# Patient Record
Sex: FEMALE | Race: WHITE | Employment: OTHER | URBAN - NONMETROPOLITAN AREA
[De-identification: names, ages, dates, MRNs, and addresses within clinical notes are randomized per-mention and may not be internally consistent; named-entity substitution may affect disease eponyms.]

---

## 2024-09-20 PROBLEM — I10 ESSENTIAL HYPERTENSION: Status: ACTIVE | Noted: 2020-09-24

## 2024-09-20 PROBLEM — E87.6 HYPOKALEMIA: Status: ACTIVE | Noted: 2020-09-24

## 2024-09-20 PROBLEM — K56.699 STRICTURE OF SIGMOID COLON (HCC): Status: ACTIVE | Noted: 2023-01-05

## 2024-09-20 PROBLEM — N32.1 COLOVESICAL FISTULA: Status: ACTIVE | Noted: 2023-09-15

## 2024-09-20 PROBLEM — Z86.711 HISTORY OF PULMONARY EMBOLUS (PE): Status: ACTIVE | Noted: 2020-09-24

## 2024-09-20 PROBLEM — F41.9 ANXIETY: Status: ACTIVE | Noted: 2020-09-24

## 2024-09-20 PROBLEM — J30.9 ALLERGIC RHINITIS: Status: ACTIVE | Noted: 2020-09-24

## 2024-09-20 PROBLEM — Z86.718 PERSONAL HISTORY OF VENOUS THROMBOSIS AND EMBOLISM: Status: ACTIVE | Noted: 2017-06-20

## 2024-09-20 PROBLEM — E03.9 HYPOTHYROIDISM: Status: ACTIVE | Noted: 2020-09-24

## 2024-09-20 PROBLEM — K21.9 GASTROESOPHAGEAL REFLUX DISEASE: Status: ACTIVE | Noted: 2020-09-24

## 2024-09-20 PROBLEM — D64.9 ANEMIA: Status: ACTIVE | Noted: 2022-11-17

## 2024-09-20 RX ORDER — PHENOL 1.4 %
10 AEROSOL, SPRAY (ML) MUCOUS MEMBRANE NIGHTLY
COMMUNITY

## 2024-09-20 RX ORDER — MONTELUKAST SODIUM 10 MG/1
10 TABLET ORAL DAILY
COMMUNITY
Start: 2024-07-30 | End: 2025-01-26

## 2024-09-20 RX ORDER — BETAMETHASONE DIPROPIONATE 0.5 MG/G
CREAM TOPICAL PRN
COMMUNITY
End: 2024-09-24

## 2024-09-20 RX ORDER — METOPROLOL SUCCINATE 50 MG/1
50 TABLET, EXTENDED RELEASE ORAL DAILY
COMMUNITY
Start: 2024-07-30 | End: 2025-01-26

## 2024-09-20 RX ORDER — BUSPIRONE HYDROCHLORIDE 10 MG/1
10 TABLET ORAL 2 TIMES DAILY
COMMUNITY
Start: 2024-07-30 | End: 2025-01-26

## 2024-09-20 RX ORDER — PANTOPRAZOLE SODIUM 40 MG/1
40 TABLET, DELAYED RELEASE ORAL DAILY
COMMUNITY
Start: 2024-07-30 | End: 2025-01-26

## 2024-09-20 RX ORDER — POTASSIUM CHLORIDE 1500 MG/1
20 TABLET, EXTENDED RELEASE ORAL DAILY
COMMUNITY
Start: 2024-07-30 | End: 2025-01-26

## 2024-09-20 RX ORDER — LOSARTAN POTASSIUM AND HYDROCHLOROTHIAZIDE 25; 100 MG/1; MG/1
1 TABLET ORAL DAILY
COMMUNITY
Start: 2024-07-30 | End: 2025-01-26

## 2024-09-20 RX ORDER — WARFARIN SODIUM 5 MG/1
5 TABLET ORAL
COMMUNITY
Start: 2024-07-25

## 2024-09-20 RX ORDER — LEVOTHYROXINE SODIUM 125 UG/1
125 TABLET ORAL DAILY
COMMUNITY
Start: 2024-04-24 | End: 2024-10-21

## 2024-09-20 RX ORDER — ACETAMINOPHEN 325 MG/1
325 TABLET ORAL EVERY 6 HOURS PRN
COMMUNITY

## 2024-09-24 ENCOUNTER — OFFICE VISIT (OUTPATIENT)
Dept: SURGERY | Age: 74
End: 2024-09-24
Payer: MEDICARE

## 2024-09-24 VITALS
WEIGHT: 183 LBS | RESPIRATION RATE: 18 BRPM | SYSTOLIC BLOOD PRESSURE: 120 MMHG | BODY MASS INDEX: 34.55 KG/M2 | DIASTOLIC BLOOD PRESSURE: 64 MMHG | HEART RATE: 87 BPM | HEIGHT: 61 IN | TEMPERATURE: 97.5 F | OXYGEN SATURATION: 96 %

## 2024-09-24 DIAGNOSIS — N82.4 COLOVAGINAL FISTULA: Primary | ICD-10-CM

## 2024-09-24 PROCEDURE — 3074F SYST BP LT 130 MM HG: CPT | Performed by: SURGERY

## 2024-09-24 PROCEDURE — G8427 DOCREV CUR MEDS BY ELIG CLIN: HCPCS | Performed by: SURGERY

## 2024-09-24 PROCEDURE — G8417 CALC BMI ABV UP PARAM F/U: HCPCS | Performed by: SURGERY

## 2024-09-24 PROCEDURE — 99204 OFFICE O/P NEW MOD 45 MIN: CPT | Performed by: SURGERY

## 2024-09-24 PROCEDURE — 3078F DIAST BP <80 MM HG: CPT | Performed by: SURGERY

## 2024-09-24 PROCEDURE — 1090F PRES/ABSN URINE INCON ASSESS: CPT | Performed by: SURGERY

## 2024-09-24 RX ORDER — FERROUS SULFATE 325(65) MG
325 TABLET ORAL
COMMUNITY

## 2024-09-24 RX ORDER — CETIRIZINE HYDROCHLORIDE 5 MG/1
5 TABLET ORAL DAILY
COMMUNITY

## 2024-09-24 RX ORDER — MONTELUKAST SODIUM 10 MG/1
10 TABLET ORAL NIGHTLY
COMMUNITY

## 2024-10-03 ENCOUNTER — HOSPITAL ENCOUNTER (OUTPATIENT)
Dept: CT IMAGING | Age: 74
Discharge: HOME | End: 2024-10-03
Attending: RADIOLOGY

## 2024-10-03 ENCOUNTER — HOSPITAL ENCOUNTER (OUTPATIENT)
Dept: GENERAL RADIOLOGY | Age: 74
Discharge: HOME | End: 2024-10-03

## 2024-10-03 DIAGNOSIS — Z00.6 EXAMINATION FOR NORMAL COMPARISON FOR CLINICAL RESEARCH: ICD-10-CM

## 2024-10-11 ENCOUNTER — OFFICE VISIT (OUTPATIENT)
Dept: SURGERY | Age: 74
End: 2024-10-11
Payer: MEDICARE

## 2024-10-11 VITALS
DIASTOLIC BLOOD PRESSURE: 68 MMHG | OXYGEN SATURATION: 97 % | SYSTOLIC BLOOD PRESSURE: 134 MMHG | TEMPERATURE: 97.1 F | HEART RATE: 76 BPM | RESPIRATION RATE: 18 BRPM

## 2024-10-11 DIAGNOSIS — N82.4 COLOVAGINAL FISTULA: ICD-10-CM

## 2024-10-11 DIAGNOSIS — Z01.818 PRE-OP TESTING: ICD-10-CM

## 2024-10-11 DIAGNOSIS — K56.699 STRICTURE OF SIGMOID COLON (HCC): Primary | ICD-10-CM

## 2024-10-11 DIAGNOSIS — I10 PRIMARY HYPERTENSION: ICD-10-CM

## 2024-10-11 PROCEDURE — G8399 PT W/DXA RESULTS DOCUMENT: HCPCS | Performed by: SURGERY

## 2024-10-11 PROCEDURE — 3078F DIAST BP <80 MM HG: CPT | Performed by: SURGERY

## 2024-10-11 PROCEDURE — 3075F SYST BP GE 130 - 139MM HG: CPT | Performed by: SURGERY

## 2024-10-11 PROCEDURE — G8427 DOCREV CUR MEDS BY ELIG CLIN: HCPCS | Performed by: SURGERY

## 2024-10-11 PROCEDURE — 3017F COLORECTAL CA SCREEN DOC REV: CPT | Performed by: SURGERY

## 2024-10-11 PROCEDURE — 1036F TOBACCO NON-USER: CPT | Performed by: SURGERY

## 2024-10-11 PROCEDURE — G8484 FLU IMMUNIZE NO ADMIN: HCPCS | Performed by: SURGERY

## 2024-10-11 PROCEDURE — G8417 CALC BMI ABV UP PARAM F/U: HCPCS | Performed by: SURGERY

## 2024-10-11 PROCEDURE — 99215 OFFICE O/P EST HI 40 MIN: CPT | Performed by: SURGERY

## 2024-10-11 PROCEDURE — 1090F PRES/ABSN URINE INCON ASSESS: CPT | Performed by: SURGERY

## 2024-10-11 PROCEDURE — 1123F ACP DISCUSS/DSCN MKR DOCD: CPT | Performed by: SURGERY

## 2024-10-11 NOTE — PROGRESS NOTES
Casimiro Callahan D.O. [facility]  [facility] GENERAL SURGERY  [address]  [place] [address]  [phone]  Established Patient Evaluation in Office    Pt Name: Marry Ayers  Date of Birth 1950   Today's Date: 10/11/2024  Medical Record Number: [id number]  Referring Provider: No ref. provider found  Primary Care Provider: Silvestre Sawyer MD  Chief Complaint   Patient presents with    Results     CT pelvis 10/03/24     ASSESSMENT       Diagnosis Orders   1. Stricture of sigmoid colon (HCC)  EKG 12 Lead    CBC    Comprehensive Metabolic Panel    COLECTOMY PARTIAL / TOTAL      2. Colovaginal fistula        3. Primary hypertension  EKG 12 Lead    CBC    Comprehensive Metabolic Panel      4. Pre-op testing  EKG 12 Lead    CBC    Comprehensive Metabolic Panel          Past Medical History:   Diagnosis Date    Allergic rhinitis     Anemia     Anxiety     Cataract     Essential hypertension     GERD (gastroesophageal reflux disease)     Hypokalemia     Hypothyroidism     Pulmonary embolism (HCC)           PLANS   Assessment & Plan   CT did not definitively show a fistula. No bladder symptoms. Still passing flatus vaginally.  Schedule Marry for sigmoid colectomy  Potential diagnostic and therapeutic modalities were discussed with the patient including surgical and nonsurgical options. The risks, complications and benefits of the options were discussed. Complications including, but not limited to, bleeding, wound infection, anastomotic leak, blood clots, bowel obstruction, other organ injury, injury to surrounding structures, intra-abdominal abscess, and need for colostomy were reviewed. The patient was given an opportunity to ask questions. Once answered, the patient is agreeable to proceed with surgery.  Status: Inpatient  Planned anesthesia: general  She will undergo pre-operative clearance per anesthesia guidelines with risk factors listed under the past medical history diagnosis &

## 2024-11-25 NOTE — PROGRESS NOTES
Called patient to set up PAT visit. States she is getting her hair cut currently and will call back when she gets home.

## 2024-11-25 NOTE — PROGRESS NOTES
PAT VISIT    Date: 11/26/24  Arrival time:  10:30am and location; [facility]   Bring Drivers license and insurance  Bring Medications in original bottles  If possible bring caregiver for appointment  Take am medications with water unless you are holding any for surgery  Appointment may last 2 hours

## 2024-12-02 NOTE — PROGRESS NOTES
Called pt to make sure they are aware to do Bowel Prep Discussed bowel prep instructions for surgery. Verbalizes understanding and denies any questions.  Stressed importance of nothing red. And do the medication at times that was on paper given them.

## 2024-12-03 ENCOUNTER — PREP FOR PROCEDURE (OUTPATIENT)
Dept: SURGERY | Age: 74
End: 2024-12-03

## 2024-12-03 RX ORDER — SODIUM CHLORIDE 0.9 % (FLUSH) 0.9 %
5-40 SYRINGE (ML) INJECTION PRN
Status: CANCELLED | OUTPATIENT
Start: 2024-12-03

## 2024-12-03 RX ORDER — METRONIDAZOLE 500 MG/100ML
500 INJECTION, SOLUTION INTRAVENOUS
Status: CANCELLED | OUTPATIENT
Start: 2024-12-03 | End: 2024-12-03

## 2024-12-03 RX ORDER — SODIUM CHLORIDE 0.9 % (FLUSH) 0.9 %
5-40 SYRINGE (ML) INJECTION EVERY 12 HOURS SCHEDULED
Status: CANCELLED | OUTPATIENT
Start: 2024-12-03

## 2024-12-03 RX ORDER — SODIUM CHLORIDE 9 MG/ML
INJECTION, SOLUTION INTRAVENOUS PRN
Status: CANCELLED | OUTPATIENT
Start: 2024-12-03

## 2024-12-03 NOTE — H&P
ZOIE Dugan  [facility] GENERAL SURGERY  [address]  [place] [address]  [phone]  Established Patient Evaluation in Office    Pt Name: Marry Ayers  Date of Birth 1950   Medical Record Number: [id number]  Referring Provider: No ref. provider found  Primary Care Provider: Silvestre Sawyer MD  Chief Complaint   Patient presents with    Results     CT pelvis 10/03/24     ASSESSMENT       Diagnosis Orders   1. Stricture of sigmoid colon (HCC)  EKG 12 Lead    CBC    Comprehensive Metabolic Panel    COLECTOMY PARTIAL / TOTAL      2. Colovaginal fistula        3. Primary hypertension  EKG 12 Lead    CBC    Comprehensive Metabolic Panel      4. Pre-op testing  EKG 12 Lead    CBC    Comprehensive Metabolic Panel          Past Medical History:   Diagnosis Date    Allergic rhinitis     Anemia     Anxiety     Cataract     Essential hypertension     GERD (gastroesophageal reflux disease)     Hypokalemia     Hypothyroidism     Pulmonary embolism (HCC)           PLANS   Assessment & Plan   CT did not definitively show a fistula. No bladder symptoms. Still passing flatus vaginally.  Schedule Marry for sigmoid colectomy  Potential diagnostic and therapeutic modalities were discussed with the patient including surgical and nonsurgical options. The risks, complications and benefits of the options were discussed. Complications including, but not limited to, bleeding, wound infection, anastomotic leak, blood clots, bowel obstruction, other organ injury, injury to surrounding structures, intra-abdominal abscess, and need for colostomy were reviewed. The patient was given an opportunity to ask questions. Once answered, the patient is agreeable to proceed with surgery.  Status: Inpatient  Planned anesthesia: general  She will undergo pre-operative clearance per anesthesia guidelines with risk factors listed under the past medical history diagnosis & problem list.  Perioperative      busPIRone (BUSPAR) 10 MG tablet Take 1 tablet by mouth 2 times daily      levothyroxine (SYNTHROID) 125 MCG tablet Take 1 tablet by mouth daily      losartan-hydroCHLOROthiazide (HYZAAR) 100-25 MG per tablet Take 1 tablet by mouth daily      Melatonin 10 MG TABS Take 10 mg by mouth nightly      metoprolol succinate (TOPROL XL) 50 MG extended release tablet Take 1 tablet by mouth daily      montelukast (SINGULAIR) 10 MG tablet Take 1 tablet by mouth daily      pantoprazole (PROTONIX) 40 MG tablet Take 1 tablet by mouth daily      potassium chloride (K-TAB) 20 MEQ TBCR extended release tablet Take 1 tablet by mouth daily      warfarin (COUMADIN) 5 MG tablet Take 1 tablet by mouth      acetaminophen (TYLENOL) 325 MG tablet Take 1 tablet by mouth every 6 hours as needed       No current facility-administered medications for this visit.     Allergies  has No Known Allergies.  Family History  family history is not on file.  Social History   reports that she has never smoked. She has never used smokeless tobacco. She reports that she does not currently use alcohol. She reports that she does not use drugs.  Health Screening Exams  Health Maintenance   Topic Date Due    Depression Screen  Never done    Hepatitis C screen  Never done    DTaP/Tdap/Td vaccine (1 - Tdap) Never done    Lipids  Never done    Breast cancer screen  Never done    Colorectal Cancer Screen  Never done    Respiratory Syncytial Virus (RSV) Pregnant or age 60 yrs+ (1 - 1-dose 60+ series) Never done    Pneumococcal 65+ years Vaccine (2 of 2 - PCV) 09/03/2019    Annual Wellness Visit (Medicare)  Never done    COVID-19 Vaccine (7 - 2023-24 season) 01/03/2025    DEXA (modify frequency per FRAX score)  Completed    Flu vaccine  Completed    Shingles vaccine  Completed    Hepatitis A vaccine  Aged Out    Hepatitis B vaccine  Aged Out    Hib vaccine  Aged Out    Polio vaccine  Aged Out    Meningococcal (ACWY) vaccine  Aged Out     Review of  Systems  +clotting disorder. Unless otherwise stated in HPI, ROS was unremarkable.     OBJECTIVE    VITALS:  temporal temperature is 97.1 °F (36.2 °C). Her blood pressure is 134/68 and her pulse is 76. Her respiration is 18 and oxygen saturation is 97%.  Pain Score:   0 - No pain There is no height or weight on file to calculate BMI.  CONSTITUTIONAL: Alert and oriented times 3, no acute distress and cooperative to examination with proper mood and affect.  SKIN: Skin color, texture, turgor normal. No rashes or lesions.  LYMPH: no cervical nodes, no inguinal nodes  HEENT: Head is normocephalic, atraumatic. EOMI, PERRLA.  NECK: Supple, symmetrical, trachea midline, no adenopathy, thyroid symmetric, not enlarged and no tenderness, skin normal.  CHEST/LUNGS: chest symmetric with normal A/P diameter, normal respiratory rate and rhythm, lungs clear to auscultation without wheezes, rales or rhonchi. No accessory muscle use. Scars None   CARDIOVASCULAR: Heart sounds are normal.  Regular rate and rhythm without murmur, gallop or rub. Normal S1 and S2. Carotid and femoral pulses 2+/4 and equal bilaterally.  ABDOMEN: Normal shape. Robotic and low midline scar(s) present. Normal bowel sounds.  No bruits. soft, nontender, nondistended, no masses or organomegaly. no evidence of hernia. Percussion: Normal without hepatosplenomegally.  RECTAL: deferred, not clinically indicated  NEUROLOGIC: There are no focalizing motor or sensory deficits. CN II-XII are grossly intact..   EXTREMITIES: no cyanosis, no clubbing, and no edema.      Thank you for the interesting evaluation. Further recommendations as listed above.       Electronically signed by DO Casimiro Kim DO [facility] GENERAL SURGERY  History and Physical Update    Pt Name: Marry Ayers  MRN: [id number]  YOB: 1950  Date of evaluation: 12/4/2024    I have examined the patient and reviewed the H&P/Consult and there are no changes to the

## 2024-12-04 ENCOUNTER — ANESTHESIA EVENT (OUTPATIENT)
Dept: OPERATING ROOM | Age: 74
End: 2024-12-04
Payer: MEDICARE

## 2024-12-04 ENCOUNTER — ANESTHESIA (OUTPATIENT)
Dept: OPERATING ROOM | Age: 74
End: 2024-12-04
Payer: MEDICARE

## 2024-12-04 ENCOUNTER — HOSPITAL ENCOUNTER (INPATIENT)
Age: 74
LOS: 24 days | Discharge: ANOTHER HOSPITAL | End: 2024-12-28
Attending: SURGERY | Admitting: SURGERY
Payer: MEDICARE

## 2024-12-04 DIAGNOSIS — Z43.2 ILEOSTOMY CARE (HCC): Primary | ICD-10-CM

## 2024-12-04 DIAGNOSIS — K56.699 SIGMOID STRICTURE (HCC): ICD-10-CM

## 2024-12-04 DIAGNOSIS — G89.18 ACUTE POSTOPERATIVE PAIN: ICD-10-CM

## 2024-12-04 PROCEDURE — 7100000001 HC PACU RECOVERY - ADDTL 15 MIN: Performed by: SURGERY

## 2024-12-04 PROCEDURE — 88304 TISSUE EXAM BY PATHOLOGIST: CPT

## 2024-12-04 PROCEDURE — 2580000003 HC RX 258: Performed by: SURGERY

## 2024-12-04 PROCEDURE — 88307 TISSUE EXAM BY PATHOLOGIST: CPT

## 2024-12-04 PROCEDURE — 6360000002 HC RX W HCPCS

## 2024-12-04 PROCEDURE — 6370000000 HC RX 637 (ALT 250 FOR IP): Performed by: SURGERY

## 2024-12-04 PROCEDURE — 0DTN0ZZ RESECTION OF SIGMOID COLON, OPEN APPROACH: ICD-10-PCS | Performed by: SURGERY

## 2024-12-04 PROCEDURE — 2709999900 HC NON-CHARGEABLE SUPPLY: Performed by: SURGERY

## 2024-12-04 PROCEDURE — 7100000000 HC PACU RECOVERY - FIRST 15 MIN: Performed by: SURGERY

## 2024-12-04 PROCEDURE — 3600000014 HC SURGERY LEVEL 4 ADDTL 15MIN: Performed by: SURGERY

## 2024-12-04 PROCEDURE — 6360000002 HC RX W HCPCS: Performed by: SURGERY

## 2024-12-04 PROCEDURE — 6360000002 HC RX W HCPCS: Performed by: NURSE ANESTHETIST, CERTIFIED REGISTERED

## 2024-12-04 PROCEDURE — 0DB80ZZ EXCISION OF SMALL INTESTINE, OPEN APPROACH: ICD-10-PCS | Performed by: SURGERY

## 2024-12-04 PROCEDURE — 2580000003 HC RX 258: Performed by: NURSE ANESTHETIST, CERTIFIED REGISTERED

## 2024-12-04 PROCEDURE — 1200000000 HC SEMI PRIVATE

## 2024-12-04 PROCEDURE — 3700000000 HC ANESTHESIA ATTENDED CARE: Performed by: SURGERY

## 2024-12-04 PROCEDURE — 44140 PARTIAL REMOVAL OF COLON: CPT | Performed by: SURGERY

## 2024-12-04 PROCEDURE — 44120 REMOVAL OF SMALL INTESTINE: CPT | Performed by: SURGERY

## 2024-12-04 PROCEDURE — 2500000003 HC RX 250 WO HCPCS: Performed by: NURSE ANESTHETIST, CERTIFIED REGISTERED

## 2024-12-04 PROCEDURE — 2720000010 HC SURG SUPPLY STERILE: Performed by: SURGERY

## 2024-12-04 PROCEDURE — 6360000002 HC RX W HCPCS: Performed by: ANESTHESIOLOGY

## 2024-12-04 PROCEDURE — 3700000001 HC ADD 15 MINUTES (ANESTHESIA): Performed by: SURGERY

## 2024-12-04 PROCEDURE — 3600000004 HC SURGERY LEVEL 4 BASE: Performed by: SURGERY

## 2024-12-04 RX ORDER — HYDRALAZINE HYDROCHLORIDE 20 MG/ML
INJECTION INTRAMUSCULAR; INTRAVENOUS
Status: COMPLETED
Start: 2024-12-04 | End: 2024-12-04

## 2024-12-04 RX ORDER — ENOXAPARIN SODIUM 100 MG/ML
40 INJECTION SUBCUTANEOUS DAILY
Status: DISCONTINUED | OUTPATIENT
Start: 2024-12-05 | End: 2024-12-12

## 2024-12-04 RX ORDER — ONDANSETRON 2 MG/ML
4 INJECTION INTRAMUSCULAR; INTRAVENOUS EVERY 6 HOURS PRN
Status: DISCONTINUED | OUTPATIENT
Start: 2024-12-04 | End: 2024-12-28 | Stop reason: HOSPADM

## 2024-12-04 RX ORDER — SODIUM CHLORIDE 0.9 % (FLUSH) 0.9 %
5-40 SYRINGE (ML) INJECTION PRN
Status: DISCONTINUED | OUTPATIENT
Start: 2024-12-04 | End: 2024-12-25

## 2024-12-04 RX ORDER — ROCURONIUM BROMIDE 10 MG/ML
INJECTION, SOLUTION INTRAVENOUS
Status: DISCONTINUED | OUTPATIENT
Start: 2024-12-04 | End: 2024-12-04 | Stop reason: SDUPTHER

## 2024-12-04 RX ORDER — SODIUM CHLORIDE 9 MG/ML
INJECTION, SOLUTION INTRAVENOUS CONTINUOUS
Status: DISCONTINUED | OUTPATIENT
Start: 2024-12-04 | End: 2024-12-06

## 2024-12-04 RX ORDER — HYDRALAZINE HYDROCHLORIDE 20 MG/ML
10 INJECTION INTRAMUSCULAR; INTRAVENOUS
Status: DISCONTINUED | OUTPATIENT
Start: 2024-12-04 | End: 2024-12-04 | Stop reason: HOSPADM

## 2024-12-04 RX ORDER — ONDANSETRON 2 MG/ML
INJECTION INTRAMUSCULAR; INTRAVENOUS
Status: DISCONTINUED | OUTPATIENT
Start: 2024-12-04 | End: 2024-12-04 | Stop reason: SDUPTHER

## 2024-12-04 RX ORDER — DEXAMETHASONE SODIUM PHOSPHATE 10 MG/ML
INJECTION, EMULSION INTRAMUSCULAR; INTRAVENOUS
Status: DISCONTINUED | OUTPATIENT
Start: 2024-12-04 | End: 2024-12-04 | Stop reason: SDUPTHER

## 2024-12-04 RX ORDER — SODIUM CHLORIDE 0.9 % (FLUSH) 0.9 %
5-40 SYRINGE (ML) INJECTION EVERY 12 HOURS SCHEDULED
Status: DISCONTINUED | OUTPATIENT
Start: 2024-12-04 | End: 2024-12-04 | Stop reason: HOSPADM

## 2024-12-04 RX ORDER — SODIUM CHLORIDE 0.9 % (FLUSH) 0.9 %
5-40 SYRINGE (ML) INJECTION EVERY 12 HOURS SCHEDULED
Status: DISCONTINUED | OUTPATIENT
Start: 2024-12-04 | End: 2024-12-25

## 2024-12-04 RX ORDER — MONTELUKAST SODIUM 10 MG/1
10 TABLET ORAL NIGHTLY
Status: DISCONTINUED | OUTPATIENT
Start: 2024-12-04 | End: 2024-12-06

## 2024-12-04 RX ORDER — HYDROCHLOROTHIAZIDE 25 MG/1
25 TABLET ORAL DAILY
Status: DISCONTINUED | OUTPATIENT
Start: 2024-12-04 | End: 2024-12-05 | Stop reason: SDUPTHER

## 2024-12-04 RX ORDER — LOSARTAN POTASSIUM 100 MG/1
100 TABLET ORAL DAILY
Status: DISCONTINUED | OUTPATIENT
Start: 2024-12-04 | End: 2024-12-05 | Stop reason: SDUPTHER

## 2024-12-04 RX ORDER — OXYCODONE HYDROCHLORIDE 5 MG/1
5 TABLET ORAL EVERY 4 HOURS PRN
Status: DISCONTINUED | OUTPATIENT
Start: 2024-12-04 | End: 2024-12-28 | Stop reason: HOSPADM

## 2024-12-04 RX ORDER — PROPOFOL 10 MG/ML
INJECTION, EMULSION INTRAVENOUS
Status: DISCONTINUED | OUTPATIENT
Start: 2024-12-04 | End: 2024-12-04 | Stop reason: SDUPTHER

## 2024-12-04 RX ORDER — MIDAZOLAM HYDROCHLORIDE 1 MG/ML
INJECTION, SOLUTION INTRAMUSCULAR; INTRAVENOUS
Status: DISCONTINUED | OUTPATIENT
Start: 2024-12-04 | End: 2024-12-04 | Stop reason: SDUPTHER

## 2024-12-04 RX ORDER — HYDROMORPHONE HYDROCHLORIDE 2 MG/ML
INJECTION, SOLUTION INTRAMUSCULAR; INTRAVENOUS; SUBCUTANEOUS
Status: DISCONTINUED | OUTPATIENT
Start: 2024-12-04 | End: 2024-12-04 | Stop reason: SDUPTHER

## 2024-12-04 RX ORDER — LEVOTHYROXINE SODIUM 125 UG/1
125 TABLET ORAL DAILY
Status: DISCONTINUED | OUTPATIENT
Start: 2024-12-05 | End: 2024-12-28 | Stop reason: HOSPADM

## 2024-12-04 RX ORDER — SODIUM CHLORIDE 0.9 % (FLUSH) 0.9 %
5-40 SYRINGE (ML) INJECTION PRN
Status: DISCONTINUED | OUTPATIENT
Start: 2024-12-04 | End: 2024-12-04 | Stop reason: HOSPADM

## 2024-12-04 RX ORDER — BUSPIRONE HYDROCHLORIDE 10 MG/1
10 TABLET ORAL 2 TIMES DAILY
Status: DISCONTINUED | OUTPATIENT
Start: 2024-12-04 | End: 2024-12-28 | Stop reason: HOSPADM

## 2024-12-04 RX ORDER — SODIUM CHLORIDE 9 MG/ML
INJECTION, SOLUTION INTRAVENOUS PRN
Status: DISCONTINUED | OUTPATIENT
Start: 2024-12-04 | End: 2024-12-28 | Stop reason: HOSPADM

## 2024-12-04 RX ORDER — METOPROLOL SUCCINATE 50 MG/1
50 TABLET, EXTENDED RELEASE ORAL DAILY
Status: DISCONTINUED | OUTPATIENT
Start: 2024-12-05 | End: 2024-12-28 | Stop reason: HOSPADM

## 2024-12-04 RX ORDER — FENTANYL CITRATE 50 UG/ML
INJECTION, SOLUTION INTRAMUSCULAR; INTRAVENOUS
Status: DISCONTINUED | OUTPATIENT
Start: 2024-12-04 | End: 2024-12-04 | Stop reason: SDUPTHER

## 2024-12-04 RX ORDER — PHENYLEPHRINE HCL IN 0.9% NACL 1 MG/10 ML
SYRINGE (ML) INTRAVENOUS
Status: DISCONTINUED | OUTPATIENT
Start: 2024-12-04 | End: 2024-12-04 | Stop reason: SDUPTHER

## 2024-12-04 RX ORDER — SODIUM CHLORIDE 9 MG/ML
INJECTION, SOLUTION INTRAVENOUS PRN
Status: DISCONTINUED | OUTPATIENT
Start: 2024-12-04 | End: 2024-12-04 | Stop reason: HOSPADM

## 2024-12-04 RX ORDER — LIDOCAINE HYDROCHLORIDE 20 MG/ML
INJECTION, SOLUTION INTRAVENOUS
Status: DISCONTINUED | OUTPATIENT
Start: 2024-12-04 | End: 2024-12-04 | Stop reason: SDUPTHER

## 2024-12-04 RX ORDER — LOSARTAN POTASSIUM AND HYDROCHLOROTHIAZIDE 25; 100 MG/1; MG/1
1 TABLET ORAL DAILY
Status: DISCONTINUED | OUTPATIENT
Start: 2024-12-04 | End: 2024-12-04 | Stop reason: CLARIF

## 2024-12-04 RX ORDER — METRONIDAZOLE 500 MG/100ML
500 INJECTION, SOLUTION INTRAVENOUS
Status: COMPLETED | OUTPATIENT
Start: 2024-12-04 | End: 2024-12-04

## 2024-12-04 RX ORDER — DROPERIDOL 2.5 MG/ML
0.62 INJECTION, SOLUTION INTRAMUSCULAR; INTRAVENOUS ONCE
Status: COMPLETED | OUTPATIENT
Start: 2024-12-04 | End: 2024-12-04

## 2024-12-04 RX ORDER — SODIUM CHLORIDE 9 MG/ML
INJECTION, SOLUTION INTRAVENOUS
Status: DISCONTINUED | OUTPATIENT
Start: 2024-12-04 | End: 2024-12-04 | Stop reason: SDUPTHER

## 2024-12-04 RX ADMIN — MONTELUKAST SODIUM 10 MG: 10 TABLET, FILM COATED ORAL at 19:54

## 2024-12-04 RX ADMIN — DEXAMETHASONE SODIUM PHOSPHATE 10 MG: 10 INJECTION, EMULSION INTRAMUSCULAR; INTRAVENOUS at 09:49

## 2024-12-04 RX ADMIN — HYDROMORPHONE HYDROCHLORIDE 0.5 MG: 1 INJECTION, SOLUTION INTRAMUSCULAR; INTRAVENOUS; SUBCUTANEOUS at 18:22

## 2024-12-04 RX ADMIN — PROPOFOL 150 MG: 10 INJECTION, EMULSION INTRAVENOUS at 09:39

## 2024-12-04 RX ADMIN — LIDOCAINE HYDROCHLORIDE 80 MG: 20 INJECTION, SOLUTION INTRAVENOUS at 09:39

## 2024-12-04 RX ADMIN — PIPERACILLIN AND TAZOBACTAM 3375 MG: 3; .375 INJECTION, POWDER, FOR SOLUTION INTRAVENOUS at 22:46

## 2024-12-04 RX ADMIN — HYDRALAZINE HYDROCHLORIDE 10 MG: 20 INJECTION INTRAMUSCULAR; INTRAVENOUS at 11:55

## 2024-12-04 RX ADMIN — SUGAMMADEX 200 MG: 100 INJECTION, SOLUTION INTRAVENOUS at 11:18

## 2024-12-04 RX ADMIN — ONDANSETRON 4 MG: 2 INJECTION INTRAMUSCULAR; INTRAVENOUS at 17:33

## 2024-12-04 RX ADMIN — PIPERACILLIN AND TAZOBACTAM 3375 MG: 3; .375 INJECTION, POWDER, FOR SOLUTION INTRAVENOUS at 15:13

## 2024-12-04 RX ADMIN — FENTANYL CITRATE 50 MCG: 50 INJECTION INTRAMUSCULAR; INTRAVENOUS at 09:56

## 2024-12-04 RX ADMIN — NALOXEGOL OXALATE 25 MG: 25 TABLET, FILM COATED ORAL at 19:54

## 2024-12-04 RX ADMIN — HYDROMORPHONE HYDROCHLORIDE 0.5 MG: 2 INJECTION INTRAMUSCULAR; INTRAVENOUS; SUBCUTANEOUS at 11:42

## 2024-12-04 RX ADMIN — FENTANYL CITRATE 50 MCG: 50 INJECTION INTRAMUSCULAR; INTRAVENOUS at 09:51

## 2024-12-04 RX ADMIN — Medication 100 MCG: at 09:56

## 2024-12-04 RX ADMIN — HYDROMORPHONE HYDROCHLORIDE 0.5 MG: 1 INJECTION, SOLUTION INTRAMUSCULAR; INTRAVENOUS; SUBCUTANEOUS at 22:51

## 2024-12-04 RX ADMIN — HYDROMORPHONE HYDROCHLORIDE 0.5 MG: 1 INJECTION, SOLUTION INTRAMUSCULAR; INTRAVENOUS; SUBCUTANEOUS at 12:10

## 2024-12-04 RX ADMIN — ONDANSETRON 4 MG: 2 INJECTION INTRAMUSCULAR; INTRAVENOUS at 11:18

## 2024-12-04 RX ADMIN — FENTANYL CITRATE 50 MCG: 50 INJECTION INTRAMUSCULAR; INTRAVENOUS at 10:05

## 2024-12-04 RX ADMIN — METRONIDAZOLE 500 MG: 500 INJECTION, SOLUTION INTRAVENOUS at 08:35

## 2024-12-04 RX ADMIN — LOSARTAN POTASSIUM 100 MG: 100 TABLET, FILM COATED ORAL at 19:52

## 2024-12-04 RX ADMIN — NALOXEGOL OXALATE 25 MG: 25 TABLET, FILM COATED ORAL at 08:23

## 2024-12-04 RX ADMIN — SODIUM CHLORIDE: 9 INJECTION, SOLUTION INTRAVENOUS at 10:28

## 2024-12-04 RX ADMIN — HYDROMORPHONE HYDROCHLORIDE 0.5 MG: 1 INJECTION, SOLUTION INTRAMUSCULAR; INTRAVENOUS; SUBCUTANEOUS at 12:15

## 2024-12-04 RX ADMIN — FENTANYL CITRATE 50 MCG: 50 INJECTION INTRAMUSCULAR; INTRAVENOUS at 10:21

## 2024-12-04 RX ADMIN — ROCURONIUM BROMIDE 20 MG: 10 INJECTION INTRAVENOUS at 10:43

## 2024-12-04 RX ADMIN — WATER 2000 MG: 1 INJECTION INTRAMUSCULAR; INTRAVENOUS; SUBCUTANEOUS at 09:50

## 2024-12-04 RX ADMIN — BUSPIRONE HYDROCHLORIDE 10 MG: 10 TABLET ORAL at 19:51

## 2024-12-04 RX ADMIN — HYDROCHLOROTHIAZIDE 25 MG: 25 TABLET ORAL at 19:54

## 2024-12-04 RX ADMIN — ROCURONIUM BROMIDE 50 MG: 10 INJECTION INTRAVENOUS at 09:39

## 2024-12-04 RX ADMIN — HYDROMORPHONE HYDROCHLORIDE 0.5 MG: 2 INJECTION INTRAMUSCULAR; INTRAVENOUS; SUBCUTANEOUS at 11:23

## 2024-12-04 RX ADMIN — SODIUM CHLORIDE, PRESERVATIVE FREE 10 ML: 5 INJECTION INTRAVENOUS at 19:56

## 2024-12-04 RX ADMIN — MIDAZOLAM 2 MG: 1 INJECTION INTRAMUSCULAR; INTRAVENOUS at 09:36

## 2024-12-04 RX ADMIN — HYDROMORPHONE HYDROCHLORIDE 0.5 MG: 1 INJECTION, SOLUTION INTRAMUSCULAR; INTRAVENOUS; SUBCUTANEOUS at 15:09

## 2024-12-04 RX ADMIN — FENTANYL CITRATE 50 MCG: 50 INJECTION INTRAMUSCULAR; INTRAVENOUS at 11:35

## 2024-12-04 RX ADMIN — DROPERIDOL 0.62 MG: 2.5 INJECTION, SOLUTION INTRAMUSCULAR; INTRAVENOUS at 12:05

## 2024-12-04 RX ADMIN — SODIUM CHLORIDE: 9 INJECTION, SOLUTION INTRAVENOUS at 08:33

## 2024-12-04 RX ADMIN — FENTANYL CITRATE 100 MCG: 50 INJECTION INTRAMUSCULAR; INTRAVENOUS at 09:39

## 2024-12-04 RX ADMIN — HYDROMORPHONE HYDROCHLORIDE 1 MG: 2 INJECTION INTRAMUSCULAR; INTRAVENOUS; SUBCUTANEOUS at 10:05

## 2024-12-04 ASSESSMENT — PAIN - FUNCTIONAL ASSESSMENT
PAIN_FUNCTIONAL_ASSESSMENT: PREVENTS OR INTERFERES SOME ACTIVE ACTIVITIES AND ADLS
PAIN_FUNCTIONAL_ASSESSMENT: NONE - DENIES PAIN
PAIN_FUNCTIONAL_ASSESSMENT: PREVENTS OR INTERFERES SOME ACTIVE ACTIVITIES AND ADLS
PAIN_FUNCTIONAL_ASSESSMENT: PREVENTS OR INTERFERES SOME ACTIVE ACTIVITIES AND ADLS
PAIN_FUNCTIONAL_ASSESSMENT: NONE - DENIES PAIN
PAIN_FUNCTIONAL_ASSESSMENT: PREVENTS OR INTERFERES SOME ACTIVE ACTIVITIES AND ADLS
PAIN_FUNCTIONAL_ASSESSMENT: PREVENTS OR INTERFERES SOME ACTIVE ACTIVITIES AND ADLS

## 2024-12-04 ASSESSMENT — PAIN SCALES - GENERAL
PAINLEVEL_OUTOF10: 10
PAINLEVEL_OUTOF10: 7
PAINLEVEL_OUTOF10: 10
PAINLEVEL_OUTOF10: 10
PAINLEVEL_OUTOF10: 2
PAINLEVEL_OUTOF10: 10

## 2024-12-04 ASSESSMENT — PAIN DESCRIPTION - DESCRIPTORS
DESCRIPTORS: SHARP;STABBING
DESCRIPTORS: BURNING;SHARP
DESCRIPTORS: SHARP
DESCRIPTORS: SHARP
DESCRIPTORS: ACHING;DISCOMFORT
DESCRIPTORS: BURNING;SHARP

## 2024-12-04 ASSESSMENT — PAIN DESCRIPTION - LOCATION
LOCATION: ABDOMEN

## 2024-12-04 ASSESSMENT — PAIN SCALES - WONG BAKER
WONGBAKER_NUMERICALRESPONSE: HURTS A LITTLE BIT
WONGBAKER_NUMERICALRESPONSE: HURTS A LITTLE BIT

## 2024-12-04 ASSESSMENT — PAIN DESCRIPTION - PAIN TYPE
TYPE: SURGICAL PAIN
TYPE: SURGICAL PAIN

## 2024-12-04 ASSESSMENT — PAIN DESCRIPTION - ORIENTATION
ORIENTATION: MID

## 2024-12-04 ASSESSMENT — PAIN DESCRIPTION - FREQUENCY: FREQUENCY: INTERMITTENT

## 2024-12-04 NOTE — ANESTHESIA POSTPROCEDURE EVALUATION
Department of Anesthesiology  Postprocedure Note    Patient: Marry Ayers  MRN: [id number]  YOB: 1950  Date of evaluation: 12/4/2024    Procedure Summary       Date: 12/04/24 [facility]    Anesthesia Start: 0935 Anesthesia Stop: 1144    Procedure: Open Sigmoid Colectomy, Partial Small Bowel Resection (Abdomen) Diagnosis:       Sigmoid stricture (HCC)      (Sigmoid stricture (HCC) [K56.699])    Surgeons: Casimiro Callahan DO Responsible Provider: Niraj Gonzalez DO    Anesthesia Type: general ASA Status: 2            Anesthesia Type: No value filed.    Evaristo Phase I: Evaristo Score: 9    Evaristo Phase II:      Anesthesia Post Evaluation    Patient location during evaluation: PACU  Level of consciousness: awake  Airway patency: patent  Nausea & Vomiting: no nausea  Cardiovascular status: hemodynamically stable  Respiratory status: acceptable  Hydration status: stable  Pain management: adequate    No notable events documented.

## 2024-12-04 NOTE — BRIEF OP NOTE
Brief Postoperative Note      Patient: Marry Ayers  YOB: 1950  MRN: [id number]    Date of Procedure: 12/4/2024    Pre-Op Diagnosis Codes:      * Sigmoid stricture (HCC) [K56.699]    Post-Op Diagnosis: Same       Procedure(s):  Open Sigmoid Colectomy, Partial Small Bowel Resection    Surgeon(s):  Casimiro Callahan DO    Assistant:  First Assistant: Jose Francisco Lynch, RN    Anesthesia: General    Estimated Blood Loss (mL): 200    Complications: None    Specimens:   ID Type Source Tests Collected by Time Destination   A : Portion Small Bowel Tissue Abdomen SURGICAL PATHOLOGY Casimiro Callahan DO 12/4/2024 1105    B : Portion Sigmoid Colon with Anastamosis Rings Tissue Sigmoid Colon SURGICAL PATHOLOGY Casimiro Callahan DO 12/4/2024 1106        Implants:  * No implants in log *      Drains:   Urinary Catheter 12/04/24 Phelps (Active)   Catheter Indications Perioperative use for selected surgical procedures 12/04/24 1240   Site Assessment Pink 12/04/24 1240   Urine Color Yellow 12/04/24 1240   Urine Appearance Clear 12/04/24 1240   Collection Container Standard 12/04/24 1240   Securement Method Securing device (Describe) 12/04/24 1240   Catheter Best Practices  Drainage tube clipped to bed;Drainage bag less than half full;Catheter secured to thigh;Lack of dependent loop in tubing;Tamper seal intact;Bag below bladder;Bag not on floor 12/04/24 1240   Status Draining 12/04/24 1240       Findings:  Infection Present At Time Of Surgery (PATOS) (choose all levels that have infection present):  No infection present    This procedure was not performed to treat colon cancer through resection    Electronically signed by Casimiro Callahan DO on 12/4/2024 at 12:51 PM

## 2024-12-04 NOTE — PROGRESS NOTES
Patient received in 6A14 per bed from PACU. Patient very drowsy. Bed alarm placed on.spouse left for the day once arrived to floor, will call at suppertime with update

## 2024-12-04 NOTE — PROGRESS NOTES
1142  - pt arrives to pacu, respirations unlabored on 4 L NC, pt responds to voice, pt denies pain, pt given dilaudid by CRNA, VSS    1155 - pt given 10 mg of hydralazine    1205 - pt given 0.625 mg of droperidol    1210 - pt given 0.5 mg of dilaudid    1215 - pt given 0.5 mg of dilaudid    1225 - pt resting comfortably, pt states pain improving and tolerable    1230 -report called to Genevieve RN    1235 - pt meets criteria for discharge from pacu at this time, waiting on transport    1250 - pt resting comfortably    1304 - pt transported to [facility] in stable condition

## 2024-12-04 NOTE — ANESTHESIA PRE PROCEDURE
2001    fusion   • CLAVICLE SURGERY     • COLONOSCOPY  08/07/2024    Dr. Prince   • CYSTOSCOPY W/ URETERAL STENT PLACEMENT  02/06/2023   • ESOPHAGOGASTRODUODENOSCOPY  09/19/2023    Dr. Tim Schirmer   • ESOPHAGOGASTRODUODENOSCOPY  12/23/2022    Dr. Tim Schirmer   • EYE SURGERY Right     2010- macular Hole Repair   • HERNIA REPAIR     • HYSTERECTOMY, TOTAL ABDOMINAL (CERVIX REMOVED)  1998   • SIGMOID COLECTOMY  02/06/2023   • TOTAL KNEE ARTHROPLASTY Left 2008       Social History:    Social History     Tobacco Use   • Smoking status: Every Day     Current packs/day: 0.25     Average packs/day: 0.3 packs/day for 30.0 years (7.5 ttl pk-yrs)     Types: Cigarettes     Start date: 11/26/1994   • Smokeless tobacco: Never   • Tobacco comments:     3 cigarettes/day   Substance Use Topics   • Alcohol use: Not Currently                                Ready to quit: Not Answered  Counseling given: Not Answered  Tobacco comments: 3 cigarettes/day      Vital Signs (Current):   Vitals:    12/04/24 0750   BP: 127/63   Pulse: 92   Resp: 16   Temp: 97.3 °F (36.3 °C)   TempSrc: Tympanic   SpO2: 95%   Weight: 83.2 kg (183 lb 6.4 oz)   Height: 1.549 m (5' 1\")                                              BP Readings from Last 3 Encounters:   12/04/24 127/63   11/26/24 (!) 140/70   10/11/24 134/68       NPO Status:                                                                                 BMI:   Wt Readings from Last 3 Encounters:   12/04/24 83.2 kg (183 lb 6.4 oz)   11/26/24 85 kg (187 lb 6.3 oz)   09/24/24 83 kg (183 lb)     Body mass index is 34.65 kg/m².    CBC: No results found for: \"WBC\", \"RBC\", \"HGB\", \"HCT\", \"MCV\", \"RDW\", \"PLT\"    CMP: No results found for: \"NA\", \"K\", \"CL\", \"CO2\", \"BUN\", \"CREATININE\", \"GFRAA\", \"AGRATIO\", \"LABGLOM\", \"GLUCOSE\", \"GLU\", \"CALCIUM\", \"BILITOT\", \"ALKPHOS\", \"AST\", \"ALT\"    POC Tests: No results for input(s): \"POCGLU\", \"POCNA\", \"POCK\", \"POCCL\", \"POCBUN\", \"POCHEMO\", \"POCHCT\" in the last 72

## 2024-12-04 NOTE — PROGRESS NOTES
Patient oriented to Same Day department and admitted to Same Day Surgery room 19.   Patient verbalized approval for first name, last initial with physician name on unit whiteboard.     Plan of care reviewed with patient.   Patient room whiteboard filled out and discussed with patient and responsible adult.   Patient and responsible adult offered Same Day Welcome Packet to review.    Call light in reach.   Bed in lowest position, locked, with one bed rail up.   SCDs and warming blanket in place.  Appropriate arm bands on patient.   Bathroom offered.   All questions and concerns of patient addressed.        Meds to Beds:   Patient informed of [facility] Meds to Beds program during admission. Patient has declined use of program.   Contact information for the pharmacy and the Meds to Beds program:   Name:    Relationship to patient:   Phone number:

## 2024-12-05 LAB
ANION GAP SERPL CALC-SCNC: 13 MEQ/L (ref 8–16)
BUN SERPL-MCNC: 9 MG/DL (ref 7–22)
CALCIUM SERPL-MCNC: 8.2 MG/DL (ref 8.5–10.5)
CHLORIDE SERPL-SCNC: 107 MEQ/L (ref 98–111)
CO2 SERPL-SCNC: 21 MEQ/L (ref 23–33)
CREAT SERPL-MCNC: 0.6 MG/DL (ref 0.4–1.2)
DEPRECATED RDW RBC AUTO: 57.5 FL (ref 35–45)
ERYTHROCYTE [DISTWIDTH] IN BLOOD BY AUTOMATED COUNT: 16.3 % (ref 11.5–14.5)
GFR SERPL CREATININE-BSD FRML MDRD: > 90 ML/MIN/1.73M2
GLUCOSE SERPL-MCNC: 128 MG/DL (ref 70–108)
HCT VFR BLD AUTO: 36.8 % (ref 37–47)
HGB BLD-MCNC: 11.5 GM/DL (ref 12–16)
MCH RBC QN AUTO: 30.3 PG (ref 26–33)
MCHC RBC AUTO-ENTMCNC: 31.3 GM/DL (ref 32.2–35.5)
MCV RBC AUTO: 96.8 FL (ref 81–99)
PLATELET # BLD AUTO: 309 THOU/MM3 (ref 130–400)
PMV BLD AUTO: 10 FL (ref 9.4–12.4)
POTASSIUM SERPL-SCNC: 3.3 MEQ/L (ref 3.5–5.2)
RBC # BLD AUTO: 3.8 MILL/MM3 (ref 4.2–5.4)
SODIUM SERPL-SCNC: 141 MEQ/L (ref 135–145)
WBC # BLD AUTO: 16 THOU/MM3 (ref 4.8–10.8)

## 2024-12-05 PROCEDURE — 6370000000 HC RX 637 (ALT 250 FOR IP): Performed by: SURGERY

## 2024-12-05 PROCEDURE — 99024 POSTOP FOLLOW-UP VISIT: CPT | Performed by: SURGERY

## 2024-12-05 PROCEDURE — 80048 BASIC METABOLIC PNL TOTAL CA: CPT

## 2024-12-05 PROCEDURE — 6360000002 HC RX W HCPCS: Performed by: SURGERY

## 2024-12-05 PROCEDURE — 6370000000 HC RX 637 (ALT 250 FOR IP)

## 2024-12-05 PROCEDURE — 85027 COMPLETE CBC AUTOMATED: CPT

## 2024-12-05 PROCEDURE — 36415 COLL VENOUS BLD VENIPUNCTURE: CPT

## 2024-12-05 PROCEDURE — 6360000002 HC RX W HCPCS

## 2024-12-05 PROCEDURE — 1200000000 HC SEMI PRIVATE

## 2024-12-05 PROCEDURE — 2580000003 HC RX 258: Performed by: SURGERY

## 2024-12-05 RX ORDER — POTASSIUM CHLORIDE 7.45 MG/ML
10 INJECTION INTRAVENOUS PRN
Status: DISCONTINUED | OUTPATIENT
Start: 2024-12-05 | End: 2024-12-28 | Stop reason: HOSPADM

## 2024-12-05 RX ORDER — POTASSIUM CHLORIDE 1500 MG/1
40 TABLET, EXTENDED RELEASE ORAL PRN
Status: DISCONTINUED | OUTPATIENT
Start: 2024-12-05 | End: 2024-12-28 | Stop reason: HOSPADM

## 2024-12-05 RX ORDER — KETOROLAC TROMETHAMINE 30 MG/ML
15 INJECTION, SOLUTION INTRAMUSCULAR; INTRAVENOUS EVERY 6 HOURS
Status: COMPLETED | OUTPATIENT
Start: 2024-12-05 | End: 2024-12-10

## 2024-12-05 RX ORDER — POTASSIUM CHLORIDE 1500 MG/1
20 TABLET, EXTENDED RELEASE ORAL PRN
Status: DISCONTINUED | OUTPATIENT
Start: 2024-12-05 | End: 2024-12-28 | Stop reason: HOSPADM

## 2024-12-05 RX ORDER — LOSARTAN POTASSIUM AND HYDROCHLOROTHIAZIDE 25; 100 MG/1; MG/1
1 TABLET ORAL DAILY
Status: DISCONTINUED | OUTPATIENT
Start: 2024-12-05 | End: 2024-12-28 | Stop reason: HOSPADM

## 2024-12-05 RX ADMIN — ONDANSETRON 4 MG: 2 INJECTION INTRAMUSCULAR; INTRAVENOUS at 17:37

## 2024-12-05 RX ADMIN — OXYCODONE 5 MG: 5 TABLET ORAL at 06:25

## 2024-12-05 RX ADMIN — KETOROLAC TROMETHAMINE 15 MG: 30 INJECTION, SOLUTION INTRAMUSCULAR at 20:20

## 2024-12-05 RX ADMIN — NALOXEGOL OXALATE 25 MG: 25 TABLET, FILM COATED ORAL at 08:22

## 2024-12-05 RX ADMIN — POTASSIUM CHLORIDE 40 MEQ: 1500 TABLET, EXTENDED RELEASE ORAL at 12:54

## 2024-12-05 RX ADMIN — PIPERACILLIN AND TAZOBACTAM 3375 MG: 3; .375 INJECTION, POWDER, FOR SOLUTION INTRAVENOUS at 08:18

## 2024-12-05 RX ADMIN — BUSPIRONE HYDROCHLORIDE 10 MG: 10 TABLET ORAL at 08:16

## 2024-12-05 RX ADMIN — SODIUM CHLORIDE: 9 INJECTION, SOLUTION INTRAVENOUS at 15:01

## 2024-12-05 RX ADMIN — PIPERACILLIN AND TAZOBACTAM 3375 MG: 3; .375 INJECTION, POWDER, FOR SOLUTION INTRAVENOUS at 02:08

## 2024-12-05 RX ADMIN — HYDROMORPHONE HYDROCHLORIDE 0.5 MG: 1 INJECTION, SOLUTION INTRAMUSCULAR; INTRAVENOUS; SUBCUTANEOUS at 04:29

## 2024-12-05 RX ADMIN — ENOXAPARIN SODIUM 40 MG: 100 INJECTION SUBCUTANEOUS at 08:15

## 2024-12-05 RX ADMIN — KETOROLAC TROMETHAMINE 15 MG: 30 INJECTION, SOLUTION INTRAMUSCULAR at 09:45

## 2024-12-05 RX ADMIN — SODIUM CHLORIDE: 9 INJECTION, SOLUTION INTRAVENOUS at 06:32

## 2024-12-05 RX ADMIN — OXYCODONE 5 MG: 5 TABLET ORAL at 02:12

## 2024-12-05 RX ADMIN — LEVOTHYROXINE SODIUM 125 MCG: 125 TABLET ORAL at 06:25

## 2024-12-05 RX ADMIN — BUSPIRONE HYDROCHLORIDE 10 MG: 10 TABLET ORAL at 20:20

## 2024-12-05 RX ADMIN — MONTELUKAST SODIUM 10 MG: 10 TABLET, FILM COATED ORAL at 20:20

## 2024-12-05 RX ADMIN — KETOROLAC TROMETHAMINE 15 MG: 30 INJECTION, SOLUTION INTRAMUSCULAR at 16:05

## 2024-12-05 RX ADMIN — LOSARTAN POTASSIUM AND HYDROCHLOROTHIAZIDE 1 TABLET: 25; 100 TABLET ORAL at 08:16

## 2024-12-05 RX ADMIN — METOPROLOL SUCCINATE 50 MG: 50 TABLET, EXTENDED RELEASE ORAL at 08:16

## 2024-12-05 ASSESSMENT — PAIN SCALES - GENERAL
PAINLEVEL_OUTOF10: 7
PAINLEVEL_OUTOF10: 5
PAINLEVEL_OUTOF10: 10
PAINLEVEL_OUTOF10: 4
PAINLEVEL_OUTOF10: 0
PAINLEVEL_OUTOF10: 6
PAINLEVEL_OUTOF10: 6
PAINLEVEL_OUTOF10: 5

## 2024-12-05 ASSESSMENT — PAIN DESCRIPTION - LOCATION
LOCATION: ABDOMEN

## 2024-12-05 ASSESSMENT — PAIN DESCRIPTION - DESCRIPTORS
DESCRIPTORS: BURNING;STABBING
DESCRIPTORS: SORE
DESCRIPTORS: ACHING
DESCRIPTORS: SHARP

## 2024-12-05 ASSESSMENT — PAIN DESCRIPTION - ORIENTATION
ORIENTATION: MID

## 2024-12-05 ASSESSMENT — PAIN DESCRIPTION - PAIN TYPE
TYPE: SURGICAL PAIN

## 2024-12-05 ASSESSMENT — PAIN SCALES - WONG BAKER
WONGBAKER_NUMERICALRESPONSE: HURTS A LITTLE BIT

## 2024-12-05 ASSESSMENT — PAIN - FUNCTIONAL ASSESSMENT
PAIN_FUNCTIONAL_ASSESSMENT: ACTIVITIES ARE NOT PREVENTED
PAIN_FUNCTIONAL_ASSESSMENT: PREVENTS OR INTERFERES SOME ACTIVE ACTIVITIES AND ADLS
PAIN_FUNCTIONAL_ASSESSMENT: ACTIVITIES ARE NOT PREVENTED
PAIN_FUNCTIONAL_ASSESSMENT: PREVENTS OR INTERFERES SOME ACTIVE ACTIVITIES AND ADLS

## 2024-12-05 ASSESSMENT — PAIN DESCRIPTION - FREQUENCY
FREQUENCY: INTERMITTENT
FREQUENCY: INTERMITTENT

## 2024-12-05 NOTE — PLAN OF CARE
changes in mentation and behavior   Position to facilitate oxygenation and minimize respiratory effort   Oxygen supplementation based on oxygen saturation or arterial blood gases   Encourage broncho-pulmonary hygiene including cough, deep breathe, incentive spirometry   Assess the need for suctioning and aspirate as needed   Assess and instruct to report shortness of breath or any respiratory difficulty   Respiratory therapy support as indicated     Problem: Skin/Tissue Integrity - Adult  Goal: Skin integrity remains intact  Outcome: Progressing  Flowsheets (Taken 12/5/2024 0753)  Skin Integrity Remains Intact:   Monitor for areas of redness and/or skin breakdown   Assess vascular access sites hourly     Problem: Skin/Tissue Integrity - Adult  Goal: Incisions, wounds, or drain sites healing without S/S of infection  Outcome: Progressing  Flowsheets (Taken 12/5/2024 0753)  Incisions, Wounds, or Drain Sites Healing Without Sign and Symptoms of Infection:   TWICE DAILY: Assess and document skin integrity   TWICE DAILY: Assess and document dressing/incision, wound bed, drain sites and surrounding tissue     Problem: Gastrointestinal - Adult  Goal: Minimal or absence of nausea and vomiting  Outcome: Progressing  Flowsheets (Taken 12/5/2024 0753)  Minimal or absence of nausea and vomiting:   Administer IV fluids as ordered to ensure adequate hydration   Maintain NPO status until nausea and vomiting are resolved   Administer ordered antiemetic medications as needed   Provide nonpharmacologic comfort measures as appropriate     Problem: Gastrointestinal - Adult  Goal: Maintains or returns to baseline bowel function  Outcome: Progressing  Flowsheets (Taken 12/5/2024 0753)  Maintains or returns to baseline bowel function:   Assess bowel function   Administer IV fluids as ordered to ensure adequate hydration   Administer ordered medications as needed   Encourage mobilization and activity     Problem: Gastrointestinal -  Adult  Goal: Maintains adequate nutritional intake  Outcome: Progressing  Flowsheets (Taken 12/5/2024 0753)  Maintains adequate nutritional intake:   Identify factors contributing to decreased intake, treat as appropriate   Monitor intake and output, weight and lab values     Problem: Genitourinary - Adult  Goal: Absence of urinary retention  Outcome: Progressing  Flowsheets (Taken 12/5/2024 0753)  Absence of urinary retention:   Assess patient’s ability to void and empty bladder   Monitor intake/output and perform bladder scan as needed     Problem: Genitourinary - Adult  Goal: Urinary catheter remains patent  Outcome: Completed  Flowsheets (Taken 12/5/2024 0753)  Urinary catheter remains patent: Assess patency of urinary catheter   Care plan reviewed with patient and patient verbalized the understanding of

## 2024-12-05 NOTE — PROGRESS NOTES
Orly Osman, DO  [facility]   General Surgery Daily Progress Note    Pt Name: Marry Ayers  Medical Record Number: [id number]  Date of Birth 1950   Today's Date: 12/5/2024  Chief complaint: Stricture of sigmoid colon  ASSESSMENT   Hospital day # 1   POD#1 Open Sigmoid Colectomy, Partial Small Bowel Resection with no complications   Pt on coumadin for hx of PE was bridged with Lovenox    has a past medical history of Allergic rhinitis, Anemia, Anxiety, Cataract, Essential hypertension, GERD (gastroesophageal reflux disease), Hx of blood clots, Hypokalemia, Hypothyroidism, and Pulmonary embolism (HCC).  PLAN   IV hydration  Analgesics and antiemetics as needed  Clear liquid diet as tolerated  Lovenox for DVT prophylaxis  Potassium replacement protocol   Increase activity  SUBJECTIVE   Marry is doing good. She denies any nausea or vomiting, has not passed flatus or had a bowel movement. She is tolerating a ADULT DIET; Clear Liquid. Her pain is well controlled on current medications. She has been ambulating in the halls.   CURRENT MEDICATIONS   Scheduled Meds:   losartan-hydroCHLOROthiazide  1 tablet Oral Daily    ketorolac  15 mg IntraVENous Q6H    busPIRone  10 mg Oral BID    levothyroxine  125 mcg Oral Daily    metoprolol succinate  50 mg Oral Daily    montelukast  10 mg Oral Nightly    sodium chloride flush  5-40 mL IntraVENous 2 times per day    enoxaparin  40 mg SubCUTAneous Daily    naloxegol  25 mg Oral Daily     Continuous Infusions:   sodium chloride 125 mL/hr at 12/05/24 0632    sodium chloride       PRN Meds:.sodium chloride flush, sodium chloride, oxyCODONE, ondansetron  OBJECTIVE   CURRENT VITALS:  height is 1.549 m (5' 1\") and weight is 83.2 kg (183 lb 6.4 oz). Her oral temperature is 98.2 °F (36.8 °C). Her blood pressure is 158/67 (abnormal) and her pulse is 100. Her respiration is 20 and oxygen saturation is 92%.   Temperature Range (24h):Temp: 98.2 °F (36.8 °C) Temp  Avg:  97.7 °F (36.5 °C)  Min: 97 °F (36.1 °C)  Max: 98.2 °F (36.8 °C)  BP Range (24h): Systolic (24hrs), Av , Min:105 , Max:204     Diastolic (24hrs), Av, Min:54, Max:87    Pulse Range (24h): Pulse  Av.3  Min: 76  Max: 103  Respiration Range (24h): Resp  Av.2  Min: 14  Max: 21  Current Pulse Ox (24h):  SpO2: 92 %  Pulse Ox Range (24h):  SpO2  Av.7 %  Min: 90 %  Max: 99 %  Oxygen Amount and Delivery: O2 Flow Rate (L/min): 2 L/min  Incentive Spirometry Tx:       Achieved Volume (mL): 1000 mL    GENERAL: alert, no distress  LUNGS: clear to ausculation, without wheezes, rales or rhonci  HEART: normal rate and regular rhythm  ABDOMEN: soft, non-tender, non-distended, bowel sounds present in all 4 quadrants, and no guarding or peritoneal signs  EXTREMITY: no cyanosis, clubbing or edema  In: 1303.1 [I.V.:1167.5]  Out: 1700 [Urine:1500]  Date 24 0000 - 24   Shift 7888-9705 1510-5620 2705-6494 24 Hour Total   INTAKE   Shift Total(mL/kg)       OUTPUT   Urine(mL/kg/hr) 1050(1.6)   1050   Shift Total(mL/kg) 1050(12.6)   1050(12.6)   Weight (kg) 83.2 83.2 83.2 83.2     LABS     Recent Labs     24  0621   WBC 16.0*   HGB 11.5*   HCT 36.8*         No results for input(s): \"INR\" in the last 72 hours.    Invalid input(s): \"PT\", \"PTT\"  No results for input(s): \"AST\", \"ALT\", \"BILITOT\", \"BILIDIR\", \"AMYLASE\", \"LIPASE\", \"LDH\", \"LACTA\" in the last 72 hours.  No results for input(s): \"TROPONINT\" in the last 72 hours.  RADIOLOGY     No orders to display         Electronically signed by Orly Osman DO on 2024 at 9:34 AM

## 2024-12-05 NOTE — PLAN OF CARE
Problem: Discharge Planning  Goal: Discharge to home or other facility with appropriate resources  Outcome: Progressing  Flowsheets (Taken 12/4/2024 1316 by Emma Bañuelos RN)  Discharge to home or other facility with appropriate resources:   Identify barriers to discharge with patient and caregiver   Arrange for needed discharge resources and transportation as appropriate   Identify discharge learning needs (meds, wound care, etc)   Refer to discharge planning if patient needs post-hospital services based on physician order or complex needs related to functional status, cognitive ability or social support system     Problem: Safety - Adult  Goal: Free from fall injury  Outcome: Progressing  Flowsheets (Taken 12/5/2024 0237)  Free From Fall Injury: Instruct family/caregiver on patient safety     Problem: ABCDS Injury Assessment  Goal: Absence of physical injury  Outcome: Progressing  Flowsheets (Taken 12/5/2024 0237)  Absence of Physical Injury: Implement safety measures based on patient assessment     Problem: Pain  Goal: Verbalizes/displays adequate comfort level or baseline comfort level  Outcome: Progressing  Flowsheets (Taken 12/4/2024 1316 by Emma Bañuelos, RN)  Verbalizes/displays adequate comfort level or baseline comfort level:   Encourage patient to monitor pain and request assistance   Assess pain using appropriate pain scale   Administer analgesics based on type and severity of pain and evaluate response   Implement non-pharmacological measures as appropriate and evaluate response   Notify Licensed Independent Practitioner if interventions unsuccessful or patient reports new pain   Consider cultural and social influences on pain and pain management     Problem: Respiratory - Adult  Goal: Achieves optimal ventilation and oxygenation  Outcome: Progressing  Flowsheets (Taken 12/5/2024 0237)  Achieves optimal ventilation and oxygenation:   Assess for changes in respiratory status   Assess for  changes in mentation and behavior     Problem: Skin/Tissue Integrity - Adult  Goal: Skin integrity remains intact  Outcome: Progressing  Flowsheets (Taken 12/5/2024 0237)  Skin Integrity Remains Intact:   Monitor for areas of redness and/or skin breakdown   Assess vascular access sites hourly     Problem: Skin/Tissue Integrity - Adult  Goal: Incisions, wounds, or drain sites healing without S/S of infection  Outcome: Progressing  Flowsheets (Taken 12/5/2024 0237)  Incisions, Wounds, or Drain Sites Healing Without Sign and Symptoms of Infection: ADMISSION and DAILY: Assess and document risk factors for pressure ulcer development     Problem: Gastrointestinal - Adult  Goal: Minimal or absence of nausea and vomiting  Outcome: Progressing  Flowsheets (Taken 12/5/2024 0237)  Minimal or absence of nausea and vomiting:   Administer IV fluids as ordered to ensure adequate hydration   Administer ordered antiemetic medications as needed   Maintain NPO status until nausea and vomiting are resolved   Provide nonpharmacologic comfort measures as appropriate     Problem: Gastrointestinal - Adult  Goal: Maintains or returns to baseline bowel function  Outcome: Progressing  Flowsheets (Taken 12/5/2024 0237)  Maintains or returns to baseline bowel function:   Assess bowel function   Administer ordered medications as needed   Encourage oral fluids to ensure adequate hydration   Encourage mobilization and activity   Administer IV fluids as ordered to ensure adequate hydration     Problem: Gastrointestinal - Adult  Goal: Maintains adequate nutritional intake  Outcome: Progressing     Problem: Genitourinary - Adult  Goal: Absence of urinary retention  Outcome: Progressing  Flowsheets (Taken 12/5/2024 0237)  Absence of urinary retention:   Assess patient’s ability to void and empty bladder   Monitor intake/output and perform bladder scan as needed     Problem: Genitourinary - Adult  Goal: Urinary catheter remains patent  Outcome:  Progressing  Flowsheets (Taken 12/5/2024 0237)  Urinary catheter remains patent: Assess patency of urinary catheter   Careplan reviewed with patient. Patient verbalized understanding of the plan of care.

## 2024-12-05 NOTE — CARE COORDINATION
Case Management Assessment Initial Evaluation    Date/Time of Evaluation: 12/5/2024 7:39 AM  Assessment Completed by: Avani Paniagua RN    If patient is discharged prior to next notation, then this note serves as note for discharge by case management.    Patient Name: Marry Ayers                   YOB: 1950  Diagnosis: Sigmoid stricture (HCC) [K56.699]                   Date / Time: 12/4/2024  7:09 AM  Location: [address] [place] [address]     Patient Admission Status: Inpatient   Readmission Risk Low 0-14, Mod 15-19), High > 20: Readmission Risk Score: 8    Current PCP: Silvestre Sawyer MD  Health Care Decision Makers:     Additional Case Management Notes: Admitted from [facility] for planned Colectomy. NPO with sips/chips. Ambulate. IVF at 125/hr. Movantik. Zosyn.     Procedures: 12-4-24 Open Sigmoid Colectomy     Imaging: No    Patient Goals/Plan/Treatment Preferences: Met with Marry. She resides with spouse and typically independent but does limited driving due to eye issue. CM to cont to follow for discharge needs.          12/05/24 1118   Service Assessment   Patient Orientation Alert and Oriented   Cognition Alert   History Provided By Patient   Primary Caregiver Self   Support Systems Spouse/Significant Other;Friends/Neighbors   Patient's Healthcare Decision Maker is: Legal Next of Kin   PCP Verified by CM Yes   Last Visit to PCP Within last 3 months   Prior Functional Level Independent in ADLs/IADLs   Current Functional Level Cooking;Housework;Shopping   Can patient return to prior living arrangement Yes   Ability to make needs known: Good   Family able to assist with home care needs: Yes   Would you like for me to discuss the discharge plan with any other family members/significant others, and if so, who? No   Financial Resources Medicare;Other (Comment)  ([employer])   Community Resources None   Social/Functional History   Lives With Spouse   Type of Home House   Home Layout One level   Active  Yes

## 2024-12-06 LAB
ANION GAP SERPL CALC-SCNC: 14 MEQ/L (ref 8–16)
BUN SERPL-MCNC: 7 MG/DL (ref 7–22)
CALCIUM SERPL-MCNC: 8.3 MG/DL (ref 8.5–10.5)
CHLORIDE SERPL-SCNC: 110 MEQ/L (ref 98–111)
CO2 SERPL-SCNC: 19 MEQ/L (ref 23–33)
CREAT SERPL-MCNC: 0.6 MG/DL (ref 0.4–1.2)
DEPRECATED RDW RBC AUTO: 57.1 FL (ref 35–45)
ERYTHROCYTE [DISTWIDTH] IN BLOOD BY AUTOMATED COUNT: 16.1 % (ref 11.5–14.5)
GFR SERPL CREATININE-BSD FRML MDRD: > 90 ML/MIN/1.73M2
GLUCOSE SERPL-MCNC: 103 MG/DL (ref 70–108)
HCT VFR BLD AUTO: 31.2 % (ref 37–47)
HGB BLD-MCNC: 9.9 GM/DL (ref 12–16)
MCH RBC QN AUTO: 30.6 PG (ref 26–33)
MCHC RBC AUTO-ENTMCNC: 31.7 GM/DL (ref 32.2–35.5)
MCV RBC AUTO: 96.3 FL (ref 81–99)
PLATELET # BLD AUTO: 274 THOU/MM3 (ref 130–400)
PMV BLD AUTO: 10.5 FL (ref 9.4–12.4)
POTASSIUM SERPL-SCNC: 3.2 MEQ/L (ref 3.5–5.2)
RBC # BLD AUTO: 3.24 MILL/MM3 (ref 4.2–5.4)
SODIUM SERPL-SCNC: 143 MEQ/L (ref 135–145)
WBC # BLD AUTO: 17 THOU/MM3 (ref 4.8–10.8)

## 2024-12-06 PROCEDURE — 80048 BASIC METABOLIC PNL TOTAL CA: CPT

## 2024-12-06 PROCEDURE — 6370000000 HC RX 637 (ALT 250 FOR IP)

## 2024-12-06 PROCEDURE — 6370000000 HC RX 637 (ALT 250 FOR IP): Performed by: SURGERY

## 2024-12-06 PROCEDURE — 6360000002 HC RX W HCPCS: Performed by: SURGERY

## 2024-12-06 PROCEDURE — 6360000002 HC RX W HCPCS

## 2024-12-06 PROCEDURE — 85027 COMPLETE CBC AUTOMATED: CPT

## 2024-12-06 PROCEDURE — 36415 COLL VENOUS BLD VENIPUNCTURE: CPT

## 2024-12-06 PROCEDURE — 1200000000 HC SEMI PRIVATE

## 2024-12-06 PROCEDURE — 2580000003 HC RX 258: Performed by: SURGERY

## 2024-12-06 RX ORDER — SODIUM CHLORIDE 9 MG/ML
INJECTION, SOLUTION INTRAVENOUS CONTINUOUS
Status: DISCONTINUED | OUTPATIENT
Start: 2024-12-06 | End: 2024-12-09

## 2024-12-06 RX ORDER — MONTELUKAST SODIUM 10 MG/1
10 TABLET ORAL EVERY MORNING
Status: DISCONTINUED | OUTPATIENT
Start: 2024-12-07 | End: 2024-12-28 | Stop reason: HOSPADM

## 2024-12-06 RX ADMIN — SODIUM CHLORIDE: 9 INJECTION, SOLUTION INTRAVENOUS at 00:09

## 2024-12-06 RX ADMIN — SODIUM CHLORIDE: 9 INJECTION, SOLUTION INTRAVENOUS at 23:19

## 2024-12-06 RX ADMIN — POTASSIUM BICARBONATE 40 MEQ: 782 TABLET, EFFERVESCENT ORAL at 08:02

## 2024-12-06 RX ADMIN — ENOXAPARIN SODIUM 40 MG: 100 INJECTION SUBCUTANEOUS at 08:02

## 2024-12-06 RX ADMIN — BUSPIRONE HYDROCHLORIDE 10 MG: 10 TABLET ORAL at 07:55

## 2024-12-06 RX ADMIN — KETOROLAC TROMETHAMINE 15 MG: 30 INJECTION, SOLUTION INTRAMUSCULAR at 09:34

## 2024-12-06 RX ADMIN — LEVOTHYROXINE SODIUM 125 MCG: 125 TABLET ORAL at 06:50

## 2024-12-06 RX ADMIN — METOPROLOL SUCCINATE 50 MG: 50 TABLET, EXTENDED RELEASE ORAL at 07:56

## 2024-12-06 RX ADMIN — NALOXEGOL OXALATE 25 MG: 25 TABLET, FILM COATED ORAL at 08:02

## 2024-12-06 RX ADMIN — BUSPIRONE HYDROCHLORIDE 10 MG: 10 TABLET ORAL at 19:39

## 2024-12-06 RX ADMIN — OXYCODONE 5 MG: 5 TABLET ORAL at 04:18

## 2024-12-06 RX ADMIN — KETOROLAC TROMETHAMINE 15 MG: 30 INJECTION, SOLUTION INTRAMUSCULAR at 04:18

## 2024-12-06 RX ADMIN — KETOROLAC TROMETHAMINE 15 MG: 30 INJECTION, SOLUTION INTRAMUSCULAR at 15:39

## 2024-12-06 RX ADMIN — LOSARTAN POTASSIUM AND HYDROCHLOROTHIAZIDE 1 TABLET: 25; 100 TABLET ORAL at 07:56

## 2024-12-06 RX ADMIN — KETOROLAC TROMETHAMINE 15 MG: 30 INJECTION, SOLUTION INTRAMUSCULAR at 21:36

## 2024-12-06 RX ADMIN — SODIUM CHLORIDE: 9 INJECTION, SOLUTION INTRAVENOUS at 08:07

## 2024-12-06 ASSESSMENT — PAIN DESCRIPTION - ORIENTATION: ORIENTATION: MID

## 2024-12-06 ASSESSMENT — PAIN DESCRIPTION - LOCATION: LOCATION: ABDOMEN

## 2024-12-06 ASSESSMENT — PAIN DESCRIPTION - DESCRIPTORS: DESCRIPTORS: SHARP

## 2024-12-06 ASSESSMENT — PAIN SCALES - GENERAL
PAINLEVEL_OUTOF10: 6
PAINLEVEL_OUTOF10: 9
PAINLEVEL_OUTOF10: 0

## 2024-12-06 ASSESSMENT — PAIN - FUNCTIONAL ASSESSMENT: PAIN_FUNCTIONAL_ASSESSMENT: ACTIVITIES ARE NOT PREVENTED

## 2024-12-06 NOTE — PLAN OF CARE
Problem: Discharge Planning  Goal: Discharge to home or other facility with appropriate resources  12/6/2024 0048 by Renita Peterson RN  Outcome: Progressing  Flowsheets (Taken 12/5/2024 2017)  Discharge to home or other facility with appropriate resources:   Identify barriers to discharge with patient and caregiver   Arrange for needed discharge resources and transportation as appropriate   Identify discharge learning needs (meds, wound care, etc)     Problem: Safety - Adult  Goal: Free from fall injury  12/6/2024 0048 by Renita Peterson RN  Outcome: Progressing  Flowsheets (Taken 12/6/2024 0048)  Free From Fall Injury:   Instruct family/caregiver on patient safety   Based on caregiver fall risk screen, instruct family/caregiver to ask for assistance with transferring infant if caregiver noted to have fall risk factors     Problem: ABCDS Injury Assessment  Goal: Absence of physical injury  12/6/2024 0048 by Renita Peterson RN  Outcome: Progressing  Flowsheets (Taken 12/6/2024 0048)  Absence of Physical Injury: Implement safety measures based on patient assessment     Problem: Pain  Goal: Verbalizes/displays adequate comfort level or baseline comfort level  12/6/2024 0048 by Renita Peterson RN  Outcome: Progressing  Flowsheets (Taken 12/6/2024 0048)  Verbalizes/displays adequate comfort level or baseline comfort level:   Encourage patient to monitor pain and request assistance   Assess pain using appropriate pain scale   Administer analgesics based on type and severity of pain and evaluate response   Implement non-pharmacological measures as appropriate and evaluate response     Problem: Respiratory - Adult  Goal: Achieves optimal ventilation and oxygenation  12/6/2024 0048 by Renita Peterson RN  Outcome: Progressing  Flowsheets (Taken 12/5/2024 2017)  Achieves optimal ventilation and oxygenation:   Assess for changes in respiratory status   Assess for changes in mentation and behavior    patient and daughter. Patient and daughter verbalize understanding of plan of care and contribute to goal setting.

## 2024-12-06 NOTE — PROGRESS NOTES
[facility]   PROGRESS NOTE      Patient: Marry Ayers  Room #: 6A-14/014-A            YOB: 1950  Age: 74 y.o.  Gender: female            Admit Date & Time: 12/4/2024  7:09 AM    Assessment:    The patient declined a visit today.    Interventions:  The patient was provided information about Spiritual Care being available.     Outcomes:  The  wished the patient a positive day.     Plan:  1.Spiritual care will continue to follow the patient according to [facility] spiritual care SOP.       Electronically signed by Chaplain Lopez, on 12/6/2024 at 9:50 AM.  Spiritual Care Department  [facility]  [phone]

## 2024-12-06 NOTE — PROGRESS NOTES
Orly Osman, DO  [facility]   General Surgery Daily Progress Note    Pt Name: Marry Ayers  Medical Record Number: [id number]  Date of Birth 1950   Today's Date: 12/6/2024  Chief complaint: Stricture of sigmoid colon   ASSESSMENT   Hospital day # 2   POD#2 Open Sigmoid Colectomy, Partial Small Bowel Resection with no complications   Pt on coumadin for hx of PE was bridged with Lovenox   Leukocytosis 2/2 to parotid gland swelling    Hypokalemia    has a past medical history of Allergic rhinitis, Anemia, Anxiety, Cataract, Essential hypertension, GERD (gastroesophageal reflux disease), Hx of blood clots, Hypokalemia, Hypothyroidism, and Pulmonary embolism (HCC).  PLAN   IV hydration  Analgesics and antiemetics as needed  Clear liquid diet as tolerated  Lovenox for DVT prophylaxis  Warm compress and anti-inflammatories   Continue Potassium replacement protocol   Increase activity  SUBJECTIVE   Marry is doing good. She denies any nausea or vomiting, has not passed flatus or had a bowel movement. She is tolerating a ADULT DIET; Clear Liquid. Her pain is well controlled on current medications. She has been ambulating in the halls.   CURRENT MEDICATIONS   Scheduled Meds:   losartan-hydroCHLOROthiazide  1 tablet Oral Daily    ketorolac  15 mg IntraVENous Q6H    busPIRone  10 mg Oral BID    levothyroxine  125 mcg Oral Daily    metoprolol succinate  50 mg Oral Daily    montelukast  10 mg Oral Nightly    sodium chloride flush  5-40 mL IntraVENous 2 times per day    enoxaparin  40 mg SubCUTAneous Daily    naloxegol  25 mg Oral Daily     Continuous Infusions:   sodium chloride 125 mL/hr at 12/06/24 0807    sodium chloride       PRN Meds:.potassium chloride **OR** potassium alternative oral replacement **OR** potassium chloride, potassium chloride, sodium chloride flush, sodium chloride, oxyCODONE, ondansetron  OBJECTIVE   CURRENT VITALS:  height is 1.549 m (5' 1\") and weight is 83.2 kg (183 lb 6.4 oz).

## 2024-12-06 NOTE — PLAN OF CARE
Problem: Discharge Planning  Goal: Discharge to home or other facility with appropriate resources  12/6/2024 1036 by Rowena Joseph RN  Outcome: Progressing  Flowsheets (Taken 12/6/2024 1036)  Discharge to home or other facility with appropriate resources:   Identify barriers to discharge with patient and caregiver   Identify discharge learning needs (meds, wound care, etc)   Arrange for needed discharge resources and transportation as appropriate   Arrange for interpreters to assist at discharge as needed     Problem: Safety - Adult  Goal: Free from fall injury  12/6/2024 1036 by Rowena Joseph RN  Outcome: Progressing  Flowsheets (Taken 12/6/2024 1036)  Free From Fall Injury:   Instruct family/caregiver on patient safety   Based on caregiver fall risk screen, instruct family/caregiver to ask for assistance with transferring infant if caregiver noted to have fall risk factors     Problem: ABCDS Injury Assessment  Goal: Absence of physical injury  12/6/2024 1036 by Rowena Joseph RN  Outcome: Progressing  Flowsheets (Taken 12/6/2024 1036)  Absence of Physical Injury: Implement safety measures based on patient assessment     Problem: Pain  Goal: Verbalizes/displays adequate comfort level or baseline comfort level  12/6/2024 1036 by Rowena Joseph RN  Outcome: Progressing  Flowsheets (Taken 12/6/2024 1036)  Verbalizes/displays adequate comfort level or baseline comfort level:   Encourage patient to monitor pain and request assistance   Administer analgesics based on type and severity of pain and evaluate response   Assess pain using appropriate pain scale   Implement non-pharmacological measures as appropriate and evaluate response     Problem: Respiratory - Adult  Goal: Achieves optimal ventilation and oxygenation  12/6/2024 1036 by Rowena Joseph RN  Outcome: Progressing  Flowsheets (Taken 12/6/2024 1036)  Achieves optimal ventilation and oxygenation:   Assess for changes in

## 2024-12-06 NOTE — CARE COORDINATION
12/6/24, 11:03 AM EST    Patient goals/plan/ treatment preferences discussed by  and .  Patient goals/plan/ treatment preferences reviewed with patient/ family.  Patient/ family verbalize understanding of discharge plan and are in agreement with goal/plan/treatment preferences.  Understanding was demonstrated using the teach back method.  AVS provided by RN at time of discharge, which includes all necessary medical information pertaining to the patients current course of illness, treatment, post-discharge goals of care, and treatment preferences.     Services At/After Discharge: None    Potential discharge in next 48 hrs pending bowel function dietary tolerance. Plans return home with spouse.

## 2024-12-07 ENCOUNTER — APPOINTMENT (OUTPATIENT)
Dept: GENERAL RADIOLOGY | Age: 74
End: 2024-12-07
Attending: SURGERY
Payer: MEDICARE

## 2024-12-07 LAB
ANION GAP SERPL CALC-SCNC: 11 MEQ/L (ref 8–16)
BUN SERPL-MCNC: 6 MG/DL (ref 7–22)
CALCIUM SERPL-MCNC: 8.6 MG/DL (ref 8.5–10.5)
CHLORIDE SERPL-SCNC: 108 MEQ/L (ref 98–111)
CO2 SERPL-SCNC: 24 MEQ/L (ref 23–33)
CREAT SERPL-MCNC: 0.6 MG/DL (ref 0.4–1.2)
DEPRECATED RDW RBC AUTO: 54.4 FL (ref 35–45)
ERYTHROCYTE [DISTWIDTH] IN BLOOD BY AUTOMATED COUNT: 15.9 % (ref 11.5–14.5)
GFR SERPL CREATININE-BSD FRML MDRD: > 90 ML/MIN/1.73M2
GLUCOSE SERPL-MCNC: 101 MG/DL (ref 70–108)
HCT VFR BLD AUTO: 28.9 % (ref 37–47)
HGB BLD-MCNC: 9.4 GM/DL (ref 12–16)
MCH RBC QN AUTO: 30.4 PG (ref 26–33)
MCHC RBC AUTO-ENTMCNC: 32.5 GM/DL (ref 32.2–35.5)
MCV RBC AUTO: 93.5 FL (ref 81–99)
PLATELET # BLD AUTO: 279 THOU/MM3 (ref 130–400)
PMV BLD AUTO: 10.5 FL (ref 9.4–12.4)
POTASSIUM SERPL-SCNC: 3.2 MEQ/L (ref 3.5–5.2)
RBC # BLD AUTO: 3.09 MILL/MM3 (ref 4.2–5.4)
SODIUM SERPL-SCNC: 143 MEQ/L (ref 135–145)
WBC # BLD AUTO: 13 THOU/MM3 (ref 4.8–10.8)

## 2024-12-07 PROCEDURE — 74018 RADEX ABDOMEN 1 VIEW: CPT

## 2024-12-07 PROCEDURE — 93005 ELECTROCARDIOGRAM TRACING: CPT | Performed by: SURGERY

## 2024-12-07 PROCEDURE — 6360000002 HC RX W HCPCS: Performed by: NURSE PRACTITIONER

## 2024-12-07 PROCEDURE — 2580000003 HC RX 258: Performed by: SURGERY

## 2024-12-07 PROCEDURE — APPSS30 APP SPLIT SHARED TIME 16-30 MINUTES: Performed by: NURSE PRACTITIONER

## 2024-12-07 PROCEDURE — 83735 ASSAY OF MAGNESIUM: CPT

## 2024-12-07 PROCEDURE — 6360000002 HC RX W HCPCS

## 2024-12-07 PROCEDURE — 6360000002 HC RX W HCPCS: Performed by: SURGERY

## 2024-12-07 PROCEDURE — 85027 COMPLETE CBC AUTOMATED: CPT

## 2024-12-07 PROCEDURE — 1200000000 HC SEMI PRIVATE

## 2024-12-07 PROCEDURE — 80048 BASIC METABOLIC PNL TOTAL CA: CPT

## 2024-12-07 PROCEDURE — 6370000000 HC RX 637 (ALT 250 FOR IP): Performed by: SURGERY

## 2024-12-07 PROCEDURE — 36415 COLL VENOUS BLD VENIPUNCTURE: CPT

## 2024-12-07 PROCEDURE — 6370000000 HC RX 637 (ALT 250 FOR IP)

## 2024-12-07 PROCEDURE — 84100 ASSAY OF PHOSPHORUS: CPT

## 2024-12-07 PROCEDURE — 99024 POSTOP FOLLOW-UP VISIT: CPT | Performed by: NURSE PRACTITIONER

## 2024-12-07 RX ORDER — MORPHINE SULFATE 2 MG/ML
2 INJECTION, SOLUTION INTRAMUSCULAR; INTRAVENOUS EVERY 4 HOURS PRN
Status: DISCONTINUED | OUTPATIENT
Start: 2024-12-07 | End: 2024-12-08

## 2024-12-07 RX ORDER — PANTOPRAZOLE SODIUM 40 MG/10ML
40 INJECTION, POWDER, LYOPHILIZED, FOR SOLUTION INTRAVENOUS DAILY
Status: DISCONTINUED | OUTPATIENT
Start: 2024-12-07 | End: 2024-12-22

## 2024-12-07 RX ORDER — SODIUM CHLORIDE 9 MG/ML
INJECTION, SOLUTION INTRAVENOUS CONTINUOUS
Status: ACTIVE | OUTPATIENT
Start: 2024-12-08 | End: 2024-12-12

## 2024-12-07 RX ORDER — HYDRALAZINE HYDROCHLORIDE 20 MG/ML
10 INJECTION INTRAMUSCULAR; INTRAVENOUS EVERY 6 HOURS PRN
Status: DISCONTINUED | OUTPATIENT
Start: 2024-12-07 | End: 2024-12-28 | Stop reason: HOSPADM

## 2024-12-07 RX ADMIN — HYDRALAZINE HYDROCHLORIDE 10 MG: 20 INJECTION INTRAMUSCULAR; INTRAVENOUS at 15:38

## 2024-12-07 RX ADMIN — POTASSIUM CHLORIDE 40 MEQ: 1500 TABLET, EXTENDED RELEASE ORAL at 08:40

## 2024-12-07 RX ADMIN — SODIUM CHLORIDE, PRESERVATIVE FREE 10 ML: 5 INJECTION INTRAVENOUS at 08:39

## 2024-12-07 RX ADMIN — SODIUM CHLORIDE: 9 INJECTION, SOLUTION INTRAVENOUS at 18:56

## 2024-12-07 RX ADMIN — METOPROLOL SUCCINATE 50 MG: 50 TABLET, EXTENDED RELEASE ORAL at 08:48

## 2024-12-07 RX ADMIN — KETOROLAC TROMETHAMINE 15 MG: 30 INJECTION, SOLUTION INTRAMUSCULAR at 03:23

## 2024-12-07 RX ADMIN — ENOXAPARIN SODIUM 40 MG: 100 INJECTION SUBCUTANEOUS at 08:40

## 2024-12-07 RX ADMIN — BUSPIRONE HYDROCHLORIDE 10 MG: 10 TABLET ORAL at 08:46

## 2024-12-07 RX ADMIN — MONTELUKAST SODIUM 10 MG: 10 TABLET ORAL at 08:48

## 2024-12-07 RX ADMIN — LOSARTAN POTASSIUM AND HYDROCHLOROTHIAZIDE 1 TABLET: 25; 100 TABLET ORAL at 08:47

## 2024-12-07 RX ADMIN — KETOROLAC TROMETHAMINE 15 MG: 30 INJECTION, SOLUTION INTRAMUSCULAR at 08:39

## 2024-12-07 RX ADMIN — LEVOTHYROXINE SODIUM 125 MCG: 125 TABLET ORAL at 06:03

## 2024-12-07 RX ADMIN — OXYCODONE 5 MG: 5 TABLET ORAL at 18:51

## 2024-12-07 RX ADMIN — MORPHINE SULFATE 2 MG: 2 INJECTION, SOLUTION INTRAMUSCULAR; INTRAVENOUS at 22:40

## 2024-12-07 RX ADMIN — NALOXEGOL OXALATE 25 MG: 25 TABLET, FILM COATED ORAL at 08:41

## 2024-12-07 RX ADMIN — ONDANSETRON 4 MG: 2 INJECTION INTRAMUSCULAR; INTRAVENOUS at 19:21

## 2024-12-07 RX ADMIN — PANTOPRAZOLE SODIUM 40 MG: 40 INJECTION, POWDER, FOR SOLUTION INTRAVENOUS at 08:39

## 2024-12-07 RX ADMIN — KETOROLAC TROMETHAMINE 15 MG: 30 INJECTION, SOLUTION INTRAMUSCULAR at 15:32

## 2024-12-07 RX ADMIN — BUSPIRONE HYDROCHLORIDE 10 MG: 10 TABLET ORAL at 20:31

## 2024-12-07 RX ADMIN — KETOROLAC TROMETHAMINE 15 MG: 30 INJECTION, SOLUTION INTRAMUSCULAR at 21:48

## 2024-12-07 ASSESSMENT — PAIN DESCRIPTION - DESCRIPTORS
DESCRIPTORS: THROBBING
DESCRIPTORS: ACHING
DESCRIPTORS: SHARP
DESCRIPTORS: ACHING
DESCRIPTORS: ACHING

## 2024-12-07 ASSESSMENT — PAIN SCALES - GENERAL
PAINLEVEL_OUTOF10: 0
PAINLEVEL_OUTOF10: 5
PAINLEVEL_OUTOF10: 0
PAINLEVEL_OUTOF10: 4
PAINLEVEL_OUTOF10: 6
PAINLEVEL_OUTOF10: 2
PAINLEVEL_OUTOF10: 5
PAINLEVEL_OUTOF10: 10
PAINLEVEL_OUTOF10: 5
PAINLEVEL_OUTOF10: 6

## 2024-12-07 ASSESSMENT — PAIN DESCRIPTION - ORIENTATION
ORIENTATION: ANTERIOR;RIGHT;LEFT
ORIENTATION: MID

## 2024-12-07 ASSESSMENT — PAIN - FUNCTIONAL ASSESSMENT
PAIN_FUNCTIONAL_ASSESSMENT: ACTIVITIES ARE NOT PREVENTED

## 2024-12-07 ASSESSMENT — PAIN DESCRIPTION - LOCATION
LOCATION: HEAD;EYE
LOCATION: FACE;ABDOMEN
LOCATION: ABDOMEN
LOCATION: ABDOMEN

## 2024-12-07 NOTE — PROGRESS NOTES
Kelly Lea, APRN - CNP  [facility]  GENERAL SURGERY   Dr Brown covering for Dr Casimiro Callahan  General Surgery Daily Progress Note    Pt Name: Marry Ayers  Medical Record Number: [id number]  Date of Birth 1950   Today's Date: 12/7/2024  Chief complaint: Stricture of sigmoid colon   ASSESSMENT   Hospital day # 3   POD#3 Open Sigmoid Colectomy, Partial Small Bowel Resection with no complications   Pt on coumadin for hx of PE was bridged with Lovenox   Leukocytosis 2/2 to parotid gland swelling  improving  Hypokalemia   Abdominal distention   Passing flatus, no bowel function yet    has a past medical history of Allergic rhinitis, Anemia, Anxiety, Cataract, Essential hypertension, GERD (gastroesophageal reflux disease), Hx of blood clots, Hypokalemia, Hypothyroidism, and Pulmonary embolism (HCC).  PLAN   IV hydration 50ml/h  Analgesics and antiemetics as needed  full liquid diet    KUB rule out ileus   Lovenox for DVT prophylaxis dose will need therapeutic dose hgb stable   GI prophylaxis   Warm compress and anti-inflammatories for parotid gland swelling   Continue Potassium replacement protocol   Increase activity as tolerated, C&DB, IS and ambulate   SUBJECTIVE   Marry is doing good. She denies any nausea or vomiting, has  passed flatus has not had a bowel movement. She has increased distention today. She is tolerating a ADULT DIET; Full Liquid. Her pain is well controlled on current medications. She has been ambulating in the halls. Has facial swelling and tender, denies issues swallowing   CURRENT MEDICATIONS   Scheduled Meds:   montelukast  10 mg Oral QAM    losartan-hydroCHLOROthiazide  1 tablet Oral Daily    ketorolac  15 mg IntraVENous Q6H    busPIRone  10 mg Oral BID    levothyroxine  125 mcg Oral Daily    metoprolol succinate  50 mg Oral Daily    sodium chloride flush  5-40 mL IntraVENous 2 times per day    enoxaparin  40 mg SubCUTAneous Daily    naloxegol  25 mg Oral Daily     Continuous

## 2024-12-07 NOTE — PLAN OF CARE
Problem: Discharge Planning  Goal: Discharge to home or other facility with appropriate resources  Outcome: Progressing  Flowsheets (Taken 12/6/2024 1935)  Discharge to home or other facility with appropriate resources:   Arrange for needed discharge resources and transportation as appropriate   Identify barriers to discharge with patient and caregiver   Identify discharge learning needs (meds, wound care, etc)   Refer to discharge planning if patient needs post-hospital services based on physician order or complex needs related to functional status, cognitive ability or social support system     Problem: Safety - Adult  Goal: Free from fall injury  Outcome: Progressing  Flowsheets (Taken 12/6/2024 1036 by Rowena Joseph, RN)  Free From Fall Injury:   Instruct family/caregiver on patient safety   Based on caregiver fall risk screen, instruct family/caregiver to ask for assistance with transferring infant if caregiver noted to have fall risk factors     Problem: ABCDS Injury Assessment  Goal: Absence of physical injury  Outcome: Progressing  Flowsheets (Taken 12/7/2024 0036)  Absence of Physical Injury: Implement safety measures based on patient assessment     Problem: Pain  Goal: Verbalizes/displays adequate comfort level or baseline comfort level  Outcome: Progressing  Flowsheets (Taken 12/6/2024 1036 by Rowena Joseph, RN)  Verbalizes/displays adequate comfort level or baseline comfort level:   Encourage patient to monitor pain and request assistance   Administer analgesics based on type and severity of pain and evaluate response   Assess pain using appropriate pain scale   Implement non-pharmacological measures as appropriate and evaluate response     Problem: Respiratory - Adult  Goal: Achieves optimal ventilation and oxygenation  Outcome: Progressing  Flowsheets (Taken 12/6/2024 1935)  Achieves optimal ventilation and oxygenation:   Assess for changes in respiratory status   Position to facilitate

## 2024-12-07 NOTE — PLAN OF CARE
Problem: Discharge Planning  Goal: Discharge to home or other facility with appropriate resources  12/7/2024 0949 by Latricia Beth RN  Outcome: Progressing  Flowsheets (Taken 12/6/2024 1935 by Leticia Lind, RN)  Discharge to home or other facility with appropriate resources:   Arrange for needed discharge resources and transportation as appropriate   Identify barriers to discharge with patient and caregiver   Identify discharge learning needs (meds, wound care, etc)   Refer to discharge planning if patient needs post-hospital services based on physician order or complex needs related to functional status, cognitive ability or social support system     Problem: Safety - Adult  Goal: Free from fall injury  12/7/2024 0949 by Latricia Beth RN  Outcome: Progressing  Flowsheets (Taken 12/6/2024 1036 by Rowena Joseph RN)  Free From Fall Injury:   Instruct family/caregiver on patient safety   Based on caregiver fall risk screen, instruct family/caregiver to ask for assistance with transferring infant if caregiver noted to have fall risk factors     Problem: Pain  Goal: Verbalizes/displays adequate comfort level or baseline comfort level  12/7/2024 0949 by Latricia Beth RN  Outcome: Progressing  Flowsheets (Taken 12/6/2024 1036 by Rowena Joseph RN)  Verbalizes/displays adequate comfort level or baseline comfort level:   Encourage patient to monitor pain and request assistance   Administer analgesics based on type and severity of pain and evaluate response   Assess pain using appropriate pain scale   Implement non-pharmacological measures as appropriate and evaluate response     Problem: Respiratory - Adult  Goal: Achieves optimal ventilation and oxygenation  12/7/2024 0949 by Latricia Beth RN  Outcome: Progressing  Flowsheets (Taken 12/6/2024 1935 by Leticia Lind RN)  Achieves optimal ventilation and oxygenation:   Assess for changes in respiratory status   Position to facilitate  hydration   Encourage oral fluids to ensure adequate hydration   Administer ordered medications as needed   Encourage mobilization and activity     Problem: Gastrointestinal - Adult  Goal: Maintains adequate nutritional intake  12/7/2024 0949 by Latricia Beth, RN  Outcome: Progressing  Flowsheets (Taken 12/6/2024 1935 by Leticia Lind, RN)  Maintains adequate nutritional intake:   Identify factors contributing to decreased intake, treat as appropriate   Monitor percentage of each meal consumed   Assist with meals as needed   Monitor intake and output, weight and lab values     Problem: Genitourinary - Adult  Goal: Absence of urinary retention  12/7/2024 0949 by Latricia Beth, RN  Outcome: Progressing  Flowsheets (Taken 12/7/2024 0036 by Leticia Lind, RN)  Absence of urinary retention: Assess patient’s ability to void and empty bladder   Discussed plan of care with patient and he verbalizes understanding

## 2024-12-08 ENCOUNTER — APPOINTMENT (OUTPATIENT)
Dept: GENERAL RADIOLOGY | Age: 74
End: 2024-12-08
Attending: SURGERY
Payer: MEDICARE

## 2024-12-08 LAB
ANION GAP SERPL CALC-SCNC: 13 MEQ/L (ref 8–16)
BASOPHILS ABSOLUTE: 0 THOU/MM3 (ref 0–0.1)
BASOPHILS NFR BLD AUTO: 0.5 %
BUN SERPL-MCNC: 5 MG/DL (ref 7–22)
CALCIUM SERPL-MCNC: 8.3 MG/DL (ref 8.5–10.5)
CHLORIDE SERPL-SCNC: 105 MEQ/L (ref 98–111)
CO2 SERPL-SCNC: 23 MEQ/L (ref 23–33)
CREAT SERPL-MCNC: 0.7 MG/DL (ref 0.4–1.2)
DEPRECATED RDW RBC AUTO: 53.1 FL (ref 35–45)
EOSINOPHIL NFR BLD AUTO: 1.2 %
EOSINOPHILS ABSOLUTE: 0.1 THOU/MM3 (ref 0–0.4)
ERYTHROCYTE [DISTWIDTH] IN BLOOD BY AUTOMATED COUNT: 15.7 % (ref 11.5–14.5)
GFR SERPL CREATININE-BSD FRML MDRD: 90 ML/MIN/1.73M2
GLUCOSE SERPL-MCNC: 115 MG/DL (ref 70–108)
HCT VFR BLD AUTO: 32.7 % (ref 37–47)
HGB BLD-MCNC: 10.4 GM/DL (ref 12–16)
IMM GRANULOCYTES # BLD AUTO: 0.02 THOU/MM3 (ref 0–0.07)
IMM GRANULOCYTES NFR BLD AUTO: 0.3 %
LYMPHOCYTES ABSOLUTE: 1.1 THOU/MM3 (ref 1–4.8)
LYMPHOCYTES NFR BLD AUTO: 13.5 %
MAGNESIUM SERPL-MCNC: 1.3 MG/DL (ref 1.6–2.4)
MAGNESIUM SERPL-MCNC: 2.3 MG/DL (ref 1.6–2.4)
MCH RBC QN AUTO: 29.4 PG (ref 26–33)
MCHC RBC AUTO-ENTMCNC: 31.8 GM/DL (ref 32.2–35.5)
MCV RBC AUTO: 92.4 FL (ref 81–99)
MONOCYTES ABSOLUTE: 0.2 THOU/MM3 (ref 0.4–1.3)
MONOCYTES NFR BLD AUTO: 2.2 %
NEUTROPHILS ABSOLUTE: 6.4 THOU/MM3 (ref 1.8–7.7)
NEUTROPHILS NFR BLD AUTO: 82.3 %
NRBC BLD AUTO-RTO: 0 /100 WBC
PHOSPHATE SERPL-MCNC: 1.5 MG/DL (ref 2.4–4.7)
PHOSPHATE SERPL-MCNC: 4.1 MG/DL (ref 2.4–4.7)
PLATELET # BLD AUTO: 373 THOU/MM3 (ref 130–400)
PLATELET BLD QL SMEAR: ADEQUATE
PMV BLD AUTO: 10.8 FL (ref 9.4–12.4)
POTASSIUM SERPL-SCNC: 3 MEQ/L (ref 3.5–5.2)
POTASSIUM SERPL-SCNC: 3.4 MEQ/L (ref 3.5–5.2)
RBC # BLD AUTO: 3.54 MILL/MM3 (ref 4.2–5.4)
SCAN OF BLOOD SMEAR: NORMAL
SODIUM SERPL-SCNC: 141 MEQ/L (ref 135–145)
STOMATOCYTES: ABNORMAL
TOXIC GRANULES BLD QL SMEAR: PRESENT
WBC # BLD AUTO: 7.8 THOU/MM3 (ref 4.8–10.8)

## 2024-12-08 PROCEDURE — 6370000000 HC RX 637 (ALT 250 FOR IP): Performed by: SURGERY

## 2024-12-08 PROCEDURE — 85025 COMPLETE CBC W/AUTO DIFF WBC: CPT

## 2024-12-08 PROCEDURE — 6360000002 HC RX W HCPCS: Performed by: NURSE PRACTITIONER

## 2024-12-08 PROCEDURE — 71045 X-RAY EXAM CHEST 1 VIEW: CPT

## 2024-12-08 PROCEDURE — 2580000003 HC RX 258: Performed by: NURSE PRACTITIONER

## 2024-12-08 PROCEDURE — 84132 ASSAY OF SERUM POTASSIUM: CPT

## 2024-12-08 PROCEDURE — 6360000002 HC RX W HCPCS: Performed by: SURGERY

## 2024-12-08 PROCEDURE — 83735 ASSAY OF MAGNESIUM: CPT

## 2024-12-08 PROCEDURE — 2500000003 HC RX 250 WO HCPCS: Performed by: NURSE PRACTITIONER

## 2024-12-08 PROCEDURE — 74019 RADEX ABDOMEN 2 VIEWS: CPT

## 2024-12-08 PROCEDURE — 84100 ASSAY OF PHOSPHORUS: CPT

## 2024-12-08 PROCEDURE — 36415 COLL VENOUS BLD VENIPUNCTURE: CPT

## 2024-12-08 PROCEDURE — 99024 POSTOP FOLLOW-UP VISIT: CPT | Performed by: SURGERY

## 2024-12-08 PROCEDURE — 1200000000 HC SEMI PRIVATE

## 2024-12-08 PROCEDURE — 6360000002 HC RX W HCPCS

## 2024-12-08 RX ORDER — MAGNESIUM SULFATE IN WATER 40 MG/ML
2000 INJECTION, SOLUTION INTRAVENOUS PRN
Status: DISCONTINUED | OUTPATIENT
Start: 2024-12-08 | End: 2024-12-28 | Stop reason: HOSPADM

## 2024-12-08 RX ORDER — DIAZEPAM 5 MG/1
2.5 TABLET ORAL ONCE
Status: DISCONTINUED | OUTPATIENT
Start: 2024-12-08 | End: 2024-12-08

## 2024-12-08 RX ADMIN — KETOROLAC TROMETHAMINE 15 MG: 30 INJECTION, SOLUTION INTRAMUSCULAR at 04:01

## 2024-12-08 RX ADMIN — PANTOPRAZOLE SODIUM 40 MG: 40 INJECTION, POWDER, FOR SOLUTION INTRAVENOUS at 08:56

## 2024-12-08 RX ADMIN — BUSPIRONE HYDROCHLORIDE 10 MG: 10 TABLET ORAL at 09:06

## 2024-12-08 RX ADMIN — LEVOTHYROXINE SODIUM 125 MCG: 125 TABLET ORAL at 06:03

## 2024-12-08 RX ADMIN — KETOROLAC TROMETHAMINE 15 MG: 30 INJECTION, SOLUTION INTRAMUSCULAR at 09:03

## 2024-12-08 RX ADMIN — POTASSIUM CHLORIDE 10 MEQ: 7.46 INJECTION, SOLUTION INTRAVENOUS at 15:17

## 2024-12-08 RX ADMIN — POTASSIUM CHLORIDE 10 MEQ: 7.46 INJECTION, SOLUTION INTRAVENOUS at 06:04

## 2024-12-08 RX ADMIN — POTASSIUM CHLORIDE 10 MEQ: 7.46 INJECTION, SOLUTION INTRAVENOUS at 04:04

## 2024-12-08 RX ADMIN — KETOROLAC TROMETHAMINE 15 MG: 30 INJECTION, SOLUTION INTRAMUSCULAR at 21:09

## 2024-12-08 RX ADMIN — LOSARTAN POTASSIUM AND HYDROCHLOROTHIAZIDE 1 TABLET: 25; 100 TABLET ORAL at 09:09

## 2024-12-08 RX ADMIN — POTASSIUM CHLORIDE 10 MEQ: 7.46 INJECTION, SOLUTION INTRAVENOUS at 16:21

## 2024-12-08 RX ADMIN — SODIUM CHLORIDE: 9 INJECTION, SOLUTION INTRAVENOUS at 19:39

## 2024-12-08 RX ADMIN — POTASSIUM CHLORIDE 10 MEQ: 7.46 INJECTION, SOLUTION INTRAVENOUS at 17:24

## 2024-12-08 RX ADMIN — MAGNESIUM SULFATE HEPTAHYDRATE 2000 MG: 40 INJECTION, SOLUTION INTRAVENOUS at 06:11

## 2024-12-08 RX ADMIN — POTASSIUM CHLORIDE 10 MEQ: 7.46 INJECTION, SOLUTION INTRAVENOUS at 07:09

## 2024-12-08 RX ADMIN — KETOROLAC TROMETHAMINE 15 MG: 30 INJECTION, SOLUTION INTRAMUSCULAR at 15:24

## 2024-12-08 RX ADMIN — SODIUM CHLORIDE: 9 INJECTION, SOLUTION INTRAVENOUS at 08:55

## 2024-12-08 RX ADMIN — POTASSIUM CHLORIDE 10 MEQ: 7.46 INJECTION, SOLUTION INTRAVENOUS at 09:59

## 2024-12-08 RX ADMIN — HYDROMORPHONE HYDROCHLORIDE 0.5 MG: 1 INJECTION, SOLUTION INTRAMUSCULAR; INTRAVENOUS; SUBCUTANEOUS at 21:11

## 2024-12-08 RX ADMIN — HYDROMORPHONE HYDROCHLORIDE 0.5 MG: 1 INJECTION, SOLUTION INTRAMUSCULAR; INTRAVENOUS; SUBCUTANEOUS at 14:07

## 2024-12-08 RX ADMIN — METOPROLOL SUCCINATE 50 MG: 50 TABLET, EXTENDED RELEASE ORAL at 09:06

## 2024-12-08 RX ADMIN — BUSPIRONE HYDROCHLORIDE 10 MG: 10 TABLET ORAL at 19:43

## 2024-12-08 RX ADMIN — POTASSIUM CHLORIDE 10 MEQ: 7.46 INJECTION, SOLUTION INTRAVENOUS at 04:51

## 2024-12-08 RX ADMIN — POTASSIUM CHLORIDE 10 MEQ: 7.46 INJECTION, SOLUTION INTRAVENOUS at 08:56

## 2024-12-08 RX ADMIN — NALOXEGOL OXALATE 25 MG: 25 TABLET, FILM COATED ORAL at 09:08

## 2024-12-08 RX ADMIN — MORPHINE SULFATE 2 MG: 2 INJECTION, SOLUTION INTRAMUSCULAR; INTRAVENOUS at 10:55

## 2024-12-08 RX ADMIN — SODIUM PHOSPHATE, MONOBASIC, MONOHYDRATE AND SODIUM PHOSPHATE, DIBASIC, ANHYDROUS 15 MMOL: 142; 276 INJECTION, SOLUTION INTRAVENOUS at 05:08

## 2024-12-08 RX ADMIN — ENOXAPARIN SODIUM 40 MG: 100 INJECTION SUBCUTANEOUS at 09:04

## 2024-12-08 RX ADMIN — POTASSIUM CHLORIDE 10 MEQ: 7.46 INJECTION, SOLUTION INTRAVENOUS at 14:09

## 2024-12-08 RX ADMIN — MAGNESIUM SULFATE HEPTAHYDRATE 2000 MG: 40 INJECTION, SOLUTION INTRAVENOUS at 04:24

## 2024-12-08 RX ADMIN — MONTELUKAST SODIUM 10 MG: 10 TABLET ORAL at 09:07

## 2024-12-08 ASSESSMENT — PAIN - FUNCTIONAL ASSESSMENT
PAIN_FUNCTIONAL_ASSESSMENT: ACTIVITIES ARE NOT PREVENTED
PAIN_FUNCTIONAL_ASSESSMENT: PREVENTS OR INTERFERES SOME ACTIVE ACTIVITIES AND ADLS
PAIN_FUNCTIONAL_ASSESSMENT: ACTIVITIES ARE NOT PREVENTED
PAIN_FUNCTIONAL_ASSESSMENT: ACTIVITIES ARE NOT PREVENTED

## 2024-12-08 ASSESSMENT — PAIN DESCRIPTION - DESCRIPTORS
DESCRIPTORS: ACHING

## 2024-12-08 ASSESSMENT — PAIN SCALES - GENERAL
PAINLEVEL_OUTOF10: 8
PAINLEVEL_OUTOF10: 3
PAINLEVEL_OUTOF10: 5
PAINLEVEL_OUTOF10: 9
PAINLEVEL_OUTOF10: 8
PAINLEVEL_OUTOF10: 9
PAINLEVEL_OUTOF10: 2
PAINLEVEL_OUTOF10: 8
PAINLEVEL_OUTOF10: 7
PAINLEVEL_OUTOF10: 8
PAINLEVEL_OUTOF10: 0
PAINLEVEL_OUTOF10: 8
PAINLEVEL_OUTOF10: 5

## 2024-12-08 ASSESSMENT — PAIN DESCRIPTION - LOCATION
LOCATION: ABDOMEN

## 2024-12-08 ASSESSMENT — PAIN DESCRIPTION - ONSET: ONSET: ON-GOING

## 2024-12-08 ASSESSMENT — PAIN DESCRIPTION - PAIN TYPE: TYPE: SURGICAL PAIN

## 2024-12-08 ASSESSMENT — PAIN DESCRIPTION - FREQUENCY: FREQUENCY: CONTINUOUS

## 2024-12-08 NOTE — SIGNIFICANT EVENT
Notified that patient is c/o increased nausea/vomiting, and abdominal pain.     Patient is POD 2 open sigmoid colectomy with a partial SB resection.     Per nursing staff, abdomen is more distended than it was at the beginning of the shift. Chart reviewed. Patient had a KUB this AM that was concerning for ileus but she had been passing flatus and tolerating full liquids.     Repeat KUB ordered. Findings similar to this morning's xray but given increased pain, N/V, and distention, will place a NGT to LIWS.     Discussed with patient. She verbalizes understanding and is agreeable with NGT.     Shanta Shine, APRN - CNP

## 2024-12-08 NOTE — PROGRESS NOTES
MD TRICIA Rudolph DR GENERAL SURGERY   Dr Brown covering for Dr Casimiro Callahan  General Surgery Daily Progress Note    Pt Name: Marry Ayers  Medical Record Number: [id number]  Date of Birth 1950   Today's Date: 12/8/2024  Chief complaint: Stricture of sigmoid colon   ASSESSMENT   Hospital day # 4   POD#4  Open Sigmoid Colectomy, Partial Small Bowel Resection with no complications   NGT placed last evening for distention and dilated sb loops kub, has had 3 bm's fels  better since ngt insertion  Pt on coumadin for hx of PE was bridged with Lovenox   Leukocytosis 2/2 to parotid gland swelling  improving  Hypokalemia   Abdominal distention   Passing flatus, no bowel function yet    has a past medical history of Allergic rhinitis, Anemia, Anxiety, Cataract, Essential hypertension, GERD (gastroesophageal reflux disease), Hx of blood clots, Hypokalemia, Hypothyroidism, and Pulmonary embolism (HCC).  PLAN   Keep ngt for now  IV hydration 50ml/h  Analgesics and antiemetics as needed  npo   Lovenox for DVT prophylaxis dose will need therapeutic dose hgb stable   GI prophylaxis   Warm compress and anti-inflammatories for parotid gland swelling   Continue Potassium replacement protocol   Increase activity as tolerated, C&DB, IS and ambulate   SUBJECTIVE   Marry is doing better since ngt placement. She denies any nausea or vomiting, has  passed flatus has had a bowel movement x 3 . Less distention since ngt insertion . She is tolerating a Diet NPO Exceptions are: Ice Chips, Sips of Water with Meds. Her pain is well controlled on current medications. She has been ambulating in the halls. Has facial swelling and tender, denies issues swallowing   CURRENT MEDICATIONS   Scheduled Meds:   pantoprazole  40 mg IntraVENous Daily    montelukast  10 mg Oral QAM    losartan-hydroCHLOROthiazide  1 tablet Oral Daily    ketorolac  15 mg IntraVENous Q6H    busPIRone  10 mg Oral BID    levothyroxine  125 mcg Oral  Daily    metoprolol succinate  50 mg Oral Daily    sodium chloride flush  5-40 mL IntraVENous 2 times per day    enoxaparin  40 mg SubCUTAneous Daily    naloxegol  25 mg Oral Daily     Continuous Infusions:   sodium chloride 100 mL/hr at 24 2359    sodium chloride 50 mL/hr at 24 1856    sodium chloride       PRN Meds:.magnesium sulfate, sodium phosphate 15 mmol in sodium chloride 0.9 % 250 mL IVPB, hydrALAZINE, morphine, potassium chloride **OR** potassium alternative oral replacement **OR** potassium chloride, potassium chloride, sodium chloride flush, sodium chloride, oxyCODONE, ondansetron  OBJECTIVE   CURRENT VITALS:  height is 1.549 m (5' 1\") and weight is 83.2 kg (183 lb 6.4 oz). Her oral temperature is 98.3 °F (36.8 °C). Her blood pressure is 130/53 (abnormal) and her pulse is 95. Her respiration is 20 and oxygen saturation is 94%.   Temperature Range (24h):Temp: 98.3 °F (36.8 °C) Temp  Av.3 °F (36.8 °C)  Min: 97.9 °F (36.6 °C)  Max: 99.3 °F (37.4 °C)  BP Range (24h): Systolic (24hrs), Av , Min:130 , Max:206     Diastolic (24hrs), Av, Min:52, Max:86    Pulse Range (24h): Pulse  Av.4  Min: 88  Max: 115  Respiration Range (24h): Resp  Av.9  Min: 16  Max: 28  Current Pulse Ox (24h):  SpO2: 94 %  Pulse Ox Range (24h):  SpO2  Av.1 %  Min: 84 %  Max: 99 %  Oxygen Amount and Delivery: O2 Flow Rate (L/min): 1 L/min  Incentive Spirometry Tx:       Achieved Volume (mL): 1250 mL    GENERAL: alert, no distress, right side mild facial swelling and tender,  LUNGS: clear to ausculation, without wheezes, rales or rhonci  HEART: normal rate and regular rhythm  ABDOMEN: soft, non-tender, distended, bowel sounds present in all 4 quadrants, hyperactive , and no guarding or peritoneal signs  Incision: Prevena wound VAC   EXTREMITY: no cyanosis, clubbing or edema  In: 653 [P.O.:653]  Out: 1775 [Urine:1575]  Date 24 - 24 235   Shift  4343-0982 3247-2878 24 Hour  Total   INTAKE   P.O.(mL/kg/hr) 0   0   Shift Total(mL/kg) 0(0)   0(0)   OUTPUT   Emesis/NG output(mL/kg) 200(2.4)   200(2.4)   Shift Total(mL/kg) 200(2.4)   200(2.4)   Weight (kg) 83.2 83.2 83.2 83.2       LABS     Recent Labs     12/06/24  0620 12/07/24  0656 12/07/24  2359   WBC 17.0* 13.0*  --    HGB 9.9* 9.4*  --    HCT 31.2* 28.9*  --     279  --     143 141   K 3.2* 3.2* 3.0*    108 105   CO2 19* 24 23   BUN 7 6* 5*   CREATININE 0.6 0.6 0.7   MG  --   --  1.3*   PHOS  --   --  1.5*   CALCIUM 8.3* 8.6 8.3*      No results for input(s): \"INR\" in the last 72 hours.    Invalid input(s): \"PT\", \"PTT\"  No results for input(s): \"AST\", \"ALT\", \"BILITOT\", \"BILIDIR\", \"AMYLASE\", \"LIPASE\", \"LDH\", \"LACTA\" in the last 72 hours.  No results for input(s): \"TROPONINT\" in the last 72 hours.  RADIOLOGY     XR ABDOMEN FOR NG/OG/NE TUBE PLACEMENT   Final Result   Appropriately positioned enteric tube.      This document has been electronically signed by: José Antonio Degroot MD on    12/07/2024 09:51 PM      XR ABDOMEN (KUB) (SINGLE AP VIEW)   Final Result   Dilated small bowel loops consistent with small-bowel obstruction.      This document has been electronically signed by: José Antonio Degroot MD on    12/07/2024 09:40 PM         XR ABDOMEN (KUB) (SINGLE AP VIEW)   Final Result   Diffuse gaseous distention throughout the intestines. This could be   related to an ileus.               **This report has been created using voice recognition software. It may contain   minor errors which are inherent in voice recognition technology.**      Electronically signed by Dr Nura Sullivanally signed by Slim Vicente MD on 12/8/2024 at 6:22 AM

## 2024-12-08 NOTE — PLAN OF CARE
Problem: Discharge Planning  Goal: Discharge to home or other facility with appropriate resources  12/8/2024 0948 by Latricia Beth RN  Outcome: Progressing  Flowsheets (Taken 12/7/2024 1925 by Leticia Lind, RN)  Discharge to home or other facility with appropriate resources:   Identify barriers to discharge with patient and caregiver   Arrange for needed discharge resources and transportation as appropriate   Identify discharge learning needs (meds, wound care, etc)   Refer to discharge planning if patient needs post-hospital services based on physician order or complex needs related to functional status, cognitive ability or social support system     Problem: Safety - Adult  Goal: Free from fall injury  12/8/2024 0948 by Latricia Beth RN  Outcome: Progressing  Flowsheets (Taken 12/6/2024 1036 by Rowena Joseph RN)  Free From Fall Injury:   Instruct family/caregiver on patient safety   Based on caregiver fall risk screen, instruct family/caregiver to ask for assistance with transferring infant if caregiver noted to have fall risk factors     Problem: ABCDS Injury Assessment  Goal: Absence of physical injury  12/8/2024 0948 by Latricia Beth RN  Outcome: Progressing  Flowsheets (Taken 12/7/2024 2343 by Leticia Lind, RN)  Absence of Physical Injury: Implement safety measures based on patient assessment     Problem: Pain  Goal: Verbalizes/displays adequate comfort level or baseline comfort level  12/8/2024 0948 by Latricia Beth RN  Outcome: Progressing  Flowsheets (Taken 12/6/2024 1036 by Rowena Joseph RN)  Verbalizes/displays adequate comfort level or baseline comfort level:   Encourage patient to monitor pain and request assistance   Administer analgesics based on type and severity of pain and evaluate response   Assess pain using appropriate pain scale   Implement non-pharmacological measures as appropriate and evaluate response     Problem: Respiratory - Adult  Goal: Achieves optimal

## 2024-12-08 NOTE — PLAN OF CARE
Problem: Discharge Planning  Goal: Discharge to home or other facility with appropriate resources  12/7/2024 2343 by Leticia Lind, RN  Outcome: Progressing  Flowsheets (Taken 12/7/2024 1925)  Discharge to home or other facility with appropriate resources:   Identify barriers to discharge with patient and caregiver   Arrange for needed discharge resources and transportation as appropriate   Identify discharge learning needs (meds, wound care, etc)   Refer to discharge planning if patient needs post-hospital services based on physician order or complex needs related to functional status, cognitive ability or social support system  12/7/2024 0949 by Latricia Beth, RN  Outcome: Progressing  Flowsheets (Taken 12/6/2024 1935 by Leticia Lind, RN)  Discharge to home or other facility with appropriate resources:   Arrange for needed discharge resources and transportation as appropriate   Identify barriers to discharge with patient and caregiver   Identify discharge learning needs (meds, wound care, etc)   Refer to discharge planning if patient needs post-hospital services based on physician order or complex needs related to functional status, cognitive ability or social support system     Problem: Safety - Adult  Goal: Free from fall injury  12/7/2024 2343 by Leticia Lind RN  Outcome: Progressing  Flowsheets (Taken 12/6/2024 1036 by Rowena Joseph, RN)  Free From Fall Injury:   Instruct family/caregiver on patient safety   Based on caregiver fall risk screen, instruct family/caregiver to ask for assistance with transferring infant if caregiver noted to have fall risk factors  12/7/2024 0949 by Latricia Beth RN  Outcome: Progressing  Flowsheets (Taken 12/6/2024 1036 by Rowena Joseph, RN)  Free From Fall Injury:   Instruct family/caregiver on patient safety   Based on caregiver fall risk screen, instruct family/caregiver to ask for assistance with transferring infant if caregiver noted to have  function   Encourage oral fluids to ensure adequate hydration   Administer IV fluids as ordered to ensure adequate hydration   Administer ordered medications as needed   Encourage mobilization and activity  12/7/2024 0949 by Latricia Beth RN  Outcome: Progressing  Flowsheets (Taken 12/6/2024 1935 by Leticia Lind RN)  Maintains or returns to baseline bowel function:   Assess bowel function   Administer IV fluids as ordered to ensure adequate hydration   Encourage oral fluids to ensure adequate hydration   Administer ordered medications as needed   Encourage mobilization and activity  Goal: Maintains adequate nutritional intake  12/7/2024 2343 by Leticia Lind RN  Outcome: Progressing  Flowsheets (Taken 12/7/2024 1925)  Maintains adequate nutritional intake:   Monitor percentage of each meal consumed   Identify factors contributing to decreased intake, treat as appropriate   Assist with meals as needed   Monitor intake and output, weight and lab values  12/7/2024 0949 by Latricia Beth RN  Outcome: Progressing  Flowsheets (Taken 12/6/2024 1935 by Leticia Lind RN)  Maintains adequate nutritional intake:   Identify factors contributing to decreased intake, treat as appropriate   Monitor percentage of each meal consumed   Assist with meals as needed   Monitor intake and output, weight and lab values     Problem: Genitourinary - Adult  Goal: Absence of urinary retention  12/7/2024 2343 by Leticia Lind RN  Outcome: Progressing  Flowsheets (Taken 12/7/2024 1925)  Absence of urinary retention:   Assess patient’s ability to void and empty bladder   Monitor intake/output and perform bladder scan as needed  12/7/2024 0949 by Latricia Beth, RN  Outcome: Progressing  Flowsheets (Taken 12/7/2024 0036 by Leticia Lind RN)  Absence of urinary retention: Assess patient’s ability to void and empty bladder     Care plan reviewed with patient. Patient verbalized understanding of plan of care and

## 2024-12-08 NOTE — PROCEDURES
PROCEDURE NOTE  Date: 12/8/2024   Name: Marry Ayers  YOB: 1950    Procedures        EKG was completed and handed to RN

## 2024-12-09 ENCOUNTER — APPOINTMENT (OUTPATIENT)
Dept: GENERAL RADIOLOGY | Age: 74
End: 2024-12-09
Attending: SURGERY
Payer: MEDICARE

## 2024-12-09 PROBLEM — E43 SEVERE MALNUTRITION (HCC): Status: ACTIVE | Noted: 2024-12-09

## 2024-12-09 LAB
ANION GAP SERPL CALC-SCNC: 10 MEQ/L (ref 8–16)
BUN SERPL-MCNC: 14 MG/DL (ref 7–22)
CA-I BLD ISE-SCNC: 1.15 MMOL/L (ref 1.12–1.32)
CALCIUM SERPL-MCNC: 8 MG/DL (ref 8.5–10.5)
CHLORIDE SERPL-SCNC: 108 MEQ/L (ref 98–111)
CO2 SERPL-SCNC: 24 MEQ/L (ref 23–33)
CREAT SERPL-MCNC: 0.7 MG/DL (ref 0.4–1.2)
DEPRECATED RDW RBC AUTO: 54.6 FL (ref 35–45)
EKG ATRIAL RATE: 109 BPM
EKG P AXIS: 60 DEGREES
EKG P-R INTERVAL: 164 MS
EKG Q-T INTERVAL: 328 MS
EKG QRS DURATION: 76 MS
EKG QTC CALCULATION (BAZETT): 441 MS
EKG R AXIS: 21 DEGREES
EKG T AXIS: 73 DEGREES
EKG VENTRICULAR RATE: 109 BPM
ERYTHROCYTE [DISTWIDTH] IN BLOOD BY AUTOMATED COUNT: 15.9 % (ref 11.5–14.5)
GFR SERPL CREATININE-BSD FRML MDRD: 90 ML/MIN/1.73M2
GLUCOSE SERPL-MCNC: 84 MG/DL (ref 70–108)
HCT VFR BLD AUTO: 33.4 % (ref 37–47)
HGB BLD-MCNC: 10.6 GM/DL (ref 12–16)
MAGNESIUM SERPL-MCNC: 2.3 MG/DL (ref 1.6–2.4)
MCH RBC QN AUTO: 29.6 PG (ref 26–33)
MCHC RBC AUTO-ENTMCNC: 31.7 GM/DL (ref 32.2–35.5)
MCV RBC AUTO: 93.3 FL (ref 81–99)
PHOSPHATE SERPL-MCNC: 3.1 MG/DL (ref 2.4–4.7)
PLATELET # BLD AUTO: 362 THOU/MM3 (ref 130–400)
PMV BLD AUTO: 10.7 FL (ref 9.4–12.4)
POTASSIUM SERPL-SCNC: 3.3 MEQ/L (ref 3.5–5.2)
POTASSIUM SERPL-SCNC: 3.7 MEQ/L (ref 3.5–5.2)
RBC # BLD AUTO: 3.58 MILL/MM3 (ref 4.2–5.4)
SODIUM SERPL-SCNC: 142 MEQ/L (ref 135–145)
WBC # BLD AUTO: 5.5 THOU/MM3 (ref 4.8–10.8)

## 2024-12-09 PROCEDURE — 6360000002 HC RX W HCPCS: Performed by: SURGERY

## 2024-12-09 PROCEDURE — 2580000003 HC RX 258: Performed by: SURGERY

## 2024-12-09 PROCEDURE — 99024 POSTOP FOLLOW-UP VISIT: CPT | Performed by: NURSE PRACTITIONER

## 2024-12-09 PROCEDURE — 2500000003 HC RX 250 WO HCPCS: Performed by: NURSE PRACTITIONER

## 2024-12-09 PROCEDURE — 74018 RADEX ABDOMEN 1 VIEW: CPT

## 2024-12-09 PROCEDURE — 76937 US GUIDE VASCULAR ACCESS: CPT

## 2024-12-09 PROCEDURE — 82330 ASSAY OF CALCIUM: CPT

## 2024-12-09 PROCEDURE — 36569 INSJ PICC 5 YR+ W/O IMAGING: CPT

## 2024-12-09 PROCEDURE — 2580000003 HC RX 258: Performed by: NURSE PRACTITIONER

## 2024-12-09 PROCEDURE — APPSS30 APP SPLIT SHARED TIME 16-30 MINUTES: Performed by: NURSE PRACTITIONER

## 2024-12-09 PROCEDURE — 6360000002 HC RX W HCPCS: Performed by: NURSE PRACTITIONER

## 2024-12-09 PROCEDURE — 93010 ELECTROCARDIOGRAM REPORT: CPT | Performed by: INTERNAL MEDICINE

## 2024-12-09 PROCEDURE — 6370000000 HC RX 637 (ALT 250 FOR IP): Performed by: SURGERY

## 2024-12-09 PROCEDURE — 71045 X-RAY EXAM CHEST 1 VIEW: CPT

## 2024-12-09 PROCEDURE — 83735 ASSAY OF MAGNESIUM: CPT

## 2024-12-09 PROCEDURE — 84132 ASSAY OF SERUM POTASSIUM: CPT

## 2024-12-09 PROCEDURE — 85027 COMPLETE CBC AUTOMATED: CPT

## 2024-12-09 PROCEDURE — 36415 COLL VENOUS BLD VENIPUNCTURE: CPT

## 2024-12-09 PROCEDURE — 1200000000 HC SEMI PRIVATE

## 2024-12-09 PROCEDURE — 80048 BASIC METABOLIC PNL TOTAL CA: CPT

## 2024-12-09 PROCEDURE — 84100 ASSAY OF PHOSPHORUS: CPT

## 2024-12-09 PROCEDURE — 6360000002 HC RX W HCPCS

## 2024-12-09 PROCEDURE — C1751 CATH, INF, PER/CENT/MIDLINE: HCPCS

## 2024-12-09 PROCEDURE — 05HY33Z INSERTION OF INFUSION DEVICE INTO UPPER VEIN, PERCUTANEOUS APPROACH: ICD-10-PCS | Performed by: SURGERY

## 2024-12-09 RX ORDER — LEUCINE, PHENYLALANINE, LYSINE, METHIONINE, ISOLEUCINE, VALINE, HISTIDINE, THREONINE, TRYPTOPHAN, ALANINE, GLYCINE, ARGININE, PROLINE, SERINE, TYROSINE, DEXTROSE 365; 280; 290; 200; 300; 290; 240; 210; 90; 1035; 515; 575; 340; 250; 20; 15 MG/100ML; MG/100ML; MG/100ML; MG/100ML; MG/100ML; MG/100ML; MG/100ML; MG/100ML; MG/100ML; MG/100ML; MG/100ML; MG/100ML; MG/100ML; MG/100ML; MG/100ML; G/100ML
840 INJECTION INTRAVENOUS CONTINUOUS
Status: DISCONTINUED | OUTPATIENT
Start: 2024-12-09 | End: 2024-12-12

## 2024-12-09 RX ADMIN — HYDROMORPHONE HYDROCHLORIDE 0.5 MG: 1 INJECTION, SOLUTION INTRAMUSCULAR; INTRAVENOUS; SUBCUTANEOUS at 02:45

## 2024-12-09 RX ADMIN — MONTELUKAST SODIUM 10 MG: 10 TABLET ORAL at 07:56

## 2024-12-09 RX ADMIN — POTASSIUM CHLORIDE 10 MEQ: 7.46 INJECTION, SOLUTION INTRAVENOUS at 09:09

## 2024-12-09 RX ADMIN — BUSPIRONE HYDROCHLORIDE 10 MG: 10 TABLET ORAL at 19:57

## 2024-12-09 RX ADMIN — BUSPIRONE HYDROCHLORIDE 10 MG: 10 TABLET ORAL at 07:56

## 2024-12-09 RX ADMIN — SODIUM CHLORIDE: 9 INJECTION, SOLUTION INTRAVENOUS at 17:56

## 2024-12-09 RX ADMIN — NALOXEGOL OXALATE 25 MG: 25 TABLET, FILM COATED ORAL at 07:49

## 2024-12-09 RX ADMIN — HYDROMORPHONE HYDROCHLORIDE 0.5 MG: 1 INJECTION, SOLUTION INTRAMUSCULAR; INTRAVENOUS; SUBCUTANEOUS at 07:50

## 2024-12-09 RX ADMIN — METOPROLOL SUCCINATE 50 MG: 50 TABLET, EXTENDED RELEASE ORAL at 07:56

## 2024-12-09 RX ADMIN — HYDROMORPHONE HYDROCHLORIDE 0.5 MG: 1 INJECTION, SOLUTION INTRAMUSCULAR; INTRAVENOUS; SUBCUTANEOUS at 20:15

## 2024-12-09 RX ADMIN — LEUCINE, PHENYLALANINE, LYSINE, METHIONINE, ISOLEUCINE, VALINE, HISTIDINE, THREONINE, TRYPTOPHAN, ALANINE, GLYCINE, ARGININE, PROLINE, SERINE, TYROSINE, DEXTROSE 840 ML: 365; 280; 290; 200; 300; 290; 240; 210; 90; 1035; 515; 575; 340; 250; 20; 15 INJECTION INTRAVENOUS at 17:52

## 2024-12-09 RX ADMIN — HYDROMORPHONE HYDROCHLORIDE 0.5 MG: 1 INJECTION, SOLUTION INTRAMUSCULAR; INTRAVENOUS; SUBCUTANEOUS at 15:37

## 2024-12-09 RX ADMIN — KETOROLAC TROMETHAMINE 15 MG: 30 INJECTION, SOLUTION INTRAMUSCULAR at 15:41

## 2024-12-09 RX ADMIN — KETOROLAC TROMETHAMINE 15 MG: 30 INJECTION, SOLUTION INTRAMUSCULAR at 07:49

## 2024-12-09 RX ADMIN — POTASSIUM CHLORIDE 10 MEQ: 7.46 INJECTION, SOLUTION INTRAVENOUS at 08:04

## 2024-12-09 RX ADMIN — POTASSIUM CHLORIDE 10 MEQ: 7.46 INJECTION, SOLUTION INTRAVENOUS at 13:40

## 2024-12-09 RX ADMIN — POTASSIUM CHLORIDE 10 MEQ: 7.46 INJECTION, SOLUTION INTRAVENOUS at 11:35

## 2024-12-09 RX ADMIN — ENOXAPARIN SODIUM 40 MG: 100 INJECTION SUBCUTANEOUS at 07:49

## 2024-12-09 RX ADMIN — HYALURONIDASE (HUMAN RECOMBINANT) 150 UNITS: 150 INJECTION, SOLUTION SUBCUTANEOUS at 12:54

## 2024-12-09 RX ADMIN — SODIUM CHLORIDE, PRESERVATIVE FREE 10 ML: 5 INJECTION INTRAVENOUS at 07:49

## 2024-12-09 RX ADMIN — HYDROMORPHONE HYDROCHLORIDE 0.5 MG: 1 INJECTION, SOLUTION INTRAMUSCULAR; INTRAVENOUS; SUBCUTANEOUS at 11:36

## 2024-12-09 RX ADMIN — SODIUM CHLORIDE: 9 INJECTION, SOLUTION INTRAVENOUS at 08:01

## 2024-12-09 RX ADMIN — PANTOPRAZOLE SODIUM 40 MG: 40 INJECTION, POWDER, FOR SOLUTION INTRAVENOUS at 07:49

## 2024-12-09 RX ADMIN — LEVOTHYROXINE SODIUM 125 MCG: 125 TABLET ORAL at 07:56

## 2024-12-09 RX ADMIN — KETOROLAC TROMETHAMINE 15 MG: 30 INJECTION, SOLUTION INTRAMUSCULAR at 22:46

## 2024-12-09 RX ADMIN — KETOROLAC TROMETHAMINE 15 MG: 30 INJECTION, SOLUTION INTRAMUSCULAR at 03:51

## 2024-12-09 RX ADMIN — LOSARTAN POTASSIUM AND HYDROCHLOROTHIAZIDE 1 TABLET: 25; 100 TABLET ORAL at 07:56

## 2024-12-09 ASSESSMENT — PAIN DESCRIPTION - PAIN TYPE: TYPE: SURGICAL PAIN

## 2024-12-09 ASSESSMENT — PAIN SCALES - GENERAL
PAINLEVEL_OUTOF10: 6
PAINLEVEL_OUTOF10: 7
PAINLEVEL_OUTOF10: 6
PAINLEVEL_OUTOF10: 8
PAINLEVEL_OUTOF10: 5
PAINLEVEL_OUTOF10: 9
PAINLEVEL_OUTOF10: 6
PAINLEVEL_OUTOF10: 7
PAINLEVEL_OUTOF10: 6
PAINLEVEL_OUTOF10: 4
PAINLEVEL_OUTOF10: 2
PAINLEVEL_OUTOF10: 6
PAINLEVEL_OUTOF10: 4
PAINLEVEL_OUTOF10: 5

## 2024-12-09 ASSESSMENT — PAIN DESCRIPTION - DESCRIPTORS
DESCRIPTORS: ACHING

## 2024-12-09 ASSESSMENT — PAIN DESCRIPTION - ORIENTATION
ORIENTATION: RIGHT

## 2024-12-09 ASSESSMENT — PAIN - FUNCTIONAL ASSESSMENT
PAIN_FUNCTIONAL_ASSESSMENT: ACTIVITIES ARE NOT PREVENTED
PAIN_FUNCTIONAL_ASSESSMENT: PREVENTS OR INTERFERES SOME ACTIVE ACTIVITIES AND ADLS
PAIN_FUNCTIONAL_ASSESSMENT: PREVENTS OR INTERFERES SOME ACTIVE ACTIVITIES AND ADLS

## 2024-12-09 ASSESSMENT — PAIN DESCRIPTION - LOCATION
LOCATION: ABDOMEN

## 2024-12-09 ASSESSMENT — PAIN DESCRIPTION - FREQUENCY: FREQUENCY: CONTINUOUS

## 2024-12-09 ASSESSMENT — PAIN DESCRIPTION - ONSET: ONSET: ON-GOING

## 2024-12-09 NOTE — CARE COORDINATION
12/9/24, 10:29 AM EST    DISCHARGE ON GOING EVALUATION    [facility] day: 5  Location: 6A-14/014-A Reason for admit: Sigmoid stricture (HCC) [K56.699]     Procedures: 12-4-24 Open Sigmoid Colectomy     Imaging since last note: 12-7-24   XR ABDOMEN (KUB) (SINGLE AP VIEW)   Final Result   Dilated small bowel loops consistent with small-bowel obstruction.       Barriers to Discharge: POD #5. NG had to be re-placed over weekend due to abd distention. Having some BMs. NG remains and NPO except ice chips. Trend labs and replace as ordered. Ambulate. IVF. Movantik. Dilaudid.     PCP: Silvestre Sawyer MD  Readmission Risk Score: 13.6    Patient Goals/Plan/Treatment Preferences: Plans return home with spouse.

## 2024-12-09 NOTE — PROGRESS NOTES
Kelly Lea, APRN - CNP  [facility]  GENERAL [facility]GERY   Dr Brown covering for Dr Casimiro Callahan  General Surgery Daily Progress Note    Pt Name: Marry Ayers  Medical Record Number: [id number]  Date of Birth 1950   Today's Date: 12/9/2024  Chief complaint: Stricture of sigmoid colon   ASSESSMENT   Hospital day # 5   POD#5  Open Sigmoid Colectomy, Partial Small Bowel Resection with no complications   NGT placed for distention and dilated sb loops kub  Pt on coumadin for hx of PE was bridged with Lovenox   Leukocytosis 2/2 to parotid gland swelling  improving  Hypokalemia   Abdominal distention, passing flatus amd has had several BM's  however has increased distention and tenderness  Chest xray -Small left-sided pleural effusion with adjacent atelectasis/infiltrate, Mild cardiomegaly.   has a past medical history of Allergic rhinitis, Anemia, Anxiety, Cataract, Essential hypertension, GERD (gastroesophageal reflux disease), Hx of blood clots, Hypokalemia, Hypothyroidism, and Pulmonary embolism (HCC).  PLAN   Keep ngt to LIWS   IV hydration 100ml/h  Analgesics and antiemetics as needed  npo   Lovenox for DVT prophylaxis dose will need therapeutic dose    GI prophylaxis   Warm compress and anti-inflammatories for parotid gland swelling, sour candy   Continue Potassium replacement protocol   Increase activity as tolerated, C&DB, IS and ambulate   PICC and TPN for nutritional support  SUBJECTIVE   Marry is worsening abdominal pain despite NG tube.  She denies any nausea or vomiting, has  passed flatus has had a bowel movement x 3 .  More distention today with ngt. She is tolerating a Diet NPO Exceptions are: Ice Chips, Sips of Water with Meds. Her pain is well controlled on current medications. Has facial swelling and tender has improved.   CURRENT MEDICATIONS   Scheduled Meds:   pantoprazole  40 mg IntraVENous Daily    montelukast  10 mg Oral QAM    losartan-hydroCHLOROthiazide  1 tablet Oral Daily

## 2024-12-09 NOTE — PROGRESS NOTES
PICC Procedure Note    Marry Ayers   Admitted- 12/4/2024  7:09 AM  Admission diagnosis- Sigmoid stricture (HCC) [K56.699]      Attending Physician- Casimiro Callahan DO  Ordering Physician- CAS GRANADO-ALICE  Indication for Insertion: TPN    Catheter Insertion Date- 12/9/2024   Lot Number- [id number]  Gauge-4  Lumen-dual    Insertion Site- ROGER Basilic  Vein Circumference- 1.70 cm  Catheter Length- 44 cm  Internal Length- 42 cm  Exposed Catheter Length- 2cm   Upper Arm Circumference- 36cm  Tip Confirmation System Bundle met- No:   If X-ray required, Tip Location- [place]   Radiologist- Decanio    PICC insertion successful- Yes  Ultrasound- yes    Okay To Use PICC- Yes    Electronically signed by Delia Moore, RN, RN on 12/9/2024 at 2:18 PM

## 2024-12-09 NOTE — PROGRESS NOTES
Pharmacy Consult for TPN/PPN Initiation    Assessment:   Indication for TPN: sigmoid colectomy, partial small bowel resection POD#5, NPO  IV access: central  Dosing weight: 57 kg    Plan:  10 kcal/kg = 570 kcal  Clinimix 5/15 at a rate of 35 mL/hr will provide 596 kcal.   Will plan to convert to 3-in-1 TPN on 12/10/24 as appropriate.    Electrolyte Replacement:   Potassium chloride: 30 mEq      BMP, Mag, iCal, & Phos ordered for tomorrow with AM labs.    Pharmacy will continue to follow. Thank you for the consult.    Catie RamirezD, BCPS 12/9/2024 3:20 PM

## 2024-12-09 NOTE — PLAN OF CARE
Problem: Discharge Planning  Goal: Discharge to home or other facility with appropriate resources  12/8/2024 2301 by Renita Peterson RN  Outcome: Progressing  Flowsheets (Taken 12/8/2024 1940)  Discharge to home or other facility with appropriate resources:   Identify barriers to discharge with patient and caregiver   Identify discharge learning needs (meds, wound care, etc)   Arrange for needed discharge resources and transportation as appropriate     Problem: Safety - Adult  Goal: Free from fall injury  12/8/2024 2301 by Renita Peterson RN  Outcome: Progressing  Flowsheets (Taken 12/6/2024 1036 by Rowena Joseph RN)  Free From Fall Injury:   Instruct family/caregiver on patient safety   Based on caregiver fall risk screen, instruct family/caregiver to ask for assistance with transferring infant if caregiver noted to have fall risk factors     Problem: ABCDS Injury Assessment  Goal: Absence of physical injury  12/8/2024 2301 by Renita Peterson RN  Outcome: Progressing  Flowsheets (Taken 12/8/2024 2301)  Absence of Physical Injury: Implement safety measures based on patient assessment     Problem: Pain  Goal: Verbalizes/displays adequate comfort level or baseline comfort level  12/8/2024 2301 by Renita Peterson RN  Outcome: Progressing  Flowsheets (Taken 12/8/2024 1940)  Verbalizes/displays adequate comfort level or baseline comfort level:   Encourage patient to monitor pain and request assistance   Assess pain using appropriate pain scale   Administer analgesics based on type and severity of pain and evaluate response   Implement non-pharmacological measures as appropriate and evaluate response   Consider cultural and social influences on pain and pain management     Problem: Respiratory - Adult  Goal: Achieves optimal ventilation and oxygenation  12/8/2024 2301 by Renita Peterson RN  Outcome: Progressing  Flowsheets (Taken 12/8/2024 1940)  Achieves optimal ventilation and oxygenation:    Assess for changes in respiratory status   Assess for changes in mentation and behavior   Position to facilitate oxygenation and minimize respiratory effort   Oxygen supplementation based on oxygen saturation or arterial blood gases   Encourage broncho-pulmonary hygiene including cough, deep breathe, incentive spirometry   Assess and instruct to report shortness of breath or any respiratory difficulty     Problem: Skin/Tissue Integrity - Adult  Goal: Skin integrity remains intact  12/8/2024 2301 by Renita Peterson, RN  Outcome: Progressing  Flowsheets  Taken 12/8/2024 2300  Skin Integrity Remains Intact:   Monitor for areas of redness and/or skin breakdown   Assess vascular access sites hourly  Taken 12/8/2024 1940  Skin Integrity Remains Intact:   Monitor for areas of redness and/or skin breakdown   Assess vascular access sites hourly     Problem: Skin/Tissue Integrity - Adult  Goal: Incisions, wounds, or drain sites healing without S/S of infection  12/8/2024 2301 by Renita Peterson, RN  Outcome: Progressing  Flowsheets  Taken 12/8/2024 2300  Incisions, Wounds, or Drain Sites Healing Without Sign and Symptoms of Infection:   TWICE DAILY: Assess and document dressing/incision, wound bed, drain sites and surrounding tissue   Implement wound care per orders  Taken 12/8/2024 1940  Incisions, Wounds, or Drain Sites Healing Without Sign and Symptoms of Infection:   TWICE DAILY: Assess and document dressing/incision, wound bed, drain sites and surrounding tissue   Implement wound care per orders     Problem: Gastrointestinal - Adult  Goal: Minimal or absence of nausea and vomiting  12/8/2024 2301 by Renita Peterson RN  Outcome: Progressing  Flowsheets (Taken 12/8/2024 1940)  Minimal or absence of nausea and vomiting:   Administer IV fluids as ordered to ensure adequate hydration   Maintain NPO status until nausea and vomiting are resolved   Nasogastric tube to low intermittent suction as ordered   Administer

## 2024-12-09 NOTE — PLAN OF CARE
Problem: Discharge Planning  Goal: Discharge to home or other facility with appropriate resources  12/9/2024 1151 by Latricia Beth RN  Outcome: Progressing  Flowsheets (Taken 12/8/2024 1940 by Renita Peterson, RN)  Discharge to home or other facility with appropriate resources:   Identify barriers to discharge with patient and caregiver   Identify discharge learning needs (meds, wound care, etc)   Arrange for needed discharge resources and transportation as appropriate     Problem: Safety - Adult  Goal: Free from fall injury  12/9/2024 1151 by Latricia Beth RN  Outcome: Progressing  Flowsheets (Taken 12/6/2024 1036 by Rowena Joseph, RN)  Free From Fall Injury:   Instruct family/caregiver on patient safety   Based on caregiver fall risk screen, instruct family/caregiver to ask for assistance with transferring infant if caregiver noted to have fall risk factors     Problem: ABCDS Injury Assessment  Goal: Absence of physical injury  12/9/2024 1151 by Latricia Beth RN  Outcome: Progressing  Flowsheets (Taken 12/8/2024 2301 by Renita Peterson RN)  Absence of Physical Injury: Implement safety measures based on patient assessment     Problem: Pain  Goal: Verbalizes/displays adequate comfort level or baseline comfort level  12/9/2024 1151 by Latricia Beth RN  Outcome: Progressing  Flowsheets (Taken 12/8/2024 1940 by Renita Peterson, RN)  Verbalizes/displays adequate comfort level or baseline comfort level:   Encourage patient to monitor pain and request assistance   Assess pain using appropriate pain scale   Administer analgesics based on type and severity of pain and evaluate response   Implement non-pharmacological measures as appropriate and evaluate response   Consider cultural and social influences on pain and pain management     Problem: Respiratory - Adult  Goal: Achieves optimal ventilation and oxygenation  12/9/2024 1151 by Latricia Beth, RN  Outcome: Progressing  Flowsheets (Taken  RN  Outcome: Progressing  Flowsheets (Taken 12/8/2024 1940 by Renita Peterson RN)  Maintains or returns to baseline bowel function:   Assess bowel function   Administer IV fluids as ordered to ensure adequate hydration   Administer ordered medications as needed   Encourage mobilization and activity     Problem: Gastrointestinal - Adult  Goal: Maintains adequate nutritional intake  12/9/2024 1151 by Latricia Beth, RN  Outcome: Progressing  Flowsheets (Taken 12/8/2024 1940 by Renita Peterson RN)  Maintains adequate nutritional intake:   Monitor percentage of each meal consumed   Identify factors contributing to decreased intake, treat as appropriate   Assist with meals as needed   Monitor intake and output, weight and lab values     Problem: Genitourinary - Adult  Goal: Absence of urinary retention  12/9/2024 1151 by Latricia Beth RN  Outcome: Progressing  Flowsheets (Taken 12/8/2024 1940 by Renita Peterson RN)  Absence of urinary retention:   Assess patient’s ability to void and empty bladder   Monitor intake/output and perform bladder scan as needed   Discussed plan of care with patient and she verbalizes understanding

## 2024-12-09 NOTE — PROGRESS NOTES
Comprehensive Nutrition Assessment    Type and Reason for Visit:  Initial, Consult (TPN)    Nutrition Recommendations/Plan:   Pt currently receiving a PICC line per RN.   [facility] Pharmacist reports when TPN able to be started will be using Clinimix 5/15. I mentioned there is no Mg level today. When TPN able to be started, suggest use dose wt: 57kgm (adjusted wt) &  10kcals/kgm yields ~570kcals.   Monitor TPN, NPO, labs, & wt.      Malnutrition Assessment:  Malnutrition Status:  Severe malnutrition (12/09/24 1408)    Context:  Acute Illness     Findings of the 6 clinical characteristics of malnutrition:  Energy Intake:  50% or less of estimated energy requirements for 5 or more days (NPO ( had surgery 12/4))  Weight Loss:  1% to 2% over 1 week     Body Fat Loss:  Unable to assess     Muscle Mass Loss:  Unable to assess    Fluid Accumulation:  Unable to assess     Strength:  Not Performed    Nutrition Assessment:     Pt. severely malnourished AEB criteria listed above.  At risk for further nutritional compromise r/t admit with stricture of sigmoid colon , colovaginal fistula, HTN, increased needs for wound healing s/p surgery (12/4/24) see below and underlying medical condition (GERD, DVT, Blood Clots, Hypothyroidism, Pulmonary Embolism).       Nutrition Related Findings:    Pt. Report/Treatments/Miscellaneous: Pt N/A. She is receiving a PICC line per RN. Pt is NPO POD# day 5 ( see below). NGT placed for distension per cht. RN mentions has been having to replace pt's K+ multiple times over the weekend. Notice 2.1 % wt loss in ~ 1 week noted.   GI Status: BM x 2 (12/8) 500ml NGT op (12/8-12/9)  Pertinent Labs: (12/9) K+ 3.3, Ca 8.3, Hemoglobin 10.6, Phos 3.1  Pertinent Meds: Movantik    Wound Type:  ((12/4) open sigmoid colectomy partial small bowel resection  , wound vac abdomen)       Current Nutrition Intake & Therapies:    Average Meal Intake: NPO  Average Supplements Intake: NPO  Diet NPO Exceptions are: Ice

## 2024-12-10 LAB
ANION GAP SERPL CALC-SCNC: 11 MEQ/L (ref 8–16)
BUN SERPL-MCNC: 17 MG/DL (ref 7–22)
CA-I BLD ISE-SCNC: 1.14 MMOL/L (ref 1.12–1.32)
CALCIUM SERPL-MCNC: 8 MG/DL (ref 8.5–10.5)
CHLORIDE SERPL-SCNC: 107 MEQ/L (ref 98–111)
CO2 SERPL-SCNC: 22 MEQ/L (ref 23–33)
CREAT SERPL-MCNC: 0.8 MG/DL (ref 0.4–1.2)
GFR SERPL CREATININE-BSD FRML MDRD: 77 ML/MIN/1.73M2
GLUCOSE SERPL-MCNC: 126 MG/DL (ref 70–108)
MAGNESIUM SERPL-MCNC: 2 MG/DL (ref 1.6–2.4)
PHOSPHATE SERPL-MCNC: 1.9 MG/DL (ref 2.4–4.7)
PHOSPHATE SERPL-MCNC: 3.3 MG/DL (ref 2.4–4.7)
POTASSIUM SERPL-SCNC: 3.1 MEQ/L (ref 3.5–5.2)
POTASSIUM SERPL-SCNC: 3.3 MEQ/L (ref 3.5–5.2)
SODIUM SERPL-SCNC: 140 MEQ/L (ref 135–145)

## 2024-12-10 PROCEDURE — 6370000000 HC RX 637 (ALT 250 FOR IP)

## 2024-12-10 PROCEDURE — 6370000000 HC RX 637 (ALT 250 FOR IP): Performed by: SURGERY

## 2024-12-10 PROCEDURE — 80048 BASIC METABOLIC PNL TOTAL CA: CPT

## 2024-12-10 PROCEDURE — 2580000003 HC RX 258: Performed by: NURSE PRACTITIONER

## 2024-12-10 PROCEDURE — 2580000003 HC RX 258: Performed by: SURGERY

## 2024-12-10 PROCEDURE — 84100 ASSAY OF PHOSPHORUS: CPT

## 2024-12-10 PROCEDURE — 83735 ASSAY OF MAGNESIUM: CPT

## 2024-12-10 PROCEDURE — 36592 COLLECT BLOOD FROM PICC: CPT

## 2024-12-10 PROCEDURE — 99024 POSTOP FOLLOW-UP VISIT: CPT | Performed by: SURGERY

## 2024-12-10 PROCEDURE — 2500000003 HC RX 250 WO HCPCS: Performed by: PHARMACIST

## 2024-12-10 PROCEDURE — 6360000002 HC RX W HCPCS: Performed by: SURGERY

## 2024-12-10 PROCEDURE — 84132 ASSAY OF SERUM POTASSIUM: CPT

## 2024-12-10 PROCEDURE — 36415 COLL VENOUS BLD VENIPUNCTURE: CPT

## 2024-12-10 PROCEDURE — 1200000000 HC SEMI PRIVATE

## 2024-12-10 PROCEDURE — 6360000002 HC RX W HCPCS

## 2024-12-10 PROCEDURE — 82330 ASSAY OF CALCIUM: CPT

## 2024-12-10 PROCEDURE — 6360000002 HC RX W HCPCS: Performed by: NURSE PRACTITIONER

## 2024-12-10 PROCEDURE — 2580000003 HC RX 258: Performed by: PHARMACIST

## 2024-12-10 RX ADMIN — BUSPIRONE HYDROCHLORIDE 10 MG: 10 TABLET ORAL at 08:41

## 2024-12-10 RX ADMIN — KETOROLAC TROMETHAMINE 15 MG: 30 INJECTION, SOLUTION INTRAMUSCULAR at 04:09

## 2024-12-10 RX ADMIN — LOSARTAN POTASSIUM AND HYDROCHLOROTHIAZIDE 1 TABLET: 25; 100 TABLET ORAL at 08:42

## 2024-12-10 RX ADMIN — MONTELUKAST SODIUM 10 MG: 10 TABLET ORAL at 08:43

## 2024-12-10 RX ADMIN — POTASSIUM CHLORIDE 10 MEQ: 7.46 INJECTION, SOLUTION INTRAVENOUS at 12:02

## 2024-12-10 RX ADMIN — SODIUM PHOSPHATE, MONOBASIC, MONOHYDRATE AND SODIUM PHOSPHATE, DIBASIC, ANHYDROUS 25 MMOL: 142; 276 INJECTION, SOLUTION INTRAVENOUS at 17:02

## 2024-12-10 RX ADMIN — OXYCODONE 5 MG: 5 TABLET ORAL at 12:22

## 2024-12-10 RX ADMIN — METOPROLOL SUCCINATE 50 MG: 50 TABLET, EXTENDED RELEASE ORAL at 08:42

## 2024-12-10 RX ADMIN — NALOXEGOL OXALATE 25 MG: 25 TABLET, FILM COATED ORAL at 08:51

## 2024-12-10 RX ADMIN — SODIUM CHLORIDE: 9 INJECTION, SOLUTION INTRAVENOUS at 04:09

## 2024-12-10 RX ADMIN — POTASSIUM CHLORIDE 10 MEQ: 7.46 INJECTION, SOLUTION INTRAVENOUS at 13:25

## 2024-12-10 RX ADMIN — POTASSIUM CHLORIDE 40 MEQ: 1500 TABLET, EXTENDED RELEASE ORAL at 21:58

## 2024-12-10 RX ADMIN — POTASSIUM CHLORIDE 10 MEQ: 7.46 INJECTION, SOLUTION INTRAVENOUS at 15:55

## 2024-12-10 RX ADMIN — SODIUM CHLORIDE, PRESERVATIVE FREE 10 ML: 5 INJECTION INTRAVENOUS at 08:36

## 2024-12-10 RX ADMIN — BUSPIRONE HYDROCHLORIDE 10 MG: 10 TABLET ORAL at 21:58

## 2024-12-10 RX ADMIN — POTASSIUM CHLORIDE 10 MEQ: 7.46 INJECTION, SOLUTION INTRAVENOUS at 14:29

## 2024-12-10 RX ADMIN — POTASSIUM CHLORIDE 10 MEQ: 7.46 INJECTION, SOLUTION INTRAVENOUS at 10:21

## 2024-12-10 RX ADMIN — POTASSIUM CHLORIDE 10 MEQ: 7.46 INJECTION, SOLUTION INTRAVENOUS at 09:00

## 2024-12-10 RX ADMIN — ONDANSETRON 4 MG: 2 INJECTION INTRAMUSCULAR; INTRAVENOUS at 21:45

## 2024-12-10 RX ADMIN — PANTOPRAZOLE SODIUM 40 MG: 40 INJECTION, POWDER, FOR SOLUTION INTRAVENOUS at 08:36

## 2024-12-10 RX ADMIN — ENOXAPARIN SODIUM 40 MG: 100 INJECTION SUBCUTANEOUS at 08:36

## 2024-12-10 RX ADMIN — HYDROMORPHONE HYDROCHLORIDE 0.5 MG: 1 INJECTION, SOLUTION INTRAMUSCULAR; INTRAVENOUS; SUBCUTANEOUS at 21:45

## 2024-12-10 RX ADMIN — LEVOTHYROXINE SODIUM 125 MCG: 125 TABLET ORAL at 05:57

## 2024-12-10 RX ADMIN — LEUCINE, PHENYLALANINE, LYSINE, METHIONINE, ISOLEUCINE, VALINE, HISTIDINE, THREONINE, TRYPTOPHAN, ALANINE, GLYCINE, ARGININE, PROLINE, SERINE, TYROSINE, DEXTROSE: 365; 280; 290; 200; 300; 290; 240; 210; 90; 1035; 515; 575; 340; 250; 20; 15 INJECTION INTRAVENOUS at 18:47

## 2024-12-10 ASSESSMENT — PAIN DESCRIPTION - LOCATION
LOCATION: HEAD
LOCATION: ABDOMEN
LOCATION: ABDOMEN
LOCATION_2: HEAD
LOCATION: ABDOMEN

## 2024-12-10 ASSESSMENT — PAIN DESCRIPTION - PAIN TYPE
TYPE: SURGICAL PAIN

## 2024-12-10 ASSESSMENT — PAIN - FUNCTIONAL ASSESSMENT
PAIN_FUNCTIONAL_ASSESSMENT: ACTIVITIES ARE NOT PREVENTED
PAIN_FUNCTIONAL_ASSESSMENT_SITE2: ACTIVITIES ARE NOT PREVENTED
PAIN_FUNCTIONAL_ASSESSMENT: ACTIVITIES ARE NOT PREVENTED

## 2024-12-10 ASSESSMENT — PAIN SCALES - GENERAL
PAINLEVEL_OUTOF10: 3
PAINLEVEL_OUTOF10: 6
PAINLEVEL_OUTOF10: 8
PAINLEVEL_OUTOF10: 4
PAINLEVEL_OUTOF10: 9
PAINLEVEL_OUTOF10: 6
PAINLEVEL_OUTOF10: 0
PAINLEVEL_OUTOF10: 5

## 2024-12-10 ASSESSMENT — PAIN DESCRIPTION - DESCRIPTORS
DESCRIPTORS: ACHING
DESCRIPTORS_2: ACHING
DESCRIPTORS: SORE
DESCRIPTORS: SHARP;BURNING
DESCRIPTORS: ACHING

## 2024-12-10 ASSESSMENT — PAIN DESCRIPTION - ORIENTATION
ORIENTATION: RIGHT

## 2024-12-10 ASSESSMENT — PAIN DESCRIPTION - FREQUENCY: FREQUENCY: INTERMITTENT

## 2024-12-10 ASSESSMENT — PAIN DESCRIPTION - INTENSITY: RATING_2: 9

## 2024-12-10 NOTE — PROGRESS NOTES
DO TRIC[facility] DR GENERAL SURGERY   General Surgery Daily Progress Note    Pt Name: Marry Ayers  Medical Record Number: [id number]  Date of Birth 1950   Today's Date: 12/10/2024  Chief complaint: Stricture of sigmoid colon   ASSESSMENT   Hospital day # 6   POD#6  Open Sigmoid Colectomy, Partial Small Bowel Resection   Pt on coumadin for hx of PE was bridged with Lovenox   Leukocytosis 2/2 to parotid gland swelling  improving  Hypokalemia , hypophosphatemia  Chest xray -Small left-sided pleural effusion with adjacent atelectasis/infiltrate, Mild cardiomegaly.   has a past medical history of Allergic rhinitis, Anemia, Anxiety, Cataract, Essential hypertension, GERD (gastroesophageal reflux disease), Hx of blood clots, Hypokalemia, Hypothyroidism, and Pulmonary embolism (HCC).  PLAN   Clamp NG, trial of clear liquids  IV hydration 100ml/h  Analgesics and antiemetics as needed  Lovenox for DVT prophylaxis dose will need therapeutic dose    GI prophylaxis   Warm compress and anti-inflammatories for parotid gland swelling, sour candy   K, Phos replacement  Increase activity as tolerated, C&DB, IS and ambulate     SUBJECTIVE   Marry is doing better today.  She denies any nausea or vomiting, has  passed flatus has had a bowel movements . She is tolerating a ADULT DIET; Clear Liquid. Her pain is well controlled on current medications. Has facial swelling and tender has improved.   CURRENT MEDICATIONS   Scheduled Meds:   pantoprazole  40 mg IntraVENous Daily    montelukast  10 mg Oral QAM    losartan-hydroCHLOROthiazide  1 tablet Oral Daily    busPIRone  10 mg Oral BID    levothyroxine  125 mcg Oral Daily    metoprolol succinate  50 mg Oral Daily    sodium chloride flush  5-40 mL IntraVENous 2 times per day    enoxaparin  40 mg SubCUTAneous Daily     Continuous Infusions:   CLINIMIX 5/15 840 mL (12/09/24 1752)    sodium chloride 100 mL/hr at 12/10/24 0409    sodium chloride       PRN Meds:.magnesium  12/10/24  0556   WBC  --  7.8 5.5  --   --    HGB  --  10.4* 10.6*  --   --    HCT  --  32.7* 33.4*  --   --    PLT  --  373 362  --   --      --   --  142 140   K 3.0* 3.4* 3.3* 3.7 3.1*     --   --  108 107   CO2 23  --   --  24 22*   BUN 5*  --   --  14 17   CREATININE 0.7  --   --  0.7 0.8   MG 1.3* 2.3  --  2.3 2.0   PHOS 1.5* 4.1  --  3.1 1.9*   CALCIUM 8.3*  --   --  8.0* 8.0*      No results for input(s): \"INR\" in the last 72 hours.    Invalid input(s): \"PT\", \"PTT\"  No results for input(s): \"AST\", \"ALT\", \"BILITOT\", \"BILIDIR\", \"AMYLASE\", \"LIPASE\", \"LDH\", \"LACTA\" in the last 72 hours.  No results for input(s): \"TROPONINT\" in the last 72 hours.  RADIOLOGY     XR CHEST PORTABLE   Final Result   1. Mild hazy opacity at the right lung base may present a small right pleural   effusion with possible mild dependent right basilar atelectasis or pneumonia.   2. Left upper extremity PICC is present with the tip overlying the cavoatrial   junction.            **This report has been created using voice recognition software.  It may contain   minor errors which are inherent in voice recognition technology.**      Electronically signed by Dr. Benito Ureña      XR ABDOMEN (KUB) (SINGLE AP VIEW)   Final Result   1. Persistent gas-filled bowel are seen overlying the abdomen and pelvis. The   overall degree of gaseous distention appears slightly progressed from prior   examination. This is progressed overlying the right mid abdomen. Further   evaluation could be obtained with CT.      **This report has been created using voice recognition software.  It may contain   minor errors which are inherent in voice recognition technology.**      Electronically signed by Dr. Benito Ureña      XR ABDOMEN (2 VIEWS)   Final Result   1. No pneumoperitoneum.   2. Nonobstructive bowel gas pattern.            **This report has been created using voice recognition software. It may contain   minor errors which are inherent in voice  recognition technology.**         Electronically signed by Dr. Casimiro Fernandez      XR CHEST PORTABLE   Final Result   1. Small left-sided pleural effusion with adjacent atelectasis/infiltrate.   2. Mild cardiomegaly.               **This report has been created using voice recognition software. It may contain   minor errors which are inherent in voice recognition technology.**            Electronically signed by Dr. Casimiro Fernandez      XR ABDOMEN FOR NG/OG/NE TUBE PLACEMENT   Final Result   Appropriately positioned enteric tube.      This document has been electronically signed by: José Antonio Degroot MD on    12/07/2024 09:51 PM      XR ABDOMEN (KUB) (SINGLE AP VIEW)   Final Result   Dilated small bowel loops consistent with small-bowel obstruction.      This document has been electronically signed by: José Antonio Degroot MD on    12/07/2024 09:40 PM         XR ABDOMEN (KUB) (SINGLE AP VIEW)   Final Result   Diffuse gaseous distention throughout the intestines. This could be   related to an ileus.               **This report has been created using voice recognition software. It may contain   minor errors which are inherent in voice recognition technology.**      Electronically signed by Dr Nura Joyce          Electronically signed by Casimiro Callahan DO on 12/10/2024 at 9:05 AM

## 2024-12-10 NOTE — PLAN OF CARE
Problem: Discharge Planning  Goal: Discharge to home or other facility with appropriate resources  Outcome: Progressing  Flowsheets (Taken 12/9/2024 1955)  Discharge to home or other facility with appropriate resources:   Identify barriers to discharge with patient and caregiver   Arrange for needed discharge resources and transportation as appropriate   Identify discharge learning needs (meds, wound care, etc)     Problem: Safety - Adult  Goal: Free from fall injury  Outcome: Progressing  Flowsheets (Taken 12/10/2024 0352)  Free From Fall Injury: Instruct family/caregiver on patient safety     Problem: ABCDS Injury Assessment  Goal: Absence of physical injury  Outcome: Progressing  Flowsheets (Taken 12/10/2024 0352)  Absence of Physical Injury: Implement safety measures based on patient assessment     Problem: Pain  Goal: Verbalizes/displays adequate comfort level or baseline comfort level  Outcome: Progressing  Flowsheets (Taken 12/9/2024 1955)  Verbalizes/displays adequate comfort level or baseline comfort level:   Encourage patient to monitor pain and request assistance   Assess pain using appropriate pain scale   Administer analgesics based on type and severity of pain and evaluate response   Implement non-pharmacological measures as appropriate and evaluate response   Consider cultural and social influences on pain and pain management     Problem: Respiratory - Adult  Goal: Achieves optimal ventilation and oxygenation  Outcome: Progressing  Flowsheets (Taken 12/9/2024 1955)  Achieves optimal ventilation and oxygenation:   Assess for changes in respiratory status   Assess for changes in mentation and behavior   Position to facilitate oxygenation and minimize respiratory effort   Oxygen supplementation based on oxygen saturation or arterial blood gases   Encourage broncho-pulmonary hygiene including cough, deep breathe, incentive spirometry   Assess and instruct to report shortness of breath or any respiratory  difficulty     Problem: Skin/Tissue Integrity - Adult  Goal: Skin integrity remains intact  Outcome: Progressing  Flowsheets  Taken 12/10/2024 0351  Skin Integrity Remains Intact:   Monitor for areas of redness and/or skin breakdown   Assess vascular access sites hourly  Taken 12/10/2024 0349  Skin Integrity Remains Intact:   Monitor for areas of redness and/or skin breakdown   Assess vascular access sites hourly  Taken 12/9/2024 1955  Skin Integrity Remains Intact:   Monitor for areas of redness and/or skin breakdown   Assess vascular access sites hourly     Problem: Skin/Tissue Integrity - Adult  Goal: Incisions, wounds, or drain sites healing without S/S of infection  Outcome: Progressing  Flowsheets  Taken 12/10/2024 0351  Incisions, Wounds, or Drain Sites Healing Without Sign and Symptoms of Infection:   TWICE DAILY: Assess and document dressing/incision, wound bed, drain sites and surrounding tissue   Implement wound care per orders  Taken 12/10/2024 0349  Incisions, Wounds, or Drain Sites Healing Without Sign and Symptoms of Infection:   TWICE DAILY: Assess and document dressing/incision, wound bed, drain sites and surrounding tissue   Implement wound care per orders  Taken 12/9/2024 1955  Incisions, Wounds, or Drain Sites Healing Without Sign and Symptoms of Infection:   TWICE DAILY: Assess and document dressing/incision, wound bed, drain sites and surrounding tissue   Implement wound care per orders     Problem: Gastrointestinal - Adult  Goal: Minimal or absence of nausea and vomiting  Outcome: Progressing  Flowsheets (Taken 12/9/2024 1955)  Minimal or absence of nausea and vomiting:   Administer IV fluids as ordered to ensure adequate hydration   Maintain NPO status until nausea and vomiting are resolved   Nasogastric tube to low intermittent suction as ordered   Administer ordered antiemetic medications as needed   Provide nonpharmacologic comfort measures as appropriate     Problem: Gastrointestinal -  Adult  Goal: Maintains or returns to baseline bowel function  Outcome: Progressing  Flowsheets (Taken 12/9/2024 1955)  Maintains or returns to baseline bowel function:   Assess bowel function   Administer IV fluids as ordered to ensure adequate hydration   Administer ordered medications as needed   Encourage mobilization and activity     Problem: Gastrointestinal - Adult  Goal: Maintains adequate nutritional intake  Outcome: Progressing  Flowsheets (Taken 12/9/2024 1955)  Maintains adequate nutritional intake: Monitor intake and output, weight and lab values     Problem: Genitourinary - Adult  Goal: Absence of urinary retention  Outcome: Progressing  Flowsheets (Taken 12/9/2024 1955)  Absence of urinary retention:   Assess patient’s ability to void and empty bladder   Monitor intake/output and perform bladder scan as needed     Problem: Nutrition Deficit:  Goal: Optimize nutritional status  12/10/2024 0352 by Renita Peterson RN  Outcome: Progressing  Flowsheets (Taken 12/10/2024 0352)  Nutrient intake appropriate for improving, restoring, or maintaining nutritional needs:   Assess nutritional status and recommend course of action   Monitor oral intake, labs, and treatment plans     Care plan reviewed with patient. Patient verbalizes understanding of plan of care and contributes to goal setting.

## 2024-12-10 NOTE — CARE COORDINATION
12/10/24, 7:47 AM EST    DISCHARGE ON GOING EVALUATION    Marry A [facility] day: 6  Location: 6A-14/014-A Reason for admit: Sigmoid stricture (HCC) [K56.699]     Procedures: 12-4-24 Open Sigmoid Colectomy     Imaging since last note: 12-9-24 CXR  IMPRESSION:  1. Mild hazy opacity at the right lung base may present a small right pleural  effusion with possible mild dependent right basilar atelectasis or pneumonia.  2. Left upper extremity PICC is present with the tip overlying the cavoatrial  junction.  12-9-24 KUB  IMPRESSION:  1. Persistent gas-filled bowel are seen overlying the abdomen and pelvis. The  overall degree of gaseous distention appears slightly progressed from prior  examination. This is progressed overlying the right mid abdomen. Further  evaluation could be obtained with CT.    Barriers to Discharge: POD #6 Having flatus and BM however having distention. NG remains to LIWS. PICC placed as may require TPN.  NPO ice chips/sips. GenSurg and Dietitian following.     PCP: Silvestre Sawyer MD  Readmission Risk Score: 12.9    Patient Goals/Plan/Treatment Preferences: Plans return home with spouse.

## 2024-12-10 NOTE — PROGRESS NOTES
Pharmacy Consult for TPN/PPN Initiation    Assessment:   IV access: central  Dosing weight: 57 kg    Plan:  10 kcal/kg = 570 kcal  Clinimix 5/15 at a rate of 35 mL/hr will provide 596 kcal.   Will plan to convert to 3-in-1 TPN on 12/11 if patient does not tolerate clear liquid diet today.    Electrolyte Replacement:   Potassium chloride: 40 mEq per PRN replacement protocol (in progress)  Sodium phosphate: 25 mmol ordered (instead of replacement protocol)      BMP, Mag, iCal, & Phos ordered for tomorrow with AM labs.    Pharmacy will continue to follow. Thank you for the consult.  Princess Rodriguez, PharmD, BCPS, BCCCP  12/10/2024 11:27 AM

## 2024-12-10 NOTE — OP NOTE
[address] [address]                            OPERATIVE REPORT      PATIENT NAME: PITO WHYTE            : 1950  MED REC NO: [id number]                       ROOM: [facility]  ACCOUNT NO: [id number]                       ADMIT DATE: 2024  PROVIDER: Casimiro Callahan DO      DATE OF PROCEDURE:  2024    SURGEON:  Casimiro Callahan DO    PREOPERATIVE DIAGNOSIS:  Sigmoid stricture.    POSTOPERATIVE DIAGNOSIS:  Sigmoid stricture.    PROCEDURES PERFORMED:  Open sigmoid colectomy and partial small bowel resection.    ANESTHESIA:  General with local.    ESTIMATED BLOOD LOSS:  200 mL.    SPECIMENS:  Portion of sigmoid colon sent to Pathology for analysis and portion of small bowel sent to Pathology for analysis.    COMPLICATIONS:  None immediately appreciated.    DISCUSSION:  The patient is a 74-year-old female who presented to my office and seen in evaluation regarding presence of a sigmoid stricture.  After a history and physical examination performed, potential diagnostic and therapeutic modalities were discussed with the patient.  Operative and nonoperative modalities were discussed.  Risks, complications, and benefits were reviewed.  She was given the opportunity to ask questions.  Once answered, informed consent was obtained.  Patient was brought to the operating room on 2024, for procedure.    OPERATIVE FINDINGS:  At the time of exploration, patient was found to have adhesions of small bowel down into the pelvis attached to the previous anastomotic line where the stricture was identified.  A partial sigmoid resection and partial small bowel resection performed as a result, and primary anastomosis of both these was created.    DESCRIPTION OF PROCEDURE:  Patient was brought to the operating room, placed in dorsal lithotomy position, placed under continuous cardiac telemetry, blood pressure, and pulse oximeter

## 2024-12-10 NOTE — PLAN OF CARE
Problem: Discharge Planning  Goal: Discharge to home or other facility with appropriate resources  12/10/2024 1106 by Emma Bañuelos RN  Outcome: Progressing  Flowsheets (Taken 12/10/2024 0822)  Discharge to home or other facility with appropriate resources:   Arrange for needed discharge resources and transportation as appropriate   Identify barriers to discharge with patient and caregiver   Identify discharge learning needs (meds, wound care, etc)   Refer to discharge planning if patient needs post-hospital services based on physician order or complex needs related to functional status, cognitive ability or social support system     Problem: Safety - Adult  Goal: Free from fall injury  12/10/2024 1106 by Emma Bañuelos RN  Outcome: Progressing  Flowsheets (Taken 12/10/2024 0352 by Renita Peterson, RN)  Free From Fall Injury: Instruct family/caregiver on patient safety     Problem: ABCDS Injury Assessment  Goal: Absence of physical injury  12/10/2024 1106 by Emma Bañuelos RN  Outcome: Progressing  Flowsheets (Taken 12/10/2024 0352 by Renita Peterson, RN)  Absence of Physical Injury: Implement safety measures based on patient assessment     Problem: Pain  Goal: Verbalizes/displays adequate comfort level or baseline comfort level  12/10/2024 1106 by Emma Bañuelos RN  Outcome: Progressing  Flowsheets (Taken 12/10/2024 0822)  Verbalizes/displays adequate comfort level or baseline comfort level:   Encourage patient to monitor pain and request assistance   Assess pain using appropriate pain scale   Administer analgesics based on type and severity of pain and evaluate response   Implement non-pharmacological measures as appropriate and evaluate response   Consider cultural and social influences on pain and pain management   Notify Licensed Independent Practitioner if interventions unsuccessful or patient reports new pain     Problem: Respiratory - Adult  Goal: Achieves optimal ventilation  status until nausea and vomiting are resolved   Nasogastric tube to low intermittent suction as ordered   Administer ordered antiemetic medications as needed   Provide nonpharmacologic comfort measures as appropriate     Problem: Gastrointestinal - Adult  Goal: Maintains or returns to baseline bowel function  12/10/2024 1106 by Emma Bañuelos RN  Outcome: Progressing  Flowsheets (Taken 12/10/2024 0822)  Maintains or returns to baseline bowel function:   Assess bowel function   Administer IV fluids as ordered to ensure adequate hydration   Administer ordered medications as needed   Encourage mobilization and activity     Problem: Gastrointestinal - Adult  Goal: Maintains adequate nutritional intake  12/10/2024 1106 by Emma Bañuelos RN  Outcome: Progressing  Flowsheets (Taken 12/10/2024 0822)  Maintains adequate nutritional intake:   Monitor percentage of each meal consumed   Identify factors contributing to decreased intake, treat as appropriate   Assist with meals as needed   Monitor intake and output, weight and lab values     Problem: Genitourinary - Adult  Goal: Absence of urinary retention  12/10/2024 1106 by Emma Bañuelos, RN  Outcome: Progressing  Flowsheets (Taken 12/10/2024 0822)  Absence of urinary retention:   Assess patient’s ability to void and empty bladder   Monitor intake/output and perform bladder scan as needed     Problem: Nutrition Deficit:  Goal: Optimize nutritional status  12/10/2024 1106 by Emma Bañuelos RN  Outcome: Progressing  Flowsheets (Taken 12/10/2024 0352 by Renita Peterson, RN)  Nutrient intake appropriate for improving, restoring, or maintaining nutritional needs:   Assess nutritional status and recommend course of action   Monitor oral intake, labs, and treatment plans   Plan of care reviewed with patient and patient verbalized the understanding of

## 2024-12-11 ENCOUNTER — APPOINTMENT (OUTPATIENT)
Dept: GENERAL RADIOLOGY | Age: 74
End: 2024-12-11
Attending: SURGERY
Payer: MEDICARE

## 2024-12-11 LAB
ANION GAP SERPL CALC-SCNC: 9 MEQ/L (ref 8–16)
BUN SERPL-MCNC: 13 MG/DL (ref 7–22)
CA-I BLD ISE-SCNC: 1.13 MMOL/L (ref 1.12–1.32)
CALCIUM SERPL-MCNC: 7.8 MG/DL (ref 8.5–10.5)
CHLORIDE SERPL-SCNC: 106 MEQ/L (ref 98–111)
CO2 SERPL-SCNC: 21 MEQ/L (ref 23–33)
CREAT SERPL-MCNC: 0.8 MG/DL (ref 0.4–1.2)
DEPRECATED RDW RBC AUTO: 53 FL (ref 35–45)
ERYTHROCYTE [DISTWIDTH] IN BLOOD BY AUTOMATED COUNT: 15.9 % (ref 11.5–14.5)
GFR SERPL CREATININE-BSD FRML MDRD: 77 ML/MIN/1.73M2
GLUCOSE BLD STRIP.AUTO-MCNC: 119 MG/DL (ref 70–108)
GLUCOSE BLD STRIP.AUTO-MCNC: 124 MG/DL (ref 70–108)
GLUCOSE BLD STRIP.AUTO-MCNC: 132 MG/DL (ref 70–108)
GLUCOSE BLD STRIP.AUTO-MCNC: 139 MG/DL (ref 70–108)
GLUCOSE SERPL-MCNC: 130 MG/DL (ref 70–108)
HCT VFR BLD AUTO: 28.2 % (ref 37–47)
HGB BLD-MCNC: 9.3 GM/DL (ref 12–16)
MAGNESIUM SERPL-MCNC: 1.9 MG/DL (ref 1.6–2.4)
MCH RBC QN AUTO: 30.2 PG (ref 26–33)
MCHC RBC AUTO-ENTMCNC: 33 GM/DL (ref 32.2–35.5)
MCV RBC AUTO: 91.6 FL (ref 81–99)
PHOSPHATE SERPL-MCNC: 2.6 MG/DL (ref 2.4–4.7)
PLATELET # BLD AUTO: 358 THOU/MM3 (ref 130–400)
PMV BLD AUTO: 10.5 FL (ref 9.4–12.4)
POTASSIUM SERPL-SCNC: 3.4 MEQ/L (ref 3.5–5.2)
RBC # BLD AUTO: 3.08 MILL/MM3 (ref 4.2–5.4)
SODIUM SERPL-SCNC: 136 MEQ/L (ref 135–145)
WBC # BLD AUTO: 6.5 THOU/MM3 (ref 4.8–10.8)

## 2024-12-11 PROCEDURE — 36415 COLL VENOUS BLD VENIPUNCTURE: CPT

## 2024-12-11 PROCEDURE — 94760 N-INVAS EAR/PLS OXIMETRY 1: CPT

## 2024-12-11 PROCEDURE — 6370000000 HC RX 637 (ALT 250 FOR IP)

## 2024-12-11 PROCEDURE — 2580000003 HC RX 258: Performed by: SURGERY

## 2024-12-11 PROCEDURE — 6370000000 HC RX 637 (ALT 250 FOR IP): Performed by: SURGERY

## 2024-12-11 PROCEDURE — 6360000002 HC RX W HCPCS: Performed by: SURGERY

## 2024-12-11 PROCEDURE — 2700000000 HC OXYGEN THERAPY PER DAY

## 2024-12-11 PROCEDURE — 94640 AIRWAY INHALATION TREATMENT: CPT

## 2024-12-11 PROCEDURE — 6370000000 HC RX 637 (ALT 250 FOR IP): Performed by: NURSE PRACTITIONER

## 2024-12-11 PROCEDURE — 2580000003 HC RX 258: Performed by: PHARMACIST

## 2024-12-11 PROCEDURE — 2500000003 HC RX 250 WO HCPCS: Performed by: PHARMACIST

## 2024-12-11 PROCEDURE — 85027 COMPLETE CBC AUTOMATED: CPT

## 2024-12-11 PROCEDURE — 82330 ASSAY OF CALCIUM: CPT

## 2024-12-11 PROCEDURE — 99024 POSTOP FOLLOW-UP VISIT: CPT | Performed by: SURGERY

## 2024-12-11 PROCEDURE — 71045 X-RAY EXAM CHEST 1 VIEW: CPT

## 2024-12-11 PROCEDURE — 80048 BASIC METABOLIC PNL TOTAL CA: CPT

## 2024-12-11 PROCEDURE — 6360000002 HC RX W HCPCS: Performed by: PHARMACIST

## 2024-12-11 PROCEDURE — 6360000002 HC RX W HCPCS: Performed by: NURSE PRACTITIONER

## 2024-12-11 PROCEDURE — 82948 REAGENT STRIP/BLOOD GLUCOSE: CPT

## 2024-12-11 PROCEDURE — 84100 ASSAY OF PHOSPHORUS: CPT

## 2024-12-11 PROCEDURE — 1200000000 HC SEMI PRIVATE

## 2024-12-11 PROCEDURE — 83735 ASSAY OF MAGNESIUM: CPT

## 2024-12-11 PROCEDURE — 2580000003 HC RX 258: Performed by: NURSE PRACTITIONER

## 2024-12-11 RX ORDER — IPRATROPIUM BROMIDE AND ALBUTEROL SULFATE 2.5; .5 MG/3ML; MG/3ML
1 SOLUTION RESPIRATORY (INHALATION) EVERY 4 HOURS PRN
Status: DISCONTINUED | OUTPATIENT
Start: 2024-12-11 | End: 2024-12-12 | Stop reason: ALTCHOICE

## 2024-12-11 RX ORDER — INSULIN LISPRO 100 [IU]/ML
0-8 INJECTION, SOLUTION INTRAVENOUS; SUBCUTANEOUS EVERY 6 HOURS
Status: DISCONTINUED | OUTPATIENT
Start: 2024-12-11 | End: 2024-12-22

## 2024-12-11 RX ORDER — GLUCAGON 1 MG/ML
1 KIT INJECTION PRN
Status: DISCONTINUED | OUTPATIENT
Start: 2024-12-11 | End: 2024-12-28 | Stop reason: HOSPADM

## 2024-12-11 RX ORDER — DEXTROSE MONOHYDRATE 100 MG/ML
INJECTION, SOLUTION INTRAVENOUS CONTINUOUS PRN
Status: DISCONTINUED | OUTPATIENT
Start: 2024-12-11 | End: 2024-12-28 | Stop reason: HOSPADM

## 2024-12-11 RX ADMIN — OXYCODONE 5 MG: 5 TABLET ORAL at 15:00

## 2024-12-11 RX ADMIN — LEVOTHYROXINE SODIUM 125 MCG: 125 TABLET ORAL at 06:22

## 2024-12-11 RX ADMIN — PANTOPRAZOLE SODIUM 40 MG: 40 INJECTION, POWDER, FOR SOLUTION INTRAVENOUS at 09:25

## 2024-12-11 RX ADMIN — POTASSIUM CHLORIDE 40 MEQ: 1500 TABLET, EXTENDED RELEASE ORAL at 09:23

## 2024-12-11 RX ADMIN — HYDROMORPHONE HYDROCHLORIDE 0.5 MG: 1 INJECTION, SOLUTION INTRAMUSCULAR; INTRAVENOUS; SUBCUTANEOUS at 04:45

## 2024-12-11 RX ADMIN — CALCIUM GLUCONATE: 98 INJECTION, SOLUTION INTRAVENOUS at 19:45

## 2024-12-11 RX ADMIN — BUSPIRONE HYDROCHLORIDE 10 MG: 10 TABLET ORAL at 20:04

## 2024-12-11 RX ADMIN — METOPROLOL SUCCINATE 50 MG: 50 TABLET, EXTENDED RELEASE ORAL at 09:32

## 2024-12-11 RX ADMIN — SODIUM CHLORIDE: 9 INJECTION, SOLUTION INTRAVENOUS at 12:13

## 2024-12-11 RX ADMIN — LOSARTAN POTASSIUM AND HYDROCHLOROTHIAZIDE 1 TABLET: 25; 100 TABLET ORAL at 09:31

## 2024-12-11 RX ADMIN — OXYCODONE 5 MG: 5 TABLET ORAL at 22:04

## 2024-12-11 RX ADMIN — SODIUM CHLORIDE: 9 INJECTION, SOLUTION INTRAVENOUS at 01:36

## 2024-12-11 RX ADMIN — BUSPIRONE HYDROCHLORIDE 10 MG: 10 TABLET ORAL at 09:30

## 2024-12-11 RX ADMIN — OXYCODONE 5 MG: 5 TABLET ORAL at 09:36

## 2024-12-11 RX ADMIN — IPRATROPIUM BROMIDE AND ALBUTEROL SULFATE 1 DOSE: .5; 3 SOLUTION RESPIRATORY (INHALATION) at 21:16

## 2024-12-11 RX ADMIN — SODIUM CHLORIDE, PRESERVATIVE FREE 10 ML: 5 INJECTION INTRAVENOUS at 09:25

## 2024-12-11 RX ADMIN — ENOXAPARIN SODIUM 40 MG: 100 INJECTION SUBCUTANEOUS at 09:24

## 2024-12-11 RX ADMIN — MONTELUKAST SODIUM 10 MG: 10 TABLET ORAL at 09:32

## 2024-12-11 ASSESSMENT — PAIN SCALES - GENERAL
PAINLEVEL_OUTOF10: 6
PAINLEVEL_OUTOF10: 6
PAINLEVEL_OUTOF10: 5
PAINLEVEL_OUTOF10: 7
PAINLEVEL_OUTOF10: 6
PAINLEVEL_OUTOF10: 6
PAINLEVEL_OUTOF10: 7
PAINLEVEL_OUTOF10: 6
PAINLEVEL_OUTOF10: 2
PAINLEVEL_OUTOF10: 6

## 2024-12-11 ASSESSMENT — PAIN - FUNCTIONAL ASSESSMENT
PAIN_FUNCTIONAL_ASSESSMENT: ACTIVITIES ARE NOT PREVENTED

## 2024-12-11 ASSESSMENT — PAIN DESCRIPTION - PAIN TYPE
TYPE: SURGICAL PAIN

## 2024-12-11 ASSESSMENT — PAIN DESCRIPTION - ORIENTATION
ORIENTATION: RIGHT

## 2024-12-11 ASSESSMENT — PAIN DESCRIPTION - LOCATION
LOCATION: ABDOMEN

## 2024-12-11 ASSESSMENT — PAIN DESCRIPTION - DESCRIPTORS
DESCRIPTORS: SHARP

## 2024-12-11 ASSESSMENT — PAIN DESCRIPTION - FREQUENCY: FREQUENCY: INTERMITTENT

## 2024-12-11 ASSESSMENT — PAIN DESCRIPTION - ONSET: ONSET: ON-GOING

## 2024-12-11 NOTE — PROGRESS NOTES
DO TRICIA Kim DR GENERAL SURGERY   General Surgery Daily Progress Note    Pt Name: Marry Ayers  Medical Record Number: [id number]  Date of Birth 1950   Today's Date: 12/11/2024  Chief complaint: Stricture of sigmoid colon   ASSESSMENT   Hospital day # 7   POD#6  Open Sigmoid Colectomy, Partial Small Bowel Resection   Pt on coumadin for hx of PE was bridged with Lovenox   Leukocytosis 2/2 to parotid gland swelling  improving  Hypokalemia , hypophosphatemia  Chest xray -Small left-sided pleural effusion with adjacent atelectasis/infiltrate, Mild cardiomegaly.   has a past medical history of Allergic rhinitis, Anemia, Anxiety, Cataract, Essential hypertension, GERD (gastroesophageal reflux disease), Hx of blood clots, Hypokalemia, Hypothyroidism, and Pulmonary embolism (HCC).  PLAN   Analgesics and antiemetics as needed  Lovenox for DVT prophylaxis dose will need therapeutic dose    GI prophylaxis   Warm compress and anti-inflammatories for parotid gland swelling, sour candy   K, Phos replacement  Increase activity as tolerated, C&DB, IS and ambulate   Full liquids as toelrated    SUBJECTIVE   Marry is doing better today.  She denies any nausea or vomiting, has  passed flatus has had a bowel movements . She is tolerating a PN-Adult Premix 5/15 - Central  PN-Adult  3 IN 1 Central Line (Custom)  ADULT DIET; Full Liquid. Her pain is well controlled on current medications. Has facial swelling and tender has improved.   CURRENT MEDICATIONS   Scheduled Meds:   insulin lispro  0-8 Units SubCUTAneous Q6H    pantoprazole  40 mg IntraVENous Daily    montelukast  10 mg Oral QAM    losartan-hydroCHLOROthiazide  1 tablet Oral Daily    busPIRone  10 mg Oral BID    levothyroxine  125 mcg Oral Daily    metoprolol succinate  50 mg Oral Daily    sodium chloride flush  5-40 mL IntraVENous 2 times per day    enoxaparin  40 mg SubCUTAneous Daily     Continuous Infusions:   dextrose      PN-Adult  3 IN 1 Central  Line (Custom)      PN-Adult Premix 5/15 - Central 35 mL/hr at 24 0623    CLINIMIX 5/15 Stopped (12/10/24 1834)    sodium chloride 50 mL/hr at 24 1317    sodium chloride       PRN Meds:.glucose, dextrose bolus **OR** dextrose bolus, glucagon (rDNA), dextrose, magnesium sulfate, sodium phosphate 15 mmol in sodium chloride 0.9 % 250 mL IVPB, HYDROmorphone, hydrALAZINE, potassium chloride **OR** potassium alternative oral replacement **OR** potassium chloride, potassium chloride, sodium chloride flush, sodium chloride, oxyCODONE, ondansetron  OBJECTIVE   CURRENT VITALS:  height is 1.549 m (5' 1\") and weight is 83.2 kg (183 lb 6.4 oz). Her oral temperature is 98.3 °F (36.8 °C). Her blood pressure is 144/50 (abnormal) and her pulse is 89. Her respiration is 16 and oxygen saturation is 90%.   Temperature Range (24h):Temp: 98.3 °F (36.8 °C) Temp  Av.2 °F (36.8 °C)  Min: 97.6 °F (36.4 °C)  Max: 98.5 °F (36.9 °C)  BP Range (24h): Systolic (24hrs), Av , Min:131 , Max:175     Diastolic (24hrs), Av, Min:50, Max:76    Pulse Range (24h): Pulse  Av.3  Min: 89  Max: 110  Respiration Range (24h): Resp  Av.4  Min: 16  Max: 22  Current Pulse Ox (24h):  SpO2: 90 %  Pulse Ox Range (24h):  SpO2  Av %  Min: 88 %  Max: 96 %  Oxygen Amount and Delivery: O2 Flow Rate (L/min): 2 L/min  Incentive Spirometry Tx:       Achieved Volume (mL): 1000 mL    GENERAL: alert, no distress, right side mild facial swelling improving  LUNGS: clear to ausculation, without wheezes, rales or rhonci  HEART: normal rate and regular rhythm  ABDOMEN: soft, tender with distention, , bowel sounds present,  and no guarding or peritoneal signs  Incision: Prevena wound VAC   EXTREMITY: no cyanosis, clubbing or edema  In: 8564 [P.O.:680; I.V.:5636.1]  Out: 900 [Urine:725]  Date 24 - 24   Shift  5022-6553 9637-8702 24 Hour Total   INTAKE   P.O.(mL/kg/hr)  300  300   I.V.(mL/kg) 742.5(8.9)   742.5(8.9)  obtained with CT.      **This report has been created using voice recognition software.  It may contain   minor errors which are inherent in voice recognition technology.**      Electronically signed by Dr. Benito rUeña      XR ABDOMEN (2 VIEWS)   Final Result   1. No pneumoperitoneum.   2. Nonobstructive bowel gas pattern.            **This report has been created using voice recognition software. It may contain   minor errors which are inherent in voice recognition technology.**         Electronically signed by Dr. Casimiro Fernandez      XR CHEST PORTABLE   Final Result   1. Small left-sided pleural effusion with adjacent atelectasis/infiltrate.   2. Mild cardiomegaly.               **This report has been created using voice recognition software. It may contain   minor errors which are inherent in voice recognition technology.**            Electronically signed by Dr. Casimiro Fernandez      XR ABDOMEN FOR NG/OG/NE TUBE PLACEMENT   Final Result   Appropriately positioned enteric tube.      This document has been electronically signed by: José Antonio Degroot MD on    12/07/2024 09:51 PM      XR ABDOMEN (KUB) (SINGLE AP VIEW)   Final Result   Dilated small bowel loops consistent with small-bowel obstruction.      This document has been electronically signed by: José Antonio Degroot MD on    12/07/2024 09:40 PM         XR ABDOMEN (KUB) (SINGLE AP VIEW)   Final Result   Diffuse gaseous distention throughout the intestines. This could be   related to an ileus.               **This report has been created using voice recognition software. It may contain   minor errors which are inherent in voice recognition technology.**      Electronically signed by Dr Nura Joyce          Electronically signed by Casimiro Callahan DO on 12/11/2024 at 2:12 PM

## 2024-12-11 NOTE — PLAN OF CARE
Problem: Discharge Planning  Goal: Discharge to home or other facility with appropriate resources  12/11/2024 1020 by Emma Bañuelos RN  Outcome: Progressing  Flowsheets (Taken 12/11/2024 0909)  Discharge to home or other facility with appropriate resources:   Identify barriers to discharge with patient and caregiver   Arrange for needed discharge resources and transportation as appropriate   Identify discharge learning needs (meds, wound care, etc)   Refer to discharge planning if patient needs post-hospital services based on physician order or complex needs related to functional status, cognitive ability or social support system     Problem: Safety - Adult  Goal: Free from fall injury  12/11/2024 1020 by Emma Bañuelos RN  Outcome: Progressing  Flowsheets (Taken 12/11/2024 0038 by Renita Peterson, RN)  Free From Fall Injury: Instruct family/caregiver on patient safety     Problem: ABCDS Injury Assessment  Goal: Absence of physical injury  12/11/2024 1020 by Emma Bañuelos RN  Outcome: Progressing  Flowsheets (Taken 12/11/2024 0038 by Renita Peterson, RN)  Absence of Physical Injury: Implement safety measures based on patient assessment     Problem: Pain  Goal: Verbalizes/displays adequate comfort level or baseline comfort level  12/11/2024 1020 by Emma Bañuelos RN  Outcome: Progressing  Flowsheets (Taken 12/11/2024 0909)  Verbalizes/displays adequate comfort level or baseline comfort level:   Encourage patient to monitor pain and request assistance   Assess pain using appropriate pain scale   Administer analgesics based on type and severity of pain and evaluate response   Implement non-pharmacological measures as appropriate and evaluate response   Consider cultural and social influences on pain and pain management   Notify Licensed Independent Practitioner if interventions unsuccessful or patient reports new pain     Problem: Respiratory - Adult  Goal: Achieves optimal ventilation  Gastrointestinal - Adult  Goal: Maintains or returns to baseline bowel function  12/11/2024 1020 by Emma Bañuelos RN  Outcome: Progressing  Flowsheets (Taken 12/10/2024 2140 by Renita Peterson, RN)  Maintains or returns to baseline bowel function:   Assess bowel function   Encourage oral fluids to ensure adequate hydration   Administer IV fluids as ordered to ensure adequate hydration   Administer ordered medications as needed   Encourage mobilization and activity     Problem: Gastrointestinal - Adult  Goal: Maintains adequate nutritional intake  12/11/2024 1020 by Emma Bañuelos RN  Outcome: Progressing  Flowsheets (Taken 12/10/2024 2140 by Renita Peterson, RN)  Maintains adequate nutritional intake:   Monitor percentage of each meal consumed   Identify factors contributing to decreased intake, treat as appropriate   Assist with meals as needed   Monitor intake and output, weight and lab values     Problem: Genitourinary - Adult  Goal: Absence of urinary retention  12/11/2024 1020 by Emma Bañuelos RN  Outcome: Progressing  Flowsheets (Taken 12/11/2024 0909)  Absence of urinary retention:   Assess patient’s ability to void and empty bladder   Monitor intake/output and perform bladder scan as needed     Problem: Nutrition Deficit:  Goal: Optimize nutritional status  12/11/2024 1020 by Emma Bañuelos RN  Outcome: Progressing  Flowsheets (Taken 12/10/2024 0352 by Renita Peterson, RN)  Nutrient intake appropriate for improving, restoring, or maintaining nutritional needs:   Assess nutritional status and recommend course of action  Plan of care reviewed with patient and patient verbalized the understanding of

## 2024-12-11 NOTE — PLAN OF CARE
Problem: Discharge Planning  Goal: Discharge to home or other facility with appropriate resources  12/11/2024 0038 by Renita Peterson RN  Outcome: Progressing  Flowsheets (Taken 12/10/2024 2140)  Discharge to home or other facility with appropriate resources:   Arrange for needed discharge resources and transportation as appropriate   Identify discharge learning needs (meds, wound care, etc)   Identify barriers to discharge with patient and caregiver     Problem: Safety - Adult  Goal: Free from fall injury  12/11/2024 0038 by Renita Peterson RN  Outcome: Progressing  Flowsheets (Taken 12/11/2024 0038)  Free From Fall Injury: Instruct family/caregiver on patient safety     Problem: ABCDS Injury Assessment  Goal: Absence of physical injury  12/11/2024 0038 by Renita Peterson RN  Outcome: Progressing  Flowsheets (Taken 12/11/2024 0038)  Absence of Physical Injury: Implement safety measures based on patient assessment     Problem: Pain  Goal: Verbalizes/displays adequate comfort level or baseline comfort level  12/11/2024 0038 by Renita Peterson RN  Outcome: Progressing  Flowsheets (Taken 12/10/2024 2140)  Verbalizes/displays adequate comfort level or baseline comfort level:   Encourage patient to monitor pain and request assistance   Assess pain using appropriate pain scale   Administer analgesics based on type and severity of pain and evaluate response   Implement non-pharmacological measures as appropriate and evaluate response   Consider cultural and social influences on pain and pain management     Problem: Respiratory - Adult  Goal: Achieves optimal ventilation and oxygenation  12/11/2024 0038 by Renita Peterson RN  Outcome: Progressing  Flowsheets (Taken 12/10/2024 2140)  Achieves optimal ventilation and oxygenation:   Assess for changes in respiratory status   Assess for changes in mentation and behavior   Position to facilitate oxygenation and minimize respiratory effort   Oxygen  nutritional status and recommend course of action   Monitor oral intake, labs, and treatment plans     Care plan reviewed with patient. Patient verbalizes understanding of plan of care and contributes to goal setting.

## 2024-12-11 NOTE — PROGRESS NOTES
Dr Callahan notified of pulse ox room air 83% with ambulation. O2 at 2 liters applied with pulse up to 96%. Bibasilar rales noted. Portable chest x ray ordered

## 2024-12-11 NOTE — PROGRESS NOTES
Physician Progress Note      PATIENT:               PITO WHYTE  [facility] #:                  [id number]  :                       1950  ADMIT DATE:       2024 7:09 AM  DISCH DATE:  RESPONDING  PROVIDER #:        Casimiro Callahan DO          QUERY TEXT:    Pt admitted with sigmoid stricture and underwent open sigmoid colectomy,   partial small bowel resection on 24.  Per 24 Xray abdomen: \"Diffuse gaseous distention throughout the   intestines. This could be related to an ileus\"  Per 2019 Xray abdomen: \"Dilated small bowel loops consistent with   small-bowel obstruction\"    If possible, please document in progress notes and discharge summary if   evaluating and or treating:  ?  The medical record reflects the following:  Risk Factors:  sigmoid stricture,open sigmoid colectomy, partial small bowel   resection 24.  Clinical Indicators: per 27 General Surgery significant event note:   increased pain, N/V, distention;   Per 24 Gen Surg progress note: passing   flatus, no bowel function yet  Per 24 Xray abdomen:Diffuse gaseous   distention throughout the intestines. This could be related to an ileus;  Per   2019 Xray abdomen: Dilated small bowel loops consistent with   small-bowel obstruction;  24 XR abdomen:  NG tube, Nonobstructive gas   pattern;  Treatment: NG LIWS,  monitoring    Shannon Levi, MSN, RN, CCDS, CRCR  Clinical   .  Options provided:  -- Post-operative ileus as an expected condition following 24  surgery  -- Post operative ileus as a complication of 24 surgery  -- Post-operative   small bowel obstruction as a complication of 24   surgery  -- Post operative ileus and post-operative small bowel obstruction both ruled   out after study  -- Other - I will add my own diagnosis  -- Disagree - Not applicable / Not valid  -- Disagree - Clinically unable to determine / Unknown  -- Refer to Clinical  Documentation Reviewer    PROVIDER RESPONSE TEXT:    Post-operative ileus as an expected condition following 12/4/24 surgery    Query created by: Shannon Levi on 12/9/2024 10:11 AM      QUERY TEXT:    Pt admitted with sigmoid stricture and underwent open sigmoid colectomy,   partial small bowel resection on 12/4/24.    Per 12/9/24 Comprehensive Nutrition Assessment by Dietitian:  Severe   malnutrition    If possible, please document in progress notes and discharge summary if you   are evaluating and /or treating any of the following:    The medical record reflects the following:  Risk Factors: sigmoid stricture, open sigmoid colectomy, partial small bowel   resection on 12/4/24.  Clinical Indicators: 12/9/24 Comprehensive Nutrition Assessment by Dietitian:   Malnutrition Status:  Severe malnutrition (12/09/24 1408)  Context:  Acute Illness  Findings of the 6 clinical characteristics of malnutrition:  Energy Intake:  50% or less of estimated energy requirements for 5 or more   days (NPO ( had surgery 12/4))  Weight Loss:  1% to 2% over 1 week  Body Fat Loss:  Unable to assess  Muscle Mass Loss:  Unable to assess  Fluid Accumulation:  Unable to assess   Strength:  Not Performed  Treatment: parenteral nutrition, assessment and monitoring by dietitian    ASPEN Criteria:    https://aspenjournals.onlinelibrary.russell.com/doi/full/10.1177/688946227933756  5    Shannon Levi, MSN, RN, CCDS, CRCR  Clinical   .  Options provided:  -- Protein calorie malnutrition severe  -- Other - I will add my own diagnosis  -- Disagree - Not applicable / Not valid  -- Disagree - Clinically unable to determine / Unknown  -- Refer to Clinical Documentation Reviewer    PROVIDER RESPONSE TEXT:    This patient has severe protein calorie malnutrition.    Query created by: Shannon Levi on 12/11/2024 12:52 PM      Electronically signed by:  Casimiro Callahan DO 12/11/2024 2:13 PM

## 2024-12-11 NOTE — PROGRESS NOTES
report)  Average Supplements Intake: None Ordered  ADULT DIET; Clear Liquid  PN-Adult Premix 5/15 - Central  Current Parenteral Nutrition Orders:  Type and Formula: Premix Central (Clinimix 5/15; dose weight 57kgm; 10 kcal/kgm)   Lipids: None  Duration: Continuous  Rate/Volume: 35ml/ hour  Current PN Order Provides: 596 kcals, 42 grams protein, 126 grams CHO  Goal PN Orders Provides: When next bag of TPN is made, suggest switch to  3 in 1 TPN using dose wt: 57kgm, 15kcals/kgm, 1.2 grams protein/kgm, 20% kcals from lipids to yield ~855kcals, 68 grams protein, 17 grams lipids, 121 grams dextrose/24 hrs    Anthropometric Measures:  Height: 154.9 cm (5' 1\")  Ideal Body Weight (IBW): 105 lbs (48 kg)    Admission Body Weight: 83.2 kg (183 lb 6.8 oz) ((12/4) no edema)  Current Body Weight: 83.2 kg (183 lb 6.8 oz) ((12/4) no edema),   IBW.    Current BMI (kg/m2): 34.7  Usual Body Weight:  (per EMR: (11/26/24) 187#6oz actual stand scale)        Weight Adjustment For:  (adjusted body weight for TPN dose weight: 57kgm)                 BMI Categories: Obese Class 1 (BMI 30.0-34.9)    Estimated Daily Nutrient Needs:  Energy Requirements Based On: Kcal/kg  Weight Used for Energy Requirements:  (83.2kgm ( 12/4))  Energy (kcal/day): 1038-8477 (15-18)  Weight Used for Protein Requirements: Ideal (48kgm IBW)  Protein (g/day): 72-96 grams ( 1.5-2 grams protein/kgm IBW)         Nutrition Diagnosis:   Severe malnutrition, in context of acute illness or injury related to Altered GI structure as evidenced by NPO or clear liquid status due to medical condition, criteria as identified in malnutrition assessment    Nutrition Interventions:   Food and/or Nutrient Delivery: Modify Parenteral Nutrition, Continue Current Diet  Nutrition Education/Counseling: Education/Counseling not appropriate  Coordination of Nutrition Care: Continue to monitor while inpatient       Goals:  Goals: Tolerate nutrition support at goal rate, within 2 days  Type of  Goal: Continue current goal  Previous Goal Met: Progressing toward Goal(s)    Nutrition Monitoring and Evaluation:      Food/Nutrient Intake Outcomes: Diet Advancement/Tolerance, Parenteral Nutrition Intake/Tolerance, Vitamin/Mineral Intake, IVF Intake  Physical Signs/Symptoms Outcomes: Biochemical Data, GI Status, Fluid Status or Edema, Hemodynamic Status, Nutrition Focused Physical Findings, Skin, Weight    Discharge Planning:    Too soon to determine     Ingrid Diego RD, LD  Contact: [phone]

## 2024-12-11 NOTE — PROGRESS NOTES
Pharmacy Consult for TPN/PPN Initiation    Indication for TPN: Sigmoid colectomy, partial small bowel resection 12/4  Ordering Provider: Kelly Lea CNP          IV Access: central  Dosing weight: 57 kg  Current insulin regimen:add medium sliding scale  Maintenance fluid: 0.9% sodium chloride at 100 mL/hr     Assessment/ Plan:  Begin 3-in-1 TPN today with the ingredients listed below:    Date 12/11/2024   Total kcal/kg 15   Total kcal 855   Protein g/kg 1.2   Protein g 68.4   Protein kcal (4 kcal/g) 273.6   Lipid % 20   Lipid g 17.1   Lipid kcal (10 kcal/g) 171   Dextrose g 120.7   Dextrose kcal (3.4 kcal/g) 410.4   Infusion Rate (mL/hr) 40   Na Acetate (mEq) 100   Na Chloride (mEq) 0   Na Phos (mmol)  (3 mmol = 4 mEq Na) 0   Total Na (mEq) 100   K Acetate (mEq) 0   K Chloride (mEq) 80   K Phos (mmol)  (15 mmol = 22 mEq K) 15   Total K (mEq) 102   Ca Gluconate (mEq)  (1 g = 4.65 mEq) 4.65   Mag Sulfate (mEq)  (1 g = 8.12 mEq) 12.18   Multivitamin (mL) 10   Trace Elements (mL) 1     Electrolyte Replacement:   Potassium chloride: 80 mEq PO per PRN replacement      Monitoring:  BMP, Mag, iCal, & Phos ordered for tomorrow with AM labs.    Pharmacy will continue to follow daily. Thank you for the consult.  Catie Steven PharmD, BCPS 12/11/2024 10:58 AM

## 2024-12-12 ENCOUNTER — APPOINTMENT (OUTPATIENT)
Dept: CT IMAGING | Age: 74
End: 2024-12-12
Attending: SURGERY
Payer: MEDICARE

## 2024-12-12 LAB
ANION GAP SERPL CALC-SCNC: 11 MEQ/L (ref 8–16)
BUN SERPL-MCNC: 11 MG/DL (ref 7–22)
CA-I BLD ISE-SCNC: 1.2 MMOL/L (ref 1.12–1.32)
CALCIUM SERPL-MCNC: 8.2 MG/DL (ref 8.5–10.5)
CHLORIDE SERPL-SCNC: 104 MEQ/L (ref 98–111)
CO2 SERPL-SCNC: 24 MEQ/L (ref 23–33)
CREAT SERPL-MCNC: 0.9 MG/DL (ref 0.4–1.2)
GFR SERPL CREATININE-BSD FRML MDRD: 67 ML/MIN/1.73M2
GLUCOSE BLD STRIP.AUTO-MCNC: 116 MG/DL (ref 70–108)
GLUCOSE BLD STRIP.AUTO-MCNC: 127 MG/DL (ref 70–108)
GLUCOSE BLD STRIP.AUTO-MCNC: 133 MG/DL (ref 70–108)
GLUCOSE BLD STRIP.AUTO-MCNC: 137 MG/DL (ref 70–108)
GLUCOSE SERPL-MCNC: 134 MG/DL (ref 70–108)
MAGNESIUM SERPL-MCNC: 1.8 MG/DL (ref 1.6–2.4)
PHOSPHATE SERPL-MCNC: 3 MG/DL (ref 2.4–4.7)
POTASSIUM SERPL-SCNC: 3.3 MEQ/L (ref 3.5–5.2)
SODIUM SERPL-SCNC: 139 MEQ/L (ref 135–145)

## 2024-12-12 PROCEDURE — 2580000003 HC RX 258: Performed by: PHARMACIST

## 2024-12-12 PROCEDURE — 71275 CT ANGIOGRAPHY CHEST: CPT

## 2024-12-12 PROCEDURE — 82330 ASSAY OF CALCIUM: CPT

## 2024-12-12 PROCEDURE — 2700000000 HC OXYGEN THERAPY PER DAY

## 2024-12-12 PROCEDURE — 74177 CT ABD & PELVIS W/CONTRAST: CPT

## 2024-12-12 PROCEDURE — 6370000000 HC RX 637 (ALT 250 FOR IP): Performed by: NURSE PRACTITIONER

## 2024-12-12 PROCEDURE — 6360000002 HC RX W HCPCS: Performed by: NURSE PRACTITIONER

## 2024-12-12 PROCEDURE — 6360000002 HC RX W HCPCS: Performed by: SURGERY

## 2024-12-12 PROCEDURE — 83735 ASSAY OF MAGNESIUM: CPT

## 2024-12-12 PROCEDURE — 6370000000 HC RX 637 (ALT 250 FOR IP): Performed by: SURGERY

## 2024-12-12 PROCEDURE — 82948 REAGENT STRIP/BLOOD GLUCOSE: CPT

## 2024-12-12 PROCEDURE — 2580000003 HC RX 258: Performed by: SURGERY

## 2024-12-12 PROCEDURE — 99024 POSTOP FOLLOW-UP VISIT: CPT | Performed by: SURGERY

## 2024-12-12 PROCEDURE — 6370000000 HC RX 637 (ALT 250 FOR IP)

## 2024-12-12 PROCEDURE — 6360000004 HC RX CONTRAST MEDICATION: Performed by: SURGERY

## 2024-12-12 PROCEDURE — 36415 COLL VENOUS BLD VENIPUNCTURE: CPT

## 2024-12-12 PROCEDURE — 6360000002 HC RX W HCPCS: Performed by: PHARMACIST

## 2024-12-12 PROCEDURE — 94640 AIRWAY INHALATION TREATMENT: CPT

## 2024-12-12 PROCEDURE — 84100 ASSAY OF PHOSPHORUS: CPT

## 2024-12-12 PROCEDURE — 1200000000 HC SEMI PRIVATE

## 2024-12-12 PROCEDURE — 94761 N-INVAS EAR/PLS OXIMETRY MLT: CPT

## 2024-12-12 PROCEDURE — 80048 BASIC METABOLIC PNL TOTAL CA: CPT

## 2024-12-12 PROCEDURE — 2500000003 HC RX 250 WO HCPCS: Performed by: PHARMACIST

## 2024-12-12 RX ORDER — LEVALBUTEROL INHALATION SOLUTION 1.25 MG/3ML
1.25 SOLUTION RESPIRATORY (INHALATION) EVERY 4 HOURS PRN
Status: DISCONTINUED | OUTPATIENT
Start: 2024-12-12 | End: 2024-12-28 | Stop reason: HOSPADM

## 2024-12-12 RX ORDER — IOPAMIDOL 755 MG/ML
80 INJECTION, SOLUTION INTRAVASCULAR
Status: COMPLETED | OUTPATIENT
Start: 2024-12-12 | End: 2024-12-12

## 2024-12-12 RX ORDER — ACETAMINOPHEN 325 MG/1
650 TABLET ORAL EVERY 4 HOURS PRN
Status: DISCONTINUED | OUTPATIENT
Start: 2024-12-12 | End: 2024-12-28 | Stop reason: HOSPADM

## 2024-12-12 RX ADMIN — SODIUM CHLORIDE: 9 INJECTION, SOLUTION INTRAVENOUS at 08:53

## 2024-12-12 RX ADMIN — ENOXAPARIN SODIUM 40 MG: 100 INJECTION SUBCUTANEOUS at 08:54

## 2024-12-12 RX ADMIN — METOPROLOL SUCCINATE 50 MG: 50 TABLET, EXTENDED RELEASE ORAL at 08:52

## 2024-12-12 RX ADMIN — PANTOPRAZOLE SODIUM 40 MG: 40 INJECTION, POWDER, FOR SOLUTION INTRAVENOUS at 08:54

## 2024-12-12 RX ADMIN — HYDROMORPHONE HYDROCHLORIDE 0.5 MG: 1 INJECTION, SOLUTION INTRAMUSCULAR; INTRAVENOUS; SUBCUTANEOUS at 20:10

## 2024-12-12 RX ADMIN — LEVOTHYROXINE SODIUM 125 MCG: 125 TABLET ORAL at 05:27

## 2024-12-12 RX ADMIN — IOPAMIDOL 80 ML: 755 INJECTION, SOLUTION INTRAVENOUS at 11:38

## 2024-12-12 RX ADMIN — MONTELUKAST SODIUM 10 MG: 10 TABLET ORAL at 08:51

## 2024-12-12 RX ADMIN — OXYCODONE 5 MG: 5 TABLET ORAL at 18:19

## 2024-12-12 RX ADMIN — LEVALBUTEROL HYDROCHLORIDE 1.25 MG: 1.25 SOLUTION RESPIRATORY (INHALATION) at 04:41

## 2024-12-12 RX ADMIN — OXYCODONE 5 MG: 5 TABLET ORAL at 10:46

## 2024-12-12 RX ADMIN — HYDRALAZINE HYDROCHLORIDE 10 MG: 20 INJECTION INTRAMUSCULAR; INTRAVENOUS at 20:10

## 2024-12-12 RX ADMIN — POTASSIUM CHLORIDE 40 MEQ: 1500 TABLET, EXTENDED RELEASE ORAL at 09:01

## 2024-12-12 RX ADMIN — CALCIUM GLUCONATE: 98 INJECTION, SOLUTION INTRAVENOUS at 18:20

## 2024-12-12 RX ADMIN — BUSPIRONE HYDROCHLORIDE 10 MG: 10 TABLET ORAL at 20:10

## 2024-12-12 RX ADMIN — ACETAMINOPHEN 650 MG: 325 TABLET ORAL at 20:09

## 2024-12-12 RX ADMIN — LOSARTAN POTASSIUM AND HYDROCHLOROTHIAZIDE 1 TABLET: 25; 100 TABLET ORAL at 08:49

## 2024-12-12 RX ADMIN — BUSPIRONE HYDROCHLORIDE 10 MG: 10 TABLET ORAL at 08:51

## 2024-12-12 ASSESSMENT — PAIN DESCRIPTION - FREQUENCY: FREQUENCY: INTERMITTENT

## 2024-12-12 ASSESSMENT — PAIN DESCRIPTION - ONSET: ONSET: ON-GOING

## 2024-12-12 ASSESSMENT — PAIN SCALES - GENERAL
PAINLEVEL_OUTOF10: 6
PAINLEVEL_OUTOF10: 5
PAINLEVEL_OUTOF10: 10
PAINLEVEL_OUTOF10: 5
PAINLEVEL_OUTOF10: 6

## 2024-12-12 ASSESSMENT — PAIN DESCRIPTION - LOCATION
LOCATION: ABDOMEN

## 2024-12-12 ASSESSMENT — PAIN DESCRIPTION - ORIENTATION
ORIENTATION: MID

## 2024-12-12 ASSESSMENT — PAIN DESCRIPTION - PAIN TYPE: TYPE: SURGICAL PAIN

## 2024-12-12 ASSESSMENT — PAIN - FUNCTIONAL ASSESSMENT: PAIN_FUNCTIONAL_ASSESSMENT: ACTIVITIES ARE NOT PREVENTED

## 2024-12-12 ASSESSMENT — PAIN DESCRIPTION - DESCRIPTORS
DESCRIPTORS: ACHING;STABBING
DESCRIPTORS: ACHING
DESCRIPTORS: ACHING

## 2024-12-12 NOTE — PROGRESS NOTES
Pharmacy Consult for TPN/PPN Monitoring & Adjustment  IV Access: central      Dosing weight: 57 kg  Current insulin regimen: medium sliding scale  Diet (excluding TPN): Regular diet, not eating  Maintenance fluid: 0.9% sodium chloride at 50 mL/hr. Will combine this in TPN today    Plan:  See components of tonight's TPN bag in the table below:  Date 12/11/2024 12/12/24   Total kcal/kg 15 20   Total kcal 855 1140   Protein g/kg 1.2 1.5   Protein g 68.4 85.5   Protein kcal (4 kcal/g) 273.6 342   Lipid % 20 30   Lipid g 17.1 34.2   Lipid kcal (10 kcal/g) 171 342   Dextrose g 120.7 134   Dextrose kcal (3.4 kcal/g) 410.4 456   Infusion Rate (mL/hr) 40 90   Na Acetate (mEq) 100 130   Na Chloride (mEq) 0 140   Na Phos (mmol)  (3 mmol = 4 mEq Na) 0 0   Total Na (mEq) 100 270   K Acetate (mEq) 0 0   K Chloride (mEq) 80 120   K Phos (mmol)  (15 mmol = 22 mEq K) 15 15   Total K (mEq) 102 142   Ca Gluconate (mEq)  (1 g = 4.65 mEq) 4.65 2.3   Mag Sulfate (mEq)  (1 g = 8.12 mEq) 12.18 12.18   Multivitamin (mL) 10 10   Trace Elements (mL) 1 1     Summary of changes for tonight's TPN:   Stop NS infusion  Increase TPN rate to 90 mL/hr  Increase NaAC to 130 mEq  Add [id number] mEq  Increase KCL to120 mEq  Decrease Ca gluconate to 2.3 mEq    Electrolyte Replacement:   Potassium chloride: 40 mEq    Monitoring:  BMP, Mag, iCal, & Phos ordered for tomorrow with AM labs.    Pharmacy will continue to follow daily. Thank you for the consult.  Kamryn Russo, Pharm.D., BCPS, BCCCP 12/12/2024 12:36 PM

## 2024-12-12 NOTE — CARE COORDINATION
12/12/24, 7:25 AM EST    DISCHARGE ON GOING EVALUATION    [facility] day: 8  Location: -14/014-A Reason for admit: Sigmoid stricture (HCC) [K56.699]     Procedures: 12-4-24 Open Sigmoid Colectomy     Imaging since last note: 12-10-24 CXR  IMPRESSION:  Small bilateral pleural effusions.    Barriers to Discharge: POD #7. Has had some parotid gland swelling with associated leukocytosis, improving. Imaging reveals some pleural effusion and atelectasis/infiltrate. PO with ambulation down in 80's.  O2 applied and sat up to 96%. Presently on 2L/NC with sat 95%. Rales noted. Resp exercise. PN at 35/hr. IVF at 50/hr. Full liquid diet. Dietitian following.     PCP: Silvestre Sawyer MD  Readmission Risk Score: 13.6    Patient Goals/Plan/Treatment Preferences: Plans return home with spouse.

## 2024-12-12 NOTE — PROGRESS NOTES
Comprehensive Nutrition Assessment    Type and Reason for Visit:  Reassess, Nutrition support    Nutrition Recommendations/Plan:    When next 3 in 1  TPN bag is made, suggest dose wt: 57kgm, 20kcals/kgm, 1.5 grams protein/kgm, 30% kcals from lipids. Spoke with pharmacist.  Diet per surgery.   Monitor TPN, labs, po intake & wt. Consider NPO if pt continues to have abdominal pain issues.   Suggest weigh pt.      Malnutrition Assessment:  Malnutrition Status:  Severe malnutrition (12/12/24 1034)    Context:  Acute Illness     Findings of the 6 clinical characteristics of malnutrition:  Energy Intake:  50% or less of estimated energy requirements for 5 or more days (NPO ( had surgery 12/4))  Weight Loss:  1% to 2% over 1 week     Body Fat Loss:  Mild body fat loss Orbital   Muscle Mass Loss:  Mild muscle mass loss Temples (temporalis)  Fluid Accumulation:  Unable to assess     Strength:  Not Performed    Nutrition Assessment:     Pt. Improving from a nutritional standpoint AEB able to increase base components of TPN today, however pt having abdominal pain  level 7 per pt. RN notified.    At risk for further nutritional compromise r/t admit with stricture of sigmoid colon, colovaginal fistula, HTN, increased needs for wound healing s/p surgery (12/4/24) see below and underlying medical condition (GERD, DVT, Blood Clots, Hypothyroidism, Pulmonary Embolism)     Nutrition Related Findings:    Pt. Report/Treatments/Miscellaneous: Pt seen, mentions poor appetite  & reports she drank some coffee for breakfast but is now having level 7 abdominal pain. I alerted RN. 3 in 1 TPN infusing 40ml/hr.    GI Status: BM x 1 (12/11)  Pertinent Labs: (12/12) K+ 3.3, Glucose 134, POC Glucose 119-139, Ca 8.2, Mg 1.8, Phos 3, (12/11) Hemoglobin 9.3  Pertinent Meds: Insulin Lispro, IVF 50ml/hr     Wound Type:  ((12/4) open sigmoid colectomy partial small bowel resection  , wound vac abdomen)       Current Nutrition Intake & Therapies:     Average Meal Intake: 1-25%  Average Supplements Intake: None Ordered  PN-Adult  3 IN 1 Central Line (Custom)  ADULT DIET; Regular  Current Parenteral Nutrition Orders:  Type and Formula: 3-in-1 Custom   Lipids: Daily  Duration: Continuous  Rate/Volume: 40ml/hr per pharmacy  Current PN Order Provides: dose wt: 57kgm, 15kcals/kgm, 1.2 grams protein/kgm, 20% kcals from lipids to yield ~855kcals, 68 grams protein, 17 grams lipids, 121 grams dextrose/24 hrs  Goal PN Orders Provides: dose wt: 57kgm, 20kcals/kgm, 1.5 grams protein/kgm, 30% kcals from lipids to yield ~ 1140kcals, 86 grams protein, 34 grams lipid, 134 grams CHO/24 hrs.    Anthropometric Measures:  Height: 154.9 cm (5' 1\")  Ideal Body Weight (IBW): 105 lbs (48 kg)    Admission Body Weight: 83.2 kg (183 lb 6.8 oz) ((12/4) no edema)  Current Body Weight: 83.2 kg (183 lb 6.8 oz) ((12/4) no edema),   IBW.    Current BMI (kg/m2): 34.7  Usual Body Weight:  (per EMR: (11/26/24) 187#6oz actual stand scale)        Weight Adjustment For:  (adjusted body weight for TPN dose weight: 57kgm)                 BMI Categories: Obese Class 1 (BMI 30.0-34.9)    Estimated Daily Nutrient Needs:  Energy Requirements Based On: Kcal/kg  Weight Used for Energy Requirements:  (83.2kgm ( 12/4))  Energy (kcal/day): 8383-9856 (15-18)  Weight Used for Protein Requirements: Ideal (48kgm IBW)  Protein (g/day): 72-96 grams ( 1.5-2 grams protein/kgm IBW)         Nutrition Diagnosis:   Severe malnutrition, in context of acute illness or injury related to Altered GI structure as evidenced by criteria as identified in malnutrition assessment    Nutrition Interventions:   Food and/or Nutrient Delivery: Modify Parenteral Nutrition  Nutrition Education/Counseling: Education/Counseling initiated (Discussed TPN with pt)  Coordination of Nutrition Care: Continue to monitor while inpatient       Goals:  Goals: Tolerate nutrition support at goal rate, within 2 days  Type of Goal: Continue current

## 2024-12-12 NOTE — PLAN OF CARE
Problem: Discharge Planning  Goal: Discharge to home or other facility with appropriate resources  12/11/2024 2237 by Kelly Baltazar RN  Outcome: Progressing  Flowsheets (Taken 12/11/2024 2237)  Discharge to home or other facility with appropriate resources:   Identify barriers to discharge with patient and caregiver   Identify discharge learning needs (meds, wound care, etc)   Arrange for needed discharge resources and transportation as appropriate     Problem: Safety - Adult  Goal: Free from fall injury  12/11/2024 2237 by Kelly Baltazar RN  Outcome: Progressing  Flowsheets (Taken 12/11/2024 2237)  Free From Fall Injury: Instruct family/caregiver on patient safety     Problem: ABCDS Injury Assessment  Goal: Absence of physical injury  12/11/2024 2237 by Kelly Baltazar RN  Outcome: Progressing  Flowsheets (Taken 12/11/2024 2237)  Absence of Physical Injury: Implement safety measures based on patient assessment     Problem: Pain  Goal: Verbalizes/displays adequate comfort level or baseline comfort level  12/11/2024 2237 by Kelly Baltazar RN  Outcome: Progressing  Flowsheets (Taken 12/11/2024 2237)  Verbalizes/displays adequate comfort level or baseline comfort level:   Encourage patient to monitor pain and request assistance   Administer analgesics based on type and severity of pain and evaluate response   Assess pain using appropriate pain scale   Implement non-pharmacological measures as appropriate and evaluate response     Problem: Respiratory - Adult  Goal: Achieves optimal ventilation and oxygenation  12/11/2024 2237 by Kelly Baltazar RN  Outcome: Progressing  Flowsheets (Taken 12/11/2024 2237)  Achieves optimal ventilation and oxygenation:   Assess for changes in respiratory status   Position to facilitate oxygenation and minimize respiratory effort   Assess for changes in mentation and behavior   Oxygen supplementation based on oxygen saturation or arterial blood gases   Assess and instruct to report shortness of  breath or any respiratory difficulty   Respiratory therapy support as indicated     Problem: Skin/Tissue Integrity - Adult  Goal: Incisions, wounds, or drain sites healing without S/S of infection  12/11/2024 2237 by Kelly Baltazar RN  Outcome: Progressing  Flowsheets  Taken 12/11/2024 2237 by Kelly Baltazar RN  Incisions, Wounds, or Drain Sites Healing Without Sign and Symptoms of Infection:   ADMISSION and DAILY: Assess and document risk factors for pressure ulcer development   TWICE DAILY: Assess and document skin integrity   TWICE DAILY: Assess and document dressing/incision, wound bed, drain sites and surrounding tissue  Taken 12/11/2024 1026 by Emma Bañuelos RN  Incisions, Wounds, or Drain Sites Healing Without Sign and Symptoms of Infection:   TWICE DAILY: Assess and document skin integrity   TWICE DAILY: Assess and document dressing/incision, wound bed, drain sites and surrounding tissue     Problem: Gastrointestinal - Adult  Goal: Maintains or returns to baseline bowel function  12/11/2024 2237 by Kelly Baltazar RN  Outcome: Progressing  Flowsheets (Taken 12/11/2024 2237)  Maintains or returns to baseline bowel function:   Assess bowel function   Administer IV fluids as ordered to ensure adequate hydration   Encourage mobilization and activity   Encourage oral fluids to ensure adequate hydration   Administer ordered medications as needed     Problem: Genitourinary - Adult  Goal: Absence of urinary retention  12/11/2024 2237 by Kelly Baltazar RN  Outcome: Progressing  Flowsheets (Taken 12/11/2024 2237)  Absence of urinary retention:   Assess patient’s ability to void and empty bladder   Monitor intake/output and perform bladder scan as needed     Problem: Nutrition Deficit:  Goal: Optimize nutritional status  12/11/2024 2237 by Kelly Baltazar RN  Outcome: Progressing  Flowsheets (Taken 12/11/2024 2237)  Nutrient intake appropriate for improving, restoring, or maintaining nutritional needs:   Assess nutritional

## 2024-12-12 NOTE — PROGRESS NOTES
DO [address] [address] DR GENERAL SURGERY   General Surgery Daily Progress Note    Pt Name: Marry Ayers  Medical Record Number: [id number]  Date of Birth 1950   Today's Date: 12/12/2024  Chief complaint: Stricture of sigmoid colon   ASSESSMENT   Hospital day # 8   POD#8  Open Sigmoid Colectomy, Partial Small Bowel Resection   Pt on coumadin for hx of PE was bridged with Lovenox   Leukocytosis 2/2 to parotid gland swelling  improving  Hypokalemia , hypophosphatemia  Chest xray -Small left-sided pleural effusion with adjacent atelectasis/infiltrate, Mild cardiomegaly.   has a past medical history of Allergic rhinitis, Anemia, Anxiety, Cataract, Essential hypertension, GERD (gastroesophageal reflux disease), Hx of blood clots, Hypokalemia, Hypothyroidism, and Pulmonary embolism (HCC).  PLAN   Analgesics and antiemetics as needed  Lovenox for DVT prophylaxis dose will need therapeutic dose    GI prophylaxis   K, Phos replacement  Increase activity as tolerated, C&DB, IS and ambulate   Increase diet  CT Chest/abd/pelvis evaluate for PE (hx of same, postop evaluation)    SUBJECTIVE   Marry is doing about the same today.  She denies any nausea or vomiting, has  passed flatus has had a bowel movements . She is tolerating a PN-Adult  3 IN 1 Central Line (Custom)  ADULT DIET; Regular. Her pain is well controlled on current medications. Still requiring O2, worse with ambulation. Pt with hx PE. Has been on lovenox  CURRENT MEDICATIONS   Scheduled Meds:   insulin lispro  0-8 Units SubCUTAneous Q6H    pantoprazole  40 mg IntraVENous Daily    montelukast  10 mg Oral QAM    losartan-hydroCHLOROthiazide  1 tablet Oral Daily    busPIRone  10 mg Oral BID    levothyroxine  125 mcg Oral Daily    metoprolol succinate  50 mg Oral Daily    sodium chloride flush  5-40 mL IntraVENous 2 times per day    enoxaparin  40 mg SubCUTAneous Daily     Continuous Infusions:   dextrose      PN-Adult  3 IN 1 Central Line (Custom) 40

## 2024-12-12 NOTE — PLAN OF CARE
Problem: Discharge Planning  Goal: Discharge to home or other facility with appropriate resources  12/12/2024 1019 by Jennifer Cifuentes RN  Outcome: Progressing  Flowsheets (Taken 12/11/2024 2237 by Kelly Baltazar RN)  Discharge to home or other facility with appropriate resources:   Identify barriers to discharge with patient and caregiver   Identify discharge learning needs (meds, wound care, etc)   Arrange for needed discharge resources and transportation as appropriate  Note: Plan of care updated with patient, including full liquid diet, TPN, home meds   12/11/2024 2237 by Kelly Baltazar RN  Outcome: Progressing  Flowsheets (Taken 12/11/2024 2237)  Discharge to home or other facility with appropriate resources:   Identify barriers to discharge with patient and caregiver   Identify discharge learning needs (meds, wound care, etc)   Arrange for needed discharge resources and transportation as appropriate     Problem: Safety - Adult  Goal: Free from fall injury  12/12/2024 1019 by Jennifer Cifuentes RN  Outcome: Progressing  Flowsheets (Taken 12/11/2024 2237 by Kelly Baltazar RN)  Free From Fall Injury: Instruct family/caregiver on patient safety  12/11/2024 2237 by Kelly Baltazar RN  Outcome: Progressing  Flowsheets (Taken 12/11/2024 2237)  Free From Fall Injury: Instruct family/caregiver on patient safety     Problem: ABCDS Injury Assessment  Goal: Absence of physical injury  12/12/2024 1019 by Jennifer Cifuentes RN  Outcome: Progressing  Flowsheets (Taken 12/11/2024 2237 by Kelly Baltazar RN)  Absence of Physical Injury: Implement safety measures based on patient assessment  12/11/2024 2237 by Kelly Baltazar RN  Outcome: Progressing  Flowsheets (Taken 12/11/2024 2237)  Absence of Physical Injury: Implement safety measures based on patient assessment     Problem: Pain  Goal: Verbalizes/displays adequate comfort level or baseline comfort level  12/12/2024 1019 by Jennifer Cifuentes RN  Outcome: Progressing  Flowsheets (Taken

## 2024-12-12 NOTE — RT PROTOCOL NOTE
RT Inhaler-Nebulizer Bronchodilator Protocol Note    There is a bronchodilator order in the chart from a provider indicating to follow the RT Bronchodilator Protocol and there is an “Initiate RT Inhaler-Nebulizer Bronchodilator Protocol” order as well (see protocol at bottom of note).    CXR Findings:  XR CHEST PORTABLE    Result Date: 12/11/2024  Small bilateral pleural effusions. **This report has been created using voice recognition software. It may contain minor errors which are inherent in voice recognition technology.** Electronically signed by Dr. Casimiro Fernandez      The findings from the last RT Protocol Assessment were as follows:   History Pulmonary Disease: None or smoker <15 pack years  Respiratory Pattern: Regular pattern and RR 12-20 bpm  Breath Sounds: Slightly diminished and/or crackles  Cough: Strong, spontaneous, non-productive  Indication for Bronchodilator Therapy:    Bronchodilator Assessment Score: 2    Aerosolized bronchodilator medication orders have been revised according to the RT Inhaler-Nebulizer Bronchodilator Protocol below.    Respiratory Therapist to perform RT Therapy Protocol Assessment initially then follow the protocol.  Repeat RT Therapy Protocol Assessment PRN for score 0-3 or on second treatment, BID, and PRN for scores above 3.    No Indications - adjust the frequency to every 6 hours PRN wheezing or bronchospasm, if no treatments needed after 48 hours then discontinue using Per Protocol order mode.     If indication present, adjust the RT bronchodilator orders based on the Bronchodilator Assessment Score as indicated below.  Use Inhaler orders unless patient has one or more of the following: on home nebulizer, not able to hold breath for 10 seconds, is not alert and oriented, cannot activate and use MDI correctly, or respiratory rate 25 breaths per minute or more, then use the equivalent nebulizer order(s) with same Frequency and PRN reasons based on the score.  If a

## 2024-12-13 ENCOUNTER — ANESTHESIA EVENT (OUTPATIENT)
Dept: OPERATING ROOM | Age: 74
End: 2024-12-13
Payer: MEDICARE

## 2024-12-13 ENCOUNTER — ANESTHESIA (OUTPATIENT)
Dept: OPERATING ROOM | Age: 74
End: 2024-12-13
Payer: MEDICARE

## 2024-12-13 PROBLEM — K65.9 PERITONITIS (HCC): Status: ACTIVE | Noted: 2024-12-13

## 2024-12-13 LAB
ANION GAP SERPL CALC-SCNC: 11 MEQ/L (ref 8–16)
BUN SERPL-MCNC: 16 MG/DL (ref 7–22)
CA-I BLD ISE-SCNC: 1.21 MMOL/L (ref 1.12–1.32)
CALCIUM SERPL-MCNC: 8.6 MG/DL (ref 8.5–10.5)
CHLORIDE SERPL-SCNC: 107 MEQ/L (ref 98–111)
CO2 SERPL-SCNC: 27 MEQ/L (ref 23–33)
CREAT SERPL-MCNC: 1 MG/DL (ref 0.4–1.2)
DEPRECATED RDW RBC AUTO: 54.6 FL (ref 35–45)
ERYTHROCYTE [DISTWIDTH] IN BLOOD BY AUTOMATED COUNT: 16.6 % (ref 11.5–14.5)
GFR SERPL CREATININE-BSD FRML MDRD: 59 ML/MIN/1.73M2
GLUCOSE BLD STRIP.AUTO-MCNC: 118 MG/DL (ref 70–108)
GLUCOSE BLD STRIP.AUTO-MCNC: 150 MG/DL (ref 70–108)
GLUCOSE BLD STRIP.AUTO-MCNC: 156 MG/DL (ref 70–108)
GLUCOSE SERPL-MCNC: 132 MG/DL (ref 70–108)
HCT VFR BLD AUTO: 28.8 % (ref 37–47)
HGB BLD-MCNC: 9.2 GM/DL (ref 12–16)
MAGNESIUM SERPL-MCNC: 2.1 MG/DL (ref 1.6–2.4)
MCH RBC QN AUTO: 28.8 PG (ref 26–33)
MCHC RBC AUTO-ENTMCNC: 31.9 GM/DL (ref 32.2–35.5)
MCV RBC AUTO: 90 FL (ref 81–99)
PHOSPHATE SERPL-MCNC: 2.8 MG/DL (ref 2.4–4.7)
PLATELET # BLD AUTO: 378 THOU/MM3 (ref 130–400)
PMV BLD AUTO: 10.7 FL (ref 9.4–12.4)
POTASSIUM SERPL-SCNC: 4.3 MEQ/L (ref 3.5–5.2)
RBC # BLD AUTO: 3.2 MILL/MM3 (ref 4.2–5.4)
SODIUM SERPL-SCNC: 145 MEQ/L (ref 135–145)
WBC # BLD AUTO: 13.3 THOU/MM3 (ref 4.8–10.8)

## 2024-12-13 PROCEDURE — 2060000000 HC ICU INTERMEDIATE R&B

## 2024-12-13 PROCEDURE — 6360000002 HC RX W HCPCS: Performed by: SURGERY

## 2024-12-13 PROCEDURE — 94761 N-INVAS EAR/PLS OXIMETRY MLT: CPT

## 2024-12-13 PROCEDURE — 85027 COMPLETE CBC AUTOMATED: CPT

## 2024-12-13 PROCEDURE — 3700000000 HC ANESTHESIA ATTENDED CARE: Performed by: SURGERY

## 2024-12-13 PROCEDURE — 82948 REAGENT STRIP/BLOOD GLUCOSE: CPT

## 2024-12-13 PROCEDURE — 7100000001 HC PACU RECOVERY - ADDTL 15 MIN: Performed by: SURGERY

## 2024-12-13 PROCEDURE — 2580000003 HC RX 258: Performed by: SURGERY

## 2024-12-13 PROCEDURE — 0DB80ZZ EXCISION OF SMALL INTESTINE, OPEN APPROACH: ICD-10-PCS | Performed by: SURGERY

## 2024-12-13 PROCEDURE — 6360000002 HC RX W HCPCS: Performed by: NURSE PRACTITIONER

## 2024-12-13 PROCEDURE — 84100 ASSAY OF PHOSPHORUS: CPT

## 2024-12-13 PROCEDURE — 3600000004 HC SURGERY LEVEL 4 BASE: Performed by: SURGERY

## 2024-12-13 PROCEDURE — 80048 BASIC METABOLIC PNL TOTAL CA: CPT

## 2024-12-13 PROCEDURE — 2500000003 HC RX 250 WO HCPCS: Performed by: SURGERY

## 2024-12-13 PROCEDURE — 2709999900 HC NON-CHARGEABLE SUPPLY: Performed by: SURGERY

## 2024-12-13 PROCEDURE — 6370000000 HC RX 637 (ALT 250 FOR IP)

## 2024-12-13 PROCEDURE — 0WQF0ZZ REPAIR ABDOMINAL WALL, OPEN APPROACH: ICD-10-PCS | Performed by: SURGERY

## 2024-12-13 PROCEDURE — 0D1B0Z4 BYPASS ILEUM TO CUTANEOUS, OPEN APPROACH: ICD-10-PCS | Performed by: SURGERY

## 2024-12-13 PROCEDURE — 2700000000 HC OXYGEN THERAPY PER DAY

## 2024-12-13 PROCEDURE — 2500000003 HC RX 250 WO HCPCS: Performed by: NURSE ANESTHETIST, CERTIFIED REGISTERED

## 2024-12-13 PROCEDURE — 44310 ILEOSTOMY/JEJUNOSTOMY: CPT | Performed by: SURGERY

## 2024-12-13 PROCEDURE — 6360000002 HC RX W HCPCS: Performed by: NURSE ANESTHETIST, CERTIFIED REGISTERED

## 2024-12-13 PROCEDURE — 49020 DRAINAGE ABDOM ABSCESS OPEN: CPT | Performed by: SURGERY

## 2024-12-13 PROCEDURE — 2720000010 HC SURG SUPPLY STERILE: Performed by: SURGERY

## 2024-12-13 PROCEDURE — 7100000000 HC PACU RECOVERY - FIRST 15 MIN: Performed by: SURGERY

## 2024-12-13 PROCEDURE — 3600000014 HC SURGERY LEVEL 4 ADDTL 15MIN: Performed by: SURGERY

## 2024-12-13 PROCEDURE — 5A0935A ASSISTANCE WITH RESPIRATORY VENTILATION, LESS THAN 24 CONSECUTIVE HOURS, HIGH NASAL FLOW/VELOCITY: ICD-10-PCS | Performed by: SURGERY

## 2024-12-13 PROCEDURE — 3700000001 HC ADD 15 MINUTES (ANESTHESIA): Performed by: SURGERY

## 2024-12-13 PROCEDURE — 36415 COLL VENOUS BLD VENIPUNCTURE: CPT

## 2024-12-13 PROCEDURE — 2580000003 HC RX 258: Performed by: NURSE ANESTHETIST, CERTIFIED REGISTERED

## 2024-12-13 PROCEDURE — 82330 ASSAY OF CALCIUM: CPT

## 2024-12-13 PROCEDURE — 99024 POSTOP FOLLOW-UP VISIT: CPT | Performed by: SURGERY

## 2024-12-13 PROCEDURE — 83735 ASSAY OF MAGNESIUM: CPT

## 2024-12-13 RX ORDER — SODIUM CHLORIDE 0.9 % (FLUSH) 0.9 %
5-40 SYRINGE (ML) INJECTION EVERY 12 HOURS SCHEDULED
Status: DISCONTINUED | OUTPATIENT
Start: 2024-12-13 | End: 2024-12-28 | Stop reason: HOSPADM

## 2024-12-13 RX ORDER — METRONIDAZOLE 500 MG/100ML
500 INJECTION, SOLUTION INTRAVENOUS
Status: DISCONTINUED | OUTPATIENT
Start: 2024-12-14 | End: 2024-12-13 | Stop reason: ALTCHOICE

## 2024-12-13 RX ORDER — CEFAZOLIN SODIUM 1 G/3ML
INJECTION, POWDER, FOR SOLUTION INTRAMUSCULAR; INTRAVENOUS
Status: DISCONTINUED | OUTPATIENT
Start: 2024-12-13 | End: 2024-12-13 | Stop reason: SDUPTHER

## 2024-12-13 RX ORDER — ONDANSETRON 2 MG/ML
INJECTION INTRAMUSCULAR; INTRAVENOUS
Status: DISCONTINUED | OUTPATIENT
Start: 2024-12-13 | End: 2024-12-13 | Stop reason: SDUPTHER

## 2024-12-13 RX ORDER — SODIUM CHLORIDE 0.9 % (FLUSH) 0.9 %
5-40 SYRINGE (ML) INJECTION PRN
Status: DISCONTINUED | OUTPATIENT
Start: 2024-12-13 | End: 2024-12-28 | Stop reason: HOSPADM

## 2024-12-13 RX ORDER — SODIUM CHLORIDE 9 MG/ML
INJECTION, SOLUTION INTRAVENOUS
Status: DISCONTINUED | OUTPATIENT
Start: 2024-12-13 | End: 2024-12-13 | Stop reason: SDUPTHER

## 2024-12-13 RX ORDER — PROPOFOL 10 MG/ML
INJECTION, EMULSION INTRAVENOUS
Status: DISCONTINUED | OUTPATIENT
Start: 2024-12-13 | End: 2024-12-13 | Stop reason: SDUPTHER

## 2024-12-13 RX ORDER — ROCURONIUM BROMIDE 10 MG/ML
INJECTION, SOLUTION INTRAVENOUS
Status: DISCONTINUED | OUTPATIENT
Start: 2024-12-13 | End: 2024-12-13 | Stop reason: SDUPTHER

## 2024-12-13 RX ORDER — DEXAMETHASONE SODIUM PHOSPHATE 10 MG/ML
INJECTION, EMULSION INTRAMUSCULAR; INTRAVENOUS
Status: DISCONTINUED | OUTPATIENT
Start: 2024-12-13 | End: 2024-12-13 | Stop reason: SDUPTHER

## 2024-12-13 RX ORDER — FENTANYL CITRATE 50 UG/ML
INJECTION, SOLUTION INTRAMUSCULAR; INTRAVENOUS
Status: DISCONTINUED | OUTPATIENT
Start: 2024-12-13 | End: 2024-12-13 | Stop reason: SDUPTHER

## 2024-12-13 RX ORDER — LIDOCAINE HCL/PF 100 MG/5ML
SYRINGE (ML) INJECTION
Status: DISCONTINUED | OUTPATIENT
Start: 2024-12-13 | End: 2024-12-13 | Stop reason: SDUPTHER

## 2024-12-13 RX ORDER — IPRATROPIUM BROMIDE AND ALBUTEROL SULFATE 2.5; .5 MG/3ML; MG/3ML
SOLUTION RESPIRATORY (INHALATION)
Status: COMPLETED
Start: 2024-12-13 | End: 2024-12-13

## 2024-12-13 RX ORDER — METRONIDAZOLE 500 MG/100ML
500 INJECTION, SOLUTION INTRAVENOUS EVERY 8 HOURS
Status: COMPLETED | OUTPATIENT
Start: 2024-12-13 | End: 2024-12-13

## 2024-12-13 RX ORDER — SODIUM CHLORIDE 9 MG/ML
INJECTION, SOLUTION INTRAVENOUS PRN
Status: DISCONTINUED | OUTPATIENT
Start: 2024-12-13 | End: 2024-12-28 | Stop reason: HOSPADM

## 2024-12-13 RX ORDER — PHENYLEPHRINE HCL IN 0.9% NACL 1 MG/10 ML
SYRINGE (ML) INTRAVENOUS
Status: DISCONTINUED | OUTPATIENT
Start: 2024-12-13 | End: 2024-12-13 | Stop reason: SDUPTHER

## 2024-12-13 RX ADMIN — HYDROMORPHONE HYDROCHLORIDE 0.5 MG: 1 INJECTION, SOLUTION INTRAMUSCULAR; INTRAVENOUS; SUBCUTANEOUS at 10:27

## 2024-12-13 RX ADMIN — DEXAMETHASONE SODIUM PHOSPHATE 8 MG: 10 INJECTION, EMULSION INTRAMUSCULAR; INTRAVENOUS at 16:14

## 2024-12-13 RX ADMIN — BUSPIRONE HYDROCHLORIDE 10 MG: 10 TABLET ORAL at 10:01

## 2024-12-13 RX ADMIN — SUGAMMADEX 200 MG: 100 INJECTION, SOLUTION INTRAVENOUS at 17:03

## 2024-12-13 RX ADMIN — PANTOPRAZOLE SODIUM 40 MG: 40 INJECTION, POWDER, FOR SOLUTION INTRAVENOUS at 09:58

## 2024-12-13 RX ADMIN — Medication 200 MCG: at 16:01

## 2024-12-13 RX ADMIN — SODIUM CHLORIDE, PRESERVATIVE FREE 10 ML: 5 INJECTION INTRAVENOUS at 21:09

## 2024-12-13 RX ADMIN — MONTELUKAST SODIUM 10 MG: 10 TABLET ORAL at 10:02

## 2024-12-13 RX ADMIN — METRONIDAZOLE 500 MG: 500 INJECTION, SOLUTION INTRAVENOUS at 15:34

## 2024-12-13 RX ADMIN — PROPOFOL 150 MG: 10 INJECTION, EMULSION INTRAVENOUS at 15:53

## 2024-12-13 RX ADMIN — METOPROLOL SUCCINATE 50 MG: 50 TABLET, EXTENDED RELEASE ORAL at 10:02

## 2024-12-13 RX ADMIN — SODIUM CHLORIDE, PRESERVATIVE FREE 10 ML: 5 INJECTION INTRAVENOUS at 21:11

## 2024-12-13 RX ADMIN — SODIUM CHLORIDE: 9 INJECTION, SOLUTION INTRAVENOUS at 15:45

## 2024-12-13 RX ADMIN — Medication 100 MG: at 15:53

## 2024-12-13 RX ADMIN — CEFAZOLIN 2 G: 1 INJECTION, POWDER, FOR SOLUTION INTRAMUSCULAR; INTRAVENOUS at 16:00

## 2024-12-13 RX ADMIN — ROCURONIUM BROMIDE 50 MG: 10 INJECTION INTRAVENOUS at 15:53

## 2024-12-13 RX ADMIN — CALCIUM GLUCONATE: 98 INJECTION, SOLUTION INTRAVENOUS at 20:50

## 2024-12-13 RX ADMIN — HYDROMORPHONE HYDROCHLORIDE 0.5 MG: 1 INJECTION, SOLUTION INTRAMUSCULAR; INTRAVENOUS; SUBCUTANEOUS at 03:23

## 2024-12-13 RX ADMIN — FENTANYL CITRATE 100 MCG: 50 INJECTION, SOLUTION INTRAMUSCULAR; INTRAVENOUS at 17:05

## 2024-12-13 RX ADMIN — SODIUM CHLORIDE, PRESERVATIVE FREE 10 ML: 5 INJECTION INTRAVENOUS at 10:03

## 2024-12-13 RX ADMIN — ONDANSETRON 4 MG: 2 INJECTION INTRAMUSCULAR; INTRAVENOUS at 16:14

## 2024-12-13 RX ADMIN — LOSARTAN POTASSIUM AND HYDROCHLOROTHIAZIDE 1 TABLET: 25; 100 TABLET ORAL at 10:02

## 2024-12-13 RX ADMIN — FENTANYL CITRATE 100 MCG: 50 INJECTION, SOLUTION INTRAMUSCULAR; INTRAVENOUS at 15:50

## 2024-12-13 RX ADMIN — BUSPIRONE HYDROCHLORIDE 10 MG: 10 TABLET ORAL at 21:14

## 2024-12-13 RX ADMIN — IPRATROPIUM BROMIDE AND ALBUTEROL SULFATE 3 ML: .5; 3 SOLUTION RESPIRATORY (INHALATION) at 17:55

## 2024-12-13 RX ADMIN — Medication 200 MCG: at 16:10

## 2024-12-13 RX ADMIN — PIPERACILLIN AND TAZOBACTAM 4500 MG: 4; .5 INJECTION, POWDER, FOR SOLUTION INTRAVENOUS at 21:09

## 2024-12-13 ASSESSMENT — PAIN SCALES - GENERAL
PAINLEVEL_OUTOF10: 10
PAINLEVEL_OUTOF10: 0
PAINLEVEL_OUTOF10: 10

## 2024-12-13 ASSESSMENT — PAIN DESCRIPTION - DESCRIPTORS: DESCRIPTORS: SHARP

## 2024-12-13 ASSESSMENT — PAIN DESCRIPTION - LOCATION: LOCATION: ABDOMEN

## 2024-12-13 ASSESSMENT — PAIN DESCRIPTION - ORIENTATION: ORIENTATION: RIGHT

## 2024-12-13 NOTE — PLAN OF CARE
Problem: Safety - Adult  Goal: Free from fall injury  12/12/2024 2212 by Kelly Baltazar RN  Outcome: Progressing  Flowsheets (Taken 12/12/2024 2212)  Free From Fall Injury: Instruct family/caregiver on patient safety     Problem: Discharge Planning  Goal: Discharge to home or other facility with appropriate resources  12/12/2024 2212 by Kelly Baltazar RN  Outcome: Progressing  Flowsheets (Taken 12/12/2024 2212)  Discharge to home or other facility with appropriate resources:   Identify barriers to discharge with patient and caregiver   Identify discharge learning needs (meds, wound care, etc)   Arrange for needed discharge resources and transportation as appropriate     Problem: ABCDS Injury Assessment  Goal: Absence of physical injury  12/12/2024 2212 by Kelly Baltazar RN  Outcome: Progressing  Flowsheets (Taken 12/12/2024 2212)  Absence of Physical Injury: Implement safety measures based on patient assessment     Problem: Pain  Goal: Verbalizes/displays adequate comfort level or baseline comfort level  12/12/2024 2212 by Kelly Baltazar RN  Outcome: Progressing  Flowsheets (Taken 12/12/2024 2212)  Verbalizes/displays adequate comfort level or baseline comfort level:   Encourage patient to monitor pain and request assistance   Administer analgesics based on type and severity of pain and evaluate response   Assess pain using appropriate pain scale   Implement non-pharmacological measures as appropriate and evaluate response     Problem: Respiratory - Adult  Goal: Achieves optimal ventilation and oxygenation  12/12/2024 2212 by Kelly Baltazar RN  Outcome: Progressing  Flowsheets (Taken 12/12/2024 2212)  Achieves optimal ventilation and oxygenation:   Assess for changes in respiratory status   Position to facilitate oxygenation and minimize respiratory effort   Respiratory therapy support as indicated   Assess for changes in mentation and behavior   Oxygen supplementation based on oxygen saturation or arterial blood gases

## 2024-12-13 NOTE — BRIEF OP NOTE
Brief Postoperative Note      Patient: Marry Ayers  YOB: 1950  MRN: [id number]    Date of Procedure: 12/13/2024    Pre-Op Diagnosis Codes:      * pneumoperitoneum, peritonitis likely anastomotic leak    Post-Op Diagnosis: fecal peritonitis and anastomotic leak       Procedure(s):  EXPLORATORY LAPOROTOMY, creation of iliostomy    Surgeon(s):  Casimiro Callahan DO    Assistant:  First Assistant: Jose Francisco Lynch, RN    Anesthesia: General    Estimated Blood Loss (mL): 50    Complications: None    Specimens:   * No specimens in log *    Implants:  * No implants in log *      Drains:   Closed/Suction Drain Midline RLQ (Active)       Colostomy LLQ (Active)       Urinary Catheter 12/13/24 Phelps-Temperature (Active)       [REMOVED] Negative Pressure Wound Therapy Abdomen Medial (Removed)   Dressing Status Intact w/seal 12/11/24 0909   Canister changed? No 12/11/24 0909   Output (ml) 0 ml 12/10/24 2140   Unit Type prevena 12/11/24 0909   Mode Continuous 12/11/24 0909   Dressing Type Black foam 12/11/24 0909       [REMOVED] NG/OG/NJ/NE Tube Nasogastric 14 fr Right nostril (Removed)   Surrounding Skin Clean, dry & intact 12/10/24 0822   Securement device Tape 12/10/24 0822   Status Clamped 12/10/24 1205   Placement Verified Gastric Contents 12/10/24 0822   NG/OG/NJ/NE External Measurement (cm) 60 cm 12/10/24 0822   Drainage Appearance Green;Tan 12/10/24 1205   Output (mL) 175 ml 12/10/24 1205   Action Taken Other (comment) 12/10/24 0559       [REMOVED] Urinary Catheter 12/04/24 Phelps (Removed)   Catheter Indications Perioperative use for selected surgical procedures 12/05/24 1245   Site Assessment No urethral drainage 12/05/24 1245   Urine Color Yellow 12/05/24 1245   Urine Appearance Clear 12/05/24 1245   Collection Container Standard 12/05/24 1245   Securement Method Securing device (Describe) 12/05/24 1245   Catheter Care  Perineal wipes 12/04/24 1943   Catheter Best Practices  Catheter secured to  thigh;Tamper seal intact;Bag below bladder;Bag not on floor;Lack of dependent loop in tubing;Drainage bag less than half full 12/05/24 1245   Status Draining 12/05/24 1245   Output (mL) 600 mL 12/05/24 1245       [REMOVED] External Urinary Catheter (Removed)   Site Assessment Clean,dry & intact 12/11/24 1526   Placement Replaced 12/10/24 0302   Securement Method Other (Comment) 12/09/24 1636   Catheter Care Catheter/Wick replaced 12/10/24 0302   Perineal Care Yes 12/10/24 0302   Suction 40 mmgHg continuous 12/10/24 0302   Urine Color Sonia 12/11/24 1526   Urine Appearance Clear 12/11/24 1526   Output (mL) 250 mL 12/11/24 1526       Findings:  Infection Present At Time Of Surgery (PATOS) (choose all levels that have infection present):  - Deep Infection (muscle/fascia) present as evidenced by phlegmon  - Organ Space infection (below fascia) present as evidenced by feculent peritonitis    Electronically signed by Casimiro Callahan DO on 12/13/2024 at 5:22 PM

## 2024-12-13 NOTE — PLAN OF CARE
Problem: Discharge Planning  Goal: Discharge to home or other facility with appropriate resources  Outcome: Progressing     Problem: Safety - Adult  Goal: Free from fall injury  Outcome: Progressing     Problem: ABCDS Injury Assessment  Goal: Absence of physical injury  Outcome: Progressing     Problem: Skin/Tissue Integrity - Adult  Goal: Skin integrity remains intact  Recent Flowsheet Documentation  Taken 12/13/2024 0950 by Yasmin Graham RN  Skin Integrity Remains Intact: Monitor for areas of redness and/or skin breakdown     Problem: Nutrition Deficit:  Goal: Optimize nutritional status  12/13/2024 1118 by Ingrid Diego, ABIMAEL, LD  Flowsheets (Taken 12/13/2024 1118)  Nutrient intake appropriate for improving, restoring, or maintaining nutritional needs:   Assess nutritional status and recommend course of action   Recommend, monitor, and adjust tube feedings and TPN/PPN based on assessed needs

## 2024-12-13 NOTE — PROGRESS NOTES
[facility]   PROGRESS NOTE      Patient: Marry Ayers  Room #: 6A-14/014-A            YOB: 1950  Age: 74 y.o.  Gender: female            Admit Date & Time: 12/4/2024  7:09 AM    Assessment:    The patient declined a visit today.    Interventions:  The patient was provided information about Spiritual Care being available.     Outcomes:  The  wished the patient a positive day.     Plan:  1.Spiritual care will continue to follow the patient according to [facility] spiritual care SOP.       Electronically signed by Chaplain Lopez, on 12/13/2024 at 1:35 PM.  Spiritual Care Department  [facility]  [phone]

## 2024-12-13 NOTE — PROGRESS NOTES
Spoke with Amaya in Dietary keep macros the same based off dosing weight 57 kg, 20 kcal/kg, 1.5g/kg, and 30% lipids.         Pharmacy Consult for TPN/PPN Monitoring & Adjustment  IV Access: central      Dosing weight: 57 kg  Current insulin regimen: medium sliding scale  Diet (excluding TPN): NPO  Maintenance fluid: none    Plan:  Plan:  See components of tonight's TPN bag in the table below:  Date 12/11/2024 12/12/24 12/13/24   Total kcal/kg 15 20 20   Total kcal 855 1140 1140   Protein g/kg 1.2 1.5 1.5   Protein g 68.4 85.5 85.5   Protein kcal (4 kcal/g) 273.6 342 342   Lipid % 20 30 30   Lipid g 17.1 34.2 34.2   Lipid kcal (10 kcal/g) 171 342 342   Dextrose g 120.7 134 134   Dextrose kcal (3.4 kcal/g) 410.4 456 456   Infusion Rate (mL/hr) 40 90 90   Na Acetate (mEq) 100 130 130   Na Chloride (mEq) 0 140 120   Na Phos (mmol)  (3 mmol = 4 mEq Na) 0 0 0   Total Na (mEq) 100 270 250   K Acetate (mEq) 0 0 0   K Chloride (mEq) 80 120 80   K Phos (mmol)  (15 mmol = 22 mEq K) 15 15 15   Total K (mEq) 102 142 102   Ca Gluconate (mEq)  (1 g = 4.65 mEq) 4.65 2.3 2.3   Mag Sulfate (mEq)  (1 g = 8.12 mEq) 12.18 12.18 12.18   Multivitamin (mL) 10 10 10   Trace Elements (mL) 1 1 1     Summary of changes for tonight's TPN:   Decrease NaCl 120 meq  Decrease KCL 80 meq     Electrolyte Replacement:   none    Monitoring:  BMP, Mag, iCal, & Phos ordered for tomorrow with AM labs.    Pharmacy will continue to follow daily. Thank you for the consult.  Monika Glass, PharmD 12/13/2024 11:59 AM

## 2024-12-13 NOTE — PROGRESS NOTES
Antecubital IV in right forearm was flushed and the attached the primary tubing of normal saline and secondary of flagyll. Line was clear no swelling noted. Upon coming to the operating room 1547 the arm was red and swollen from the IV site. It was immediately stopped and a cold compress applied. Dr Callahan aware.

## 2024-12-13 NOTE — PROGRESS NOTES
Comprehensive Nutrition Assessment    Type and Reason for Visit:  Initial, Nutrition support    Nutrition Recommendations/Plan:    When next 3 in 1 TPN bag is made, suggest continue dose wt: 57kgm, 20kcals/kgm, 1.5 grams protein/kgm, 30% kcals from lipids. Spoke with pharmacist.  NPO per surgery. Monitor TPN, labs & wt.   Suggest weigh pt.      Malnutrition Assessment:  Malnutrition Status:  Severe malnutrition (12/12/24 1034)    Context:  Acute Illness     Findings of the 6 clinical characteristics of malnutrition:  Energy Intake:  50% or less of estimated energy requirements for 5 or more days (NPO ( had surgery 12/4))  Weight Loss:  1% to 2% over 1 week     Body Fat Loss:  Mild body fat loss Orbital   Muscle Mass Loss:  Mild muscle mass loss Temples (temporalis)  Fluid Accumulation:  Unable to assess     Strength:  Not Performed    Nutrition Assessment:      Pt.  Not improving from a nutritional standpoint AEB pt having level 10 abdominal pain   ( RN notified), having an Exploratory Lap today.    At risk for further nutritional compromise r/t admit with stricture of sigmoid colon, colovaginal fistula, HTN, increased needs for wound healing s/p surgery (12/4/24) see below and underlying medical condition (GERD, DVT, Blood Clots, Hypothyroidism, Pulmonary Embolism)     Nutrition Related Findings:    Pt. Report/Treatments/Miscellaneous: 3 in 1 TPN infusing 90ml/hr, pt wearing oxygen &  reports level 10 abdominal pain- I alerted RN. RN mentions pt having an exploratory lap today. Pt's abdomen appears distended. Chest x-ray small left sided pleural effusion with adjacent atlectasis/infiltrate, mild cardiomegaly  GI Status: BM x 1 (12/11)  Pertinent Labs: (12/13) Glucose 132, Hemoglobin 9.2, Mg 2.1, Phos 2.8  Pertinent Meds:  Insulin Lispro    Wound Type:  ((12/4) open sigmoid colectomy partial small bowel resection  , wound vac abdomen)       Current Nutrition Intake & Therapies:    Average Meal Intake:

## 2024-12-13 NOTE — ANESTHESIA PRE PROCEDURE
Department of Anesthesiology  Preprocedure Note       Name:  Marry Ayers   Age:  74 y.o.  :  1950                                          MRN:  [id number]         Date:  2024      Surgeon: Surgeon(s):  Casimiro Callahan DO    Procedure: Procedure(s):  EXPLORATORY LAPOROTOMY, POSS BOWEL RESECTION    Medications prior to admission:   Prior to Admission medications    Medication Sig Start Date End Date Taking? Authorizing Provider   Lactobacillus (PROBIOTIC ACIDOPHILUS PO) Take 1 tablet by mouth daily (with breakfast)   Yes ProviderKatie MD   cetirizine (ZYRTEC) 5 MG tablet Take 1 tablet by mouth daily   Yes Provider, MD Katie   Calcium Carb-Cholecalciferol (CALCIUM 500 + D PO) Take by mouth   Yes ProviderKatie MD   busPIRone (BUSPAR) 10 MG tablet Take 1 tablet by mouth 2 times daily 24 Yes ProviderKatie MD   levothyroxine (SYNTHROID) 125 MCG tablet Take 1 tablet by mouth daily 24 Yes Provider, MD Katie   losartan-hydroCHLOROthiazide (HYZAAR) 100-25 MG per tablet Take 1 tablet by mouth daily 24 Yes Provider, MD Katie   Melatonin 10 MG TABS Take 10 mg by mouth nightly   Yes ProviderKatie MD   metoprolol succinate (TOPROL XL) 50 MG extended release tablet Take 1 tablet by mouth daily 24 Yes ProviderKatie MD   montelukast (SINGULAIR) 10 MG tablet Take 1 tablet by mouth Daily 24 Yes ProviderKatie MD   pantoprazole (PROTONIX) 40 MG tablet Take 1 tablet by mouth nightly 24 Yes Provider, MD Katie   potassium chloride (K-TAB) 20 MEQ TBCR extended release tablet Take 1 tablet by mouth daily 24 Yes ProviderKatie MD   acetaminophen (TYLENOL) 325 MG tablet Take 1 tablet by mouth every 6 hours as needed   Yes Provider, MD Katie   warfarin (COUMADIN) 5 MG tablet Take 1 tablet by mouth Indications: 6 PM 24   Katie Diciknson MD

## 2024-12-13 NOTE — PROGRESS NOTES
1720- pt to pacu, pt not compliant with medical devices, pt needs frequent redirection. Pt reports she is alright when asked if she is nauseated or in pain. BP/HR elevated. Pt needs directed to C&DB. Pt restless.    1736- pt continues to be restless, needs frequent redirection to remain still. Keep medical devices in place.     1745- pt remains restless.     1756- pt continues to be restless. Reports pain when encouraged to cough.    1806- pt resting in bed with less frequent need to redirect to lead medical devices in place. Oxygenation improving with breathing tx.     1812- pt oxygenation decreasing again after breathing tx complete. Discussed with anesthesia. Orders received. Updated 6A nurse Joanna, asked to update pt son who is in room. Will call back once stepdown bed obtained.    1826- respiratory at bedside to place high flow.     1839- report called to 4K nurse Sandoval    1845- Pt meets criteria for discharge from pacu.     1847- transport requested, respiratory notified.    1855- Transport arrives to take pt to 4K 17 in stable condition on NRB mask at 15 LPM with Respiratory

## 2024-12-13 NOTE — PROGRESS NOTES
DO TRIC[facility] Kim DR GENERAL SURGERY   General Surgery Daily Progress Note    Pt Name: Marry Ayers  Medical Record Number: [id number]  Date of Birth 1950   Today's Date: 12/13/2024  Chief complaint: Stricture of sigmoid colon   ASSESSMENT   Hospital day # 9   POD# 9 Open Sigmoid Colectomy, Partial Small Bowel Resection   Pt on coumadin for hx of PE was bridged with Lovenox   Leukocytosis 2/2 to parotid gland swelling  improving  Hypokalemia , hypophosphatemia  Chest xray -Small left-sided pleural effusion with adjacent atelectasis/infiltrate, Mild cardiomegaly.   has a past medical history of Allergic rhinitis, Anemia, Anxiety, Cataract, Essential hypertension, GERD (gastroesophageal reflux disease), Hx of blood clots, Hypokalemia, Hypothyroidism, and Pulmonary embolism (HCC).  PLAN   Pt WBC increased, now with some fevers overnight, + rebound tenderness on rt side, combined with CT findings needs exploration. Discussed with pt and will proceed with ex lap later today.     SUBJECTIVE   Marry is doing worse today.  She denies any nausea or vomiting. Pain on right side more severe than previous. She is tolerating a PN-Adult  3 IN 1 Central Line (Custom).   CURRENT MEDICATIONS   Scheduled Meds:   insulin lispro  0-8 Units SubCUTAneous Q6H    pantoprazole  40 mg IntraVENous Daily    montelukast  10 mg Oral QAM    losartan-hydroCHLOROthiazide  1 tablet Oral Daily    busPIRone  10 mg Oral BID    levothyroxine  125 mcg Oral Daily    metoprolol succinate  50 mg Oral Daily    sodium chloride flush  5-40 mL IntraVENous 2 times per day     Continuous Infusions:   PN-Adult  3 IN 1 Central Line (Custom) 90 mL/hr at 12/13/24 0311    dextrose      sodium chloride       PRN Meds:.levalbuterol, acetaminophen, glucose, dextrose bolus **OR** dextrose bolus, glucagon (rDNA), dextrose, magnesium sulfate, sodium phosphate 15 mmol in sodium chloride 0.9 % 250 mL IVPB, HYDROmorphone, hydrALAZINE, potassium chloride    PHOS 2.6 3.0 2.8   CALCIUM 7.8* 8.2* 8.6      No results for input(s): \"INR\" in the last 72 hours.    Invalid input(s): \"PT\", \"PTT\"  No results for input(s): \"AST\", \"ALT\", \"BILITOT\", \"BILIDIR\", \"AMYLASE\", \"LIPASE\", \"LDH\", \"LACTA\" in the last 72 hours.  No results for input(s): \"TROPONINT\" in the last 72 hours.  RADIOLOGY     CT ABDOMEN PELVIS W IV CONTRAST Additional Contrast? Radiologist Recommendation   Final Result   1. No filling defects are noted within the pulmonary arterial vasculature to   suggest the presence of pulmonary embolus. Multifocal patchy groundglass   opacities, small bilateral pleural effusions, and moderate bilateral lower lobe   consolidation, right greater than left, suggest pneumonia in the appropriate   clinical setting.      2. Postoperative changes with surgical anastomosis is noted in the upper   abdominal small bowel as well as the distal colon. A large amount of   pneumoperitoneum is seen within the abdomen. Ill-defined pockets of gas and   fluid are seen along both paracolic gutters is concerning for an anastomotic   leak. An ill-defined collection of gas and fluid in the left paracolic gutter   measures up to 2.4 x 10.3 cm (series 605, images 29 through 33). Ill-defined   peripherally enhancing interloop ascites is noted in the lower abdomen/pelvis.   No bowel obstruction is observed. Diffuse total body wall edema suggesting   anasarca is present.      3. Chronic findings are discussed.      **This report has been created using voice recognition software.  It may contain   minor errors which are inherent in voice recognition technology.**      Electronically signed by Dr. Christie Topete      CTA CHEST W WO CONTRAST   Final Result   1. No filling defects are noted within the pulmonary arterial vasculature to   suggest the presence of pulmonary embolus. Multifocal patchy groundglass   opacities, small bilateral pleural effusions, and moderate bilateral lower lobe   consolidation,  right greater than left, suggest pneumonia in the appropriate   clinical setting.      2. Postoperative changes with surgical anastomosis is noted in the upper   abdominal small bowel as well as the distal colon. A large amount of   pneumoperitoneum is seen within the abdomen. Ill-defined pockets of gas and   fluid are seen along both paracolic gutters is concerning for an anastomotic   leak. An ill-defined collection of gas and fluid in the left paracolic gutter   measures up to 2.4 x 10.3 cm (series 605, images 29 through 33). Ill-defined   peripherally enhancing interloop ascites is noted in the lower abdomen/pelvis.   No bowel obstruction is observed. Diffuse total body wall edema suggesting   anasarca is present.      3. Chronic findings are discussed.      **This report has been created using voice recognition software.  It may contain   minor errors which are inherent in voice recognition technology.**      Electronically signed by Dr. Christie Topete      XR CHEST PORTABLE   Final Result      Small bilateral pleural effusions.               **This report has been created using voice recognition software. It may contain   minor errors which are inherent in voice recognition technology.**            Electronically signed by Dr. Casimiro Fernandez      XR CHEST PORTABLE   Final Result   1. Mild hazy opacity at the right lung base may present a small right pleural   effusion with possible mild dependent right basilar atelectasis or pneumonia.   2. Left upper extremity PICC is present with the tip overlying the cavoatrial   junction.            **This report has been created using voice recognition software.  It may contain   minor errors which are inherent in voice recognition technology.**      Electronically signed by Dr. Benito Ureña      XR ABDOMEN (KUB) (SINGLE AP VIEW)   Final Result   1. Persistent gas-filled bowel are seen overlying the abdomen and pelvis. The   overall degree of gaseous distention

## 2024-12-14 LAB
ANION GAP SERPL CALC-SCNC: 8 MEQ/L (ref 8–16)
BUN SERPL-MCNC: 16 MG/DL (ref 7–22)
CA-I BLD ISE-SCNC: 1.11 MMOL/L (ref 1.12–1.32)
CALCIUM SERPL-MCNC: 8 MG/DL (ref 8.5–10.5)
CHLORIDE SERPL-SCNC: 104 MEQ/L (ref 98–111)
CO2 SERPL-SCNC: 29 MEQ/L (ref 23–33)
CREAT SERPL-MCNC: 0.8 MG/DL (ref 0.4–1.2)
GFR SERPL CREATININE-BSD FRML MDRD: 77 ML/MIN/1.73M2
GLUCOSE BLD STRIP.AUTO-MCNC: 128 MG/DL (ref 70–108)
GLUCOSE BLD STRIP.AUTO-MCNC: 160 MG/DL (ref 70–108)
GLUCOSE BLD STRIP.AUTO-MCNC: 169 MG/DL (ref 70–108)
GLUCOSE BLD STRIP.AUTO-MCNC: 199 MG/DL (ref 70–108)
GLUCOSE SERPL-MCNC: 168 MG/DL (ref 70–108)
MAGNESIUM SERPL-MCNC: 2 MG/DL (ref 1.6–2.4)
PHOSPHATE SERPL-MCNC: 3 MG/DL (ref 2.4–4.7)
POTASSIUM SERPL-SCNC: 3.9 MEQ/L (ref 3.5–5.2)
SODIUM SERPL-SCNC: 141 MEQ/L (ref 135–145)

## 2024-12-14 PROCEDURE — 6360000002 HC RX W HCPCS: Performed by: SURGERY

## 2024-12-14 PROCEDURE — 36415 COLL VENOUS BLD VENIPUNCTURE: CPT

## 2024-12-14 PROCEDURE — 82330 ASSAY OF CALCIUM: CPT

## 2024-12-14 PROCEDURE — 6370000000 HC RX 637 (ALT 250 FOR IP): Performed by: SURGERY

## 2024-12-14 PROCEDURE — 80048 BASIC METABOLIC PNL TOTAL CA: CPT

## 2024-12-14 PROCEDURE — 2500000003 HC RX 250 WO HCPCS: Performed by: NURSE PRACTITIONER

## 2024-12-14 PROCEDURE — 83735 ASSAY OF MAGNESIUM: CPT

## 2024-12-14 PROCEDURE — 2700000000 HC OXYGEN THERAPY PER DAY

## 2024-12-14 PROCEDURE — 2580000003 HC RX 258: Performed by: SURGERY

## 2024-12-14 PROCEDURE — 94761 N-INVAS EAR/PLS OXIMETRY MLT: CPT

## 2024-12-14 PROCEDURE — 2580000003 HC RX 258: Performed by: NURSE PRACTITIONER

## 2024-12-14 PROCEDURE — 99024 POSTOP FOLLOW-UP VISIT: CPT | Performed by: SURGERY

## 2024-12-14 PROCEDURE — 84100 ASSAY OF PHOSPHORUS: CPT

## 2024-12-14 PROCEDURE — 82948 REAGENT STRIP/BLOOD GLUCOSE: CPT

## 2024-12-14 PROCEDURE — 2060000000 HC ICU INTERMEDIATE R&B

## 2024-12-14 PROCEDURE — 6360000002 HC RX W HCPCS: Performed by: NURSE PRACTITIONER

## 2024-12-14 RX ORDER — ENOXAPARIN SODIUM 100 MG/ML
40 INJECTION SUBCUTANEOUS DAILY
Status: DISCONTINUED | OUTPATIENT
Start: 2024-12-15 | End: 2024-12-22

## 2024-12-14 RX ADMIN — METOPROLOL SUCCINATE 50 MG: 50 TABLET, EXTENDED RELEASE ORAL at 09:52

## 2024-12-14 RX ADMIN — SODIUM CHLORIDE, PRESERVATIVE FREE 10 ML: 5 INJECTION INTRAVENOUS at 07:43

## 2024-12-14 RX ADMIN — SODIUM CHLORIDE, PRESERVATIVE FREE 10 ML: 5 INJECTION INTRAVENOUS at 21:20

## 2024-12-14 RX ADMIN — SODIUM CHLORIDE, PRESERVATIVE FREE 10 ML: 5 INJECTION INTRAVENOUS at 21:19

## 2024-12-14 RX ADMIN — HYDRALAZINE HYDROCHLORIDE 10 MG: 20 INJECTION INTRAMUSCULAR; INTRAVENOUS at 16:47

## 2024-12-14 RX ADMIN — PIPERACILLIN AND TAZOBACTAM 3375 MG: 3; .375 INJECTION, POWDER, FOR SOLUTION INTRAVENOUS at 11:08

## 2024-12-14 RX ADMIN — BUSPIRONE HYDROCHLORIDE 10 MG: 10 TABLET ORAL at 09:52

## 2024-12-14 RX ADMIN — PANTOPRAZOLE SODIUM 40 MG: 40 INJECTION, POWDER, FOR SOLUTION INTRAVENOUS at 07:40

## 2024-12-14 RX ADMIN — OXYCODONE 5 MG: 5 TABLET ORAL at 23:23

## 2024-12-14 RX ADMIN — HYDROMORPHONE HYDROCHLORIDE 0.5 MG: 1 INJECTION, SOLUTION INTRAMUSCULAR; INTRAVENOUS; SUBCUTANEOUS at 07:41

## 2024-12-14 RX ADMIN — HYDRALAZINE HYDROCHLORIDE 10 MG: 20 INJECTION INTRAMUSCULAR; INTRAVENOUS at 05:22

## 2024-12-14 RX ADMIN — PIPERACILLIN AND TAZOBACTAM 3375 MG: 3; .375 INJECTION, POWDER, FOR SOLUTION INTRAVENOUS at 19:59

## 2024-12-14 RX ADMIN — MONTELUKAST SODIUM 10 MG: 10 TABLET ORAL at 09:52

## 2024-12-14 RX ADMIN — INSULIN LISPRO 2 UNITS: 100 INJECTION, SOLUTION INTRAVENOUS; SUBCUTANEOUS at 00:20

## 2024-12-14 RX ADMIN — BUSPIRONE HYDROCHLORIDE 10 MG: 10 TABLET ORAL at 21:20

## 2024-12-14 RX ADMIN — CALCIUM GLUCONATE: 98 INJECTION, SOLUTION INTRAVENOUS at 17:56

## 2024-12-14 RX ADMIN — LOSARTAN POTASSIUM AND HYDROCHLOROTHIAZIDE 1 TABLET: 25; 100 TABLET ORAL at 09:52

## 2024-12-14 RX ADMIN — OXYCODONE 5 MG: 5 TABLET ORAL at 13:23

## 2024-12-14 RX ADMIN — PIPERACILLIN AND TAZOBACTAM 3375 MG: 3; .375 INJECTION, POWDER, FOR SOLUTION INTRAVENOUS at 03:05

## 2024-12-14 RX ADMIN — LEVOTHYROXINE SODIUM 125 MCG: 125 TABLET ORAL at 06:26

## 2024-12-14 RX ADMIN — ONDANSETRON 4 MG: 2 INJECTION INTRAMUSCULAR; INTRAVENOUS at 16:54

## 2024-12-14 RX ADMIN — HYDROMORPHONE HYDROCHLORIDE 0.5 MG: 1 INJECTION, SOLUTION INTRAMUSCULAR; INTRAVENOUS; SUBCUTANEOUS at 16:48

## 2024-12-14 ASSESSMENT — PAIN DESCRIPTION - FREQUENCY
FREQUENCY: CONTINUOUS

## 2024-12-14 ASSESSMENT — PAIN SCALES - GENERAL
PAINLEVEL_OUTOF10: 6
PAINLEVEL_OUTOF10: 0
PAINLEVEL_OUTOF10: 10
PAINLEVEL_OUTOF10: 0
PAINLEVEL_OUTOF10: 10

## 2024-12-14 ASSESSMENT — PAIN DESCRIPTION - DESCRIPTORS
DESCRIPTORS: ACHING
DESCRIPTORS: DISCOMFORT

## 2024-12-14 ASSESSMENT — PAIN DESCRIPTION - LOCATION
LOCATION: ABDOMEN
LOCATION: ABDOMEN;INCISION

## 2024-12-14 ASSESSMENT — PAIN DESCRIPTION - ORIENTATION
ORIENTATION: MID

## 2024-12-14 ASSESSMENT — PAIN - FUNCTIONAL ASSESSMENT
PAIN_FUNCTIONAL_ASSESSMENT: ACTIVITIES ARE NOT PREVENTED
PAIN_FUNCTIONAL_ASSESSMENT: PREVENTS OR INTERFERES SOME ACTIVE ACTIVITIES AND ADLS
PAIN_FUNCTIONAL_ASSESSMENT: PREVENTS OR INTERFERES WITH MANY ACTIVE NOT PASSIVE ACTIVITIES

## 2024-12-14 ASSESSMENT — PAIN DESCRIPTION - PAIN TYPE
TYPE: SURGICAL PAIN

## 2024-12-14 ASSESSMENT — PAIN DESCRIPTION - ONSET
ONSET: ON-GOING

## 2024-12-14 NOTE — PLAN OF CARE
Problem: Respiratory - Adult  Goal: Achieves optimal ventilation and oxygenation  12/14/2024 0443 by Ro Pate, RCP  Outcome: Progressing   Remains on HFNC to support breathing and oxygenation. Will continue weaning as tolerated.    Patient mutually agreed on goals.

## 2024-12-14 NOTE — PROGRESS NOTES
DO TRICIA Kim DR GENERAL SURGERY   General Surgery Daily Progress Note    Pt Name: Marry Ayers  Medical Record Number: [id number]  Date of Birth 1950   Today's Date: 12/14/2024  Chief complaint: Stricture of sigmoid colon   ASSESSMENT   Hospital day # 10   POD# 10 Open Sigmoid Colectomy, Partial Small Bowel Resection   POD #1 repair of anastomotic leak and loop ileostomy  Pt on coumadin for hx of PE was bridged with Lovenox   has a past medical history of Allergic rhinitis, Anemia, Anxiety, Cataract, Essential hypertension, GERD (gastroesophageal reflux disease), Hx of blood clots, Hypokalemia, Hypothyroidism, and Pulmonary embolism (HCC).  PLAN   Analgesics and antiemetics as needed  IV hydration  Ostomy and drain care  Lovenox for DVT prophylaxis  Sips and chips    SUBJECTIVE   Marry is doing fairly well. Denies any nausea or vomting. Pain improved. She is tolerating a Diet NPO Exceptions are: Sips of Water with Meds  PN-Adult  3 IN 1 Central Line (Custom)  PN-Adult  3 IN 1 Central Line (Custom).   CURRENT MEDICATIONS   Scheduled Meds:   sodium chloride flush  5-40 mL IntraVENous 2 times per day    piperacillin-tazobactam  3,375 mg IntraVENous Q8H    insulin lispro  0-8 Units SubCUTAneous Q6H    pantoprazole  40 mg IntraVENous Daily    montelukast  10 mg Oral QAM    losartan-hydroCHLOROthiazide  1 tablet Oral Daily    busPIRone  10 mg Oral BID    levothyroxine  125 mcg Oral Daily    metoprolol succinate  50 mg Oral Daily    sodium chloride flush  5-40 mL IntraVENous 2 times per day     Continuous Infusions:   PN-Adult  3 IN 1 Central Line (Custom)      sodium chloride      PN-Adult  3 IN 1 Central Line (Custom) 90 mL/hr at 12/13/24 2050    dextrose      sodium chloride       PRN Meds:.sodium chloride flush, sodium chloride, levalbuterol, acetaminophen, glucose, dextrose bolus **OR** dextrose bolus, glucagon (rDNA), dextrose, magnesium sulfate, sodium phosphate 15 mmol in sodium chloride  0.9 % 250 mL IVPB, HYDROmorphone, hydrALAZINE, potassium chloride **OR** potassium alternative oral replacement **OR** potassium chloride, potassium chloride, sodium chloride flush, sodium chloride, oxyCODONE, ondansetron  OBJECTIVE   CURRENT VITALS:  height is 1.549 m (5' 1\") and weight is 83.2 kg (183 lb 6.4 oz). Her oral temperature is 97.7 °F (36.5 °C). Her blood pressure is 168/73 (abnormal) and her pulse is 82. Her respiration is 18 and oxygen saturation is 91%.   Temperature Range (24h):Temp: 97.7 °F (36.5 °C) Temp  Av.4 °F (36.9 °C)  Min: 97.3 °F (36.3 °C)  Max: 101.4 °F (38.6 °C)  BP Range (24h): Systolic (24hrs), Av , Min:128 , Max:206     Diastolic (24hrs), Av, Min:57, Max:90    Pulse Range (24h): Pulse  Av.6  Min: 78  Max: 111  Respiration Range (24h): Resp  Av.8  Min: 16  Max: 50  Current Pulse Ox (24h):  SpO2: 91 %  Pulse Ox Range (24h):  SpO2  Av.6 %  Min: 81 %  Max: 97 %  Oxygen Amount and Delivery: O2 Flow Rate (L/min): 50 L/min (weaned high SPO2)  Incentive Spirometry Tx:       Achieved Volume (mL): 1000 mL    GENERAL: alert, no distress, right side mild facial swelling improving  LUNGS: clear to ausculation, without wheezes, rales or rhonci  HEART: normal rate and regular rhythm  ABDOMEN: Ostomy in LLQ, without significant output. Incision C/D/I, expected pain.   Incision: C/D/I  EXTREMITY: no cyanosis, clubbing or edema  In: 300 [I.V.:300]  Out: 1255 [Urine:900; Drains:355]  Date 24 0000 - 24 2353   Shift 5335-1438 3728-0990 8244-9276 24 Hour Total   INTAKE   Shift Total(mL/kg)       OUTPUT   Urine(mL/kg/hr) 900(1.4)   900   Drains(mL/kg) 235(2.8)   235(2.8)   Shift Total(mL/kg) 1135(13.6)   1135(13.6)   Weight (kg) 83.2 83.2 83.2 83.2       LABS     Recent Labs     24  0525 24  0658 24  0430   WBC  --  13.3*  --    HGB  --  9.2*  --    HCT  --  28.8*  --    PLT  --  378  --     145 141   K 3.3* 4.3 3.9    107 104   CO2 24 27

## 2024-12-14 NOTE — PROGRESS NOTES
Pharmacy Consult for TPN/PPN Monitoring & Adjustment  IV Access: central      Dosing weight: 57 kg  Current insulin regimen: medium sliding scale  Diet (excluding TPN): NPO  Maintenance fluid: none    Plan:  See components of tonight's TPN bag in the table below:  Date 12/11/2024 12/12/24 12/13/24 12/14/2024     Total kcal/kg 15 20 20 20   Total kcal 855 1140 1140 1140   Protein g/kg 1.2 1.5 1.5 1.5   Protein g 68.4 85.5 85.5 85.5   Protein kcal (4 kcal/g) 273.6 342 342 342   Lipid % 20 30 30 30   Lipid g 17.1 34.2 34.2 34.2   Lipid kcal (10 kcal/g) 171 342 342 342   Dextrose g 120.7 134 134 134   Dextrose kcal (3.4 kcal/g) 410.4 456 456 456   Infusion Rate (mL/hr) 40 90 90 90   Na Acetate (mEq) 100 130 130 130   Na Chloride (mEq) 0 140 120 120   Na Phos (mmol)  (3 mmol = 4 mEq Na) 0 0 0 0   Total Na (mEq) 100 270 250 250   K Acetate (mEq) 0 0 0 0   K Chloride (mEq) 80 120 80 80   K Phos (mmol)  (15 mmol = 22 mEq K) 15 15 15 15   Total K (mEq) 102 142 102 102   Ca Gluconate (mEq)  (1 g = 4.65 mEq) 4.65 2.3 2.3 2.3   Mag Sulfate (mEq)  (1 g = 8.12 mEq) 12.18 12.18 12.18 12.18   Multivitamin (mL) 10 10 10 10   Trace Elements (mL) 1 1 1 1      Summary of changes for tonight's TPN:   No change    Electrolyte Replacement:   none    Monitoring:  BMP, Mag, iCal, & Phos ordered for tomorrow with AM labs.    Pharmacy will continue to follow daily. Thank you for the consult.    Daisy Milligan, PharmD, BCPS  12/14/2024  8:27 AM

## 2024-12-14 NOTE — PROGRESS NOTES
Patient transferred to [facility] from PACU who was previously on 6A. Vital signs obtained, continuous pulse ox applied, and Two RN skin assessment performed by Avtar HEARN and NADIYA Dumas.

## 2024-12-14 NOTE — PLAN OF CARE
Problem: Discharge Planning  Goal: Discharge to home or other facility with appropriate resources  12/13/2024 2324 by Piter Kirkpatrick RN  Outcome: Progressing  12/13/2024 1840 by Yasmin Graham RN  Outcome: Progressing     Problem: Safety - Adult  Goal: Free from fall injury  12/13/2024 2324 by Piter Kirkpatrick RN  Outcome: Progressing  12/13/2024 1840 by Yasmin Graham RN  Outcome: Progressing     Problem: ABCDS Injury Assessment  Goal: Absence of physical injury  12/13/2024 2324 by Piter Kirkpatrick RN  Outcome: Progressing  12/13/2024 1840 by Yasmin Graham RN  Outcome: Progressing     Problem: Pain  Goal: Verbalizes/displays adequate comfort level or baseline comfort level  Outcome: Progressing     Problem: Respiratory - Adult  Goal: Achieves optimal ventilation and oxygenation  Outcome: Progressing  Flowsheets (Taken 12/13/2024 1945)  Achieves optimal ventilation and oxygenation: Assess for changes in respiratory status

## 2024-12-14 NOTE — ANESTHESIA POSTPROCEDURE EVALUATION
12/13/24 1810 (!) 178/78 -- -- (!) 101 24 94 %   12/13/24 1805 (!) 185/81 -- -- (!) 101 28 97 %   12/13/24 1800 138/80 -- -- (!) 102 (!) 32 93 %   12/13/24 1755 (!) 206/90 -- -- (!) 105 30 90 %   12/13/24 1750 (!) 182/81 -- -- (!) 107 (!) 50 (!) 86 %   12/13/24 1745 (!) 187/77 -- -- (!) 102 (!) 39 (!) 86 %       Level of Consciousness:  Awake    Respiratory:  Stable    Oxygen Saturation:  Stable    Cardiovascular:  Stable    Hydration:  Adequate    PONV:  Stable    Post-op Pain:  Adequate analgesia    Post-op Assessment:  No apparent anesthetic complications    Additional Follow-Up / Treatment / Comment:  None    SPENCER LIN MD  December 13, 2024   9:44 PM      No notable events documented.

## 2024-12-15 LAB
ANION GAP SERPL CALC-SCNC: 8 MEQ/L (ref 8–16)
BUN SERPL-MCNC: 21 MG/DL (ref 7–22)
CA-I BLD ISE-SCNC: 1 MMOL/L (ref 1.12–1.32)
CALCIUM SERPL-MCNC: 8 MG/DL (ref 8.5–10.5)
CHLORIDE SERPL-SCNC: 103 MEQ/L (ref 98–111)
CO2 SERPL-SCNC: 30 MEQ/L (ref 23–33)
CREAT SERPL-MCNC: 0.8 MG/DL (ref 0.4–1.2)
DEPRECATED RDW RBC AUTO: 51.8 FL (ref 35–45)
ERYTHROCYTE [DISTWIDTH] IN BLOOD BY AUTOMATED COUNT: 16 % (ref 11.5–14.5)
GFR SERPL CREATININE-BSD FRML MDRD: 77 ML/MIN/1.73M2
GLUCOSE BLD STRIP.AUTO-MCNC: 120 MG/DL (ref 70–108)
GLUCOSE BLD STRIP.AUTO-MCNC: 123 MG/DL (ref 70–108)
GLUCOSE BLD STRIP.AUTO-MCNC: 130 MG/DL (ref 70–108)
GLUCOSE SERPL-MCNC: 140 MG/DL (ref 70–108)
HCT VFR BLD AUTO: 25.9 % (ref 37–47)
HGB BLD-MCNC: 8.7 GM/DL (ref 12–16)
MAGNESIUM SERPL-MCNC: 2.2 MG/DL (ref 1.6–2.4)
MCH RBC QN AUTO: 29.5 PG (ref 26–33)
MCHC RBC AUTO-ENTMCNC: 33.6 GM/DL (ref 32.2–35.5)
MCV RBC AUTO: 87.8 FL (ref 81–99)
PHOSPHATE SERPL-MCNC: 4.1 MG/DL (ref 2.4–4.7)
PLATELET # BLD AUTO: 465 THOU/MM3 (ref 130–400)
PMV BLD AUTO: 10.9 FL (ref 9.4–12.4)
POTASSIUM SERPL-SCNC: 4.2 MEQ/L (ref 3.5–5.2)
RBC # BLD AUTO: 2.95 MILL/MM3 (ref 4.2–5.4)
SODIUM SERPL-SCNC: 141 MEQ/L (ref 135–145)
WBC # BLD AUTO: 16.1 THOU/MM3 (ref 4.8–10.8)

## 2024-12-15 PROCEDURE — 6360000002 HC RX W HCPCS: Performed by: SURGERY

## 2024-12-15 PROCEDURE — 6360000002 HC RX W HCPCS: Performed by: NURSE PRACTITIONER

## 2024-12-15 PROCEDURE — 84100 ASSAY OF PHOSPHORUS: CPT

## 2024-12-15 PROCEDURE — 36415 COLL VENOUS BLD VENIPUNCTURE: CPT

## 2024-12-15 PROCEDURE — 2580000003 HC RX 258: Performed by: SURGERY

## 2024-12-15 PROCEDURE — 85027 COMPLETE CBC AUTOMATED: CPT

## 2024-12-15 PROCEDURE — 82948 REAGENT STRIP/BLOOD GLUCOSE: CPT

## 2024-12-15 PROCEDURE — 2060000000 HC ICU INTERMEDIATE R&B

## 2024-12-15 PROCEDURE — 82330 ASSAY OF CALCIUM: CPT

## 2024-12-15 PROCEDURE — 99024 POSTOP FOLLOW-UP VISIT: CPT | Performed by: SURGERY

## 2024-12-15 PROCEDURE — 2500000003 HC RX 250 WO HCPCS: Performed by: NURSE PRACTITIONER

## 2024-12-15 PROCEDURE — 2580000003 HC RX 258: Performed by: NURSE PRACTITIONER

## 2024-12-15 PROCEDURE — 6370000000 HC RX 637 (ALT 250 FOR IP): Performed by: SURGERY

## 2024-12-15 PROCEDURE — 80048 BASIC METABOLIC PNL TOTAL CA: CPT

## 2024-12-15 PROCEDURE — 83735 ASSAY OF MAGNESIUM: CPT

## 2024-12-15 RX ORDER — CALCIUM GLUCONATE 20 MG/ML
2000 INJECTION, SOLUTION INTRAVENOUS ONCE
Status: COMPLETED | OUTPATIENT
Start: 2024-12-15 | End: 2024-12-15

## 2024-12-15 RX ADMIN — MONTELUKAST SODIUM 10 MG: 10 TABLET ORAL at 09:25

## 2024-12-15 RX ADMIN — BUSPIRONE HYDROCHLORIDE 10 MG: 10 TABLET ORAL at 19:28

## 2024-12-15 RX ADMIN — HYDROMORPHONE HYDROCHLORIDE 0.5 MG: 1 INJECTION, SOLUTION INTRAMUSCULAR; INTRAVENOUS; SUBCUTANEOUS at 12:46

## 2024-12-15 RX ADMIN — PANTOPRAZOLE SODIUM 40 MG: 40 INJECTION, POWDER, FOR SOLUTION INTRAVENOUS at 09:22

## 2024-12-15 RX ADMIN — OXYCODONE 5 MG: 5 TABLET ORAL at 21:22

## 2024-12-15 RX ADMIN — OXYCODONE 5 MG: 5 TABLET ORAL at 15:34

## 2024-12-15 RX ADMIN — SODIUM CHLORIDE, PRESERVATIVE FREE 10 ML: 5 INJECTION INTRAVENOUS at 19:28

## 2024-12-15 RX ADMIN — HYDROMORPHONE HYDROCHLORIDE 0.5 MG: 1 INJECTION, SOLUTION INTRAMUSCULAR; INTRAVENOUS; SUBCUTANEOUS at 22:22

## 2024-12-15 RX ADMIN — SODIUM CHLORIDE, PRESERVATIVE FREE 10 ML: 5 INJECTION INTRAVENOUS at 09:22

## 2024-12-15 RX ADMIN — CALCIUM GLUCONATE: 98 INJECTION, SOLUTION INTRAVENOUS at 17:07

## 2024-12-15 RX ADMIN — LOSARTAN POTASSIUM AND HYDROCHLOROTHIAZIDE 1 TABLET: 25; 100 TABLET ORAL at 09:25

## 2024-12-15 RX ADMIN — PIPERACILLIN AND TAZOBACTAM 3375 MG: 3; .375 INJECTION, POWDER, FOR SOLUTION INTRAVENOUS at 11:44

## 2024-12-15 RX ADMIN — LEVOTHYROXINE SODIUM 125 MCG: 125 TABLET ORAL at 06:42

## 2024-12-15 RX ADMIN — PIPERACILLIN AND TAZOBACTAM 3375 MG: 3; .375 INJECTION, POWDER, FOR SOLUTION INTRAVENOUS at 19:28

## 2024-12-15 RX ADMIN — METOPROLOL SUCCINATE 50 MG: 50 TABLET, EXTENDED RELEASE ORAL at 09:25

## 2024-12-15 RX ADMIN — CALCIUM GLUCONATE 2000 MG: 20 INJECTION, SOLUTION INTRAVENOUS at 09:21

## 2024-12-15 RX ADMIN — HYDROMORPHONE HYDROCHLORIDE 0.5 MG: 1 INJECTION, SOLUTION INTRAMUSCULAR; INTRAVENOUS; SUBCUTANEOUS at 02:04

## 2024-12-15 RX ADMIN — OXYCODONE 5 MG: 5 TABLET ORAL at 09:28

## 2024-12-15 RX ADMIN — PIPERACILLIN AND TAZOBACTAM 3375 MG: 3; .375 INJECTION, POWDER, FOR SOLUTION INTRAVENOUS at 02:42

## 2024-12-15 RX ADMIN — BUSPIRONE HYDROCHLORIDE 10 MG: 10 TABLET ORAL at 09:25

## 2024-12-15 RX ADMIN — ONDANSETRON 4 MG: 2 INJECTION INTRAMUSCULAR; INTRAVENOUS at 22:04

## 2024-12-15 RX ADMIN — ENOXAPARIN SODIUM 40 MG: 100 INJECTION SUBCUTANEOUS at 09:22

## 2024-12-15 RX ADMIN — SODIUM CHLORIDE, PRESERVATIVE FREE 20 ML: 5 INJECTION INTRAVENOUS at 09:23

## 2024-12-15 ASSESSMENT — PAIN DESCRIPTION - ORIENTATION
ORIENTATION: MID

## 2024-12-15 ASSESSMENT — PAIN DESCRIPTION - PAIN TYPE
TYPE: SURGICAL PAIN

## 2024-12-15 ASSESSMENT — PAIN DESCRIPTION - LOCATION
LOCATION: ABDOMEN

## 2024-12-15 ASSESSMENT — PAIN - FUNCTIONAL ASSESSMENT
PAIN_FUNCTIONAL_ASSESSMENT: PREVENTS OR INTERFERES SOME ACTIVE ACTIVITIES AND ADLS

## 2024-12-15 ASSESSMENT — PAIN SCALES - GENERAL
PAINLEVEL_OUTOF10: 9
PAINLEVEL_OUTOF10: 3
PAINLEVEL_OUTOF10: 0
PAINLEVEL_OUTOF10: 9
PAINLEVEL_OUTOF10: 7
PAINLEVEL_OUTOF10: 9
PAINLEVEL_OUTOF10: 6
PAINLEVEL_OUTOF10: 0
PAINLEVEL_OUTOF10: 7
PAINLEVEL_OUTOF10: 10
PAINLEVEL_OUTOF10: 9

## 2024-12-15 ASSESSMENT — PAIN DESCRIPTION - DESCRIPTORS
DESCRIPTORS: ACHING
DESCRIPTORS: DISCOMFORT
DESCRIPTORS: ACHING
DESCRIPTORS: ACHING;DISCOMFORT
DESCRIPTORS: ACHING
DESCRIPTORS: ACHING;DISCOMFORT

## 2024-12-15 ASSESSMENT — PAIN DESCRIPTION - FREQUENCY
FREQUENCY: CONTINUOUS

## 2024-12-15 ASSESSMENT — PAIN DESCRIPTION - ONSET
ONSET: ON-GOING

## 2024-12-15 NOTE — PLAN OF CARE
Problem: Discharge Planning  Goal: Discharge to home or other facility with appropriate resources  Outcome: Progressing  Flowsheets (Taken 12/14/2024 1000 by María Elena Maldonado, RN)  Discharge to home or other facility with appropriate resources:   Identify barriers to discharge with patient and caregiver   Arrange for needed discharge resources and transportation as appropriate   Identify discharge learning needs (meds, wound care, etc)   Refer to discharge planning if patient needs post-hospital services based on physician order or complex needs related to functional status, cognitive ability or social support system     Problem: Safety - Adult  Goal: Free from fall injury  Outcome: Progressing     Problem: ABCDS Injury Assessment  Goal: Absence of physical injury  Outcome: Progressing     Problem: Pain  Goal: Verbalizes/displays adequate comfort level or baseline comfort level  Outcome: Progressing     Problem: Respiratory - Adult  Goal: Achieves optimal ventilation and oxygenation  Outcome: Progressing  Flowsheets (Taken 12/14/2024 1000 by María Elena Maldonado, RN)  Achieves optimal ventilation and oxygenation: Assess for changes in respiratory status

## 2024-12-15 NOTE — PROGRESS NOTES
Pharmacy Consult for TPN/PPN Monitoring & Adjustment  IV Access: central      Dosing weight: 57 kg  Current insulin regimen: medium sliding scale  Diet (excluding TPN): NPO  Maintenance fluid: none    Plan:  See components of tonight's TPN bag in the table below:  Date 12/11/2024 12/12/24 12/13/24 12/14/2024    12/15/2024   Total kcal/kg 15 20 20 20 20   Total kcal 855 1140 1140 1140 1140   Protein g/kg 1.2 1.5 1.5 1.5 1.5   Protein g 68.4 85.5 85.5 85.5 85.5   Protein kcal (4 kcal/g) 273.6 342 342 342 342   Lipid % 20 30 30 30 30   Lipid g 17.1 34.2 34.2 34.2 34.2   Lipid kcal (10 kcal/g) 171 342 342 342 342   Dextrose g 120.7 134 134 134 134   Dextrose kcal (3.4 kcal/g) 410.4 456 456 456 456   Infusion Rate (mL/hr) 40 90 90 90 90   Na Acetate (mEq) 100 130 130 130 130   Na Chloride (mEq) 0 140 120 120 120   Na Phos (mmol)  (3 mmol = 4 mEq Na) 0 0 0 0 0   Total Na (mEq) 100 270 250 250 250   K Acetate (mEq) 0 0 0 0 0   K Chloride (mEq) 80 120 80 80 80   K Phos (mmol)  (15 mmol = 22 mEq K) 15 15 15 15 15   Total K (mEq) 102 142 102 102 102   Ca Gluconate (mEq)  (1 g = 4.65 mEq) 4.65 2.3 2.3 2.3 9.3   Mag Sulfate (mEq)  (1 g = 8.12 mEq) 12.18 12.18 12.18 12.18 12.18   Multivitamin (mL) 10 10 10 10 10   Trace Elements (mL) 1 1 1 1 1      Summary of changes for tonight's TPN:   Increase calcium gluconate 9.3 mEq    Electrolyte Replacement:   Calcium gluconate: 2 g    Monitoring:  BMP, Mag, iCal, & Phos ordered for tomorrow with AM labs.    Pharmacy will continue to follow daily. Thank you for the consult.    Daisy Milligan, JamesD, BCPS  12/15/2024  8:36 AM

## 2024-12-15 NOTE — PROGRESS NOTES
DO [facility] Kim DR GENERAL SURGERY   General Surgery Daily Progress Note    Pt Name: Marry Ayers  Medical Record Number: [id number]  Date of Birth 1950   Today's Date: 12/15/2024  Chief complaint: Stricture of sigmoid colon   ASSESSMENT   Hospital day # 11   POD# 11 Open Sigmoid Colectomy, Partial Small Bowel Resection   POD # 2 repair of anastomotic leak and loop ileostomy  Pt on coumadin for hx of PE was bridged with Lovenox   has a past medical history of Allergic rhinitis, Anemia, Anxiety, Cataract, Essential hypertension, GERD (gastroesophageal reflux disease), Hx of blood clots, Hypokalemia, Hypothyroidism, and Pulmonary embolism (HCC).  PLAN   Analgesics and antiemetics as needed  IV hydration  Ostomy and drain care  Lovenox for DVT prophylaxis  Clear liquids as tolerated  PT/OT  C evaluation    SUBJECTIVE   Marry is doing fairly well. Denies any nausea or vomting. Pain improved. She is tolerating a PN-Adult  3 IN 1 Central Line (Custom)  Diet NPO Exceptions are: Sips of Water with Meds, Ice Chips, Sips of Clear Liquids. + ostomy output. Has been upt ot he chair.   CURRENT MEDICATIONS   Scheduled Meds:   enoxaparin  40 mg SubCUTAneous Daily    sodium chloride flush  5-40 mL IntraVENous 2 times per day    piperacillin-tazobactam  3,375 mg IntraVENous Q8H    insulin lispro  0-8 Units SubCUTAneous Q6H    pantoprazole  40 mg IntraVENous Daily    montelukast  10 mg Oral QAM    losartan-hydroCHLOROthiazide  1 tablet Oral Daily    busPIRone  10 mg Oral BID    levothyroxine  125 mcg Oral Daily    metoprolol succinate  50 mg Oral Daily    sodium chloride flush  5-40 mL IntraVENous 2 times per day     Continuous Infusions:   PN-Adult  3 IN 1 Central Line (Custom) 90 mL/hr at 12/14/24 1756    sodium chloride      dextrose      sodium chloride       PRN Meds:.sodium chloride flush, sodium chloride, levalbuterol, acetaminophen, glucose, dextrose bolus **OR** dextrose bolus, glucagon (rDNA),  effusions, and moderate bilateral lower lobe   consolidation, right greater than left, suggest pneumonia in the appropriate   clinical setting.      2. Postoperative changes with surgical anastomosis is noted in the upper   abdominal small bowel as well as the distal colon. A large amount of   pneumoperitoneum is seen within the abdomen. Ill-defined pockets of gas and   fluid are seen along both paracolic gutters is concerning for an anastomotic   leak. An ill-defined collection of gas and fluid in the left paracolic gutter   measures up to 2.4 x 10.3 cm (series 605, images 29 through 33). Ill-defined   peripherally enhancing interloop ascites is noted in the lower abdomen/pelvis.   No bowel obstruction is observed. Diffuse total body wall edema suggesting   anasarca is present.      3. Chronic findings are discussed.      **This report has been created using voice recognition software.  It may contain   minor errors which are inherent in voice recognition technology.**      Electronically signed by Dr. Christie Topete      XR CHEST PORTABLE   Final Result      Small bilateral pleural effusions.               **This report has been created using voice recognition software. It may contain   minor errors which are inherent in voice recognition technology.**            Electronically signed by Dr. Casimiro Fernandez      XR CHEST PORTABLE   Final Result   1. Mild hazy opacity at the right lung base may present a small right pleural   effusion with possible mild dependent right basilar atelectasis or pneumonia.   2. Left upper extremity PICC is present with the tip overlying the cavoatrial   junction.            **This report has been created using voice recognition software.  It may contain   minor errors which are inherent in voice recognition technology.**      Electronically signed by Dr. Benito Ureña      XR ABDOMEN (KUB) (SINGLE AP VIEW)   Final Result   1. Persistent gas-filled bowel are seen overlying the

## 2024-12-16 PROBLEM — Z43.2 ILEOSTOMY CARE (HCC): Status: ACTIVE | Noted: 2024-12-16

## 2024-12-16 LAB
ANION GAP SERPL CALC-SCNC: 10 MEQ/L (ref 8–16)
BUN SERPL-MCNC: 20 MG/DL (ref 7–22)
CA-I BLD ISE-SCNC: 1.16 MMOL/L (ref 1.12–1.32)
CALCIUM SERPL-MCNC: 8 MG/DL (ref 8.5–10.5)
CHLORIDE SERPL-SCNC: 97 MEQ/L (ref 98–111)
CO2 SERPL-SCNC: 29 MEQ/L (ref 23–33)
CREAT SERPL-MCNC: 0.9 MG/DL (ref 0.4–1.2)
DEPRECATED RDW RBC AUTO: 50.5 FL (ref 35–45)
ERYTHROCYTE [DISTWIDTH] IN BLOOD BY AUTOMATED COUNT: 15.7 % (ref 11.5–14.5)
GFR SERPL CREATININE-BSD FRML MDRD: 67 ML/MIN/1.73M2
GLUCOSE BLD STRIP.AUTO-MCNC: 125 MG/DL (ref 70–108)
GLUCOSE BLD STRIP.AUTO-MCNC: 125 MG/DL (ref 70–108)
GLUCOSE BLD STRIP.AUTO-MCNC: 127 MG/DL (ref 70–108)
GLUCOSE BLD STRIP.AUTO-MCNC: 145 MG/DL (ref 70–108)
GLUCOSE BLD STRIP.AUTO-MCNC: 150 MG/DL (ref 70–108)
GLUCOSE BLD STRIP.AUTO-MCNC: 160 MG/DL (ref 70–108)
GLUCOSE SERPL-MCNC: 115 MG/DL (ref 70–108)
HCT VFR BLD AUTO: 29.8 % (ref 37–47)
HGB BLD-MCNC: 10 GM/DL (ref 12–16)
MAGNESIUM SERPL-MCNC: 1.8 MG/DL (ref 1.6–2.4)
MCH RBC QN AUTO: 29.9 PG (ref 26–33)
MCHC RBC AUTO-ENTMCNC: 33.6 GM/DL (ref 32.2–35.5)
MCV RBC AUTO: 89 FL (ref 81–99)
PHOSPHATE SERPL-MCNC: 3.7 MG/DL (ref 2.4–4.7)
PLATELET # BLD AUTO: 559 THOU/MM3 (ref 130–400)
PMV BLD AUTO: 10.8 FL (ref 9.4–12.4)
POTASSIUM SERPL-SCNC: 4.2 MEQ/L (ref 3.5–5.2)
RBC # BLD AUTO: 3.35 MILL/MM3 (ref 4.2–5.4)
SODIUM SERPL-SCNC: 136 MEQ/L (ref 135–145)
WBC # BLD AUTO: 23 THOU/MM3 (ref 4.8–10.8)

## 2024-12-16 PROCEDURE — 85027 COMPLETE CBC AUTOMATED: CPT

## 2024-12-16 PROCEDURE — 6360000002 HC RX W HCPCS: Performed by: SURGERY

## 2024-12-16 PROCEDURE — 2700000000 HC OXYGEN THERAPY PER DAY

## 2024-12-16 PROCEDURE — 94761 N-INVAS EAR/PLS OXIMETRY MLT: CPT

## 2024-12-16 PROCEDURE — 87070 CULTURE OTHR SPECIMN AEROBIC: CPT

## 2024-12-16 PROCEDURE — 6360000002 HC RX W HCPCS: Performed by: PHARMACIST

## 2024-12-16 PROCEDURE — 2580000003 HC RX 258: Performed by: SURGERY

## 2024-12-16 PROCEDURE — 97162 PT EVAL MOD COMPLEX 30 MIN: CPT

## 2024-12-16 PROCEDURE — 82330 ASSAY OF CALCIUM: CPT

## 2024-12-16 PROCEDURE — 97530 THERAPEUTIC ACTIVITIES: CPT

## 2024-12-16 PROCEDURE — 6370000000 HC RX 637 (ALT 250 FOR IP): Performed by: SURGERY

## 2024-12-16 PROCEDURE — 87075 CULTR BACTERIA EXCEPT BLOOD: CPT

## 2024-12-16 PROCEDURE — 36415 COLL VENOUS BLD VENIPUNCTURE: CPT

## 2024-12-16 PROCEDURE — 84100 ASSAY OF PHOSPHORUS: CPT

## 2024-12-16 PROCEDURE — 87205 SMEAR GRAM STAIN: CPT

## 2024-12-16 PROCEDURE — 2500000003 HC RX 250 WO HCPCS: Performed by: PHARMACIST

## 2024-12-16 PROCEDURE — 80048 BASIC METABOLIC PNL TOTAL CA: CPT

## 2024-12-16 PROCEDURE — 82948 REAGENT STRIP/BLOOD GLUCOSE: CPT

## 2024-12-16 PROCEDURE — 44310 ILEOSTOMY/JEJUNOSTOMY: CPT | Performed by: SURGERY

## 2024-12-16 PROCEDURE — 83735 ASSAY OF MAGNESIUM: CPT

## 2024-12-16 PROCEDURE — 99024 POSTOP FOLLOW-UP VISIT: CPT | Performed by: SURGERY

## 2024-12-16 PROCEDURE — 2060000000 HC ICU INTERMEDIATE R&B

## 2024-12-16 PROCEDURE — 2580000003 HC RX 258: Performed by: PHARMACIST

## 2024-12-16 PROCEDURE — 44602 SUTURE SMALL INTESTINE: CPT | Performed by: SURGERY

## 2024-12-16 RX ADMIN — OXYCODONE 5 MG: 5 TABLET ORAL at 23:16

## 2024-12-16 RX ADMIN — HYDROMORPHONE HYDROCHLORIDE 0.5 MG: 1 INJECTION, SOLUTION INTRAMUSCULAR; INTRAVENOUS; SUBCUTANEOUS at 04:57

## 2024-12-16 RX ADMIN — PIPERACILLIN AND TAZOBACTAM 3375 MG: 3; .375 INJECTION, POWDER, FOR SOLUTION INTRAVENOUS at 02:54

## 2024-12-16 RX ADMIN — OXYCODONE 5 MG: 5 TABLET ORAL at 15:47

## 2024-12-16 RX ADMIN — SODIUM CHLORIDE, PRESERVATIVE FREE 20 ML: 5 INJECTION INTRAVENOUS at 08:14

## 2024-12-16 RX ADMIN — PIPERACILLIN AND TAZOBACTAM 3375 MG: 3; .375 INJECTION, POWDER, FOR SOLUTION INTRAVENOUS at 12:13

## 2024-12-16 RX ADMIN — SODIUM CHLORIDE, PRESERVATIVE FREE 10 ML: 5 INJECTION INTRAVENOUS at 19:56

## 2024-12-16 RX ADMIN — LEVOTHYROXINE SODIUM 125 MCG: 125 TABLET ORAL at 06:09

## 2024-12-16 RX ADMIN — SODIUM CHLORIDE, PRESERVATIVE FREE 10 ML: 5 INJECTION INTRAVENOUS at 08:08

## 2024-12-16 RX ADMIN — HYDROMORPHONE HYDROCHLORIDE 0.5 MG: 1 INJECTION, SOLUTION INTRAMUSCULAR; INTRAVENOUS; SUBCUTANEOUS at 11:01

## 2024-12-16 RX ADMIN — ENOXAPARIN SODIUM 40 MG: 100 INJECTION SUBCUTANEOUS at 08:08

## 2024-12-16 RX ADMIN — OXYCODONE 5 MG: 5 TABLET ORAL at 03:50

## 2024-12-16 RX ADMIN — BUSPIRONE HYDROCHLORIDE 10 MG: 10 TABLET ORAL at 19:56

## 2024-12-16 RX ADMIN — BUSPIRONE HYDROCHLORIDE 10 MG: 10 TABLET ORAL at 08:12

## 2024-12-16 RX ADMIN — METOPROLOL SUCCINATE 50 MG: 50 TABLET, EXTENDED RELEASE ORAL at 08:12

## 2024-12-16 RX ADMIN — LOSARTAN POTASSIUM AND HYDROCHLOROTHIAZIDE 1 TABLET: 25; 100 TABLET ORAL at 08:12

## 2024-12-16 RX ADMIN — PIPERACILLIN AND TAZOBACTAM 3375 MG: 3; .375 INJECTION, POWDER, FOR SOLUTION INTRAVENOUS at 19:54

## 2024-12-16 RX ADMIN — OXYCODONE 5 MG: 5 TABLET ORAL at 08:08

## 2024-12-16 RX ADMIN — CALCIUM GLUCONATE: 98 INJECTION, SOLUTION INTRAVENOUS at 16:59

## 2024-12-16 RX ADMIN — PANTOPRAZOLE SODIUM 40 MG: 40 INJECTION, POWDER, FOR SOLUTION INTRAVENOUS at 08:08

## 2024-12-16 RX ADMIN — MONTELUKAST SODIUM 10 MG: 10 TABLET ORAL at 08:12

## 2024-12-16 ASSESSMENT — PAIN DESCRIPTION - ORIENTATION
ORIENTATION: MID

## 2024-12-16 ASSESSMENT — PAIN DESCRIPTION - LOCATION
LOCATION: ABDOMEN

## 2024-12-16 ASSESSMENT — PAIN SCALES - GENERAL
PAINLEVEL_OUTOF10: 2
PAINLEVEL_OUTOF10: 10
PAINLEVEL_OUTOF10: 6
PAINLEVEL_OUTOF10: 0
PAINLEVEL_OUTOF10: 10
PAINLEVEL_OUTOF10: 10
PAINLEVEL_OUTOF10: 4
PAINLEVEL_OUTOF10: 10
PAINLEVEL_OUTOF10: 5

## 2024-12-16 ASSESSMENT — PAIN DESCRIPTION - DESCRIPTORS
DESCRIPTORS: ACHING

## 2024-12-16 ASSESSMENT — PAIN DESCRIPTION - ONSET
ONSET: ON-GOING
ONSET: ON-GOING

## 2024-12-16 ASSESSMENT — PAIN DESCRIPTION - PAIN TYPE
TYPE: SURGICAL PAIN
TYPE: ACUTE PAIN;SURGICAL PAIN

## 2024-12-16 ASSESSMENT — PAIN - FUNCTIONAL ASSESSMENT
PAIN_FUNCTIONAL_ASSESSMENT: PREVENTS OR INTERFERES SOME ACTIVE ACTIVITIES AND ADLS

## 2024-12-16 ASSESSMENT — PAIN DESCRIPTION - FREQUENCY
FREQUENCY: CONTINUOUS
FREQUENCY: CONTINUOUS

## 2024-12-16 NOTE — PROGRESS NOTES
IV RN to patient room for PICC dressing change and assessment, patient currently working with therapy. IV team to follow up.

## 2024-12-16 NOTE — OP NOTE
[address] [address]                            OPERATIVE REPORT      PATIENT NAME: PITO WHYTE            : 1950  MED REC NO: [id number]                       ROOM: [facility]  ACCOUNT NO: [id number]                       ADMIT DATE: 2024  PROVIDER: Casimiro Callahan DO      DATE OF PROCEDURE:  2024    SURGEON:  Casimiro Callahan DO    PREOPERATIVE DIAGNOSES:  Pneumoperitoneum, peritonitis, likely anastomotic leak.    POSTOPERATIVE DIAGNOSES:  Pneumoperitoneum, peritonitis, likely anastomotic leak, feculent peritonitis.    PROCEDURE PERFORMED:  Exploratory laparotomy, repair of anastomosis, creation of loop ileostomy.    ANESTHESIA:  General with local.    ESTIMATED BLOOD LOSS:  50 mL.    SPECIMENS:  None.    COMPLICATION:  None immediately appreciated.    DISCUSSION:  The patient is a 74-year-old female, who had undergone a sigmoid resection, partial small bowel resection on 2024.  Postoperative course initially was unremarkable, but failed to progress, and CT scan showing pneumoperitoneum larger than expected.  After history and physical examination performed, potential diagnostic and therapeutic modalities were discussed with the patient, operative and nonoperative modalities were discussed; risks, complications, and benefits were reviewed, she was given the opportunity to ask questions, and once answered, informed consent was obtained.  The patient was brought to the operating room on 2024 for procedure.    OPERATIVE FINDINGS:  At the time of exploration, the patient was found to have inflammatory changes and phlegmon present at the muscle and fascial level as well as feculent peritonitis at the deep space level, both present at the time of surgery.  Due to the previous extensive pelvic surgical history, it was elected for a small repair as the anastomotic leak was quite small, was subsequently patched,

## 2024-12-16 NOTE — CARE COORDINATION
DISCHARGE PLANNING EVALUATION  12/16/24, 11:31 AM EST    Reason for Referral: Discharge planning  Decision Maker: Self  Current Services: Possible HH  New Services Requested: HH  Family/ Social/ Home environment: Patient lives with her spouse in their home. Patient reported that she was independent before coming to hospital. She reported that she has supportive family and friends and has all necessary she needs.   Payment Source:Medicare  Transportation at Discharge: family  Post-acute (PAC) provider list was provided to patient. Patient was informed of their freedom to choose PAC provider. Discussed and offered to show the patient the relevant PAC Providers quality and resource use measures on Medicare Compare web site via computer based on patient's goals of care and treatment preferences. Questions regarding selection process were answered.      Teach Back Method used with patient regarding care plan and needs  Patient verbalized understanding of the plan of care and contribute to goal setting.       Patient preferences and discharge plan: Patient plans to return home with spouse and possible new HH. She would like to consider her options and speak with her spouse. SW to follow and assist with discharge planning as needed.    Electronically signed by FAIZAN Harris on 12/16/2024 at 11:31 AM

## 2024-12-16 NOTE — PLAN OF CARE
Problem: Discharge Planning  Goal: Discharge to home or other facility with appropriate resources  Outcome: Progressing  See SW note

## 2024-12-16 NOTE — PROGRESS NOTES
[facility] Wound Ostomy Continence Nurse  Ostomy Referral Progress Note      NAME:  Marry Ayers  MEDICAL RECORD NUMBER:  [id number]  AGE: 74 y.o.   GENDER:  female  :  1950  TODAY'S DATE:  2024    Subjective   Reason for [facility] Evaluation and Assessment: New Ostomy     Surgeon: Dr Callahan   Date of surgery: 24    Assessment     Encounter: Present to patient room. Patient in bed. Assessment and photo to follow. Midline abdomen dressing removed. Cleansed stoma with warm wash cloth, pat dry. Skin prep applied to varsha-stomal skin. Coloplast 2 piece red, flat flange cut to fit to size, protective ring applied to inner edge of flange, centered over stoma, and applied. Pouch applied. Barrier extenders placed on outer edge of flange. Applied hands lightly over system to activate hydrocolloid. Optifoam post-op AG applied to stapled, approximated mid abdomen incision. Patient coherent, but unable to maintain conversation. Will plan to change pouching system and educate later this week. Highly recommend patient have home health assist with ostomy care after discharge. Ostomy educational information given to patient, with additional pouching systems in room. Will continue to follow. Bed in low, call light in reach.       OSTOMY ASSESSMENT:        24 1256   Ileostomy RLQ   Placement Date/Time: 24 1634   Present on Admission/Arrival: No  Location: LLQ   Stomal Appliance 2 piece;Flat;Changed   Flange Size (inches) 35 Inches   Stoma  Assessment Red;Protrudes   Peristomal Assessment Clean;Intact   Mucocutaneous Junction Intact   Treatment Site care;Liquid skin barrier;Pouch change;Barrier ring   Stool Appearance Bloody   Stool Color Brown;Red   Incision 24 Abdomen Medial   Date First Assessed/Time First Assessed: 24 1002   Location: Abdomen  Incision Location Orientation: Medial   Dressing Status Old drainage noted;New dressing applied   Dressing Change Due 24   Incision  Cleansed Cleansed with saline   Dressing/Treatment Foam impregnated with Ag   Closure Staples   Margins Approximated   Incision Assessment Dry   Drainage Amount Scant (moist but unmeasurable)   Drainage Description Serosanguinous;Yellow   Odor None   Eliza-incision Assessment Edematous;Intact         Plan     Treatment Recommendations:  Midline abdomen incision- Clean wound with normal saline or wound cleanser and gauze. Pat dry with clean gauze. Cover with Optifoam Post-op AG dressing. Change every 3 days and PRN for strikethrough drainage/ saturation. Additional dressings located in 7K supply.        Discharge Plan:  Placement for patient upon discharge:   [x] Home with HH  [] Home with self/family member changing  [] Inpatient Rehab  [] ECF  [] [place]/ LTAC    [x] Follow-up referral placed Outpatient Ostomy Clinic  [x] Follow-up appointment entered  [x] Discharge instructions in AVS  [x] New patient folder/education given  [] Discharge supply pack given      For [facility] outpatient ostomy clinic:   Recommend patient follow up in outpatient wound clinic with MARK Cerna.   [facility] Out[facility]ound [facility] Ostomy [facility]  [address] [place] [address]  Phone: [phone]  Fax: [phone]

## 2024-12-16 NOTE — DISCHARGE INSTRUCTIONS
Visit Discharge/ physician orders:      ATTN HOME HEALTH: Do not order supplies until patient is seen in Wound Ostomy Clinic.  Supplies   Size   Order #     Coloplast Sensura Bloomingrose red soft convex flange Cut-to-fit [id number]   Coloplast Sensura Serjio drainable pouch   [id number]   Brava Skin Barrier Wipes  [id number]   Brava Protective Ring  91226   Brava Elastic Barrier Strips  160075     Change your flange 1-2   times / week.    Application of two-piece Colostomy/Ileostomy Pouch:  Assemble supplies in order of application before removing pouch.  Remove worn pouching system.  Wash skin with warm water and wash cloth. Pat dry with towel. DO NOT USE SOAP/ WIPES!  Inspect varsha-stomal skin for redness or irritation. If present, apply small amount of stoma powder to skin, dust off excess. (Brava Stoma Powder 25365)  Measure size of stoma with measuring guide. Get as close to size of stoma without leaving too much skin exposed.  Cut a hole in the flange to fit the size of the stoma.  Apply skin prep to the varsha-stomal skin (including stoma powder, if used).  Remove paper backing from flange.  Apply protective ring to inner edge of flange OR to peristomal skin.   ONLY if pouching system has continued leaking, patient may benefit from use of NuHope adhesive (6610). Brush a thin layer of the adhesive with the included wand to the back of the flange and the varsha-stomal skin. Allow to become tacky to touch before applying.  Center the flange around the stoma and apply.   Apply the pouch by aligning the white edge of the pouch sticker with the blue line on the flange.  Run fingers around the back of flange and pouch to ensure the two pieces are adhered.   Apply barrier extenders around outer edge of flange (half on the flange, half on the skin).  Empty when 1/3 full.    Follow up with LOY Cerna at 1 week post discharge.  [facility] Outpatient Wound Ostomy [facility]  [address] [place], [place] [address]  [phone]    Call

## 2024-12-16 NOTE — PROGRESS NOTES
[facility]  INPATIENT PHYSICAL THERAPY  EVALUATION  [facility] ICU STEPDOWN TELEMETRY 4K - 4K-17/017-A    Discharge Recommendations: Continue to assess pending progress, Patient would benefit from continued therapy after discharge, 24 hour supervision or assist, at this time, would recommend inpatient therapy stay prior to returning home.   Equipment Recommendations: No             Time In: 1315  Time Out: 1348  Timed Code Treatment Minutes: 24 Minutes  Minutes: 33          Date: 2024  Patient Name: Marry Ayers,  Gender:  female        MRN: [id number]  : 1950  (74 y.o.)      Referring Practitioner: Casimiro Callahan DO  Diagnosis: Sigmoid stricture (HCC)  Additional Pertinent Hx: Pt is s/p open sigmoid colectomy with a partial SB resection completed by Dr Callahan on . As of  abdomen is more distended than it was at the beginning of the shift. Chart reviewed. Patient had a KUB this AM that was concerning for ileus but she had been passing flatus and tolerating full liquids.      Repeat KUB ordered. Findings similar to this morning's xray but given increased pain, N/V, and distention, will place a NGT to LIWS . Chest xray -Small left-sided pleural effusion with adjacent atelectasis/infiltrate, Mild cardiomegaly.     Restrictions/Precautions:  Restrictions/Precautions: General Precautions, Fall Risk       Other Position/Activity Restrictions: PALAK drains x2, colostomy    Required Braces or Orthoses?: No      Subjective:  Chart Reviewed: Yes  Patient assessed for rehabilitation services?: Yes  Family/Caregiver Present: No  Subjective: OK to see pt per nursing. Pt  sitting up in bed when PT arrived, agreeable to PT session with min encouragement. Pt noted to be slightly confused during session. This therapist would ask pt to complete a task and then little to initiation. Pt would then ask \"what am I supposed to be doing,\" frequently. Per nursing asking this therapist to monitor O2 on

## 2024-12-16 NOTE — PROGRESS NOTES
Pharmacy Consult for TPN/PPN Monitoring & Adjustment  IV Access: central      Dosing weight: 57 kg  Current insulin regimen: medium sliding scale  Diet (excluding TPN): NPO  Maintenance fluid: none    Plan:  See components of tonight's TPN bag in the table below:  Date 12/14/2024    12/15/2024 12/16/2024   Total kcal/kg 20 20 25   Total kcal 1140 1140 1425   Protein g/kg 1.5 1.5 1.5   Protein g 85.5 85.5 85.5   Protein kcal (4 kcal/g) 342 342 342   Lipid % 30 30 20   Lipid g 34.2 34.2 28.5   Lipid kcal (10 kcal/g) 342 342 285   Dextrose g 134 134 234.7   Dextrose kcal (3.4 kcal/g) 456 456 798   Infusion Rate (mL/hr) 90 90 90   Na Acetate (mEq) 130 130 130   Na Chloride (mEq) 120 120 150   Na Phos (mmol)  (3 mmol = 4 mEq Na) 0 0 0   Total Na (mEq) 250 250 280   K Acetate (mEq) 0 0 0   K Chloride (mEq) 80 80 80   K Phos (mmol)  (15 mmol = 22 mEq K) 15 15 15   Total K (mEq) 102 102 102   Ca Gluconate (mEq)  (1 g = 4.65 mEq) 2.3 9.3 9.3   Mag Sulfate (mEq)  (1 g = 8.12 mEq) 12.18 12.18 12.18   Multivitamin (mL) 10 10 10   Trace Elements (mL) 1 1 1      Summary of changes for tonight's TPN:   Increase to 25 kcal/kg/day  Decrease lipids to 20%  Increase NaCl to 150 mEq    Electrolyte Replacement:   None    Monitoring:  BMP, TG, & Phos ordered for tomorrow with AM labs.    Pharmacy will continue to follow daily. Thank you for the consult.  Ioana Ramos, JamesD, BCPS   12/16/2024  11:32 AM

## 2024-12-16 NOTE — PROGRESS NOTES
[facility]  OCCUPATIONAL THERAPY MISSED TREATMENT NOTE  [facility] ICU STEPDOWN TELEMETRY 4K  4K-17/017-A      Date: 2024  Patient Name: Marry Ayers        CSN: [id number]   : 1950  (74 y.o.)  Gender: female                REASON FOR MISSED TREATMENT:  OT attempted X2 this date. At 730, pt very drowsy, requesting pain medicine and rest first before starting therapy. Checked back in PM, and pt was working with PT. Will check back as able

## 2024-12-16 NOTE — PROGRESS NOTES
DO TRICIA Kim DR GENERAL SURGERY   General Surgery Daily Progress Note    Pt Name: Marry Ayers  Medical Record Number: [id number]  Date of Birth 1950   Today's Date: 12/16/2024  Chief complaint: Stricture of sigmoid colon   ASSESSMENT   Hospital day # 12   POD# 12 Open Sigmoid Colectomy, Partial Small Bowel Resection   POD # 3 repair of anastomotic leak and loop ileostomy  Pt on coumadin for hx of PE was bridged with Lovenox  WBC increasing - will send fluid cultures from PALAK drain   has a past medical history of Allergic rhinitis, Anemia, Anxiety, Cataract, Essential hypertension, GERD (gastroesophageal reflux disease), Hx of blood clots, Hypokalemia, Hypothyroidism, and Pulmonary embolism (HCC).  PLAN   Analgesics and antiemetics as needed  IV hydration  Ostomy and drain care  Lovenox for DVT prophylaxis  Clear liquids as tolerated  PT/OT  HHC evaluation  Drain culture    SUBJECTIVE   Marry is doing fairly well. Denies any nausea or vomting. Pain improved. She is tolerating a PN-Adult  3 IN 1 Central Line (Custom)  ADULT DIET; Clear Liquid. + ostomy output. Has been up to the chair.   CURRENT MEDICATIONS   Scheduled Meds:   enoxaparin  40 mg SubCUTAneous Daily    sodium chloride flush  5-40 mL IntraVENous 2 times per day    piperacillin-tazobactam  3,375 mg IntraVENous Q8H    insulin lispro  0-8 Units SubCUTAneous Q6H    pantoprazole  40 mg IntraVENous Daily    montelukast  10 mg Oral QAM    losartan-hydroCHLOROthiazide  1 tablet Oral Daily    busPIRone  10 mg Oral BID    levothyroxine  125 mcg Oral Daily    metoprolol succinate  50 mg Oral Daily    sodium chloride flush  5-40 mL IntraVENous 2 times per day     Continuous Infusions:   PN-Adult  3 IN 1 Central Line (Custom) 90 mL/hr at 12/15/24 1707    sodium chloride      dextrose      sodium chloride       PRN Meds:.sodium chloride flush, sodium chloride, levalbuterol, acetaminophen, glucose, dextrose bolus **OR** dextrose bolus,  by Dr. Benito Ureña      XR ABDOMEN (KUB) (SINGLE AP VIEW)   Final Result   1. Persistent gas-filled bowel are seen overlying the abdomen and pelvis. The   overall degree of gaseous distention appears slightly progressed from prior   examination. This is progressed overlying the right mid abdomen. Further   evaluation could be obtained with CT.      **This report has been created using voice recognition software.  It may contain   minor errors which are inherent in voice recognition technology.**      Electronically signed by Dr. Benito Ureña      XR ABDOMEN (2 VIEWS)   Final Result   1. No pneumoperitoneum.   2. Nonobstructive bowel gas pattern.            **This report has been created using voice recognition software. It may contain   minor errors which are inherent in voice recognition technology.**         Electronically signed by Dr. Casimiro Fernandez      XR CHEST PORTABLE   Final Result   1. Small left-sided pleural effusion with adjacent atelectasis/infiltrate.   2. Mild cardiomegaly.               **This report has been created using voice recognition software. It may contain   minor errors which are inherent in voice recognition technology.**            Electronically signed by Dr. Casimiro Fernandez      XR ABDOMEN FOR NG/OG/NE TUBE PLACEMENT   Final Result   Appropriately positioned enteric tube.      This document has been electronically signed by: José Antonio Degroot MD on    12/07/2024 09:51 PM      XR ABDOMEN (KUB) (SINGLE AP VIEW)   Final Result   Dilated small bowel loops consistent with small-bowel obstruction.      This document has been electronically signed by: José Antonio Degroot MD on    12/07/2024 09:40 PM         XR ABDOMEN (KUB) (SINGLE AP VIEW)   Final Result   Diffuse gaseous distention throughout the intestines. This could be   related to an ileus.               **This report has been created using voice recognition software. It may contain   minor errors which are inherent in voice recognition

## 2024-12-16 NOTE — PROGRESS NOTES
Comprehensive Nutrition Assessment    Type and Reason for Visit:  Reassess    Nutrition Recommendations/Plan:   When next 3 in 1 TPN bag is made, suggest dose wt: 57kgm, 25kcals/kgm, 1.5 grams protein/kgm, 20% kcals from lipids . Spoke with pharmacist.  Suggest triglyceride level.  Continue current diet per provider.  Pt declines ONS.      Malnutrition Assessment:  Malnutrition Status:  Severe malnutrition (12/12/24 1034)    Context:  Acute Illness     Findings of the 6 clinical characteristics of malnutrition:  Energy Intake:  50% or less of estimated energy requirements for 5 or more days (NPO ( had surgery 12/4))  Weight Loss:  1% to 2% over 1 week     Body Fat Loss:  Mild body fat loss Orbital   Muscle Mass Loss:  Mild muscle mass loss Temples (temporalis)  Fluid Accumulation:  Unable to assess     Strength:  Not Performed    Nutrition Assessment:      Pt.  improving from a nutritional standpoint AEB able to increase TPN base components today. .    At risk for further nutritional compromise r/t admit with stricture of sigmoid colon, colovaginal fistula, HTN,peritonitis increased needs for wound healing s/p surgery (12/4/24) see below and underlying medical condition (GERD, DVT, Blood Clots, Hypothyroidism, Pulmonary Embolism)     Nutrition Related Findings:    Pt. Report/Treatments/Miscellaneous: Pt just had exploratory lap procedure 12/13 ( see below). Pt seen,appears lethargic,  3 in 1 TPN infusing 90ml/hr ,mentions poor appetite, just received a clear liquid breakfast tray. Pt declines ONS. She mentions she ate nothing for supper last night. She denies N/V.  GI Status: 150ml ostomy op noted.   Pertinent Labs: (12/16) POC Glucose  120-130, Ca 8, Hemoglobin 10, Mg 1.8,Phos 3.7  Pertinent Meds:  Insulin Lispro    Wound Type:  ((12/4) open sigmoid colectomy partial small bowel resection , wound vac abdomen, (12/16) exploratory laparotomy repair of anastamosis, creation of loop ileostomy)       Current  Interdisciplinary Rounds       Goals:  Goals: Tolerate nutrition support at goal rate, within 2 days  Type of Goal: Continue current goal  Previous Goal Met: Progressing toward Goal(s)    Nutrition Monitoring and Evaluation:      Food/Nutrient Intake Outcomes: Diet Advancement/Tolerance, Parenteral Nutrition Intake/Tolerance, Vitamin/Mineral Intake, IVF Intake  Physical Signs/Symptoms Outcomes: Biochemical Data, GI Status, Fluid Status or Edema, Hemodynamic Status, Nutrition Focused Physical Findings, Skin, Weight    Discharge Planning:    Too soon to determine     Ingrid Diego RD, LD  Contact: [phone]

## 2024-12-17 LAB
ANION GAP SERPL CALC-SCNC: 12 MEQ/L (ref 8–16)
BUN SERPL-MCNC: 16 MG/DL (ref 7–22)
CALCIUM SERPL-MCNC: 8 MG/DL (ref 8.5–10.5)
CHLORIDE SERPL-SCNC: 98 MEQ/L (ref 98–111)
CO2 SERPL-SCNC: 27 MEQ/L (ref 23–33)
CREAT SERPL-MCNC: 0.7 MG/DL (ref 0.4–1.2)
DEPRECATED RDW RBC AUTO: 49.4 FL (ref 35–45)
ERYTHROCYTE [DISTWIDTH] IN BLOOD BY AUTOMATED COUNT: 15.4 % (ref 11.5–14.5)
GFR SERPL CREATININE-BSD FRML MDRD: 90 ML/MIN/1.73M2
GLUCOSE BLD STRIP.AUTO-MCNC: 148 MG/DL (ref 70–108)
GLUCOSE BLD STRIP.AUTO-MCNC: 152 MG/DL (ref 70–108)
GLUCOSE BLD STRIP.AUTO-MCNC: 180 MG/DL (ref 70–108)
GLUCOSE SERPL-MCNC: 152 MG/DL (ref 70–108)
HCT VFR BLD AUTO: 27.9 % (ref 37–47)
HGB BLD-MCNC: 9.3 GM/DL (ref 12–16)
MCH RBC QN AUTO: 29.3 PG (ref 26–33)
MCHC RBC AUTO-ENTMCNC: 33.3 GM/DL (ref 32.2–35.5)
MCV RBC AUTO: 88 FL (ref 81–99)
PHOSPHATE SERPL-MCNC: 3.6 MG/DL (ref 2.4–4.7)
PLATELET # BLD AUTO: 568 THOU/MM3 (ref 130–400)
PMV BLD AUTO: 11.1 FL (ref 9.4–12.4)
POTASSIUM SERPL-SCNC: 3.7 MEQ/L (ref 3.5–5.2)
RBC # BLD AUTO: 3.17 MILL/MM3 (ref 4.2–5.4)
SODIUM SERPL-SCNC: 137 MEQ/L (ref 135–145)
TRIGL SERPL-MCNC: 168 MG/DL (ref 0–199)
WBC # BLD AUTO: 17.7 THOU/MM3 (ref 4.8–10.8)

## 2024-12-17 PROCEDURE — 80048 BASIC METABOLIC PNL TOTAL CA: CPT

## 2024-12-17 PROCEDURE — 36415 COLL VENOUS BLD VENIPUNCTURE: CPT

## 2024-12-17 PROCEDURE — 84100 ASSAY OF PHOSPHORUS: CPT

## 2024-12-17 PROCEDURE — 97116 GAIT TRAINING THERAPY: CPT

## 2024-12-17 PROCEDURE — 2060000000 HC ICU INTERMEDIATE R&B

## 2024-12-17 PROCEDURE — 6360000002 HC RX W HCPCS: Performed by: SURGERY

## 2024-12-17 PROCEDURE — 6360000002 HC RX W HCPCS: Performed by: PHARMACIST

## 2024-12-17 PROCEDURE — 2580000003 HC RX 258: Performed by: PHARMACIST

## 2024-12-17 PROCEDURE — 84478 ASSAY OF TRIGLYCERIDES: CPT

## 2024-12-17 PROCEDURE — 6370000000 HC RX 637 (ALT 250 FOR IP): Performed by: SURGERY

## 2024-12-17 PROCEDURE — 82948 REAGENT STRIP/BLOOD GLUCOSE: CPT

## 2024-12-17 PROCEDURE — 2500000003 HC RX 250 WO HCPCS: Performed by: SURGERY

## 2024-12-17 PROCEDURE — 97530 THERAPEUTIC ACTIVITIES: CPT

## 2024-12-17 PROCEDURE — 2580000003 HC RX 258: Performed by: SURGERY

## 2024-12-17 PROCEDURE — 97110 THERAPEUTIC EXERCISES: CPT

## 2024-12-17 PROCEDURE — 99024 POSTOP FOLLOW-UP VISIT: CPT | Performed by: SURGERY

## 2024-12-17 PROCEDURE — 85027 COMPLETE CBC AUTOMATED: CPT

## 2024-12-17 PROCEDURE — 2500000003 HC RX 250 WO HCPCS: Performed by: PHARMACIST

## 2024-12-17 RX ADMIN — INSULIN LISPRO 2 UNITS: 100 INJECTION, SOLUTION INTRAVENOUS; SUBCUTANEOUS at 06:49

## 2024-12-17 RX ADMIN — SODIUM CHLORIDE, PRESERVATIVE FREE 10 ML: 5 INJECTION INTRAVENOUS at 21:08

## 2024-12-17 RX ADMIN — ONDANSETRON 4 MG: 2 INJECTION INTRAMUSCULAR; INTRAVENOUS at 21:14

## 2024-12-17 RX ADMIN — PANTOPRAZOLE SODIUM 40 MG: 40 INJECTION, POWDER, FOR SOLUTION INTRAVENOUS at 08:01

## 2024-12-17 RX ADMIN — OXYCODONE 5 MG: 5 TABLET ORAL at 09:40

## 2024-12-17 RX ADMIN — MONTELUKAST SODIUM 10 MG: 10 TABLET ORAL at 08:01

## 2024-12-17 RX ADMIN — PIPERACILLIN AND TAZOBACTAM 3375 MG: 3; .375 INJECTION, POWDER, FOR SOLUTION INTRAVENOUS at 10:55

## 2024-12-17 RX ADMIN — LEVOTHYROXINE SODIUM 125 MCG: 125 TABLET ORAL at 06:49

## 2024-12-17 RX ADMIN — SODIUM CHLORIDE, PRESERVATIVE FREE 10 ML: 5 INJECTION INTRAVENOUS at 08:00

## 2024-12-17 RX ADMIN — PIPERACILLIN AND TAZOBACTAM 3375 MG: 3; .375 INJECTION, POWDER, FOR SOLUTION INTRAVENOUS at 04:18

## 2024-12-17 RX ADMIN — PIPERACILLIN AND TAZOBACTAM 3375 MG: 3; .375 INJECTION, POWDER, FOR SOLUTION INTRAVENOUS at 21:40

## 2024-12-17 RX ADMIN — BUSPIRONE HYDROCHLORIDE 10 MG: 10 TABLET ORAL at 21:08

## 2024-12-17 RX ADMIN — HYDROMORPHONE HYDROCHLORIDE 0.5 MG: 1 INJECTION, SOLUTION INTRAMUSCULAR; INTRAVENOUS; SUBCUTANEOUS at 21:08

## 2024-12-17 RX ADMIN — METOPROLOL SUCCINATE 50 MG: 50 TABLET, EXTENDED RELEASE ORAL at 08:01

## 2024-12-17 RX ADMIN — BUSPIRONE HYDROCHLORIDE 10 MG: 10 TABLET ORAL at 08:01

## 2024-12-17 RX ADMIN — LOSARTAN POTASSIUM AND HYDROCHLOROTHIAZIDE 1 TABLET: 25; 100 TABLET ORAL at 08:01

## 2024-12-17 RX ADMIN — CALCIUM GLUCONATE: 98 INJECTION, SOLUTION INTRAVENOUS at 18:00

## 2024-12-17 RX ADMIN — HYDROMORPHONE HYDROCHLORIDE 0.5 MG: 1 INJECTION, SOLUTION INTRAMUSCULAR; INTRAVENOUS; SUBCUTANEOUS at 04:21

## 2024-12-17 RX ADMIN — ENOXAPARIN SODIUM 40 MG: 100 INJECTION SUBCUTANEOUS at 08:01

## 2024-12-17 ASSESSMENT — PAIN DESCRIPTION - LOCATION
LOCATION: ABDOMEN

## 2024-12-17 ASSESSMENT — PAIN DESCRIPTION - PAIN TYPE: TYPE: ACUTE PAIN;SURGICAL PAIN

## 2024-12-17 ASSESSMENT — PAIN SCALES - GENERAL
PAINLEVEL_OUTOF10: 9
PAINLEVEL_OUTOF10: 0
PAINLEVEL_OUTOF10: 0
PAINLEVEL_OUTOF10: 10
PAINLEVEL_OUTOF10: 10
PAINLEVEL_OUTOF10: 0
PAINLEVEL_OUTOF10: 0

## 2024-12-17 ASSESSMENT — PAIN DESCRIPTION - DESCRIPTORS
DESCRIPTORS: SHARP
DESCRIPTORS: ACHING
DESCRIPTORS: ACHING

## 2024-12-17 ASSESSMENT — PAIN DESCRIPTION - ORIENTATION
ORIENTATION: MID

## 2024-12-17 ASSESSMENT — PAIN - FUNCTIONAL ASSESSMENT
PAIN_FUNCTIONAL_ASSESSMENT: ACTIVITIES ARE NOT PREVENTED
PAIN_FUNCTIONAL_ASSESSMENT: ACTIVITIES ARE NOT PREVENTED

## 2024-12-17 ASSESSMENT — PAIN DESCRIPTION - FREQUENCY: FREQUENCY: CONTINUOUS

## 2024-12-17 ASSESSMENT — PAIN DESCRIPTION - ONSET: ONSET: ON-GOING

## 2024-12-17 NOTE — PROGRESS NOTES
Patient PICC line not drawing for this RN, attempted by second RN, Jin and still unsuccessful. Patient changed to lab draw. Phlebotomy notified. They state they will be up in 30-40 minutes to get lab draw, and to hit the \"collect\" tab on my screen for the currently printed labels.

## 2024-12-17 NOTE — PROGRESS NOTES
Pharmacy Consult for TPN/PPN Monitoring & Adjustment  IV Access: central      Dosing weight: 57 kg  Current insulin regimen: medium sliding scale  Diet (excluding TPN): regular diet  Maintenance fluid: none     Plan:  See components of tonight's TPN bag in the table below:  Date 12/15/2024 12/16/2024 12/17/2024   Total kcal/kg 20 25 25   Total kcal 1140 1425 1425   Protein g/kg 1.5 1.5 1.5   Protein g 85.5 85.5 85.5   Protein kcal (4 kcal/g) 342 342 342   Lipid % 30 20 30   Lipid g 34.2 28.5 42.8   Lipid kcal (10 kcal/g) 342 285 427.5   Dextrose g 134 234.7 192.8   Dextrose kcal (3.4 kcal/g) 456 798 655.5   Infusion Rate (mL/hr) 90 90 90   Na Acetate (mEq) 130 130 130   Na Chloride (mEq) 120 150 150   Na Phos (mmol)  (3 mmol = 4 mEq Na) 0 0 0   Total Na (mEq) 250 280 280   K Acetate (mEq) 0 0 0   K Chloride (mEq) 80 80 100   K Phos (mmol)  (15 mmol = 22 mEq K) 15 15 15   Total K (mEq) 102 102 122   Ca Gluconate (mEq)  (1 g = 4.65 mEq) 9.3 9.3 9.3   Mag Sulfate (mEq)  (1 g = 8.12 mEq) 12.18 12.18 12.18   Multivitamin (mL) 10 10 10   Trace Elements (mL) 1 1 1     Summary of changes for tonight's TPN:   Increase lipids to 30%  Increase KCl to 100 mEq    Electrolyte Replacement:   None    Monitoring:  BMP, Mag, iCal, & Phos ordered for tomorrow with AM labs.    Pharmacy will continue to follow daily. Thank you for the consult.  Ioana Ramos, JamesD, BCPS   12/17/2024  10:49 AM

## 2024-12-17 NOTE — PROGRESS NOTES
Comprehensive Nutrition Assessment    Type and Reason for Visit:  Reassess, Nutrition support    Nutrition Recommendations/Plan:   Diet is per surgery. Would consider NPO if burning abdominal pain level 10 continues.Discussed with RN.    When next 3 in 1 TPN bag is made, suggest use  dose wt: 57kgm, 25kcals/kgm, 1.5 grams protein/kgm, 30% kcals from lipids.      Malnutrition Assessment:  Malnutrition Status:  Severe malnutrition (12/12/24 1034)    Context:  Acute Illness     Findings of the 6 clinical characteristics of malnutrition:  Energy Intake:  50% or less of estimated energy requirements for 5 or more days (NPO ( had surgery 12/4))  Weight Loss:  1% to 2% over 1 week     Body Fat Loss:  Mild body fat loss Orbital   Muscle Mass Loss:  Mild muscle mass loss Temples (temporalis)  Fluid Accumulation:  Unable to assess     Strength:  Not Performed    Nutrition Assessment:     Pt declining  from a nutritional standpoint AEB pt reports she consumed a little jello this morning & complaining of level 10 burning abdominal pain. I alerted RN.     At risk for further nutritional compromise r/t admit with stricture of sigmoid colon, colovaginal fistula, HTN,peritonitis increased needs for wound healing s/p surgery (12/4/24) see below and underlying medical condition (GERD, DVT, Blood Clots, Hypothyroidism, Pulmonary Embolism     Nutrition Related Findings:    Pt. Report/Treatments/Miscellaneous: Pt seen, poor appetite continues, mentions ate a little jello for breakfast & complaining of level 10 burning  abdominal pain. I alerted RN. Ioana phamacist mentions she  did not order a Mg today \" It has been stable\". TPN meeting estimated nutritional needs.  Pt has an ileostomy per wound RN notes.   GI Status:  100ml ostomy op (12/16)  Pertinent Labs: (12/17) Glucose 152, POC Glucose 125-180, Phos 3.6, Ca 8, Hemoglobin 9.3, Triglycerides 168, Mg N/A  Pertinent Meds: Insulin Lispro     Wound Type:  ((12/4) open sigmoid

## 2024-12-17 NOTE — PROGRESS NOTES
[facility]  INPATIENT PHYSICAL THERAPY  DAILY NOTE  STRZ ICU STEPDOWN TELEMETRY 4K - 4K-17/017-A      Discharge Recommendations: Continue to assess pending progress and pending pts ability to meet STG's she may need a therapy stay prior to home w/ 24 hour assist and home health   Equipment Recommendations: No                 Time In: 1441  Time Out: 1520  Timed Code Treatment Minutes: 39 Minutes  Minutes: 39          Date: 2024  Patient Name: Marry Ayers,  Gender:  female        MRN: [id number]  : 1950  (74 y.o.)     Referring Practitioner: Casimiro Callahan DO  Diagnosis: Sigmoid stricture (HCC)  Additional Pertinent Hx: Pt is s/p open sigmoid colectomy with a partial SB resection completed by Dr Callahan on . As of  abdomen is more distended than it was at the beginning of the shift. Chart reviewed. Patient had a KUB this AM that was concerning for ileus but she had been passing flatus and tolerating full liquids.      Repeat KUB ordered. Findings similar to this morning's xray but given increased pain, N/V, and distention, will place a NGT to LIWS . Chest xray -Small left-sided pleural effusion with adjacent atelectasis/infiltrate, Mild cardiomegaly.     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

## 2024-12-17 NOTE — PROGRESS NOTES
[facility]  OCCUPATIONAL THERAPY MISSED TREATMENT NOTE  STRZ ICU STEPDOWN TELEMETRY 4K  4K-017-A      Date: 2024  Patient Name: Marry Ayers        CSN: [id number]   : 1950  (74 y.o.)  Gender: female                REASON FOR MISSED TREATMENT: Hold Treatment per Nursing OT orders received, upon entering room RN with patient and patient reporting increased pain in abdomen. Will attempt back next availability.    Rohini Younger, OTR/L [id number]

## 2024-12-17 NOTE — PROGRESS NOTES
[facility] GENERAL SURGERY   General Surgery Daily Progress Note    Pt Name: Marry Ayers  Medical Record Number: [id number]  Date of Birth 1950   Today's Date: 12/17/2024  Chief complaint: Stricture of sigmoid colon   ASSESSMENT   Hospital day # 13   POD# 14 Open Sigmoid Colectomy, Partial Small Bowel Resection   POD # 4 repair of anastomotic leak and loop ileostomy  Pt on coumadin for hx of PE was bridged with Lovenox  WBC decreasing  PALAK cultures prelim seg neutrophils   has a past medical history of Allergic rhinitis, Anemia, Anxiety, Cataract, Essential hypertension, GERD (gastroesophageal reflux disease), Hx of blood clots, Hypokalemia, Hypothyroidism, and Pulmonary embolism (HCC).  PLAN   Analgesics and antiemetics as needed  IV hydration  Ostomy and drain care  Lovenox for DVT prophylaxis  Regular as tolerated  PT/OT  HHC evaluation  SUBJECTIVE   Maryr is doing better. Still weak but has been up to the chair. Denies any nausea or vomting. Pain improved. She is tolerating a PN-Adult  3 IN 1 Central Line (Custom)  ADULT DIET; Regular. + ostomy output.   Scheduled Meds:   enoxaparin  40 mg SubCUTAneous Daily    sodium chloride flush  5-40 mL IntraVENous 2 times per day    piperacillin-tazobactam  3,375 mg IntraVENous Q8H    insulin lispro  0-8 Units SubCUTAneous Q6H    pantoprazole  40 mg IntraVENous Daily    montelukast  10 mg Oral QAM    losartan-hydroCHLOROthiazide  1 tablet Oral Daily    busPIRone  10 mg Oral BID    levothyroxine  125 mcg Oral Daily    metoprolol succinate  50 mg Oral Daily    sodium chloride flush  5-40 mL IntraVENous 2 times per day     Continuous Infusions:   PN-Adult  3 IN 1 Central Line (Custom) 90 mL/hr at 12/16/24 1659    sodium chloride      dextrose      sodium chloride       PRN Meds:.sodium chloride flush, sodium chloride, levalbuterol, acetaminophen, glucose, dextrose bolus **OR** dextrose bolus, glucagon (rDNA), dextrose, magnesium sulfate, sodium

## 2024-12-18 LAB
ANION GAP SERPL CALC-SCNC: 12 MEQ/L (ref 8–16)
BUN SERPL-MCNC: 14 MG/DL (ref 7–22)
CA-I BLD ISE-SCNC: 1.17 MMOL/L (ref 1.12–1.32)
CALCIUM SERPL-MCNC: 8.4 MG/DL (ref 8.5–10.5)
CHLORIDE SERPL-SCNC: 97 MEQ/L (ref 98–111)
CO2 SERPL-SCNC: 28 MEQ/L (ref 23–33)
CREAT SERPL-MCNC: 0.8 MG/DL (ref 0.4–1.2)
DEPRECATED RDW RBC AUTO: 49.3 FL (ref 35–45)
ERYTHROCYTE [DISTWIDTH] IN BLOOD BY AUTOMATED COUNT: 15.7 % (ref 11.5–14.5)
GFR SERPL CREATININE-BSD FRML MDRD: 77 ML/MIN/1.73M2
GLUCOSE BLD STRIP.AUTO-MCNC: 140 MG/DL (ref 70–108)
GLUCOSE BLD STRIP.AUTO-MCNC: 143 MG/DL (ref 70–108)
GLUCOSE BLD STRIP.AUTO-MCNC: 153 MG/DL (ref 70–108)
GLUCOSE BLD STRIP.AUTO-MCNC: 163 MG/DL (ref 70–108)
GLUCOSE BLD STRIP.AUTO-MCNC: 173 MG/DL (ref 70–108)
GLUCOSE SERPL-MCNC: 151 MG/DL (ref 70–108)
HCT VFR BLD AUTO: 27.5 % (ref 37–47)
HGB BLD-MCNC: 9.1 GM/DL (ref 12–16)
MAGNESIUM SERPL-MCNC: 1.8 MG/DL (ref 1.6–2.4)
MCH RBC QN AUTO: 28.9 PG (ref 26–33)
MCHC RBC AUTO-ENTMCNC: 33.1 GM/DL (ref 32.2–35.5)
MCV RBC AUTO: 87.3 FL (ref 81–99)
PHOSPHATE SERPL-MCNC: 3.5 MG/DL (ref 2.4–4.7)
PLATELET # BLD AUTO: 600 THOU/MM3 (ref 130–400)
PMV BLD AUTO: 10.7 FL (ref 9.4–12.4)
POTASSIUM SERPL-SCNC: 3.9 MEQ/L (ref 3.5–5.2)
RBC # BLD AUTO: 3.15 MILL/MM3 (ref 4.2–5.4)
SODIUM SERPL-SCNC: 137 MEQ/L (ref 135–145)
WBC # BLD AUTO: 12.7 THOU/MM3 (ref 4.8–10.8)

## 2024-12-18 PROCEDURE — 80048 BASIC METABOLIC PNL TOTAL CA: CPT

## 2024-12-18 PROCEDURE — 6370000000 HC RX 637 (ALT 250 FOR IP): Performed by: SURGERY

## 2024-12-18 PROCEDURE — 85027 COMPLETE CBC AUTOMATED: CPT

## 2024-12-18 PROCEDURE — 6360000002 HC RX W HCPCS: Performed by: SURGERY

## 2024-12-18 PROCEDURE — 2500000003 HC RX 250 WO HCPCS: Performed by: SURGERY

## 2024-12-18 PROCEDURE — 84100 ASSAY OF PHOSPHORUS: CPT

## 2024-12-18 PROCEDURE — 2060000000 HC ICU INTERMEDIATE R&B

## 2024-12-18 PROCEDURE — 99024 POSTOP FOLLOW-UP VISIT: CPT | Performed by: SURGERY

## 2024-12-18 PROCEDURE — 97110 THERAPEUTIC EXERCISES: CPT

## 2024-12-18 PROCEDURE — 2500000003 HC RX 250 WO HCPCS: Performed by: PHARMACIST

## 2024-12-18 PROCEDURE — 97535 SELF CARE MNGMENT TRAINING: CPT

## 2024-12-18 PROCEDURE — 6360000002 HC RX W HCPCS: Performed by: PHARMACIST

## 2024-12-18 PROCEDURE — 82948 REAGENT STRIP/BLOOD GLUCOSE: CPT

## 2024-12-18 PROCEDURE — 82330 ASSAY OF CALCIUM: CPT

## 2024-12-18 PROCEDURE — 36415 COLL VENOUS BLD VENIPUNCTURE: CPT

## 2024-12-18 PROCEDURE — 2580000003 HC RX 258: Performed by: PHARMACIST

## 2024-12-18 PROCEDURE — 83735 ASSAY OF MAGNESIUM: CPT

## 2024-12-18 PROCEDURE — 97116 GAIT TRAINING THERAPY: CPT

## 2024-12-18 PROCEDURE — 2580000003 HC RX 258: Performed by: SURGERY

## 2024-12-18 PROCEDURE — 97166 OT EVAL MOD COMPLEX 45 MIN: CPT

## 2024-12-18 RX ORDER — MORPHINE SULFATE 2 MG/ML
2 INJECTION, SOLUTION INTRAMUSCULAR; INTRAVENOUS EVERY 4 HOURS PRN
Status: DISCONTINUED | OUTPATIENT
Start: 2024-12-18 | End: 2024-12-28 | Stop reason: HOSPADM

## 2024-12-18 RX ADMIN — HYDROMORPHONE HYDROCHLORIDE 0.5 MG: 1 INJECTION, SOLUTION INTRAMUSCULAR; INTRAVENOUS; SUBCUTANEOUS at 04:16

## 2024-12-18 RX ADMIN — PIPERACILLIN AND TAZOBACTAM 3375 MG: 3; .375 INJECTION, POWDER, FOR SOLUTION INTRAVENOUS at 11:53

## 2024-12-18 RX ADMIN — BUSPIRONE HYDROCHLORIDE 10 MG: 10 TABLET ORAL at 09:55

## 2024-12-18 RX ADMIN — SODIUM CHLORIDE, PRESERVATIVE FREE 10 ML: 5 INJECTION INTRAVENOUS at 09:58

## 2024-12-18 RX ADMIN — PANTOPRAZOLE SODIUM 40 MG: 40 INJECTION, POWDER, FOR SOLUTION INTRAVENOUS at 09:56

## 2024-12-18 RX ADMIN — OXYCODONE 5 MG: 5 TABLET ORAL at 17:54

## 2024-12-18 RX ADMIN — SODIUM CHLORIDE, PRESERVATIVE FREE 10 ML: 5 INJECTION INTRAVENOUS at 20:09

## 2024-12-18 RX ADMIN — LEVOTHYROXINE SODIUM 125 MCG: 125 TABLET ORAL at 09:56

## 2024-12-18 RX ADMIN — ENOXAPARIN SODIUM 40 MG: 100 INJECTION SUBCUTANEOUS at 09:55

## 2024-12-18 RX ADMIN — PIPERACILLIN AND TAZOBACTAM 3375 MG: 3; .375 INJECTION, POWDER, FOR SOLUTION INTRAVENOUS at 04:26

## 2024-12-18 RX ADMIN — BUSPIRONE HYDROCHLORIDE 10 MG: 10 TABLET ORAL at 20:09

## 2024-12-18 RX ADMIN — HYDROMORPHONE HYDROCHLORIDE 0.5 MG: 1 INJECTION, SOLUTION INTRAMUSCULAR; INTRAVENOUS; SUBCUTANEOUS at 09:17

## 2024-12-18 RX ADMIN — PIPERACILLIN AND TAZOBACTAM 3375 MG: 3; .375 INJECTION, POWDER, FOR SOLUTION INTRAVENOUS at 20:14

## 2024-12-18 RX ADMIN — METOPROLOL SUCCINATE 50 MG: 50 TABLET, EXTENDED RELEASE ORAL at 09:57

## 2024-12-18 RX ADMIN — CALCIUM GLUCONATE: 98 INJECTION, SOLUTION INTRAVENOUS at 19:07

## 2024-12-18 RX ADMIN — LOSARTAN POTASSIUM AND HYDROCHLOROTHIAZIDE 1 TABLET: 25; 100 TABLET ORAL at 09:57

## 2024-12-18 RX ADMIN — MORPHINE SULFATE 2 MG: 2 INJECTION, SOLUTION INTRAMUSCULAR; INTRAVENOUS at 15:14

## 2024-12-18 RX ADMIN — MONTELUKAST SODIUM 10 MG: 10 TABLET ORAL at 09:58

## 2024-12-18 RX ADMIN — MORPHINE SULFATE 2 MG: 2 INJECTION, SOLUTION INTRAMUSCULAR; INTRAVENOUS at 20:05

## 2024-12-18 RX ADMIN — ONDANSETRON 4 MG: 2 INJECTION INTRAMUSCULAR; INTRAVENOUS at 15:09

## 2024-12-18 RX ADMIN — SODIUM CHLORIDE, PRESERVATIVE FREE 10 ML: 5 INJECTION INTRAVENOUS at 09:56

## 2024-12-18 ASSESSMENT — PAIN DESCRIPTION - LOCATION
LOCATION: ABDOMEN

## 2024-12-18 ASSESSMENT — PAIN SCALES - GENERAL
PAINLEVEL_OUTOF10: 3
PAINLEVEL_OUTOF10: 10
PAINLEVEL_OUTOF10: 10
PAINLEVEL_OUTOF10: 8
PAINLEVEL_OUTOF10: 10
PAINLEVEL_OUTOF10: 10
PAINLEVEL_OUTOF10: 0
PAINLEVEL_OUTOF10: 8

## 2024-12-18 ASSESSMENT — PAIN DESCRIPTION - DESCRIPTORS
DESCRIPTORS: ACHING;DISCOMFORT
DESCRIPTORS: SHARP
DESCRIPTORS: ACHING;DISCOMFORT

## 2024-12-18 ASSESSMENT — PAIN DESCRIPTION - ONSET
ONSET: ON-GOING

## 2024-12-18 ASSESSMENT — PAIN DESCRIPTION - PAIN TYPE
TYPE: ACUTE PAIN;SURGICAL PAIN
TYPE: ACUTE PAIN;SURGICAL PAIN
TYPE: SURGICAL PAIN

## 2024-12-18 ASSESSMENT — PAIN DESCRIPTION - ORIENTATION
ORIENTATION: MID
ORIENTATION: MID

## 2024-12-18 ASSESSMENT — PAIN DESCRIPTION - FREQUENCY
FREQUENCY: CONTINUOUS
FREQUENCY: CONTINUOUS

## 2024-12-18 NOTE — PLAN OF CARE
Problem: Discharge Planning  Goal: Discharge to home or other facility with appropriate resources  Outcome: Progressing  Flowsheets (Taken 12/18/2024 0800)  Discharge to home or other facility with appropriate resources:   Identify barriers to discharge with patient and caregiver   Arrange for needed discharge resources and transportation as appropriate     Problem: Safety - Adult  Goal: Free from fall injury  Outcome: Progressing     Problem: ABCDS Injury Assessment  Goal: Absence of physical injury  Outcome: Progressing     Problem: Pain  Goal: Verbalizes/displays adequate comfort level or baseline comfort level  Outcome: Progressing  Flowsheets (Taken 12/18/2024 0917)  Verbalizes/displays adequate comfort level or baseline comfort level:   Encourage patient to monitor pain and request assistance   Assess pain using appropriate pain scale   Administer analgesics based on type and severity of pain and evaluate response     Problem: Respiratory - Adult  Goal: Achieves optimal ventilation and oxygenation  Outcome: Progressing  Flowsheets (Taken 12/18/2024 0800)  Achieves optimal ventilation and oxygenation:   Assess for changes in respiratory status   Assess for changes in mentation and behavior   Position to facilitate oxygenation and minimize respiratory effort     Problem: Skin/Tissue Integrity - Adult  Goal: Skin integrity remains intact  Outcome: Progressing  Flowsheets (Taken 12/18/2024 0800)  Skin Integrity Remains Intact:   Monitor for areas of redness and/or skin breakdown   Assess vascular access sites hourly  Goal: Incisions, wounds, or drain sites healing without S/S of infection  Outcome: Progressing  Flowsheets (Taken 12/18/2024 0800)  Incisions, Wounds, or Drain Sites Healing Without Sign and Symptoms of Infection: TWICE DAILY: Assess and document skin integrity     Problem: Gastrointestinal - Adult  Goal: Minimal or absence of nausea and vomiting  Outcome: Progressing  Flowsheets (Taken 12/18/2024  and determine need for appliance change or resting period.  Outcome: Progressing

## 2024-12-18 NOTE — PROGRESS NOTES
lower abdomen/pelvis.   No bowel obstruction is observed. Diffuse total body wall edema suggesting   anasarca is present.      3. Chronic findings are discussed.      **This report has been created using voice recognition software.  It may contain   minor errors which are inherent in voice recognition technology.**      Electronically signed by Dr. Christie Topete      CTA CHEST W WO CONTRAST   Final Result   1. No filling defects are noted within the pulmonary arterial vasculature to   suggest the presence of pulmonary embolus. Multifocal patchy groundglass   opacities, small bilateral pleural effusions, and moderate bilateral lower lobe   consolidation, right greater than left, suggest pneumonia in the appropriate   clinical setting.      2. Postoperative changes with surgical anastomosis is noted in the upper   abdominal small bowel as well as the distal colon. A large amount of   pneumoperitoneum is seen within the abdomen. Ill-defined pockets of gas and   fluid are seen along both paracolic gutters is concerning for an anastomotic   leak. An ill-defined collection of gas and fluid in the left paracolic gutter   measures up to 2.4 x 10.3 cm (series 605, images 29 through 33). Ill-defined   peripherally enhancing interloop ascites is noted in the lower abdomen/pelvis.   No bowel obstruction is observed. Diffuse total body wall edema suggesting   anasarca is present.      3. Chronic findings are discussed.      **This report has been created using voice recognition software.  It may contain   minor errors which are inherent in voice recognition technology.**      Electronically signed by Dr. Christie Topete      XR CHEST PORTABLE   Final Result      Small bilateral pleural effusions.               **This report has been created using voice recognition software. It may contain   minor errors which are inherent in voice recognition technology.**            Electronically signed by Dr. Casimiro Fernandez      XR CHEST  PORTABLE   Final Result   1. Mild hazy opacity at the right lung base may present a small right pleural   effusion with possible mild dependent right basilar atelectasis or pneumonia.   2. Left upper extremity PICC is present with the tip overlying the cavoatrial   junction.            **This report has been created using voice recognition software.  It may contain   minor errors which are inherent in voice recognition technology.**      Electronically signed by Dr. Benito Ureña      XR ABDOMEN (KUB) (SINGLE AP VIEW)   Final Result   1. Persistent gas-filled bowel are seen overlying the abdomen and pelvis. The   overall degree of gaseous distention appears slightly progressed from prior   examination. This is progressed overlying the right mid abdomen. Further   evaluation could be obtained with CT.      **This report has been created using voice recognition software.  It may contain   minor errors which are inherent in voice recognition technology.**      Electronically signed by Dr. Benito Ureña      XR ABDOMEN (2 VIEWS)   Final Result   1. No pneumoperitoneum.   2. Nonobstructive bowel gas pattern.            **This report has been created using voice recognition software. It may contain   minor errors which are inherent in voice recognition technology.**         Electronically signed by Dr. Casimiro Fernandez      XR CHEST PORTABLE   Final Result   1. Small left-sided pleural effusion with adjacent atelectasis/infiltrate.   2. Mild cardiomegaly.               **This report has been created using voice recognition software. It may contain   minor errors which are inherent in voice recognition technology.**            Electronically signed by Dr. Casimiro Fernandez      XR ABDOMEN FOR NG/OG/NE TUBE PLACEMENT   Final Result   Appropriately positioned enteric tube.      This document has been electronically signed by: José Antonio Degroot MD on    12/07/2024 09:51 PM      XR ABDOMEN (KUB) (SINGLE AP VIEW)   Final Result    Dilated small bowel loops consistent with small-bowel obstruction.      This document has been electronically signed by: José Antonio Degroot MD on    12/07/2024 09:40 PM         XR ABDOMEN (KUB) (SINGLE AP VIEW)   Final Result   Diffuse gaseous distention throughout the intestines. This could be   related to an ileus.               **This report has been created using voice recognition software. It may contain   minor errors which are inherent in voice recognition technology.**      Electronically signed by Dr Nura Joyce          Electronically signed by Casimiro Callahan DO on 12/18/2024 at 12:19 PM

## 2024-12-18 NOTE — CARE COORDINATION
12/18/24, 3:31 PM EST    DISCHARGE ON GOING EVALUATION    Marry A [facility] day: 14  Location: [facility]- Reason for admit: Sigmoid stricture (HCC) [K56.699]     Procedures:   12/4 Open sigmoid colectomy  12/9 PICC left basilic  12/13 Exploratory lap, creation of ileostomy    Imaging since last note: none    Barriers to Discharge: Taken back to surgery on 12/13 for exploratory lap. Found to have fecal peritonitis and anastomotic leak. Ileostomy created. Advanced to regular diet yesterday. Primary RN notes drainage that appears like pus from base of midline incision. Surgeon removed a couple of inferior staples today to promote drainage. Continues on TPN.     Sats 95% on 2L O2. Afebrile. NSR. Ox4. PALAK x2. Ostomy w/stoma red/moist, watery green output. PT/OT. Fluid from PALAK +vergy light growth gram positive cocci. Telemetry, PICC, drain care, wound care, ostomy care, external urinary catheter, SCDs. TPN, lovenox, SSI, prn IV morphine, prn zofran, prn roxicodone, IV protonix, IV zosyn Q8H, Electrolyte replacement protocols.     PCP: Silvestre Sawyer MD  Readmission Risk Score: 15.5    Patient Goals/Plan/Treatment Preferences: Home with  and possible new HH. Monitor for additional needs. SW on case.

## 2024-12-18 NOTE — PROGRESS NOTES
[facility]  INPATIENT OCCUPATIONAL THERAPY  STR ICU STEPDOWN TELEMETRY 4K  EVALUATION      Discharge Recommendations: Continue to assess pending progress, Other (Comment) (inpatient therapy stay)  Equipment Recommendations: No continue to monitor      Time In: 1441  Time Out: 1507  Timed Code Treatment Minutes: 16 Minutes  Minutes: 26          Date: 2024  Patient Name: Marry Ayers,   Gender: female      MRN: [id number]  : 1950  (74 y.o.)  Referring Practitioner: Casimiro Callahan DO  Diagnosis: Sigmoid stricture (HCC)  Additional Pertinent Hx: Pt is s/p open sigmoid colectomy with a partial SB resection completed by Dr Callahan on . As of  abdomen is more distended than it was at the beginning of the shift. Chart reviewed. Patient had a KUB this AM that was concerning for ileus but she had been passing flatus and tolerating full liquids.      Repeat KUB ordered. Findings similar to this morning's xray but given increased pain, N/V, and distention, will place a NGT to LIWS . Chest xray -Small left-sided pleural effusion with adjacent atelectasis/infiltrate, Mild cardiomegaly.  Open Sigmoid Colectomy, Partial Small Bowel Resection   12/10 Open sigmoid colectomy and partial small bowel resection.    EXPLORATORY LAPOROTOMY, creation of iliostomy     Exploratory laparotomy, repair of anastomosis, creation of loop ileostomy.    Restrictions/Precautions:  Restrictions/Precautions: General Precautions, Fall Risk  Position Activity Restriction  Other Position/Activity Restrictions: PALAK drains x2, Ileostomy/jejunostomy    Subjective  Chart Reviewed: Yes, Orders, Progress Notes, History and Physical, Imaging, Operative Notes  Patient assessed for rehabilitation services?: Yes  Family / Caregiver Present: No    Subjective: RN approved OT session. patient resting in bed requesting to get back to bed. patient dry heaving throughout session. RN present at end of

## 2024-12-18 NOTE — PROGRESS NOTES
Pharmacy Consult for TPN/PPN Monitoring & Adjustment  IV Access: central      Dosing weight: 57 kg  Current insulin regimen: medium sliding scale  Diet (excluding TPN): regular diet  Maintenance fluid: none     Plan:  See components of tonight's TPN bag in the table below:  Date 12/15/2024 12/16/2024 12/17/2024 12/18/2024   Total kcal/kg 20 25 25 25   Total kcal 1140 1425 1425 1425   Protein g/kg 1.5 1.5 1.5 1.5   Protein g 85.5 85.5 85.5 85.5   Protein kcal (4 kcal/g) 342 342 342 342   Lipid % 30 20 30 30   Lipid g 34.2 28.5 42.8 42.8   Lipid kcal (10 kcal/g) 342 285 427.5 427.5   Dextrose g 134 234.7 192.8 192.8   Dextrose kcal (3.4 kcal/g) 456 798 655.5 655.5   Infusion Rate (mL/hr) 90 90 90 90   Na Acetate (mEq) 130 130 130 130   Na Chloride (mEq) 120 150 150 150   Na Phos (mmol)  (3 mmol = 4 mEq Na) 0 0 0 0   Total Na (mEq) 250 280 280 280   K Acetate (mEq) 0 0 0 0   K Chloride (mEq) 80 80 100 100   K Phos (mmol)  (15 mmol = 22 mEq K) 15 15 15 15   Total K (mEq) 102 102 122 122   Ca Gluconate (mEq)  (1 g = 4.65 mEq) 9.3 9.3 9.3 9.3   Mag Sulfate (mEq)  (1 g = 8.12 mEq) 12.18 12.18 12.18 12.18   Multivitamin (mL) 10 10 10 10   Trace Elements (mL) 1 1 1 1      Summary of changes for tonight's TPN:   none    Electrolyte Replacement:   none    Monitoring:  BMP ordered for tomorrow with AM labs.    Pharmacy will continue to follow daily. Thank you for the consult.  Ioana Ramos, JamesD, BCPS   12/18/2024  12:02 PM

## 2024-12-18 NOTE — PROGRESS NOTES
Comprehensive Nutrition Assessment    Type and Reason for Visit:  Reassess, Nutrition support    Nutrition Recommendations/Plan:   When next 3 in 1 bag TPN bag is made ,  suggest keeping 3 in 1 TPN base components the same using dose wt 57kgm, 25kcals/kgm, 1.5 grams protein/kgm, 30% kcals from lipids . Discussed with pharmacist.   Consider NPO  except ice chips sips of water as tolerated due to abdominal pain level 10 continues per pt. I discussed this with RN.   Suggest weigh pt.       Malnutrition Assessment:  Malnutrition Status:  Severe malnutrition (12/12/24 1034)    Context:  Acute Illness     Findings of the 6 clinical characteristics of malnutrition:  Energy Intake:  50% or less of estimated energy requirements for 5 or more days (NPO ( had surgery 12/4))  Weight Loss:  1% to 2% over 1 week     Body Fat Loss:  Mild body fat loss Orbital   Muscle Mass Loss:  Mild muscle mass loss Temples (temporalis)  Fluid Accumulation:  Unable to assess     Strength:  Not Performed    Nutrition Assessment:      Pt stable  from a nutritional standpoint AEB TPN meeting pt's estimated nutritional needs however pt appears pt unable to tolerate diet.     At risk for further nutritional compromise r/t admit with stricture of sigmoid colon, colovaginal fistula, HTN,peritonitis increased needs for wound healing s/p surgery (12/4/24) see below and underlying medical condition (GERD, DVT, Blood Clots, Hypothyroidism, Pulmonary Embolism     Nutrition Related Findings:    Pt. Report/Treatments/Miscellaneous: Pt seen, poor appetite,  3 in 1 TPN infusing 90ml/hr. Pt did not eat any breakfast. She mentions she consumed \" a little jello & applesauce\" for supper yesterday. Pt complains of level 10 abdominal pain & notice seepage all over her hospital gown. RN examined pt & told me pt's abdomen is \" super distended\". I suggested pt not eat/consider NPO except sips  ice chips/water as tolerated.     GI Status: 100ml ostomy op

## 2024-12-18 NOTE — PROGRESS NOTES
[facility]  INPATIENT PHYSICAL THERAPY  DAILY NOTE  STRZ ICU STEPDOWN TELEMETRY 4K - 4K-17/017-A      Discharge Recommendations: Continue to assess pending progress and pending her ability to meet STG's pt may need a therapy stay, currently only walking short distances and would require 24 hour hands on assist   Equipment Recommendations: No                 Time In: 1330  Time Out: 1355  Timed Code Treatment Minutes: 25 Minutes  Minutes: 25          Date: 2024  Patient Name: Marry Ayers,  Gender:  female        MRN: [id number]  : 1950  (74 y.o.)     Referring Practitioner: Casimiro Callahan DO  Diagnosis: Sigmoid stricture (HCC)  Additional Pertinent Hx: Pt is s/p open sigmoid colectomy with a partial SB resection completed by Dr Callahan on . As of  abdomen is more distended than it was at the beginning of the shift. Chart reviewed. Patient had a KUB this AM that was concerning for ileus but she had been passing flatus and tolerating full liquids.      Repeat KUB ordered. Findings similar to this morning's xray but given increased pain, N/V, and distention, will place a NGT to LIWS . Chest xray -Small left-sided pleural effusion with adjacent atelectasis/infiltrate, Mild cardiomegaly.     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   Activity Tolerance:  Patient tolerance of  treatment:Fair.  Plan: Current Treatment Recommendations: Strengthening, Balance training, Gait training, Stair training, Functional mobility training, Transfer training, Neuromuscular re-education, Endurance training, Equipment evaluation, education, & procurement, Patient/Caregiver education & training, Safety education & training, Therapeutic activities, Home exercise program  General Plan:  (5x GM)    Education:  Learners: Patient  Patient Education: Plan of Care, discussed importance of mobility     Goals:  Patient Goals : return home with   Short Term Goals  Time Frame for Short Term Goals: by discharge  Short Term Goal 1: Pt will demo sit to/from stand transfers with LRAD with S to progress with mobility.  Short Term Goal 2: Pt will demo supine to/from sit transfers with bed flat and SBA to progress with mobility.  Short Term Goal 3: Pt will amb for 30 feet with LRAD with SBA to progress with mobility.  Short Term Goal 4: Pt will demo stair negotiation with rail with CGA to progress with mobility.  Long Term Goals  Time Frame for Long Term Goals : NA due to short ELOS    Following session, patient left in safe position with all fall risk precautions in place.

## 2024-12-19 LAB
ANION GAP SERPL CALC-SCNC: 10 MEQ/L (ref 8–16)
BACTERIA SPEC ANAEROBE CULT: NORMAL
BACTERIA SPEC BFLD CULT: NORMAL
BUN SERPL-MCNC: 14 MG/DL (ref 7–22)
CALCIUM SERPL-MCNC: 8.1 MG/DL (ref 8.5–10.5)
CHLORIDE SERPL-SCNC: 99 MEQ/L (ref 98–111)
CO2 SERPL-SCNC: 29 MEQ/L (ref 23–33)
CREAT SERPL-MCNC: 0.8 MG/DL (ref 0.4–1.2)
DEPRECATED RDW RBC AUTO: 51.4 FL (ref 35–45)
ERYTHROCYTE [DISTWIDTH] IN BLOOD BY AUTOMATED COUNT: 15.8 % (ref 11.5–14.5)
GFR SERPL CREATININE-BSD FRML MDRD: 77 ML/MIN/1.73M2
GLUCOSE BLD STRIP.AUTO-MCNC: 104 MG/DL (ref 70–108)
GLUCOSE BLD STRIP.AUTO-MCNC: 135 MG/DL (ref 70–108)
GLUCOSE BLD STRIP.AUTO-MCNC: 144 MG/DL (ref 70–108)
GLUCOSE BLD STRIP.AUTO-MCNC: 153 MG/DL (ref 70–108)
GLUCOSE BLD STRIP.AUTO-MCNC: 154 MG/DL (ref 70–108)
GLUCOSE SERPL-MCNC: 122 MG/DL (ref 70–108)
GRAM STN SPEC: NORMAL
HCT VFR BLD AUTO: 26.5 % (ref 37–47)
HGB BLD-MCNC: 8.6 GM/DL (ref 12–16)
MCH RBC QN AUTO: 29 PG (ref 26–33)
MCHC RBC AUTO-ENTMCNC: 32.5 GM/DL (ref 32.2–35.5)
MCV RBC AUTO: 89.2 FL (ref 81–99)
PLATELET # BLD AUTO: 664 THOU/MM3 (ref 130–400)
PMV BLD AUTO: 10.8 FL (ref 9.4–12.4)
POTASSIUM SERPL-SCNC: 3.8 MEQ/L (ref 3.5–5.2)
RBC # BLD AUTO: 2.97 MILL/MM3 (ref 4.2–5.4)
SODIUM SERPL-SCNC: 138 MEQ/L (ref 135–145)
WBC # BLD AUTO: 11.2 THOU/MM3 (ref 4.8–10.8)

## 2024-12-19 PROCEDURE — 82948 REAGENT STRIP/BLOOD GLUCOSE: CPT

## 2024-12-19 PROCEDURE — 6360000002 HC RX W HCPCS: Performed by: SURGERY

## 2024-12-19 PROCEDURE — 6370000000 HC RX 637 (ALT 250 FOR IP): Performed by: SURGERY

## 2024-12-19 PROCEDURE — 97530 THERAPEUTIC ACTIVITIES: CPT

## 2024-12-19 PROCEDURE — 85027 COMPLETE CBC AUTOMATED: CPT

## 2024-12-19 PROCEDURE — 2060000000 HC ICU INTERMEDIATE R&B

## 2024-12-19 PROCEDURE — 97116 GAIT TRAINING THERAPY: CPT

## 2024-12-19 PROCEDURE — 2500000003 HC RX 250 WO HCPCS: Performed by: SURGERY

## 2024-12-19 PROCEDURE — 36415 COLL VENOUS BLD VENIPUNCTURE: CPT

## 2024-12-19 PROCEDURE — 99024 POSTOP FOLLOW-UP VISIT: CPT | Performed by: SURGERY

## 2024-12-19 PROCEDURE — 97110 THERAPEUTIC EXERCISES: CPT

## 2024-12-19 PROCEDURE — 2580000003 HC RX 258: Performed by: SURGERY

## 2024-12-19 PROCEDURE — 80048 BASIC METABOLIC PNL TOTAL CA: CPT

## 2024-12-19 RX ADMIN — LOSARTAN POTASSIUM AND HYDROCHLOROTHIAZIDE 1 TABLET: 25; 100 TABLET ORAL at 08:22

## 2024-12-19 RX ADMIN — PIPERACILLIN AND TAZOBACTAM 3375 MG: 3; .375 INJECTION, POWDER, FOR SOLUTION INTRAVENOUS at 19:59

## 2024-12-19 RX ADMIN — MORPHINE SULFATE 2 MG: 2 INJECTION, SOLUTION INTRAMUSCULAR; INTRAVENOUS at 09:59

## 2024-12-19 RX ADMIN — MORPHINE SULFATE 2 MG: 2 INJECTION, SOLUTION INTRAMUSCULAR; INTRAVENOUS at 04:05

## 2024-12-19 RX ADMIN — ENOXAPARIN SODIUM 40 MG: 100 INJECTION SUBCUTANEOUS at 08:27

## 2024-12-19 RX ADMIN — BUSPIRONE HYDROCHLORIDE 10 MG: 10 TABLET ORAL at 08:21

## 2024-12-19 RX ADMIN — SODIUM CHLORIDE, PRESERVATIVE FREE 10 ML: 5 INJECTION INTRAVENOUS at 08:24

## 2024-12-19 RX ADMIN — MONTELUKAST SODIUM 10 MG: 10 TABLET ORAL at 08:23

## 2024-12-19 RX ADMIN — PIPERACILLIN AND TAZOBACTAM 3375 MG: 3; .375 INJECTION, POWDER, FOR SOLUTION INTRAVENOUS at 04:10

## 2024-12-19 RX ADMIN — ONDANSETRON 4 MG: 2 INJECTION INTRAMUSCULAR; INTRAVENOUS at 09:59

## 2024-12-19 RX ADMIN — OXYCODONE 5 MG: 5 TABLET ORAL at 14:23

## 2024-12-19 RX ADMIN — MORPHINE SULFATE 2 MG: 2 INJECTION, SOLUTION INTRAMUSCULAR; INTRAVENOUS at 17:42

## 2024-12-19 RX ADMIN — METOPROLOL SUCCINATE 50 MG: 50 TABLET, EXTENDED RELEASE ORAL at 08:23

## 2024-12-19 RX ADMIN — BUSPIRONE HYDROCHLORIDE 10 MG: 10 TABLET ORAL at 20:00

## 2024-12-19 RX ADMIN — SODIUM CHLORIDE, PRESERVATIVE FREE 10 ML: 5 INJECTION INTRAVENOUS at 19:57

## 2024-12-19 RX ADMIN — PANTOPRAZOLE SODIUM 40 MG: 40 INJECTION, POWDER, FOR SOLUTION INTRAVENOUS at 08:27

## 2024-12-19 RX ADMIN — OXYCODONE 5 MG: 5 TABLET ORAL at 19:57

## 2024-12-19 RX ADMIN — LEVOTHYROXINE SODIUM 125 MCG: 125 TABLET ORAL at 07:08

## 2024-12-19 RX ADMIN — PIPERACILLIN AND TAZOBACTAM 3375 MG: 3; .375 INJECTION, POWDER, FOR SOLUTION INTRAVENOUS at 11:30

## 2024-12-19 RX ADMIN — SODIUM CHLORIDE, PRESERVATIVE FREE 10 ML: 5 INJECTION INTRAVENOUS at 08:20

## 2024-12-19 ASSESSMENT — PAIN SCALES - GENERAL
PAINLEVEL_OUTOF10: 8
PAINLEVEL_OUTOF10: 8
PAINLEVEL_OUTOF10: 10
PAINLEVEL_OUTOF10: 8
PAINLEVEL_OUTOF10: 0
PAINLEVEL_OUTOF10: 10
PAINLEVEL_OUTOF10: 0

## 2024-12-19 ASSESSMENT — PAIN DESCRIPTION - DESCRIPTORS
DESCRIPTORS: ACHING
DESCRIPTORS: ACHING;DISCOMFORT

## 2024-12-19 ASSESSMENT — PAIN DESCRIPTION - PAIN TYPE
TYPE: ACUTE PAIN;SURGICAL PAIN
TYPE: SURGICAL PAIN
TYPE: SURGICAL PAIN

## 2024-12-19 ASSESSMENT — PAIN DESCRIPTION - ORIENTATION
ORIENTATION: MID
ORIENTATION: MID

## 2024-12-19 ASSESSMENT — PAIN DESCRIPTION - LOCATION
LOCATION: ABDOMEN

## 2024-12-19 ASSESSMENT — PAIN DESCRIPTION - FREQUENCY
FREQUENCY: CONTINUOUS

## 2024-12-19 ASSESSMENT — PAIN DESCRIPTION - ONSET
ONSET: ON-GOING

## 2024-12-19 NOTE — PLAN OF CARE
Problem: Discharge Planning  Goal: Discharge to home or other facility with appropriate resources  12/19/2024 1037 by Kathryn Lamas RN  Outcome: Progressing  Flowsheets (Taken 12/19/2024 0800)  Discharge to home or other facility with appropriate resources: Identify barriers to discharge with patient and caregiver  12/19/2024 0043 by Edward Ghosh RN  Outcome: Progressing  Flowsheets (Taken 12/19/2024 0043)  Discharge to home or other facility with appropriate resources:   Identify barriers to discharge with patient and caregiver   Arrange for needed discharge resources and transportation as appropriate   Identify discharge learning needs (meds, wound care, etc)   Refer to discharge planning if patient needs post-hospital services based on physician order or complex needs related to functional status, cognitive ability or social support system     Problem: Safety - Adult  Goal: Free from fall injury  12/19/2024 1037 by Kathryn Lamas RN  Outcome: Progressing  12/19/2024 0043 by Edward Ghosh RN  Outcome: Progressing  Flowsheets (Taken 12/19/2024 0043)  Free From Fall Injury: Instruct family/caregiver on patient safety     Problem: ABCDS Injury Assessment  Goal: Absence of physical injury  12/19/2024 1037 by Kathryn Lamas RN  Outcome: Progressing  12/19/2024 0043 by Edward Ghosh RN  Outcome: Progressing  Flowsheets (Taken 12/19/2024 0043)  Absence of Physical Injury: Implement safety measures based on patient assessment     Problem: Pain  Goal: Verbalizes/displays adequate comfort level or baseline comfort level  12/19/2024 1037 by Kathryn Lamas RN  Outcome: Progressing  Flowsheets (Taken 12/19/2024 0800)  Verbalizes/displays adequate comfort level or baseline comfort level:   Encourage patient to monitor pain and request assistance   Assess pain using appropriate pain scale  12/19/2024 0043 by Edward Ghosh RN  Outcome: Progressing  Flowsheets (Taken 12/19/2024 0043)  Verbalizes/displays adequate  labs, and treatment plans   Recommend appropriate diets, oral nutritional supplements, and vitamin/mineral supplements   Recommend, monitor, and adjust tube feedings and TPN/PPN based on assessed needs  Taken 12/19/2024 0928  Nutrient intake appropriate for improving, restoring, or maintaining nutritional needs:   Assess nutritional status and recommend course of action   Monitor oral intake, labs, and treatment plans   Recommend appropriate diets, oral nutritional supplements, and vitamin/mineral supplements   Recommend, monitor, and adjust tube feedings and TPN/PPN based on assessed needs  12/19/2024 0043 by Edward Ghosh, RN  Outcome: Progressing  Flowsheets (Taken 12/19/2024 0043)  Nutrient intake appropriate for improving, restoring, or maintaining nutritional needs:   Assess nutritional status and recommend course of action   Monitor oral intake, labs, and treatment plans   Recommend appropriate diets, oral nutritional supplements, and vitamin/mineral supplements   Order, calculate, and assess calorie counts as needed   Provide specific nutrition education to patient or family as appropriate     Problem: Skin/Tissue Integrity  Goal: Absence of new skin breakdown  Description: 1.  Monitor for areas of redness and/or skin breakdown  2.  Assess vascular access sites hourly  3.  Every 4-6 hours minimum:  Change oxygen saturation probe site  4.  Every 4-6 hours:  If on nasal continuous positive airway pressure, respiratory therapy assess nares and determine need for appliance change or resting period.  12/19/2024 1037 by Kathryn Lamas, RN  Outcome: Progressing  12/19/2024 0043 by Edward Ghosh, RN  Outcome: Progressing  Note: Patient remains free from any new skin breakdown during this shift.

## 2024-12-19 NOTE — PROGRESS NOTES
[facility]  INPATIENT PHYSICAL THERAPY  DAILY NOTE  STRZ ICU STEPDOWN TELEMETRY 4K - 4K-17/017-A      Discharge Recommendations: Continue to assess pending progress, 24 hour assistance or supervision, and Patient would benefit from continued PT at discharge  Equipment Recommendations: No               Time In: 1345  Time Out: 1424  Timed Code Treatment Minutes: 39 Minutes  Minutes: 39          Date: 2024  Patient Name: Marry Ayers,  Gender:  female        MRN: [id number]  : 1950  (74 y.o.)     Referring Practitioner: Casimiro Callahan DO  Diagnosis: Sigmoid stricture (HCC)  Additional Pertinent Hx: Pt is s/p open sigmoid colectomy with a partial SB resection completed by Dr Callahan on . As of  abdomen is more distended than it was at the beginning of the shift. Chart reviewed. Patient had a KUB this AM that was concerning for ileus but she had been passing flatus and tolerating full liquids.      Repeat KUB ordered. Findings similar to this morning's xray but given increased pain, N/V, and distention, will place a NGT to LIWS . Chest xray -Small left-sided pleural effusion with adjacent atelectasis/infiltrate, Mild cardiomegaly.     Prior Level of Function:  Lives With: Spouse  Type of Home: House  Home Layout: One level  Home Access: Stairs to enter without rails  Entrance Stairs - Number of Steps: 3 GRACIELA  Home Equipment: Walker - Rolling, Rollator, Cane   Bathroom Shower/Tub: Tub/Shower unit  Bathroom Toilet: Standard  Bathroom Equipment: Shower chair    Prior Level of Assist for ADLs: Independent  Prior Level of Assist for Homemaking: Independent  Prior Level of Assist for Transfers: Independent  Active : Yes  Additional Comments: IND prior, used to care for her husbands parents and aunt  Prior Level of Assist for Ambulation: Independent household ambulator, with or without device  Has the patient had two or more falls in the past year or any fall with injury in the  with functional mobility.    Functional Outcome Measures:  [facility] (6 CLICK) BASIC MOBILITY  AM-[facility] Inpatient Mobility Raw Score : 16  AM-[facility] Inpatient T-Scale Score : 40.78        Modified [place] Scale:  Not Applicable    ASSESSMENT:  Assessment: Patient progressing toward established goals. Patient continues to demonstrate deficits in Strength, Endurance, and Gait mechanics and would benefit from continued skilled PT to address these impairments and return to PLOF.   Activity Tolerance:  Patient tolerance of  treatment:Good.  Plan: Current Treatment Recommendations: Strengthening, Balance training, Gait training, Stair training, Functional mobility training, Transfer training, Neuromuscular re-education, Endurance training, Equipment evaluation, education, & procurement, Patient/Caregiver education & training, Safety education & training, Therapeutic activities, Home exercise program  General Plan:  (5x GM)    Education:  Learners: Patient  Patient Education: Plan of Care, Precautions/Restrictions, Education Related to Diagnosis, Bed Mobility, Transfers, Gait, Use of Gait Belt, Up in Chair for All Meals, Verbal Exercise Instruction, Health Promotion and Wellness Education, Pursed Lip Breathing,  - Patient Verbalized Understanding    Goals:  Patient Goals : return home with   Short Term Goals  Time Frame for Short Term Goals: by discharge  Short Term Goal 1: Pt will demo sit to/from stand transfers with LRAD with S to progress with mobility.  Short Term Goal 2: Pt will demo supine to/from sit transfers with bed flat and SBA to progress with mobility.  Short Term Goal 3: Pt will amb for 30 feet with LRAD with SBA to progress with mobility.  Short Term Goal 4: Pt will demo stair negotiation with rail with CGA to progress with mobility.  Long Term Goals  Time Frame for Long Term Goals : NA due to short ELOS    Following session, patient left in safe position with all fall risk precautions in place.

## 2024-12-19 NOTE — PROGRESS NOTES
DO TRICIA Kim DR GENERAL SURGERY   General Surgery Daily Progress Note    Pt Name: Marry Ayers  Medical Record Number: [id number]  Date of Birth 1950   Today's Date: 12/19/2024  Chief complaint: Stricture of sigmoid colon   ASSESSMENT   Hospital day # 15   POD# 16 Open Sigmoid Colectomy, Partial Small Bowel Resection   POD # 6 repair of anastomotic leak and loop ileostomy  Pt on coumadin for hx of PE was bridged with Lovenox  PALAK cultures with proteus and G+ cocci   has a past medical history of Allergic rhinitis, Anemia, Anxiety, Cataract, Essential hypertension, GERD (gastroesophageal reflux disease), Hx of blood clots, Hypokalemia, Hypothyroidism, and Pulmonary embolism (HCC).  PLAN   Analgesics and antiemetics as needed  IV hydration  Ostomy and drain care  Lovenox for DVT prophylaxis  Regular as tolerated  PT/OT  HHC evaluation  Currently using ostomy bag for midline drainage  SUBJECTIVE   Marry is doing similar. Had drainage from the inferior portion of her incision which was opened and having decreasing drainage. Similar color to what was in the PALAK drains previously. Had loop ileostomy most recent operation which has diverted enteric flow, but may still have some liquid stool retained in the colon that needs to be expelled. Some erythema right flank area which may be peritoneal fluid leaking via the ostomy defect.. Still weak but has been up to the chair. Dhaveing nausea after dialudid. Pain similar. She is tolerating a ADULT DIET; Regular  PN-Adult  3 IN 1 Central Line (Custom). + ostomy output.   Scheduled Meds:   enoxaparin  40 mg SubCUTAneous Daily    sodium chloride flush  5-40 mL IntraVENous 2 times per day    piperacillin-tazobactam  3,375 mg IntraVENous Q8H    insulin lispro  0-8 Units SubCUTAneous Q6H    pantoprazole  40 mg IntraVENous Daily    montelukast  10 mg Oral QAM    losartan-hydroCHLOROthiazide  1 tablet Oral Daily    busPIRone  10 mg Oral BID    levothyroxine  125  NG/OG/NE TUBE PLACEMENT   Final Result   Appropriately positioned enteric tube.      This document has been electronically signed by: José Antonio Degroot MD on    12/07/2024 09:51 PM      XR ABDOMEN (KUB) (SINGLE AP VIEW)   Final Result   Dilated small bowel loops consistent with small-bowel obstruction.      This document has been electronically signed by: José Antonio Degroot MD on    12/07/2024 09:40 PM         XR ABDOMEN (KUB) (SINGLE AP VIEW)   Final Result   Diffuse gaseous distention throughout the intestines. This could be   related to an ileus.               **This report has been created using voice recognition software. It may contain   minor errors which are inherent in voice recognition technology.**      Electronically signed by Dr Nura Joyce          Electronically signed by Casimiro Callahan DO on 12/19/2024 at 4:32 PM

## 2024-12-19 NOTE — PROGRESS NOTES
Comprehensive Nutrition Assessment    Type and Reason for Visit:  Reassess    Nutrition Recommendations/Plan:   Per Dr. Callahan, plan is to begin weaning 3-in-1 TPN d/t patient tolerating diet well.  Continue current diet.  Pt declines all ONS during LOS.  Recommend consider a Multivitamin daily.     Malnutrition Assessment:  Malnutrition Status:  Severe malnutrition (12/12/24 1034)    Context:  Acute Illness     Findings of the 6 clinical characteristics of malnutrition:  Energy Intake:  50% or less of estimated energy requirements for 5 or more days (NPO ( had surgery 12/4))  Weight Loss:  1% to 2% over 1 week     Body Fat Loss:  Mild body fat loss Orbital   Muscle Mass Loss:  Mild muscle mass loss Temples (temporalis)  Fluid Accumulation:  Unable to assess   Strength:  Not Performed    Nutrition Assessment:  Pt stable from a nutritional standpoint AEB TPN meeting pt's estimated nutritional needs and patient is tolerating Regular diet well with no nausea, emesis or abdominal pain this morning after breakfast. At risk for further nutritional compromise r/t admit with stricture of sigmoid colon, colovaginal fistula, HTN, peritonitis increased needs for wound healing s/p surgery (12/4/24) see below and underlying medical condition (GERD, DVT, Blood Clots, Hypothyroidism, Pulmonary Embolism     Nutrition Related Findings:    Pt. Report/Treatments/Miscellaneous: Pt seen this morning. Pt very soft spoken. Pt denies any nausea/emesis or abdominal pain at present. Pt ate all of her scrambled eggs and one sausage link for breakfast today and a fruit cup with supper last night. Pt declines all ONS during LOS. Pt states she likes fruit, vegetables and hamburgers with meals. 3-in-1 TPN is infusing at 90 ml/hr this morning. Per Dr. Callahan, plan is to begin weaning 3-in-1 TPN d/t patient tolerating diet well.  GI Status: 200 ml ileostomy output  Pertinent Labs: Sodium 138, Potassium 3.8, BUN 14, Cr. 0.8, POC Glucose 154,

## 2024-12-19 NOTE — PROGRESS NOTES
[facility] Wound Ostomy Continence Nurse  Ostomy Referral Progress Note      NAME:  Marry Ayers  MEDICAL RECORD NUMBER:  [id number]  AGE: 74 y.o.   GENDER:  female  :  1950  TODAY'S DATE:  2024    Subjective   Reason for [facility] Evaluation and Assessment: new ileostomy    Surgeon: Dr Callahan     Date of surgery: 24    Assessment     Encounter: Present to patient room. Patient in bed. Pt noted to have pouch to distal midline incision. Midline incision putting out copious amount of purulent drainage. Changed this pouch. Photo of incision below. Placed a 2 pc red Coloplast pouch with ring to inner edge. Removed ostomy pouch to stoma. Pt noted to have induration and erythema to peristomal area. Erythema and induration extends to right flank and warm to touch. See photo below. Reported findings to Dr. Callahan. Cleansed stoma with warm wash cloth, pat dry. Skin prep applied to varsha-stomal skin. Coloplast 2 piece red flange cut to fit to size, protective ring applied to inner edge of flange, centered over stoma, and applied. Pouch applied. Barrier extenders placed on outer edge of flange. Applied hands lightly over system to activate hydrocolloid. Educated patient step by step throughout pouching system application. Answered questions and concerns. Will plan to change pouching system and educate next week. Highly recommend patient have home health assist with ostomy care after discharge. Ostomy educational information given to patient, with additional pouching systems in room. Will continue to follow. Bed in low, call light in reach.       OSTOMY ASSESSMENT:  24    Ileostomy/Jejunostomy RLQ Loop ileostomy (Active)   Stomal Appliance 2 piece 24 1352   Flange Size (inches) 35 Inches 24 1352   Stoma  Assessment Red;Moist;Protrudes 24 135   Peristomal Assessment Induration;Edema;Blanchable erythema 24 135   Mucocutaneous Junction Intact 24 135   Treatment Site

## 2024-12-19 NOTE — PLAN OF CARE
needed   Provide nonpharmacologic comfort measures as appropriate   Nutrition consult to assist patient with adequate nutrition and appropriate food choices     Problem: Gastrointestinal - Adult  Goal: Maintains or returns to baseline bowel function  12/19/2024 0043 by Edward Ghosh RN  Outcome: Progressing  Flowsheets (Taken 12/19/2024 0043)  Maintains or returns to baseline bowel function:   Assess bowel function   Encourage oral fluids to ensure adequate hydration   Administer ordered medications as needed   Nutrition consult to assist patient with appropriate food choices   Encourage mobilization and activity   Administer IV fluids as ordered to ensure adequate hydration     Problem: Gastrointestinal - Adult  Goal: Maintains adequate nutritional intake  12/19/2024 0043 by Edward Ghosh RN  Outcome: Progressing  Flowsheets (Taken 12/19/2024 0043)  Maintains adequate nutritional intake:   Monitor percentage of each meal consumed   Assist with meals as needed   Obtain nutritional consult as needed   Monitor intake and output, weight and lab values   Identify factors contributing to decreased intake, treat as appropriate     Problem: Genitourinary - Adult  Goal: Absence of urinary retention  12/19/2024 0043 by Edward Ghosh RN  Outcome: Progressing  Flowsheets (Taken 12/19/2024 0043)  Absence of urinary retention:   Assess patient’s ability to void and empty bladder   Monitor intake/output and perform bladder scan as needed   Discuss catheterization for long term situations as appropriate     Problem: Nutrition Deficit:  Goal: Optimize nutritional status  12/19/2024 0043 by Edward Ghosh RN  Outcome: Progressing  Flowsheets (Taken 12/19/2024 0043)  Nutrient intake appropriate for improving, restoring, or maintaining nutritional needs:   Assess nutritional status and recommend course of action   Monitor oral intake, labs, and treatment plans   Recommend appropriate diets, oral nutritional supplements,  and vitamin/mineral supplements   Order, calculate, and assess calorie counts as needed   Provide specific nutrition education to patient or family as appropriate     Problem: Skin/Tissue Integrity  Goal: Absence of new skin breakdown  Description: 1.  Monitor for areas of redness and/or skin breakdown  2.  Assess vascular access sites hourly  3.  Every 4-6 hours minimum:  Change oxygen saturation probe site  4.  Every 4-6 hours:  If on nasal continuous positive airway pressure, respiratory therapy assess nares and determine need for appliance change or resting period.  12/19/2024 0043 by Edward Ghosh RN  Outcome: Progressing  Note: Patient remains free from any new skin breakdown during this shift.     Care plan reviewed with patient.  Patient verbalizes understanding of the plan of care and contributes to goal setting.

## 2024-12-19 NOTE — PROGRESS NOTES
[facility]  OCCUPATIONAL THERAPY MISSED TREATMENT NOTE  STRZ ICU STEPDOWN TELEMETRY 4K  4K-17/017-A      Date: 2024  Patient Name: Marry Ayers        CSN: [id number]   : 1950  (74 y.o.)  Gender: female   Referring Practitioner: Casimiro Callahan DO            REASON FOR MISSED TREATMENT: Patient Refused.  ; first attempt to tx patient at 0828 with patient seated up in bed working on breakfast and declined tos it up in recliner to finish meal. Second attempt to tx patient at 1055 with patient supine in bed upon OT arrival and spouse present. Patient had just finished getting bed bath with techs. Patient refusing OOB activity due to abdominal pain with RN stating she already provided pain meds. Patient edu in importance of increasing activity to safely transition home with patient stating, \"I know my limitations, I've been thru this before\". Patient's spouse spoke with this writer outside patient's room stating patient is very sedentary at home. OT to check back as able and time allows.

## 2024-12-20 LAB
ANION GAP SERPL CALC-SCNC: 10 MEQ/L (ref 8–16)
BUN SERPL-MCNC: 13 MG/DL (ref 7–22)
CALCIUM SERPL-MCNC: 8.2 MG/DL (ref 8.5–10.5)
CHLORIDE SERPL-SCNC: 97 MEQ/L (ref 98–111)
CO2 SERPL-SCNC: 28 MEQ/L (ref 23–33)
CREAT SERPL-MCNC: 0.9 MG/DL (ref 0.4–1.2)
DEPRECATED RDW RBC AUTO: 50.6 FL (ref 35–45)
ERYTHROCYTE [DISTWIDTH] IN BLOOD BY AUTOMATED COUNT: 15.6 % (ref 11.5–14.5)
GFR SERPL CREATININE-BSD FRML MDRD: 67 ML/MIN/1.73M2
GLUCOSE BLD STRIP.AUTO-MCNC: 101 MG/DL (ref 70–108)
GLUCOSE BLD STRIP.AUTO-MCNC: 101 MG/DL (ref 70–108)
GLUCOSE BLD STRIP.AUTO-MCNC: 115 MG/DL (ref 70–108)
GLUCOSE BLD STRIP.AUTO-MCNC: 115 MG/DL (ref 70–108)
GLUCOSE BLD STRIP.AUTO-MCNC: 98 MG/DL (ref 70–108)
GLUCOSE SERPL-MCNC: 97 MG/DL (ref 70–108)
HCT VFR BLD AUTO: 26.6 % (ref 37–47)
HGB BLD-MCNC: 8.4 GM/DL (ref 12–16)
MCH RBC QN AUTO: 28.3 PG (ref 26–33)
MCHC RBC AUTO-ENTMCNC: 31.6 GM/DL (ref 32.2–35.5)
MCV RBC AUTO: 89.6 FL (ref 81–99)
PLATELET # BLD AUTO: 696 THOU/MM3 (ref 130–400)
PMV BLD AUTO: 10.5 FL (ref 9.4–12.4)
POTASSIUM SERPL-SCNC: 4.1 MEQ/L (ref 3.5–5.2)
RBC # BLD AUTO: 2.97 MILL/MM3 (ref 4.2–5.4)
SODIUM SERPL-SCNC: 135 MEQ/L (ref 135–145)
WBC # BLD AUTO: 12.6 THOU/MM3 (ref 4.8–10.8)

## 2024-12-20 PROCEDURE — 99024 POSTOP FOLLOW-UP VISIT: CPT | Performed by: SURGERY

## 2024-12-20 PROCEDURE — 6360000002 HC RX W HCPCS: Performed by: SURGERY

## 2024-12-20 PROCEDURE — 36415 COLL VENOUS BLD VENIPUNCTURE: CPT

## 2024-12-20 PROCEDURE — 6370000000 HC RX 637 (ALT 250 FOR IP): Performed by: SURGERY

## 2024-12-20 PROCEDURE — 80048 BASIC METABOLIC PNL TOTAL CA: CPT

## 2024-12-20 PROCEDURE — 2060000000 HC ICU INTERMEDIATE R&B

## 2024-12-20 PROCEDURE — 85027 COMPLETE CBC AUTOMATED: CPT

## 2024-12-20 PROCEDURE — 2500000003 HC RX 250 WO HCPCS: Performed by: SURGERY

## 2024-12-20 PROCEDURE — 97530 THERAPEUTIC ACTIVITIES: CPT

## 2024-12-20 PROCEDURE — 2580000003 HC RX 258: Performed by: SURGERY

## 2024-12-20 PROCEDURE — 82948 REAGENT STRIP/BLOOD GLUCOSE: CPT

## 2024-12-20 RX ADMIN — PIPERACILLIN AND TAZOBACTAM 3375 MG: 3; .375 INJECTION, POWDER, FOR SOLUTION INTRAVENOUS at 14:18

## 2024-12-20 RX ADMIN — BUSPIRONE HYDROCHLORIDE 10 MG: 10 TABLET ORAL at 09:07

## 2024-12-20 RX ADMIN — ENOXAPARIN SODIUM 40 MG: 100 INJECTION SUBCUTANEOUS at 09:10

## 2024-12-20 RX ADMIN — METOPROLOL SUCCINATE 50 MG: 50 TABLET, EXTENDED RELEASE ORAL at 09:07

## 2024-12-20 RX ADMIN — OXYCODONE 5 MG: 5 TABLET ORAL at 18:41

## 2024-12-20 RX ADMIN — LEVOTHYROXINE SODIUM 125 MCG: 125 TABLET ORAL at 06:23

## 2024-12-20 RX ADMIN — LOSARTAN POTASSIUM AND HYDROCHLOROTHIAZIDE 1 TABLET: 25; 100 TABLET ORAL at 09:07

## 2024-12-20 RX ADMIN — OXYCODONE 5 MG: 5 TABLET ORAL at 14:28

## 2024-12-20 RX ADMIN — OXYCODONE 5 MG: 5 TABLET ORAL at 03:49

## 2024-12-20 RX ADMIN — MONTELUKAST SODIUM 10 MG: 10 TABLET ORAL at 09:07

## 2024-12-20 RX ADMIN — SODIUM CHLORIDE, PRESERVATIVE FREE 10 ML: 5 INJECTION INTRAVENOUS at 09:07

## 2024-12-20 RX ADMIN — PIPERACILLIN AND TAZOBACTAM 3375 MG: 3; .375 INJECTION, POWDER, FOR SOLUTION INTRAVENOUS at 04:53

## 2024-12-20 RX ADMIN — PANTOPRAZOLE SODIUM 40 MG: 40 INJECTION, POWDER, FOR SOLUTION INTRAVENOUS at 09:10

## 2024-12-20 RX ADMIN — BUSPIRONE HYDROCHLORIDE 10 MG: 10 TABLET ORAL at 20:11

## 2024-12-20 RX ADMIN — SODIUM CHLORIDE, PRESERVATIVE FREE 10 ML: 5 INJECTION INTRAVENOUS at 20:11

## 2024-12-20 RX ADMIN — PIPERACILLIN AND TAZOBACTAM 3375 MG: 3; .375 INJECTION, POWDER, FOR SOLUTION INTRAVENOUS at 20:13

## 2024-12-20 RX ADMIN — OXYCODONE 5 MG: 5 TABLET ORAL at 09:10

## 2024-12-20 ASSESSMENT — PAIN DESCRIPTION - LOCATION
LOCATION: ABDOMEN

## 2024-12-20 ASSESSMENT — PAIN SCALES - GENERAL
PAINLEVEL_OUTOF10: 10
PAINLEVEL_OUTOF10: 5
PAINLEVEL_OUTOF10: 0
PAINLEVEL_OUTOF10: 6
PAINLEVEL_OUTOF10: 6
PAINLEVEL_OUTOF10: 0

## 2024-12-20 ASSESSMENT — PAIN DESCRIPTION - ONSET: ONSET: ON-GOING

## 2024-12-20 ASSESSMENT — PAIN DESCRIPTION - DESCRIPTORS
DESCRIPTORS: ACHING;DISCOMFORT
DESCRIPTORS: ACHING

## 2024-12-20 ASSESSMENT — PAIN DESCRIPTION - PAIN TYPE: TYPE: SURGICAL PAIN

## 2024-12-20 ASSESSMENT — PAIN DESCRIPTION - FREQUENCY: FREQUENCY: CONTINUOUS

## 2024-12-20 ASSESSMENT — PAIN DESCRIPTION - ORIENTATION
ORIENTATION: MID
ORIENTATION: LOWER

## 2024-12-20 ASSESSMENT — PAIN - FUNCTIONAL ASSESSMENT: PAIN_FUNCTIONAL_ASSESSMENT: ACTIVITIES ARE NOT PREVENTED

## 2024-12-20 NOTE — CARE COORDINATION
12/20/24, 9:25 AM EST  DISCHARGE PLANNING EVALUATION    SW spoke with patient about discharge planning and HH. She reported that she wants SW to call her spouse and discuss HH with him.     SW called spouse Naeem and left a voicemail asking for a call back.       12/20/24, 11:03 AM EST  DISCHARGE PLANNING EVALUATION    SW received a call from spouse Naeem and he reported that he would like referral to [facility] .     SW called and made referral to Patricia at [facility] . ROBIN faxed clinical information.      12/20/24, 1:59 PM EST  DISCHARGE PLANNING EVALUATION    SW received a call from Billie at [facility]  and they can accept patient.     12/20/24, 1:59 PM EST    Patient goals/plan/ treatment preferences discussed by  and .  Patient goals/plan/ treatment preferences reviewed with patient/ family.  Patient/ family verbalize understanding of discharge plan and are in agreement with goal/plan/treatment preferences.  Understanding was demonstrated using the teach back method.  AVS provided by RN at time of discharge, which includes all necessary medical information pertaining to the patients current course of illness, treatment, post-discharge goals of care, and treatment preferences.     Services At/After Discharge: Home Health [facility]    Patient may discharge over the weekend home with spouse and new [facility].     SW notified [facility] of possible discharge.     RN made aware and made aware they need to call [facility] when patient discharges.

## 2024-12-20 NOTE — PROGRESS NOTES
[facility]  INPATIENT PHYSICAL THERAPY  DAILY NOTE  [facility] ICU STEPDOWN TELEMETRY 4K - 4K-17/017-A      Discharge Recommendations: 24 hour assistance or supervision and Inpatient Therapy Stay  Equipment Recommendations: No               Time In: 1121  Time Out: 1150  Timed Code Treatment Minutes: 29 Minutes  Minutes: 29          Date: 2024  Patient Name: Marry Ayers,  Gender:  female        MRN: [id number]  : 1950  (74 y.o.)     Referring Practitioner: Casimiro Callahan DO  Diagnosis: Sigmoid stricture (HCC)  Additional Pertinent Hx: Pt is s/p open sigmoid colectomy with a partial SB resection completed by Dr Callahan on . As of  abdomen is more distended than it was at the beginning of the shift. Chart reviewed. Patient had a KUB this AM that was concerning for ileus but she had been passing flatus and tolerating full liquids.      Repeat KUB ordered. Findings similar to this morning's xray but given increased pain, N/V, and distention, will place a NGT to LIWS . Chest xray -Small left-sided pleural effusion with adjacent atelectasis/infiltrate, Mild cardiomegaly.     Prior Level of Function:  Lives With: Spouse  Type of Home: House  Home Layout: One level  Home Access: Stairs to enter without rails  Entrance Stairs - Number of Steps: 3 GRACIELA  Home Equipment: Walker - Rolling, Rollator, Cane   Bathroom Shower/Tub: Tub/Shower unit  Bathroom Toilet: Standard  Bathroom Equipment: Shower chair    Prior Level of Assist for ADLs: Independent  Prior Level of Assist for Homemaking: Independent  Prior Level of Assist for Transfers: Independent  Active : Yes  Additional Comments: IND prior, used to care for her husbands parents and aunt  Prior Level of Assist for Ambulation: Independent household ambulator, with or without device  Has the patient had two or more falls in the past year or any fall with injury in the past year?:  education & training, Therapeutic activities, Home exercise program  General Plan:  (5x GM)    Education:  Learners: Patient  Patient Education: Plan of Care, Bed Mobility, Transfers, Gait    Goals:  Patient Goals : return home with   Short Term Goals  Time Frame for Short Term Goals: by discharge  Short Term Goal 1: Pt will demo sit to/from stand transfers with LRAD with S to progress with mobility.  Short Term Goal 2: Pt will demo supine to/from sit transfers with bed flat and SBA to progress with mobility.  Short Term Goal 3: Pt will amb for 30 feet with LRAD with SBA to progress with mobility.  Short Term Goal 4: Pt will demo stair negotiation with rail with CGA to progress with mobility.  Long Term Goals  Time Frame for Long Term Goals : NA due to short ELOS    Following session, patient left in safe position with all fall risk precautions in place.

## 2024-12-20 NOTE — PROGRESS NOTES
DO TRICIA Kim DR GENERAL SURGERY   General Surgery Daily Progress Note    Pt Name: Marry Ayers  Medical Record Number: [id number]  Date of Birth 1950   Today's Date: 12/20/2024  Chief complaint: Stricture of sigmoid colon   ASSESSMENT   Hospital day # 16   POD# 17 Open Sigmoid Colectomy, Partial Small Bowel Resection   POD # 7 repair of anastomotic leak and loop ileostomy  Prophylactic dosing of lovenox. Will need to be advanced to therapeutic dose due to hx of PE versus restarting coumadin  PALAK cultures with proteus and G+ cocci   has a past medical history of Allergic rhinitis, Anemia, Anxiety, Cataract, Essential hypertension, GERD (gastroesophageal reflux disease), Hx of blood clots, Hypokalemia, Hypothyroidism, and Pulmonary embolism (HCC).  PLAN   Analgesics and antiemetics as needed  IV hydration  Ostomy and drain care  Remove LLQ PALAK drain  Lovenox for DVT prophylaxis  Regular as tolerated  PT/OT  HHC evaluation  Currently using ostomy bag for midline drainage but has markedly decreased. Consider removal next 48 hours and go back to dressing changes  SUBJECTIVE   Marry is doing similar. Had drainage from the inferior portion of her incision which was opened and having decreasing drainage. Similar color to what was in the PALAK drains previously. LLQ drain has scant output over the last 24 hours. Had loop ileostomy most recent operation which has diverted enteric flow, but may still have some liquid stool retained in the colon that needs to be expelled. Some erythema right flank area which may be peritoneal fluid leaking via the ostomy defect. Still weak but has been up to the chair.  Pain similar. She is tolerating a ADULT DIET; Regular. + ostomy output.   Scheduled Meds:   enoxaparin  40 mg SubCUTAneous Daily    sodium chloride flush  5-40 mL IntraVENous 2 times per day    piperacillin-tazobactam  3,375 mg IntraVENous Q8H    insulin lispro  0-8 Units SubCUTAneous Q6H    pantoprazole

## 2024-12-20 NOTE — CARE COORDINATION
12/20/24, 2:52 PM EST    DISCHARGE ON GOING EVALUATION    Marry VELASQUEZ [facility] day: 16  Location: [facility]17/017-A Reason for admit: Sigmoid stricture (HCC) [K56.699]     Procedures:   12/4 Colectomy/Partial SBR  12/9 PICC  12/13 Ex Lap, Ileostomy    Barriers to Discharge:   Sigmoid Stricture/Feculent Peritonitis/Anastomotic Leak  PMH: DVT, Pulmonary Emboli, Active Smoker  Elevated WBC, H 8.4; monitor  Ileostomy Output = 345 ml/24h; monitor  Oxygen 1L  IV Zosyn  IV PPI  Ostomy Bag over Abdominal Wound  PCP: Silvestre Sawyer MD  Readmission Risk Score: 15    Patient Goals/Plan/Treatment Preferences: plans home w spouse Antwon, alie [place]  (nsg, therapy, ileostomy teaching), ostomy clinic; therapy following

## 2024-12-21 LAB
DEPRECATED RDW RBC AUTO: 54.1 FL (ref 35–45)
ERYTHROCYTE [DISTWIDTH] IN BLOOD BY AUTOMATED COUNT: 15.7 % (ref 11.5–14.5)
GLUCOSE BLD STRIP.AUTO-MCNC: 130 MG/DL (ref 70–108)
GLUCOSE BLD STRIP.AUTO-MCNC: 144 MG/DL (ref 70–108)
GLUCOSE BLD STRIP.AUTO-MCNC: 91 MG/DL (ref 70–108)
GLUCOSE BLD STRIP.AUTO-MCNC: 96 MG/DL (ref 70–108)
HCT VFR BLD AUTO: 27.8 % (ref 37–47)
HGB BLD-MCNC: 8.5 GM/DL (ref 12–16)
MCH RBC QN AUTO: 29.1 PG (ref 26–33)
MCHC RBC AUTO-ENTMCNC: 30.6 GM/DL (ref 32.2–35.5)
MCV RBC AUTO: 95.2 FL (ref 81–99)
PLATELET # BLD AUTO: 720 THOU/MM3 (ref 130–400)
PMV BLD AUTO: 10.3 FL (ref 9.4–12.4)
RBC # BLD AUTO: 2.92 MILL/MM3 (ref 4.2–5.4)
WBC # BLD AUTO: 12.4 THOU/MM3 (ref 4.8–10.8)

## 2024-12-21 PROCEDURE — 36415 COLL VENOUS BLD VENIPUNCTURE: CPT

## 2024-12-21 PROCEDURE — 2580000003 HC RX 258: Performed by: SURGERY

## 2024-12-21 PROCEDURE — 6370000000 HC RX 637 (ALT 250 FOR IP): Performed by: SURGERY

## 2024-12-21 PROCEDURE — 2500000003 HC RX 250 WO HCPCS: Performed by: SURGERY

## 2024-12-21 PROCEDURE — 2060000000 HC ICU INTERMEDIATE R&B

## 2024-12-21 PROCEDURE — 6360000002 HC RX W HCPCS: Performed by: SURGERY

## 2024-12-21 PROCEDURE — 85027 COMPLETE CBC AUTOMATED: CPT

## 2024-12-21 PROCEDURE — 99024 POSTOP FOLLOW-UP VISIT: CPT | Performed by: SURGERY

## 2024-12-21 PROCEDURE — 82948 REAGENT STRIP/BLOOD GLUCOSE: CPT

## 2024-12-21 RX ADMIN — OXYCODONE 5 MG: 5 TABLET ORAL at 12:17

## 2024-12-21 RX ADMIN — SODIUM CHLORIDE, PRESERVATIVE FREE 10 ML: 5 INJECTION INTRAVENOUS at 19:59

## 2024-12-21 RX ADMIN — OXYCODONE 5 MG: 5 TABLET ORAL at 03:06

## 2024-12-21 RX ADMIN — BUSPIRONE HYDROCHLORIDE 10 MG: 10 TABLET ORAL at 08:36

## 2024-12-21 RX ADMIN — PANTOPRAZOLE SODIUM 40 MG: 40 INJECTION, POWDER, FOR SOLUTION INTRAVENOUS at 08:35

## 2024-12-21 RX ADMIN — OXYCODONE 5 MG: 5 TABLET ORAL at 17:52

## 2024-12-21 RX ADMIN — PIPERACILLIN AND TAZOBACTAM 3375 MG: 3; .375 INJECTION, POWDER, FOR SOLUTION INTRAVENOUS at 20:04

## 2024-12-21 RX ADMIN — PIPERACILLIN AND TAZOBACTAM 3375 MG: 3; .375 INJECTION, POWDER, FOR SOLUTION INTRAVENOUS at 12:23

## 2024-12-21 RX ADMIN — BUSPIRONE HYDROCHLORIDE 10 MG: 10 TABLET ORAL at 19:59

## 2024-12-21 RX ADMIN — MONTELUKAST SODIUM 10 MG: 10 TABLET ORAL at 08:37

## 2024-12-21 RX ADMIN — PIPERACILLIN AND TAZOBACTAM 3375 MG: 3; .375 INJECTION, POWDER, FOR SOLUTION INTRAVENOUS at 04:43

## 2024-12-21 RX ADMIN — LOSARTAN POTASSIUM AND HYDROCHLOROTHIAZIDE 1 TABLET: 25; 100 TABLET ORAL at 08:37

## 2024-12-21 RX ADMIN — SODIUM CHLORIDE, PRESERVATIVE FREE 10 ML: 5 INJECTION INTRAVENOUS at 08:38

## 2024-12-21 RX ADMIN — ENOXAPARIN SODIUM 40 MG: 100 INJECTION SUBCUTANEOUS at 08:35

## 2024-12-21 RX ADMIN — LEVOTHYROXINE SODIUM 125 MCG: 125 TABLET ORAL at 07:29

## 2024-12-21 RX ADMIN — METOPROLOL SUCCINATE 50 MG: 50 TABLET, EXTENDED RELEASE ORAL at 08:36

## 2024-12-21 ASSESSMENT — PAIN - FUNCTIONAL ASSESSMENT
PAIN_FUNCTIONAL_ASSESSMENT: ACTIVITIES ARE NOT PREVENTED

## 2024-12-21 ASSESSMENT — PAIN DESCRIPTION - FREQUENCY: FREQUENCY: CONTINUOUS

## 2024-12-21 ASSESSMENT — PAIN DESCRIPTION - DESCRIPTORS
DESCRIPTORS: ACHING;DISCOMFORT
DESCRIPTORS: ACHING;DISCOMFORT

## 2024-12-21 ASSESSMENT — PAIN DESCRIPTION - PAIN TYPE: TYPE: SURGICAL PAIN

## 2024-12-21 ASSESSMENT — PAIN DESCRIPTION - LOCATION
LOCATION: ABDOMEN

## 2024-12-21 ASSESSMENT — PAIN DESCRIPTION - ORIENTATION
ORIENTATION: LOWER
ORIENTATION: MID;RIGHT;LEFT
ORIENTATION: RIGHT;LEFT;MID

## 2024-12-21 ASSESSMENT — PAIN SCALES - WONG BAKER: WONGBAKER_NUMERICALRESPONSE: HURTS A LITTLE BIT

## 2024-12-21 ASSESSMENT — PAIN DESCRIPTION - ONSET: ONSET: ON-GOING

## 2024-12-21 NOTE — PROGRESS NOTES
MD TRICIA Vicente DR GENERAL SURGERY   General Surgery Daily Progress Note    Pt Name: Marry Ayers  Medical Record Number: [id number]  Date of Birth 1950   Today's Date: 12/21/2024  Chief complaint: Stricture of sigmoid colon   ASSESSMENT   Hospital day # 17   POD# 18 Open Sigmoid Colectomy, Partial Small Bowel Resection   POD # 8 repair of anastomotic leak and loop ileostomy  Prophylactic dosing of lovenox. Will need to be advanced to therapeutic dose due to hx of PE versus restarting coumadin  PALAK cultures with proteus and G+ cocci   has a past medical history of Allergic rhinitis, Anemia, Anxiety, Cataract, Essential hypertension, GERD (gastroesophageal reflux disease), Hx of blood clots, Hypokalemia, Hypothyroidism, and Pulmonary embolism (HCC).  PLAN   Analgesics and antiemetics as needed  IV hydration  Ostomy and drain care  Remove LLQ PALAK drain  Lovenox for DVT prophylaxis  Regular as tolerated  PT/OT  Home health at discharge.  From abdominal standpoint patient is ready for discharge home with home health however still on supplemental oxygen will continue to try to wean today.  SUBJECTIVE   Marry is doing similar.  Drainage decreased.  Ostomy is functioning and her right lower quadrant.  Incision other than the lower portion is clean dry and intact.  Patient hopeful to go home before Tracy.  Scheduled Meds:   enoxaparin  40 mg SubCUTAneous Daily    sodium chloride flush  5-40 mL IntraVENous 2 times per day    piperacillin-tazobactam  3,375 mg IntraVENous Q8H    insulin lispro  0-8 Units SubCUTAneous Q6H    pantoprazole  40 mg IntraVENous Daily    montelukast  10 mg Oral QAM    losartan-hydroCHLOROthiazide  1 tablet Oral Daily    busPIRone  10 mg Oral BID    levothyroxine  125 mcg Oral Daily    metoprolol succinate  50 mg Oral Daily    sodium chloride flush  5-40 mL IntraVENous 2 times per day     Continuous Infusions:   sodium chloride      dextrose      sodium chloride       PRN  which are inherent in voice recognition technology.**      Electronically signed by Dr. Benito Urñea      XR ABDOMEN (KUB) (SINGLE AP VIEW)   Final Result   1. Persistent gas-filled bowel are seen overlying the abdomen and pelvis. The   overall degree of gaseous distention appears slightly progressed from prior   examination. This is progressed overlying the right mid abdomen. Further   evaluation could be obtained with CT.      **This report has been created using voice recognition software.  It may contain   minor errors which are inherent in voice recognition technology.**      Electronically signed by Dr. Benito Ureña      XR ABDOMEN (2 VIEWS)   Final Result   1. No pneumoperitoneum.   2. Nonobstructive bowel gas pattern.            **This report has been created using voice recognition software. It may contain   minor errors which are inherent in voice recognition technology.**         Electronically signed by Dr. Casimiro Fernandez      XR CHEST PORTABLE   Final Result   1. Small left-sided pleural effusion with adjacent atelectasis/infiltrate.   2. Mild cardiomegaly.               **This report has been created using voice recognition software. It may contain   minor errors which are inherent in voice recognition technology.**            Electronically signed by Dr. Casimiro Fernandez      XR ABDOMEN FOR NG/OG/NE TUBE PLACEMENT   Final Result   Appropriately positioned enteric tube.      This document has been electronically signed by: José Antonio Degroot MD on    12/07/2024 09:51 PM      XR ABDOMEN (KUB) (SINGLE AP VIEW)   Final Result   Dilated small bowel loops consistent with small-bowel obstruction.      This document has been electronically signed by: José Antonio Degroot MD on    12/07/2024 09:40 PM         XR ABDOMEN (KUB) (SINGLE AP VIEW)   Final Result   Diffuse gaseous distention throughout the intestines. This could be   related to an ileus.               **This report has been created using voice recognition  software. It may contain   minor errors which are inherent in voice recognition technology.**      Electronically signed by Dr Nura Joyce          Electronically signed by Gabrielle Brown MD on 12/21/2024 at 9:21 AM

## 2024-12-22 ENCOUNTER — APPOINTMENT (OUTPATIENT)
Dept: GENERAL RADIOLOGY | Age: 74
End: 2024-12-22
Attending: SURGERY
Payer: MEDICARE

## 2024-12-22 LAB
ANION GAP SERPL CALC-SCNC: 14 MEQ/L (ref 8–16)
BUN SERPL-MCNC: 9 MG/DL (ref 7–22)
CA-I BLD ISE-SCNC: 1.12 MMOL/L (ref 1.12–1.32)
CALCIUM SERPL-MCNC: 8.6 MG/DL (ref 8.5–10.5)
CHLORIDE SERPL-SCNC: 96 MEQ/L (ref 98–111)
CO2 SERPL-SCNC: 27 MEQ/L (ref 23–33)
CREAT SERPL-MCNC: 0.9 MG/DL (ref 0.4–1.2)
DEPRECATED RDW RBC AUTO: 47.6 FL (ref 35–45)
ERYTHROCYTE [DISTWIDTH] IN BLOOD BY AUTOMATED COUNT: 14.8 % (ref 11.5–14.5)
GFR SERPL CREATININE-BSD FRML MDRD: 67 ML/MIN/1.73M2
GLUCOSE BLD STRIP.AUTO-MCNC: 107 MG/DL (ref 70–108)
GLUCOSE BLD STRIP.AUTO-MCNC: 110 MG/DL (ref 70–108)
GLUCOSE BLD STRIP.AUTO-MCNC: 114 MG/DL (ref 70–108)
GLUCOSE SERPL-MCNC: 99 MG/DL (ref 70–108)
HCT VFR BLD AUTO: 28 % (ref 37–47)
HGB BLD-MCNC: 8.9 GM/DL (ref 12–16)
LACTATE SERPL-SCNC: 0.8 MMOL/L (ref 0.5–2)
MAGNESIUM SERPL-MCNC: 1.8 MG/DL (ref 1.6–2.4)
MCH RBC QN AUTO: 27.9 PG (ref 26–33)
MCHC RBC AUTO-ENTMCNC: 31.8 GM/DL (ref 32.2–35.5)
MCV RBC AUTO: 87.8 FL (ref 81–99)
PLATELET # BLD AUTO: 753 THOU/MM3 (ref 130–400)
PMV BLD AUTO: 10.1 FL (ref 9.4–12.4)
POTASSIUM SERPL-SCNC: 3.6 MEQ/L (ref 3.5–5.2)
POTASSIUM SERPL-SCNC: 4.7 MEQ/L (ref 3.5–5.2)
RBC # BLD AUTO: 3.19 MILL/MM3 (ref 4.2–5.4)
SODIUM SERPL-SCNC: 137 MEQ/L (ref 135–145)
WBC # BLD AUTO: 10.1 THOU/MM3 (ref 4.8–10.8)

## 2024-12-22 PROCEDURE — 84132 ASSAY OF SERUM POTASSIUM: CPT

## 2024-12-22 PROCEDURE — 2500000003 HC RX 250 WO HCPCS: Performed by: SURGERY

## 2024-12-22 PROCEDURE — 36415 COLL VENOUS BLD VENIPUNCTURE: CPT

## 2024-12-22 PROCEDURE — 82948 REAGENT STRIP/BLOOD GLUCOSE: CPT

## 2024-12-22 PROCEDURE — 82330 ASSAY OF CALCIUM: CPT

## 2024-12-22 PROCEDURE — 83735 ASSAY OF MAGNESIUM: CPT

## 2024-12-22 PROCEDURE — 83605 ASSAY OF LACTIC ACID: CPT

## 2024-12-22 PROCEDURE — 6360000002 HC RX W HCPCS: Performed by: NURSE PRACTITIONER

## 2024-12-22 PROCEDURE — 85027 COMPLETE CBC AUTOMATED: CPT

## 2024-12-22 PROCEDURE — 6370000000 HC RX 637 (ALT 250 FOR IP): Performed by: SURGERY

## 2024-12-22 PROCEDURE — 71045 X-RAY EXAM CHEST 1 VIEW: CPT

## 2024-12-22 PROCEDURE — 80048 BASIC METABOLIC PNL TOTAL CA: CPT

## 2024-12-22 PROCEDURE — 6360000002 HC RX W HCPCS: Performed by: SURGERY

## 2024-12-22 PROCEDURE — 2580000003 HC RX 258: Performed by: SURGERY

## 2024-12-22 PROCEDURE — 2060000000 HC ICU INTERMEDIATE R&B

## 2024-12-22 PROCEDURE — 99024 POSTOP FOLLOW-UP VISIT: CPT | Performed by: NURSE PRACTITIONER

## 2024-12-22 PROCEDURE — APPSS45 APP SPLIT SHARED TIME 31-45 MINUTES: Performed by: NURSE PRACTITIONER

## 2024-12-22 PROCEDURE — 97110 THERAPEUTIC EXERCISES: CPT

## 2024-12-22 PROCEDURE — 6360000002 HC RX W HCPCS

## 2024-12-22 RX ORDER — POTASSIUM CHLORIDE 29.8 MG/ML
20 INJECTION INTRAVENOUS
Status: COMPLETED | OUTPATIENT
Start: 2024-12-22 | End: 2024-12-22

## 2024-12-22 RX ORDER — PANTOPRAZOLE SODIUM 40 MG/1
40 TABLET, DELAYED RELEASE ORAL
Status: DISCONTINUED | OUTPATIENT
Start: 2024-12-23 | End: 2024-12-28 | Stop reason: HOSPADM

## 2024-12-22 RX ORDER — MAGNESIUM SULFATE 1 G/100ML
1000 INJECTION INTRAVENOUS ONCE
Status: COMPLETED | OUTPATIENT
Start: 2024-12-22 | End: 2024-12-22

## 2024-12-22 RX ORDER — ENOXAPARIN SODIUM 100 MG/ML
1 INJECTION SUBCUTANEOUS 2 TIMES DAILY
Status: DISCONTINUED | OUTPATIENT
Start: 2024-12-22 | End: 2024-12-23

## 2024-12-22 RX ADMIN — POTASSIUM CHLORIDE 20 MEQ: 29.8 INJECTION, SOLUTION INTRAVENOUS at 04:02

## 2024-12-22 RX ADMIN — BUSPIRONE HYDROCHLORIDE 10 MG: 10 TABLET ORAL at 21:27

## 2024-12-22 RX ADMIN — PIPERACILLIN AND TAZOBACTAM 3375 MG: 3; .375 INJECTION, POWDER, FOR SOLUTION INTRAVENOUS at 21:32

## 2024-12-22 RX ADMIN — PIPERACILLIN AND TAZOBACTAM 3375 MG: 3; .375 INJECTION, POWDER, FOR SOLUTION INTRAVENOUS at 04:01

## 2024-12-22 RX ADMIN — POTASSIUM CHLORIDE 20 MEQ: 29.8 INJECTION, SOLUTION INTRAVENOUS at 05:04

## 2024-12-22 RX ADMIN — ENOXAPARIN SODIUM 90 MG: 100 INJECTION SUBCUTANEOUS at 21:27

## 2024-12-22 RX ADMIN — OXYCODONE 5 MG: 5 TABLET ORAL at 03:53

## 2024-12-22 RX ADMIN — PANTOPRAZOLE SODIUM 40 MG: 40 INJECTION, POWDER, FOR SOLUTION INTRAVENOUS at 09:19

## 2024-12-22 RX ADMIN — LEVOTHYROXINE SODIUM 125 MCG: 125 TABLET ORAL at 05:09

## 2024-12-22 RX ADMIN — METOPROLOL SUCCINATE 50 MG: 50 TABLET, EXTENDED RELEASE ORAL at 09:18

## 2024-12-22 RX ADMIN — OXYCODONE 5 MG: 5 TABLET ORAL at 16:30

## 2024-12-22 RX ADMIN — OXYCODONE 5 MG: 5 TABLET ORAL at 21:36

## 2024-12-22 RX ADMIN — MONTELUKAST SODIUM 10 MG: 10 TABLET ORAL at 09:17

## 2024-12-22 RX ADMIN — ENOXAPARIN SODIUM 40 MG: 100 INJECTION SUBCUTANEOUS at 09:18

## 2024-12-22 RX ADMIN — BUSPIRONE HYDROCHLORIDE 10 MG: 10 TABLET ORAL at 09:16

## 2024-12-22 RX ADMIN — SODIUM CHLORIDE, PRESERVATIVE FREE 10 ML: 5 INJECTION INTRAVENOUS at 09:18

## 2024-12-22 RX ADMIN — LOSARTAN POTASSIUM AND HYDROCHLOROTHIAZIDE 1 TABLET: 25; 100 TABLET ORAL at 09:18

## 2024-12-22 RX ADMIN — PIPERACILLIN AND TAZOBACTAM 3375 MG: 3; .375 INJECTION, POWDER, FOR SOLUTION INTRAVENOUS at 11:55

## 2024-12-22 RX ADMIN — MAGNESIUM SULFATE HEPTAHYDRATE 1000 MG: 10 INJECTION, SOLUTION INTRAVENOUS at 02:16

## 2024-12-22 ASSESSMENT — PAIN DESCRIPTION - ONSET: ONSET: ON-GOING

## 2024-12-22 ASSESSMENT — PAIN SCALES - GENERAL
PAINLEVEL_OUTOF10: 0
PAINLEVEL_OUTOF10: 10
PAINLEVEL_OUTOF10: 10
PAINLEVEL_OUTOF10: 6
PAINLEVEL_OUTOF10: 0

## 2024-12-22 ASSESSMENT — PAIN DESCRIPTION - ORIENTATION
ORIENTATION: RIGHT;LEFT;MID
ORIENTATION: RIGHT;LEFT;MID
ORIENTATION: MID;LEFT;RIGHT

## 2024-12-22 ASSESSMENT — PAIN DESCRIPTION - DESCRIPTORS
DESCRIPTORS: ACHING;SHARP;SORE
DESCRIPTORS: ACHING

## 2024-12-22 ASSESSMENT — PAIN DESCRIPTION - PAIN TYPE: TYPE: SURGICAL PAIN

## 2024-12-22 ASSESSMENT — PAIN DESCRIPTION - LOCATION
LOCATION: ABDOMEN

## 2024-12-22 ASSESSMENT — PAIN - FUNCTIONAL ASSESSMENT: PAIN_FUNCTIONAL_ASSESSMENT: ACTIVITIES ARE NOT PREVENTED

## 2024-12-22 ASSESSMENT — PAIN DESCRIPTION - FREQUENCY: FREQUENCY: CONTINUOUS

## 2024-12-22 NOTE — PROGRESS NOTES
YOSSI LOPEZ, APRN - CNP  [facility]  GENERAL SURGERY   General Surgery Daily Progress Note    Pt Name: Marry Ayers  Medical Record Number: [id number]  Date of Birth 1950   Today's Date: 12/22/2024  Chief complaint: Stricture of sigmoid colon   ASSESSMENT   POD# 19 Open Sigmoid Colectomy, Partial Small Bowel Resection   POD # 8 repair of anastomotic leak and loop ileostomy  Acute hypoxic respiratory insufficiency   History of PE and DVT  PALAK cultures with proteus and G+ cocci  Decontioning   has a past medical history of Allergic rhinitis, Anemia, Anxiety, Cataract, Essential hypertension, GERD (gastroesophageal reflux disease), Hx of blood clots, Hypokalemia, Hypothyroidism, and Pulmonary embolism (HCC).  PLAN   Analgesics and antiemetics as needed  Ostomy, wound and drain care  Remove LLQ PALAK drain  Lovenox to prophylactic dosing for history of PE. Normally takes coumadin. Check INR tomorrow.  Regular as tolerated  Zosyn  PT/OT  Chest x-ray today for hypoxia. Unable to wean off supplemental. Pulmonary toileting.   Home health at discharge  Labs reviewed. WBC wnl today. Platelets increasing 753 today. Monitor. Hem stable 8.9 today.   Patient has not been out of room into hallway. Had an episode of SVT overnight. Continue to monitor. Clinically, no worse but do not feel she is ready for discharge. If clinically worsens of pending on drainage from lower wound may need rescanned to evaluate for any loculated fluid collections. PALAK drains not putting anything out.   SUBJECTIVE   Marry is sitting up in chair. Frusterated. Unable to wean off 1-2L. No fevers. Ostomy is functioning and her right lower quadrant. Upper incision intact but lower incision opened up and draining white, milky drainage. Ostomy bag in place. PALAK drains x2 to left abdomen with really no outputs. Abdominal pain still 10/10 per patient but sitting comfortably in chair. Oxy effective she says. No chest pain or SOB. Ostomy functioning.

## 2024-12-22 NOTE — PROGRESS NOTES
[facility] Adult Intravenous to Oral Protocol    Pantoprazole changed to PO per [facility] P&T IV to PO protocol.    Patient meets criteria based on the following:  Tolerating diet more advanced than clear liquids  Tolerating other oral medications  Not on vasopressors  No nausea/vomiting within past 24 hours  No active GI bleed  No gastrectomy, gastric outlet or bowel obstruction, ileus, malabsorption syndrome      Thank you,  Catie RamirezD, [facility] 12/22/2024 2:50 PM       Patient states the prescription for Sucralfate was sent to the wrong pharmacy.    She is requesting for the medication to be called in ASAP to [facility] in [place].     She states she has to have to have it today.     Please call patient with any questions.

## 2024-12-22 NOTE — PROGRESS NOTES
Physical Therapy   [facility]  INPATIENT PHYSICAL THERAPY  DAILY NOTE  [facility] ICU STEPDOWN TELEMETRY 4K - 4K-17/017-A      Discharge Recommendations: Continue to assess pending progress, 24 hour assistance or supervision, and Patient would benefit from continued PT at discharge  Equipment Recommendations: No               Time In: 1258  Time Out: 1313  Timed Code Treatment Minutes: 15 Minutes  Minutes: 15          Date: 2024  Patient Name: Marry Ayers,  Gender:  female        MRN: [id number]  : 1950  (74 y.o.)     Referring Practitioner: Casimiro Callahan DO  Diagnosis: Sigmoid stricture (HCC)  Additional Pertinent Hx: Pt is s/p open sigmoid colectomy with a partial SB resection completed by Dr Callahan on . As of  abdomen is more distended than it was at the beginning of the shift. Chart reviewed. Patient had a KUB this AM that was concerning for ileus but she had been passing flatus and tolerating full liquids.      Repeat KUB ordered. Findings similar to this morning's xray but given increased pain, N/V, and distention, will place a NGT to LIWS . Chest xray -Small left-sided pleural effusion with adjacent atelectasis/infiltrate, Mild cardiomegaly.     Prior Level of Function:  Lives With: Spouse  Type of Home: House  Home Layout: One level  Home Access: Stairs to enter without rails  Entrance Stairs - Number of Steps: 3 GRACIELA  Home Equipment: Walker - Rolling, Rollator, Cane   Bathroom Shower/Tub: Tub/Shower unit  Bathroom Toilet: Standard  Bathroom Equipment: Shower chair    Prior Level of Assist for ADLs: Independent  Prior Level of Assist for Homemaking: Independent  Prior Level of Assist for Transfers: Independent  Active : Yes  Additional Comments: IND prior, used to care for her husbands parents and aunt  Prior Level of Assist for Ambulation: Independent household ambulator, with or without device  Has the patient had two or more falls in the past year or any fall  with injury in the past year?: No    Restrictions/Precautions:  Restrictions/Precautions: General Precautions, Fall Risk  Position Activity Restriction  Other Position/Activity Restrictions: PALAK drains x2, Ileostomy/jejunostomy     SUBJECTIVE: RN approved therapy, patient sitting in bedside chair, agreeable to limited therapy reports she has been waiting an hour to get back to bed      PAIN: 5/10: abdomen    Vitals: Vitals not assessed per clinical judgement, see nursing flowsheet    OBJECTIVE:  Bed Mobility:  Sit to Supine: Contact Guard Assistance, X 1, with rail, with verbal cues , with increased time for completion     Transfers:  Sit to Stand: Contact Guard Assistance, X 1, cues for hand placement, with verbal cues  Stand to Sit:Contact Guard Assistance, X 1, cues for alignment to surface and for safety with assistive device    Ambulation:  Contact Guard Assistance, X 1, with cues for safety, with verbal cues , with increased time for completion  Distance: 12 ft  Surface: Level Tile  Device: Rolling Walker  Gait Deviations: Forward Flexed Posture, Slow Krystal, Decreased Gait Speed, Decreased Heel Strike Bilaterally, Wide Base of Support, Increased reliance on assistive device, Verbal and tactile cues for safety and sequencing required during gait training in order for a more normalized gait pattern and fall preventions. Without verbal and tactile cues, patient would require more physical assistance in order to complete task, and Cues for proximity to assistive device   Patient declined further gait trials stating \" I have had enough\"  Stairs:  Not Tested    Balance:  Static Standing Balance: Stand By Assistance, X 1  Dynamic Standing Balance: Contact Guard Assistance, X 1, with cues for safety    Exercise:  Patient was guided in 1 set(s) 10 reps of exercises to both lower extremities: ,Ankle pumps, Glut sets, Quad sets, Heelslides, Hip abduction/adduction, Seated marches, and Long arc quads.  Exercises were

## 2024-12-22 NOTE — SIGNIFICANT EVENT
Patient seen at bedside and evaluated due to SVT, which was paroxysmal and lasted for only a few seconds without any symptoms. Upon review of telemetry patient noted to be in NSR with PACs. Will give 1 g of Mag sulfate now and check BMP/Mag/ionized Ca. Pain will need to be adequately controlled. Patient's nurse notified of orders and lab draws. Spoke with on call FERNY for surgery and she agrees to hold off on formal consult at this time as this was not a sustained event and likely 2/2 electrolyte abnormalities. Feel free to contact hospitalist service if any other issues were to arise.

## 2024-12-22 NOTE — PROGRESS NOTES
1317 - Educated patient about the importance of walking post-op. Patient agreeable to walking after lunch. Therapy did exercises with the patient. This RN then asked the patient if they were ok with taking a walk. Patient refused at this time. Will encourage a walk after their nap.  1632 - attempt to walk patient. Patient got lightheaded and dizzy standing at bedside. Assisted back to bed.

## 2024-12-22 NOTE — PROGRESS NOTES
.[facility]  OCCUPATIONAL THERAPY MISSED TREATMENT NOTE  [facility] ICU STEPDOWN TELEMETRY 4K  4K-17/017-A      Date: 2024  Patient Name: Marry Ayers        CSN: [id number]   : 1950  (74 y.o.)  Gender: female   Referring Practitioner: Casimiro Callahan DO            REASON FOR MISSED TREATMENT:  pt stated she had just returned to bed and did not want to get back up as she had been up this am for a while.   Will attempt again tomorrow as time allows.

## 2024-12-23 LAB
ANION GAP SERPL CALC-SCNC: 14 MEQ/L (ref 8–16)
BUN SERPL-MCNC: 8 MG/DL (ref 7–22)
CALCIUM SERPL-MCNC: 8.4 MG/DL (ref 8.5–10.5)
CHLORIDE SERPL-SCNC: 95 MEQ/L (ref 98–111)
CO2 SERPL-SCNC: 26 MEQ/L (ref 23–33)
CREAT SERPL-MCNC: 0.9 MG/DL (ref 0.4–1.2)
DEPRECATED RDW RBC AUTO: 48.7 FL (ref 35–45)
ERYTHROCYTE [DISTWIDTH] IN BLOOD BY AUTOMATED COUNT: 15.2 % (ref 11.5–14.5)
GFR SERPL CREATININE-BSD FRML MDRD: 67 ML/MIN/1.73M2
GLUCOSE SERPL-MCNC: 94 MG/DL (ref 70–108)
HCT VFR BLD AUTO: 27.2 % (ref 37–47)
HGB BLD-MCNC: 8.8 GM/DL (ref 12–16)
INR PPP: 0.93 (ref 0.85–1.13)
MCH RBC QN AUTO: 28.6 PG (ref 26–33)
MCHC RBC AUTO-ENTMCNC: 32.4 GM/DL (ref 32.2–35.5)
MCV RBC AUTO: 88.3 FL (ref 81–99)
PLATELET # BLD AUTO: 747 THOU/MM3 (ref 130–400)
PMV BLD AUTO: 10.2 FL (ref 9.4–12.4)
POTASSIUM SERPL-SCNC: 3.5 MEQ/L (ref 3.5–5.2)
RBC # BLD AUTO: 3.08 MILL/MM3 (ref 4.2–5.4)
SODIUM SERPL-SCNC: 135 MEQ/L (ref 135–145)
WBC # BLD AUTO: 9.7 THOU/MM3 (ref 4.8–10.8)

## 2024-12-23 PROCEDURE — 97116 GAIT TRAINING THERAPY: CPT

## 2024-12-23 PROCEDURE — 2580000003 HC RX 258: Performed by: SURGERY

## 2024-12-23 PROCEDURE — 85610 PROTHROMBIN TIME: CPT

## 2024-12-23 PROCEDURE — 2060000000 HC ICU INTERMEDIATE R&B

## 2024-12-23 PROCEDURE — 6370000000 HC RX 637 (ALT 250 FOR IP): Performed by: SURGERY

## 2024-12-23 PROCEDURE — 97535 SELF CARE MNGMENT TRAINING: CPT

## 2024-12-23 PROCEDURE — 85027 COMPLETE CBC AUTOMATED: CPT

## 2024-12-23 PROCEDURE — 2500000003 HC RX 250 WO HCPCS: Performed by: SURGERY

## 2024-12-23 PROCEDURE — 6360000002 HC RX W HCPCS: Performed by: SURGERY

## 2024-12-23 PROCEDURE — 97530 THERAPEUTIC ACTIVITIES: CPT

## 2024-12-23 PROCEDURE — 99024 POSTOP FOLLOW-UP VISIT: CPT | Performed by: SURGERY

## 2024-12-23 PROCEDURE — 80048 BASIC METABOLIC PNL TOTAL CA: CPT

## 2024-12-23 PROCEDURE — 36415 COLL VENOUS BLD VENIPUNCTURE: CPT

## 2024-12-23 PROCEDURE — 6360000002 HC RX W HCPCS: Performed by: NURSE PRACTITIONER

## 2024-12-23 PROCEDURE — 6360000002 HC RX W HCPCS: Performed by: PHYSICIAN ASSISTANT

## 2024-12-23 RX ORDER — ENOXAPARIN SODIUM 100 MG/ML
1 INJECTION SUBCUTANEOUS 2 TIMES DAILY
Status: DISCONTINUED | OUTPATIENT
Start: 2024-12-23 | End: 2024-12-28

## 2024-12-23 RX ADMIN — MONTELUKAST SODIUM 10 MG: 10 TABLET ORAL at 08:33

## 2024-12-23 RX ADMIN — OXYCODONE 5 MG: 5 TABLET ORAL at 12:59

## 2024-12-23 RX ADMIN — SODIUM CHLORIDE, PRESERVATIVE FREE 10 ML: 5 INJECTION INTRAVENOUS at 20:16

## 2024-12-23 RX ADMIN — SODIUM CHLORIDE, PRESERVATIVE FREE 10 ML: 5 INJECTION INTRAVENOUS at 04:11

## 2024-12-23 RX ADMIN — BUSPIRONE HYDROCHLORIDE 10 MG: 10 TABLET ORAL at 08:32

## 2024-12-23 RX ADMIN — SODIUM CHLORIDE, PRESERVATIVE FREE 10 ML: 5 INJECTION INTRAVENOUS at 08:30

## 2024-12-23 RX ADMIN — BUSPIRONE HYDROCHLORIDE 10 MG: 10 TABLET ORAL at 20:17

## 2024-12-23 RX ADMIN — PANTOPRAZOLE SODIUM 40 MG: 40 TABLET, DELAYED RELEASE ORAL at 06:48

## 2024-12-23 RX ADMIN — PIPERACILLIN AND TAZOBACTAM 3375 MG: 3; .375 INJECTION, POWDER, FOR SOLUTION INTRAVENOUS at 13:02

## 2024-12-23 RX ADMIN — HYDROMORPHONE HYDROCHLORIDE 0.5 MG: 1 INJECTION, SOLUTION INTRAMUSCULAR; INTRAVENOUS; SUBCUTANEOUS at 21:13

## 2024-12-23 RX ADMIN — OXYCODONE 5 MG: 5 TABLET ORAL at 20:16

## 2024-12-23 RX ADMIN — LEVOTHYROXINE SODIUM 125 MCG: 125 TABLET ORAL at 06:48

## 2024-12-23 RX ADMIN — LOSARTAN POTASSIUM AND HYDROCHLOROTHIAZIDE 1 TABLET: 25; 100 TABLET ORAL at 08:34

## 2024-12-23 RX ADMIN — PIPERACILLIN AND TAZOBACTAM 3375 MG: 3; .375 INJECTION, POWDER, FOR SOLUTION INTRAVENOUS at 20:19

## 2024-12-23 RX ADMIN — ENOXAPARIN SODIUM 80 MG: 100 INJECTION SUBCUTANEOUS at 08:30

## 2024-12-23 RX ADMIN — OXYCODONE 5 MG: 5 TABLET ORAL at 08:31

## 2024-12-23 RX ADMIN — METOPROLOL SUCCINATE 50 MG: 50 TABLET, EXTENDED RELEASE ORAL at 08:33

## 2024-12-23 RX ADMIN — ENOXAPARIN SODIUM 80 MG: 100 INJECTION SUBCUTANEOUS at 20:16

## 2024-12-23 RX ADMIN — PIPERACILLIN AND TAZOBACTAM 3375 MG: 3; .375 INJECTION, POWDER, FOR SOLUTION INTRAVENOUS at 04:11

## 2024-12-23 ASSESSMENT — PAIN SCALES - GENERAL
PAINLEVEL_OUTOF10: 0
PAINLEVEL_OUTOF10: 10
PAINLEVEL_OUTOF10: 8
PAINLEVEL_OUTOF10: 8
PAINLEVEL_OUTOF10: 10

## 2024-12-23 ASSESSMENT — PAIN DESCRIPTION - LOCATION
LOCATION: ABDOMEN

## 2024-12-23 ASSESSMENT — PAIN DESCRIPTION - DESCRIPTORS
DESCRIPTORS: ACHING;SHARP;SORE
DESCRIPTORS: ACHING
DESCRIPTORS: SHARP
DESCRIPTORS: SHARP

## 2024-12-23 ASSESSMENT — PAIN DESCRIPTION - ORIENTATION
ORIENTATION: RIGHT;LOWER;UPPER;MID
ORIENTATION: RIGHT
ORIENTATION: RIGHT;MID
ORIENTATION: RIGHT;MID

## 2024-12-23 ASSESSMENT — PAIN DESCRIPTION - FREQUENCY: FREQUENCY: CONTINUOUS

## 2024-12-23 ASSESSMENT — PAIN - FUNCTIONAL ASSESSMENT
PAIN_FUNCTIONAL_ASSESSMENT: PREVENTS OR INTERFERES SOME ACTIVE ACTIVITIES AND ADLS
PAIN_FUNCTIONAL_ASSESSMENT: PREVENTS OR INTERFERES SOME ACTIVE ACTIVITIES AND ADLS

## 2024-12-23 ASSESSMENT — PAIN DESCRIPTION - PAIN TYPE: TYPE: SURGICAL PAIN

## 2024-12-23 NOTE — PROGRESS NOTES
[facility]  INPATIENT PHYSICAL THERAPY  DAILY NOTE  [facility] ICU STEPDOWN TELEMETRY 4K - 4K-17/017-A      Discharge Recommendations: 24 hour assistance or supervision and Patient would benefit from continued PT at discharge  Equipment Recommendations: No               Time In: 0900  Time Out: 09  Timed Code Treatment Minutes: 33 Minutes  Minutes: 33          Date: 2024  Patient Name: Marry Ayers,  Gender:  female        MRN: [id number]  : 1950  (74 y.o.)     Referring Practitioner: Casimiro Callahan DO  Diagnosis: Sigmoid stricture (HCC)  Additional Pertinent Hx: Pt is s/p open sigmoid colectomy with a partial SB resection completed by Dr aCllahan on . As of  abdomen is more distended than it was at the beginning of the shift. Chart reviewed. Patient had a KUB this AM that was concerning for ileus but she had been passing flatus and tolerating full liquids.      Repeat KUB ordered. Findings similar to this morning's xray but given increased pain, N/V, and distention, will place a NGT to LIWS . Chest xray -Small left-sided pleural effusion with adjacent atelectasis/infiltrate, Mild cardiomegaly.     Prior Level of Function:  Lives With: Spouse  Type of Home: House  Home Layout: One level  Home Access: Stairs to enter without rails  Entrance Stairs - Number of Steps: 3 GRACIELA  Home Equipment: Walker - Rolling, Rollator, Cane   Bathroom Shower/Tub: Tub/Shower unit  Bathroom Toilet: Standard  Bathroom Equipment: Shower chair    Prior Level of Assist for ADLs: Independent  Prior Level of Assist for Homemaking: Independent  Prior Level of Assist for Transfers: Independent  Active : Yes  Additional Comments: IND prior, used to care for her husbands parents and aunt  Prior Level of Assist for Ambulation: Independent household ambulator, with or without device  Has the patient had two or more falls in the past year or any fall with injury in the past year?:

## 2024-12-23 NOTE — CARE COORDINATION
12/23/24, 1:20 PM EST  DISCHARGE PLANNING EVALUATION    SW received a call from Marta, daughter, and she reported that she was with patient yesterday and she feels patient needs rehab and has concerns about patient returning home.     SW spoke with patient about discharge planning. Patient became upset and reported that this was all too overwhelming. SW asked if [facility] for rehab is something she would consider. She reported that she just did not know what to do. She asked SW what SW wanted from her. SW explained that SW wanted to confirm the discharge plan of returning home with new [facility] or going to [facility] for rehab. She reported that she plans to go home with HH.     SW called Marta and she had placed patient's spouse on the phone as well. They both would like patient to go to [facility] for rehab. They will discuss with patient tonight and asked SW call them tomorrow morning.

## 2024-12-23 NOTE — PLAN OF CARE
Problem: Discharge Planning  Goal: Discharge to home or other facility with appropriate resources  Outcome: Progressing  Flowsheets (Taken 12/23/2024 1729)  Discharge to home or other facility with appropriate resources:   Identify barriers to discharge with patient and caregiver   Identify discharge learning needs (meds, wound care, etc)   Refer to discharge planning if patient needs post-hospital services based on physician order or complex needs related to functional status, cognitive ability or social support system   Arrange for needed discharge resources and transportation as appropriate     Problem: Safety - Adult  Goal: Free from fall injury  Outcome: Progressing  Flowsheets (Taken 12/23/2024 1729)  Free From Fall Injury:   Instruct family/caregiver on patient safety   Based on caregiver fall risk screen, instruct family/caregiver to ask for assistance with transferring infant if caregiver noted to have fall risk factors     Problem: ABCDS Injury Assessment  Goal: Absence of physical injury  Outcome: Progressing  Flowsheets (Taken 12/23/2024 1729)  Absence of Physical Injury: Implement safety measures based on patient assessment     Problem: Pain  Goal: Verbalizes/displays adequate comfort level or baseline comfort level  Outcome: Progressing  Flowsheets (Taken 12/23/2024 1729)  Verbalizes/displays adequate comfort level or baseline comfort level:   Encourage patient to monitor pain and request assistance   Administer analgesics based on type and severity of pain and evaluate response   Consider cultural and social influences on pain and pain management   Assess pain using appropriate pain scale   Implement non-pharmacological measures as appropriate and evaluate response     Problem: Respiratory - Adult  Goal: Achieves optimal ventilation and oxygenation  Outcome: Progressing  Flowsheets (Taken 12/23/2024 1729)  Achieves optimal ventilation and oxygenation:   Assess for changes in respiratory status    Position to facilitate oxygenation and minimize respiratory effort   Assess and instruct to report shortness of breath or any respiratory difficulty   Encourage broncho-pulmonary hygiene including cough, deep breathe, incentive spirometry   Assess for changes in mentation and behavior   Oxygen supplementation based on oxygen saturation or arterial blood gases     Problem: Skin/Tissue Integrity - Adult  Goal: Skin integrity remains intact  Outcome: Progressing  Flowsheets (Taken 12/23/2024 1729)  Skin Integrity Remains Intact: Monitor for areas of redness and/or skin breakdown     Problem: Skin/Tissue Integrity - Adult  Goal: Incisions, wounds, or drain sites healing without S/S of infection  Outcome: Progressing  Flowsheets (Taken 12/23/2024 1729)  Incisions, Wounds, or Drain Sites Healing Without Sign and Symptoms of Infection:   ADMISSION and DAILY: Assess and document risk factors for pressure ulcer development   TWICE DAILY: Assess and document skin integrity   TWICE DAILY: Assess and document dressing/incision, wound bed, drain sites and surrounding tissue     Problem: Gastrointestinal - Adult  Goal: Minimal or absence of nausea and vomiting  Outcome: Progressing  Flowsheets (Taken 12/23/2024 1729)  Minimal or absence of nausea and vomiting:   Administer IV fluids as ordered to ensure adequate hydration   Advance diet as tolerated, if ordered     Problem: Gastrointestinal - Adult  Goal: Maintains or returns to baseline bowel function  Outcome: Progressing  Flowsheets (Taken 12/23/2024 1729)  Maintains or returns to baseline bowel function:   Assess bowel function   Encourage oral fluids to ensure adequate hydration     Problem: Musculoskeletal - Adult  Goal: Return mobility to safest level of function  Outcome: Progressing  Flowsheets (Taken 12/23/2024 1729)  Return Mobility to Safest Level of Function: Assess patient stability and activity tolerance for standing, transferring and ambulating with or without

## 2024-12-23 NOTE — PROGRESS NOTES
[facility] GENERAL SURGERY   General Surgery Daily Progress Note    Pt Name: Marry Ayers  Medical Record Number: [id number]  Date of Birth 1950   Today's Date: 12/23/2024  Chief complaint: Stricture of sigmoid colon   ASSESSMENT   POD#20Open Sigmoid Colectomy, Partial Small Bowel Resection   POD # 9 repair of anastomotic leak and loop ileostomy  Acute hypoxic respiratory insufficiency   History of PE and DVT  PALAK cultures with proteus and G+ cocci  Decontioning   has a past medical history of Allergic rhinitis, Anemia, Anxiety, Cataract, Essential hypertension, GERD (gastroesophageal reflux disease), Hx of blood clots, Hypokalemia, Hypothyroidism, and Pulmonary embolism (HCC).  PLAN   Analgesics and antiemetics as needed  Ostomy, wound and drain care  Remove LLQ PALAK drain  Lovenox therapeutic dosing for history of PE  Regular as tolerated  Zosyn  PT/OT  Chest x-ray today for hypoxia. Unable to wean off supplemental. Pulmonary toileting.   Home health at discharge  Patient states does not feel ready for discharge home today.  SUBJECTIVE   Marry is sitting up in chair. Frusterated. Unable to wean off 1-2L. No fevers. Ostomy is functioning and her right lower quadrant.  Patient is tolerating diet.  Chest x-ray with small bilateral pleural effusions   enoxaparin  1 mg/kg SubCUTAneous BID    pantoprazole  40 mg Oral QAM AC    sodium chloride flush  5-40 mL IntraVENous 2 times per day    piperacillin-tazobactam  3,375 mg IntraVENous Q8H    montelukast  10 mg Oral QAM    losartan-hydroCHLOROthiazide  1 tablet Oral Daily    busPIRone  10 mg Oral BID    levothyroxine  125 mcg Oral Daily    metoprolol succinate  50 mg Oral Daily    sodium chloride flush  5-40 mL IntraVENous 2 times per day     Continuous Infusions:   sodium chloride      dextrose      sodium chloride       PRN Meds:.morphine, sodium chloride flush, sodium chloride, levalbuterol, acetaminophen, glucose, dextrose bolus **OR**

## 2024-12-23 NOTE — PROGRESS NOTES
Comprehensive Nutrition Assessment    Type and Reason for Visit: Reassess    Nutrition Recommendations/Plan:   Continue diet as ordered.   Patient continues to decline all ONS and snack options.   Recommend MVI.      Malnutrition Assessment:  Malnutrition Status: Severe malnutrition  Context: Acute Illness       Findings of the 6 clinical characteristics of malnutrition:  Energy Intake:  50% or less of estimated energy requirements for 5 or more days (NPO ( had surgery 12/4))  Weight Loss:  1% to 2% over 1 week     Body Fat Loss:  Mild body fat loss Orbital   Muscle Mass Loss:  Mild muscle mass loss Temples (temporalis)  Fluid Accumulation:  Unable to assess     Strength:  Not Performed    Nutrition Assessment:    Pt stable from a nutritional standpoint AEB patient is tolerating regular diet well with no nausea, emesis or abdominal pain, continues with decreased appetite. At risk for further nutritional compromise r/t admit with stricture of sigmoid colon, colovaginal fistula, HTN, peritonitis increased needs for wound healing s/p surgery (12/4/24) see below and underlying medical condition (GERD, DVT, Blood Clots, Hypothyroidism, Pulmonary Embolism).     Nutrition Related Findings:    Pt. Report/Treatments/Miscellaneous: Patient seen, walking in hallway with PT. Per patient her appetite has not been too good. She declines all ONS and snack options. RD encouraged her to have family bring in food from home and to try to find foods that sound appealing.   GI Status: 325 ml ostomy output   Wound: Surgical Incision (((12/4) open sigmoid colectomy partial small bowel resection , wound vac abdomen,(12/13) exploratory lap creation of ileostomy,  (12/16) exploratory laparotomy repair of anastamosis, creation of loop ileostomy))   Edema: none, per flowsheet   Pertinent Labs:   No results found for: \"GLUF\", \"LABA1C\"  Recent Labs     12/22/24  0245 12/22/24  0617 12/23/24  0446     --  135   K 3.6 4.7 3.5   CL 96*   Nutrition Focused Physical Findings, Skin, Weight, Hemodynamic Status     Discharge Planning:    Too soon to determine      Melissa Lamb MS, RD, LD  Contact: [phone]

## 2024-12-23 NOTE — PROGRESS NOTES
[facility]  STRZ ICU STEPDOWN TELEMETRY 4K  Occupational Therapy  Daily Note    Discharge Recommendations: Home with assist as needed  Equipment Recommendations: No continue to monitor       Time In: 1104  Time Out: 1130  Timed Code Treatment Minutes: 26 Minutes  Minutes: 26          Date: 2024  Patient Name: Marry Ayers,   Gender: female      Room: [address] [place] [address]  MRN: [id number]  : 1950  (74 y.o.)  Referring Practitioner: Casimiro Callahan DO  Diagnosis: Sigmoid stricture (HCC)  Additional Pertinent Hx: Pt is s/p open sigmoid colectomy with a partial SB resection completed by Dr Callahan on . As of  abdomen is more distended than it was at the beginning of the shift. Chart reviewed. Patient had a KUB this AM that was concerning for ileus but she had been passing flatus and tolerating full liquids.      Repeat KUB ordered. Findings similar to this morning's xray but given increased pain, N/V, and distention, will place a NGT to LIWS . Chest xray -Small left-sided pleural effusion with adjacent atelectasis/infiltrate, Mild cardiomegaly.  Open Sigmoid Colectomy, Partial Small Bowel Resection   12/10 Open sigmoid colectomy and partial small bowel resection.    EXPLORATORY LAPOROTOMY, creation of iliostomy     Exploratory laparotomy, repair of anastomosis, creation of loop ileostomy.    Restrictions/Precautions:  Restrictions/Precautions: General Precautions, Fall Risk  Position Activity Restriction  Other Position/Activity Restrictions: PALAK drains x1, Ileostomy/jejunostomy      Social/Functional History:  Lives With: Spouse  Type of Home: House  Home Layout: One level  Home Access: Stairs to enter without rails  Entrance Stairs - Number of Steps: 3 GRACIELA  Home Equipment: Walker - Rolling, Rollator, Cane   Bathroom Shower/Tub: Tub/Shower unit  Bathroom Toilet: Standard  Bathroom Equipment: Shower chair       Prior Level of Assist for ADLs: Independent  Prior Level of  0-100% Score: 50.11  ASSESSMENT:     Activity Tolerance:  Patient tolerance of  treatment: Good treatment tolerance       Plan: Times Per Week: 5-6x  Current Treatment Recommendations: Strengthening, Balance training, Endurance training, Safety education & training, Equipment evaluation, education, & procurement, Pain management, Functional mobility training, Patient/Caregiver education & training, Self-Care / ADL, Home management training    Education:  Learners: Patient  Role of OT, Plan of Care, ADL's, IADL's, Energy Conservation, Home Safety, Importance of Increasing Activity, Fall Prevention, and Assistive Device Safety    Goals  Short Term Goals  Time Frame for Short Term Goals: until discharge  Short Term Goal 1: Patient will safely complete various functional transfers with MOD I to improve safety in living environment.  Short Term Goal 2: Patient will improve functional ambulation to/from bathroom with supervision and LRAD in prep for return to PLOF.  Short Term Goal 3: Patient will complete UB/LB dressing/bathing with min assistance and use of LHAE prn and energy conservation techniques to improve ease in BADL routine.  Short Term Goal 4: Patient will tolerate standing 7-10 minutes  with unilateral UE release reaching ouside base of support with SBA to complete sink side grooming.  Short Term Goal 5: Patient will complete I/ADL task with min assist and min cues for Energy conservation techniques to improve ease in laundry and meal prep tasks.  Long Term Goals  Time Frame for Long Term Goals : None due to ELOS    Following session, patient left in safe position with all fall risk precautions in place.

## 2024-12-23 NOTE — PROGRESS NOTES
[facility] Wound Ostomy Continence Nurse  Ostomy Referral Progress Note      NAME:  Marry Ayers  MEDICAL RECORD NUMBER:  [id number]  AGE: 74 y.o.   GENDER:  female  :  1950  TODAY'S DATE:  2024    Subjective   Reason for [facility] Evaluation and Assessment: new ileostomy    Surgeon: Dr Callahan     Date of surgery: 24    Assessment     Encounter: Present to patient room. Patient in chair. Pt noted to have pouch to distal midline incision. Removed pouching systems. Cleansed stoma with warm wash cloth, pat dry. Skin prep applied to varsha-stomal skin. Coloplast 2 piece red flange cut to fit to size, protective ring applied to inner edge of flange, centered over stoma, and applied. Pouch applied. Barrier extenders placed on outer edge of flange. Applied hands lightly over system to activate hydrocolloid. Educated patient step by step throughout pouching system application. Distal end of incision does not have enough drainage to continue pouching. Dry gauze placed over distal end, secured with tape. Staff to change as needed for saturation. Answered questions and concerns. Will plan to change pouching system and educate next week. Highly recommend patient have home health assist with ostomy care after discharge. Ostomy educational information given to patient, with additional pouching systems in room. Will continue to follow. Patient in chair, call light in reach.       24    Ileostomy/Jejunostomy RLQ Loop ileostomy (Active)   Stomal Appliance 2 piece;Flat;Changed 24   Flange Size (inches) 35 Inches 24 1219   Stoma  Assessment Red;Moist;Protrudes 249   Peristomal Assessment Clean, dry & intact 24 1219   Mucocutaneous Junction Separation 24 1219   Treatment Barrier ring;Pouch change;Site care;Liquid skin barrier 24   Stool Appearance Loose 249   Stool Color Brown 24   Stool Amount Small 24   Output (mL) 100 ml

## 2024-12-24 LAB
ANION GAP SERPL CALC-SCNC: 16 MEQ/L (ref 8–16)
BASOPHILS ABSOLUTE: 0.1 THOU/MM3 (ref 0–0.1)
BASOPHILS NFR BLD AUTO: 0.5 %
BUN SERPL-MCNC: 9 MG/DL (ref 7–22)
CALCIUM SERPL-MCNC: 8.9 MG/DL (ref 8.5–10.5)
CHLORIDE SERPL-SCNC: 94 MEQ/L (ref 98–111)
CO2 SERPL-SCNC: 26 MEQ/L (ref 23–33)
CREAT SERPL-MCNC: 1 MG/DL (ref 0.4–1.2)
DEPRECATED RDW RBC AUTO: 50.7 FL (ref 35–45)
EOSINOPHIL NFR BLD AUTO: 2.1 %
EOSINOPHILS ABSOLUTE: 0.2 THOU/MM3 (ref 0–0.4)
ERYTHROCYTE [DISTWIDTH] IN BLOOD BY AUTOMATED COUNT: 15.4 % (ref 11.5–14.5)
GFR SERPL CREATININE-BSD FRML MDRD: 59 ML/MIN/1.73M2
GLUCOSE SERPL-MCNC: 91 MG/DL (ref 70–108)
HCT VFR BLD AUTO: 27.7 % (ref 37–47)
HGB BLD-MCNC: 8.8 GM/DL (ref 12–16)
IMM GRANULOCYTES # BLD AUTO: 0.06 THOU/MM3 (ref 0–0.07)
IMM GRANULOCYTES NFR BLD AUTO: 0.5 %
INR PPP: 0.98 (ref 0.85–1.13)
LYMPHOCYTES ABSOLUTE: 2.1 THOU/MM3 (ref 1–4.8)
LYMPHOCYTES NFR BLD AUTO: 17.5 %
MCH RBC QN AUTO: 29 PG (ref 26–33)
MCHC RBC AUTO-ENTMCNC: 31.8 GM/DL (ref 32.2–35.5)
MCV RBC AUTO: 91.4 FL (ref 81–99)
MONOCYTES ABSOLUTE: 1.1 THOU/MM3 (ref 0.4–1.3)
MONOCYTES NFR BLD AUTO: 8.9 %
NEUTROPHILS ABSOLUTE: 8.4 THOU/MM3 (ref 1.8–7.7)
NEUTROPHILS NFR BLD AUTO: 70.5 %
NRBC BLD AUTO-RTO: 0 /100 WBC
PLATELET # BLD AUTO: 761 THOU/MM3 (ref 130–400)
PMV BLD AUTO: 10.1 FL (ref 9.4–12.4)
POTASSIUM SERPL-SCNC: 3.5 MEQ/L (ref 3.5–5.2)
RBC # BLD AUTO: 3.03 MILL/MM3 (ref 4.2–5.4)
SODIUM SERPL-SCNC: 136 MEQ/L (ref 135–145)
WBC # BLD AUTO: 11.9 THOU/MM3 (ref 4.8–10.8)

## 2024-12-24 PROCEDURE — 85025 COMPLETE CBC W/AUTO DIFF WBC: CPT

## 2024-12-24 PROCEDURE — 6370000000 HC RX 637 (ALT 250 FOR IP): Performed by: NURSE PRACTITIONER

## 2024-12-24 PROCEDURE — 85610 PROTHROMBIN TIME: CPT

## 2024-12-24 PROCEDURE — 2060000000 HC ICU INTERMEDIATE R&B

## 2024-12-24 PROCEDURE — 97530 THERAPEUTIC ACTIVITIES: CPT

## 2024-12-24 PROCEDURE — 6360000002 HC RX W HCPCS: Performed by: NURSE PRACTITIONER

## 2024-12-24 PROCEDURE — 97110 THERAPEUTIC EXERCISES: CPT

## 2024-12-24 PROCEDURE — 2500000003 HC RX 250 WO HCPCS: Performed by: SURGERY

## 2024-12-24 PROCEDURE — 6370000000 HC RX 637 (ALT 250 FOR IP): Performed by: SURGERY

## 2024-12-24 PROCEDURE — 6360000002 HC RX W HCPCS: Performed by: SURGERY

## 2024-12-24 PROCEDURE — 80048 BASIC METABOLIC PNL TOTAL CA: CPT

## 2024-12-24 PROCEDURE — 2580000003 HC RX 258: Performed by: SURGERY

## 2024-12-24 PROCEDURE — 97116 GAIT TRAINING THERAPY: CPT

## 2024-12-24 PROCEDURE — 99024 POSTOP FOLLOW-UP VISIT: CPT | Performed by: NURSE PRACTITIONER

## 2024-12-24 PROCEDURE — 36415 COLL VENOUS BLD VENIPUNCTURE: CPT

## 2024-12-24 PROCEDURE — 97535 SELF CARE MNGMENT TRAINING: CPT

## 2024-12-24 PROCEDURE — APPSS30 APP SPLIT SHARED TIME 16-30 MINUTES: Performed by: NURSE PRACTITIONER

## 2024-12-24 RX ORDER — WARFARIN SODIUM 7.5 MG/1
7.5 TABLET ORAL ONCE
Status: COMPLETED | OUTPATIENT
Start: 2024-12-24 | End: 2024-12-24

## 2024-12-24 RX ADMIN — LEVOTHYROXINE SODIUM 125 MCG: 125 TABLET ORAL at 05:08

## 2024-12-24 RX ADMIN — MORPHINE SULFATE 2 MG: 2 INJECTION, SOLUTION INTRAMUSCULAR; INTRAVENOUS at 13:34

## 2024-12-24 RX ADMIN — OXYCODONE 5 MG: 5 TABLET ORAL at 21:06

## 2024-12-24 RX ADMIN — OXYCODONE 5 MG: 5 TABLET ORAL at 15:25

## 2024-12-24 RX ADMIN — ENOXAPARIN SODIUM 80 MG: 100 INJECTION SUBCUTANEOUS at 07:56

## 2024-12-24 RX ADMIN — PANTOPRAZOLE SODIUM 40 MG: 40 TABLET, DELAYED RELEASE ORAL at 05:08

## 2024-12-24 RX ADMIN — SODIUM CHLORIDE, PRESERVATIVE FREE 10 ML: 5 INJECTION INTRAVENOUS at 21:08

## 2024-12-24 RX ADMIN — SODIUM CHLORIDE, PRESERVATIVE FREE 10 ML: 5 INJECTION INTRAVENOUS at 07:56

## 2024-12-24 RX ADMIN — ENOXAPARIN SODIUM 80 MG: 100 INJECTION SUBCUTANEOUS at 21:07

## 2024-12-24 RX ADMIN — PIPERACILLIN AND TAZOBACTAM 3375 MG: 3; .375 INJECTION, POWDER, FOR SOLUTION INTRAVENOUS at 14:17

## 2024-12-24 RX ADMIN — OXYCODONE 5 MG: 5 TABLET ORAL at 07:56

## 2024-12-24 RX ADMIN — BUSPIRONE HYDROCHLORIDE 10 MG: 10 TABLET ORAL at 07:57

## 2024-12-24 RX ADMIN — BUSPIRONE HYDROCHLORIDE 10 MG: 10 TABLET ORAL at 21:09

## 2024-12-24 RX ADMIN — LOSARTAN POTASSIUM AND HYDROCHLOROTHIAZIDE 1 TABLET: 25; 100 TABLET ORAL at 07:58

## 2024-12-24 RX ADMIN — PIPERACILLIN AND TAZOBACTAM 3375 MG: 3; .375 INJECTION, POWDER, FOR SOLUTION INTRAVENOUS at 05:07

## 2024-12-24 RX ADMIN — WARFARIN SODIUM 7.5 MG: 7.5 TABLET ORAL at 21:07

## 2024-12-24 RX ADMIN — PIPERACILLIN AND TAZOBACTAM 3375 MG: 3; .375 INJECTION, POWDER, FOR SOLUTION INTRAVENOUS at 21:17

## 2024-12-24 RX ADMIN — MONTELUKAST SODIUM 10 MG: 10 TABLET ORAL at 07:59

## 2024-12-24 RX ADMIN — METOPROLOL SUCCINATE 50 MG: 50 TABLET, EXTENDED RELEASE ORAL at 07:58

## 2024-12-24 ASSESSMENT — PAIN DESCRIPTION - ORIENTATION
ORIENTATION: RIGHT
ORIENTATION: INNER
ORIENTATION: RIGHT
ORIENTATION: LOWER

## 2024-12-24 ASSESSMENT — PAIN DESCRIPTION - LOCATION
LOCATION: ABDOMEN

## 2024-12-24 ASSESSMENT — PAIN - FUNCTIONAL ASSESSMENT
PAIN_FUNCTIONAL_ASSESSMENT: PREVENTS OR INTERFERES SOME ACTIVE ACTIVITIES AND ADLS
PAIN_FUNCTIONAL_ASSESSMENT: ACTIVITIES ARE NOT PREVENTED
PAIN_FUNCTIONAL_ASSESSMENT: ACTIVITIES ARE NOT PREVENTED
PAIN_FUNCTIONAL_ASSESSMENT: PREVENTS OR INTERFERES SOME ACTIVE ACTIVITIES AND ADLS

## 2024-12-24 ASSESSMENT — PAIN SCALES - WONG BAKER: WONGBAKER_NUMERICALRESPONSE: NO HURT

## 2024-12-24 ASSESSMENT — PAIN SCALES - GENERAL
PAINLEVEL_OUTOF10: 6
PAINLEVEL_OUTOF10: 10
PAINLEVEL_OUTOF10: 0
PAINLEVEL_OUTOF10: 10
PAINLEVEL_OUTOF10: 6

## 2024-12-24 ASSESSMENT — PAIN DESCRIPTION - ONSET
ONSET: ON-GOING

## 2024-12-24 ASSESSMENT — PAIN DESCRIPTION - PAIN TYPE
TYPE: SURGICAL PAIN
TYPE: ACUTE PAIN;SURGICAL PAIN

## 2024-12-24 ASSESSMENT — PAIN DESCRIPTION - DESCRIPTORS
DESCRIPTORS: ACHING
DESCRIPTORS: SHARP

## 2024-12-24 ASSESSMENT — PAIN DESCRIPTION - FREQUENCY
FREQUENCY: CONTINUOUS

## 2024-12-24 NOTE — PROGRESS NOTES
[facility]  INPATIENT PHYSICAL THERAPY  DAILY NOTE  STRZ ICU STEPDOWN TELEMETRY 4K - 4K-17/017-A      Discharge Recommendations: Subacte/Skilled Nursing Facility  Equipment Recommendations: No               Time In: 0953  Time Out: 1020  Timed Code Treatment Minutes: 27 Minutes  Minutes: 27          Date: 2024  Patient Name: Marry Ayers,  Gender:  female        MRN: [id number]  : 1950  (74 y.o.)     Referring Practitioner: Casimiro Callahan DO  Diagnosis: Sigmoid stricture (HCC)  Additional Pertinent Hx: Pt is s/p open sigmoid colectomy with a partial SB resection completed by Dr Callahan on . As of  abdomen is more distended than it was at the beginning of the shift. Chart reviewed. Patient had a KUB this AM that was concerning for ileus but she had been passing flatus and tolerating full liquids.      Repeat KUB ordered. Findings similar to this morning's xray but given increased pain, N/V, and distention, will place a NGT to LIWS . Chest xray -Small left-sided pleural effusion with adjacent atelectasis/infiltrate, Mild cardiomegaly.     Prior Level of Function:  Lives With: Spouse  Type of Home: House  Home Layout: One level  Home Access: Stairs to enter without rails  Entrance Stairs - Number of Steps: 3 GRACIELA  Home Equipment: Walker - Rolling, Rollator, Cane   Bathroom Shower/Tub: Tub/Shower unit  Bathroom Toilet: Standard  Bathroom Equipment: Shower chair    Prior Level of Assist for ADLs: Independent  Prior Level of Assist for Homemaking: Independent  Prior Level of Assist for Transfers: Independent  Active : Yes  Additional Comments: IND prior, used to care for her husbands parents and aunt  Prior Level of Assist for Ambulation: Independent household ambulator, with or without device  Has the patient had two or more falls in the past year or any fall with injury in the past year?: No    Restrictions/Precautions:  Restrictions/Precautions: General Precautions,  Fall Risk  Position Activity Restriction  Other Position/Activity Restrictions: PALAK drains x1, Ileostomy/jejunostomy     SUBJECTIVE: RN approved therapy session. Patient seated in BS chair upon PTA arrival and agreeable to therapy treatment. Patient A&Ox4/4. Patient verbalizing she does not feel ready to go home and feels she will need increased assistance with rehab before return home.    PAIN: Patient verbalizing abdominal pain, pain not rated.    Vitals: Vitals not assessed per clinical judgement, see nursing flowsheet    OBJECTIVE:  Bed Mobility:  Not Tested    Transfers:  Sit to Stand: Contact Guard Assistance, wide SANKET  Stand to Sit:Contact Guard Assistance, cues for alignment to surface and for safety with assistive device    Ambulation:  Contact Guard Assistance  Distance: 25'x1  Surface: Level Tile  Device: Rolling Walker  Gait Deviations: Forward Flexed Posture, Slow Krystal, Decreased Step Length Bilaterally, Decreased Gait Speed, Decreased Heel Strike Bilaterally, Wide Base of Support, Increased reliance on assistive device, Verbal and tactile cues for safety and sequencing required during gait training in order for a more normalized gait pattern and fall preventions. Without verbal and tactile cues, patient would require more physical assistance in order to complete task, and PTA monitoring IV pole and O2 tank.     Stairs:  Not Tested    Balance:  Static Standing Balance: Stand By Assistance  Dynamic Standing Balance: Contact Guard Assistance    Exercise:  None    Functional Outcome Measures:  [facility] (6 CLICK) BASIC MOBILITY  AM-[facility] Inpatient Mobility Raw Score : 17  AM-[facility] Inpatient T-Scale Score : 42.13        Modified Belmond Scale:  Not Applicable    ASSESSMENT:  Assessment: Patient progressing toward established goals.  Activity Tolerance:  Patient tolerance of  treatment:Good.  Plan: Current Treatment Recommendations: Strengthening, Balance training, Gait training, Stair training, Functional mobility

## 2024-12-24 NOTE — PROGRESS NOTES
Order received for PICC removal per Feliciano Tan, APRN-CNP. Patient placed flat in bed. Transparent dressing removed. Skin accessed appears clean and intact. Area cleaned with alcohol, sterile gauze and Vaseline gauze placed over site, PICC removed with tip intact, pressure held with sterile gauze for 3 minutes. New transparent dressing applied. Educated the patient on remaining in bed for 30 min, dressing stays on for 24 hours, signs and symptoms of infection and notify primary nurse if any blood or oozing. Emily HEARN notified.

## 2024-12-24 NOTE — CARE COORDINATION
12/24/24, 10:46 AM EST  DISCHARGE PLANNING EVALUATION    SW called Marta and she added in patient's spouse on the phone. They reported that they have discussed ECF and patient is agreeable to [facility]. Spouse asked general questions about Medicare and ECF. SW asked about transportation to [facility] and they decided ambulette would be the best option. SW informed them of private pay cost and getting a bill, both agreeable.     SW spoke with patient about discharge planning and she agreed to referral to [facility].     SW called Yasmin at [facility] and made referral. They will accept patient but will not be able to accept until Thursday 12/26.     SW called and left a voicemail for Marta, daughter, notifying her of [facility] accepting and potential discharge on Thursday 12/26.     SW called spouse and notified him of [facility] accepting and potential discharge on Thursday 12/26.     ROBIN spoke with patient and notified her [facility] accepting and potential discharge on Thursday 12/26.     Attending PA made aware.

## 2024-12-24 NOTE — PROGRESS NOTES
[facility]  STRZ ICU STEPDOWN TELEMETRY 4K  Occupational Therapy  Daily Note    Discharge Recommendations: Continue to assess pending progress and Home with Home Health OT Based on performance, home with HH therapies would be appropriate.  However, pt states she will be d/c to SNF for further rehab and recommend further OT at d.c destination.   Equipment Recommendations: No continue to monitor       Time In: 1258  Time Out: 1324  Timed Code Treatment Minutes: 26 Minutes  Minutes: 26          Date: 2024  Patient Name: Marry Ayers,   Gender: female      Room: [facility]17/017-A  MRN: [id number]  : 1950  (74 y.o.)  Referring Practitioner: Casimiro Callahan DO  Diagnosis: Sigmoid stricture (HCC)  Additional Pertinent Hx: Pt is s/p open sigmoid colectomy with a partial SB resection completed by Dr Callahan on . As of  abdomen is more distended than it was at the beginning of the shift. Chart reviewed. Patient had a KUB this AM that was concerning for ileus but she had been passing flatus and tolerating full liquids.      Repeat KUB ordered. Findings similar to this morning's xray but given increased pain, N/V, and distention, will place a NGT to LIWS . Chest xray -Small left-sided pleural effusion with adjacent atelectasis/infiltrate, Mild cardiomegaly.  Open Sigmoid Colectomy, Partial Small Bowel Resection   12/10 Open sigmoid colectomy and partial small bowel resection.    EXPLORATORY LAPOROTOMY, creation of iliostomy     Exploratory laparotomy, repair of anastomosis, creation of loop ileostomy.    Restrictions/Precautions:  Restrictions/Precautions: General Precautions, Fall Risk  Position Activity Restriction  Other Position/Activity Restrictions: PALAK drains x1, Ileostomy/jejunostomy      Social/Functional History:  Lives With: Spouse  Type of Home: House  Home Layout: One level  Home Access: Stairs to enter without rails  Entrance Stairs - Number of Steps: 3 GRACIELA  Home  Equipment: Walker - Rolling, Rollator, Cane   Bathroom Shower/Tub: Tub/Shower unit  Bathroom Toilet: Standard  Bathroom Equipment: Shower chair       Prior Level of Assist for ADLs: Independent  Prior Level of Assist for Homemaking: Independent  Prior Level of Assist for Transfers: Independent  Prior Level of Assist for Ambulation: Independent household ambulator, with or without device  Has the patient had two or more falls in the past year or any fall with injury in the past year?: No    Active : Yes  Occupation: Retired  Additional Comments: IND prior, used to care for her husbands parents and aunt    SUBJECTIVE: Nursing gave approval to see pt.  Upon arrival pt resting in bedside chair and agreeable to OT session.     PAIN: 10/10 abdomen pain at rest and with movement, Nursing made aware of pain rating.    Vitals: Vitals not assessed per clinical judgement, see nursing flowsheet    COGNITION: WFL    ADL:   Toileting: Supervision.  For management of gown and set up assistance to perform varsha care.   Toilet Transfer: Supervision to/from [facility] with cues for safety.     IADL:   Not Tested    BALANCE:  Sitting Balance:  Modified Independent.    Standing Balance: Supervision-Stand By Assistance.      BED MOBILITY:  Not Tested    TRANSFERS:  Sit to Stand:  Supervision.    Stand to Sit: Supervision.      FUNCTIONAL MOBILITY:  Assistive Device: Rolling Walker  Assist Level:  Stand By Assistance.   Distance: within room from one side of the room to the other to utilize BSC    ADDITIONAL ACTIVITIES:  Pt. Instructed to engage in 10 reps of BUE strengthening with mild resistive theraband.  Pt. Able to complete with 1-2 cues for proper technique.  Exercises completed this date to improve BUE strength for ease with ADL based transfers.        Modified Hamlin Scale:  Not Applicable    ASSESSMENT:     Activity Tolerance:  Patient tolerance of  treatment: Good treatment tolerance       Plan: Times Per Week: 5-6x  Current

## 2024-12-24 NOTE — PROGRESS NOTES
Warfarin Pharmacy Consult Note    Marry Ayers is a 74 y.o. female for whom pharmacy has been asked to manage warfarin therapy.     Consulting Provider: Feliciano Tan CNP  Indication: Hx of DVT & PE  Target INR: 2.0-3.0   Warfarin dose prior to admission: 7.5mg Tuesday 5mg SunMWThFSat  Outpatient warfarin provider: [facility] Cou[facility]in [facility] [phone]    Recent Labs     12/22/24  0617 12/23/24  0446   HGB 8.9* 8.8*   * 747*     Recent Labs     12/23/24  0445   INR 0.93     Concurrent anticoagulants/antiplatelets: lovenox 80mg BID,   Significant warfarin drug-drug interactions: levothyroxine (HM)    Date INR Warfarin Dose   12/24/2024 0.98  7.5 mg                                   INR will be monitored routinely until therapeutic INR is achieved.    Pharmacy will continue to follow. Thank you for the consult,   Monika Glass, PharmD 12/24/2024 8:56 AM

## 2024-12-24 NOTE — PROGRESS NOTES
YOSSI LOPEZ, APRN - CNP for Dr. José Antonio Barger covering for Dr. Sindy CLARKE DR GENERAL SURGERY   General Surgery Daily Progress Note    Pt Name: Marry Ayers  Medical Record Number: [id number]  Date of Birth 1950   Today's Date: 12/24/2024  Chief complaint: Stricture of sigmoid colon   ASSESSMENT   POD#21 Open Sigmoid Colectomy, Partial Small Bowel Resection   POD # 10 repair of anastomotic leak and loop ileostomy  Acute hypoxic respiratory insufficiency - improving  History of PE and DVT  PALAK cultures with proteus and G+ cocci  Decontioning   has a past medical history of Allergic rhinitis, Anemia, Anxiety, Cataract, Essential hypertension, GERD (gastroesophageal reflux disease), Hx of blood clots, Hypokalemia, Hypothyroidism, and Pulmonary embolism (HCC).  PLAN   Analgesics and antiemetics as needed  Routine wound and ostomy care. Nurses following.  Remove remaining PALAK ddrain  Start coumadin. Continue lovenox until therapeutic. Daily INRs.  Regular as tolerated  Continue Zosyn  PT/OT  Check labs this morning  Weaning supplemental oxygen. Continue pulmonary toileting.   Social work following. Planning ECF now. Likely discharge on Thursday. Will not accept tomorrow.  SUBJECTIVE   Marry is sitting up in bed. Feels better than she has but admits it is slow. Wearing 2L oxygen this morning. Per nursing just placed it on last night for comfort. Had an episode of 10/10 pain where she required a dose of Dilaudid. States pain is more tolerable this morning. No fevers. Ostomy is functioning and her right lower quadrant.  Patient is tolerating diet.  She denies any nausea or vomiting. Denies any bloating. PALAK drain left with minimal serous fluid in bulb. Lower incision draining tan fluid but has decreased significantly. No ostomy appliance in place. She is getting OOB better and moving around more. External ruffin in place this morning still.      warfarin placeholder: dosing by pharmacy   Other RX  Placeholder    warfarin  7.5 mg Oral Once    enoxaparin  1 mg/kg SubCUTAneous BID    pantoprazole  40 mg Oral QAM AC    sodium chloride flush  5-40 mL IntraVENous 2 times per day    piperacillin-tazobactam  3,375 mg IntraVENous Q8H    montelukast  10 mg Oral QAM    losartan-hydroCHLOROthiazide  1 tablet Oral Daily    busPIRone  10 mg Oral BID    levothyroxine  125 mcg Oral Daily    metoprolol succinate  50 mg Oral Daily    sodium chloride flush  5-40 mL IntraVENous 2 times per day     Continuous Infusions:   sodium chloride      dextrose      sodium chloride       PRN Meds:.morphine, sodium chloride flush, sodium chloride, levalbuterol, acetaminophen, glucose, dextrose bolus **OR** dextrose bolus, glucagon (rDNA), dextrose, magnesium sulfate, sodium phosphate 15 mmol in sodium chloride 0.9 % 250 mL IVPB, hydrALAZINE, potassium chloride **OR** potassium alternative oral replacement **OR** potassium chloride, potassium chloride, sodium chloride flush, sodium chloride, oxyCODONE, ondansetron  OBJECTIVE   CURRENT VITALS:  height is 1.549 m (5' 1\") and weight is 82.6 kg (182 lb). Her oral temperature is 98 °F (36.7 °C). Her blood pressure is 118/49 (abnormal) and her pulse is 76. Her respiration is 20 and oxygen saturation is 95%.   Temperature Range (24h):Temp: 98 °F (36.7 °C) Temp  Av.8 °F (36.6 °C)  Min: 97.3 °F (36.3 °C)  Max: 98.2 °F (36.8 °C)  BP Range (24h): Systolic (24hrs), Av , Min:118 , Max:136     Diastolic (24hrs), Av, Min:47, Max:89    Pulse Range (24h): Pulse  Av.8  Min: 74  Max: 79  Respiration Range (24h): Resp  Av.9  Min: 16  Max: 20  Current Pulse Ox (24h):  SpO2: 95 %  Pulse Ox Range (24h):  SpO2  Av.5 %  Min: 91 %  Max: 95 %  Oxygen Amount and Delivery: O2 Flow Rate (L/min): 2 L/min (weaned to 1L)  Incentive Spirometry Tx:       Achieved Volume (mL): 500 mL    GENERAL: alert, no distress  LUNGS: lungs diminished   HEART: normal rate and regular rhythm  ABDOMEN: Ostomy in  using voice recognition software.  It may contain   minor errors which are inherent in voice recognition technology.**      Electronically signed by Dr. Christie Topeet      XR CHEST PORTABLE   Final Result      Small bilateral pleural effusions.               **This report has been created using voice recognition software. It may contain   minor errors which are inherent in voice recognition technology.**            Electronically signed by Dr. Casimiro Fernandez      XR CHEST PORTABLE   Final Result   1. Mild hazy opacity at the right lung base may present a small right pleural   effusion with possible mild dependent right basilar atelectasis or pneumonia.   2. Left upper extremity PICC is present with the tip overlying the cavoatrial   junction.            **This report has been created using voice recognition software.  It may contain   minor errors which are inherent in voice recognition technology.**      Electronically signed by Dr. Benito Ureña      XR ABDOMEN (KUB) (SINGLE AP VIEW)   Final Result   1. Persistent gas-filled bowel are seen overlying the abdomen and pelvis. The   overall degree of gaseous distention appears slightly progressed from prior   examination. This is progressed overlying the right mid abdomen. Further   evaluation could be obtained with CT.      **This report has been created using voice recognition software.  It may contain   minor errors which are inherent in voice recognition technology.**      Electronically signed by Dr. Benito Ureña      XR ABDOMEN (2 VIEWS)   Final Result   1. No pneumoperitoneum.   2. Nonobstructive bowel gas pattern.            **This report has been created using voice recognition software. It may contain   minor errors which are inherent in voice recognition technology.**         Electronically signed by Dr. Casimiro Fernandez      XR CHEST PORTABLE   Final Result   1. Small left-sided pleural effusion with adjacent atelectasis/infiltrate.   2. Mild

## 2024-12-24 NOTE — DISCHARGE INSTR - COC
Continuity of Care Form    Patient Name: Marry Ayers   :  1950  MRN:  [id number]    Admit date:  2024  Discharge date:  24    Code Status Order: Full Code   Advance Directives:   Advance Care Flowsheet Documentation             Admitting Physician:  Casimiro Callahan DO  PCP: Silvestre Sawyer MD    Discharging Nurse: Maureen HEARN  Discharging Hospital Unit/Room#: 5k 21  Discharging Unit Phone Number: [phone]    Emergency Contact:   Extended Emergency Contact Information  Primary Emergency Contact: Antwon Ayers  Address: [address] [place] [address] [place] [address] [facility]  Home Phone: [phone]  Mobile Phone: [phone]  Relation: Spouse    Past Surgical History:  Past Surgical History:   Procedure Laterality Date    CARDIAC CATHETERIZATION      CARPAL TUNNEL RELEASE Bilateral     CATARACT REMOVAL      CERVICAL SPINE SURGERY      fusion    CLAVICLE SURGERY      COLONOSCOPY  2024    Dr. Prince    CYSTOSCOPY W/ URETERAL STENT PLACEMENT  2023    ESOPHAGOGASTRODUODENOSCOPY  2023    Dr. Tim Schirmer    ESOPHAGOGASTRODUODENOSCOPY  2022    Dr. Tim Schirmer    EYE SURGERY Right     2010- macular Hole Repair    HERNIA REPAIR      HYSTERECTOMY, TOTAL ABDOMINAL (CERVIX REMOVED)      LAPAROTOMY N/A 2024    EXPLORATORY LAPOROTOMY, creation of iliostomy performed by Casimiro Callahan DO at [facility] OR    SIGMOID COLECTOMY  2023    SIGMOID COLECTOMY N/A 2024    Open Sigmoid Colectomy, Partial Small Bowel Resection performed by Casimiro Callahan DO at [facility] OR    TOTAL KNEE ARTHROPLASTY Left        Immunization History:   Immunization History   Administered Date(s) Administered    COVID-19, MODERNA Bivalent, (age 12y+), IM, 50 mcg/0.5 mL 10/05/2023    COVID-19, MODERNA, (age 12y+), IM, 50mcg/0.5mL 2024    COVID-19, PFIZER Bivalent, DO NOT Dilute, (age 12y+), IM, 30 mcg/0.3 mL 10/28/2022    COVID-19, PFIZER PURPLE top, DILUTE  Facility (if applicable)   Name:  Address:  Dialysis Schedule:  Phone:  Fax:    / signature: Electronically signed by FAIZAN Harris on 12/24/24 at 10:26 AM EST    PHYSICIAN SECTION    Prognosis: Good    Condition at Discharge: Stable    Rehab Potential (if transferring to Rehab): Good    Recommended Labs or Other Treatments After Discharge: ostomy, wound care, PT/OT    Physician Certification: I certify the above information and transfer of Marry Ayers  is necessary for the continuing treatment of the diagnosis listed and that she requires Skilled Nursing Facility for less 30 days.     Update Admission H&P: No change in H&P    PHYSICIAN SIGNATURE:  Electronically signed by Casimiro Callahan DO on 12/27/24 at 1:17 PM EST

## 2024-12-25 LAB
DEPRECATED RDW RBC AUTO: 50.7 FL (ref 35–45)
ERYTHROCYTE [DISTWIDTH] IN BLOOD BY AUTOMATED COUNT: 15.3 % (ref 11.5–14.5)
HCT VFR BLD AUTO: 26.8 % (ref 37–47)
HGB BLD-MCNC: 8.5 GM/DL (ref 12–16)
INR PPP: 1.03 (ref 0.85–1.13)
MCH RBC QN AUTO: 28.8 PG (ref 26–33)
MCHC RBC AUTO-ENTMCNC: 31.7 GM/DL (ref 32.2–35.5)
MCV RBC AUTO: 90.8 FL (ref 81–99)
PLATELET # BLD AUTO: 699 THOU/MM3 (ref 130–400)
PMV BLD AUTO: 10 FL (ref 9.4–12.4)
RBC # BLD AUTO: 2.95 MILL/MM3 (ref 4.2–5.4)
WBC # BLD AUTO: 9.8 THOU/MM3 (ref 4.8–10.8)

## 2024-12-25 PROCEDURE — 85610 PROTHROMBIN TIME: CPT

## 2024-12-25 PROCEDURE — 2500000003 HC RX 250 WO HCPCS: Performed by: SURGERY

## 2024-12-25 PROCEDURE — 85027 COMPLETE CBC AUTOMATED: CPT

## 2024-12-25 PROCEDURE — 6370000000 HC RX 637 (ALT 250 FOR IP)

## 2024-12-25 PROCEDURE — 6360000002 HC RX W HCPCS: Performed by: SURGERY

## 2024-12-25 PROCEDURE — 6360000002 HC RX W HCPCS: Performed by: NURSE PRACTITIONER

## 2024-12-25 PROCEDURE — 2580000003 HC RX 258: Performed by: SURGERY

## 2024-12-25 PROCEDURE — 2060000000 HC ICU INTERMEDIATE R&B

## 2024-12-25 PROCEDURE — 36415 COLL VENOUS BLD VENIPUNCTURE: CPT

## 2024-12-25 PROCEDURE — 6370000000 HC RX 637 (ALT 250 FOR IP): Performed by: SURGERY

## 2024-12-25 RX ORDER — WARFARIN SODIUM 10 MG/1
10 TABLET ORAL
Status: COMPLETED | OUTPATIENT
Start: 2024-12-25 | End: 2024-12-25

## 2024-12-25 RX ADMIN — ENOXAPARIN SODIUM 80 MG: 100 INJECTION SUBCUTANEOUS at 21:04

## 2024-12-25 RX ADMIN — PIPERACILLIN AND TAZOBACTAM 3375 MG: 3; .375 INJECTION, POWDER, FOR SOLUTION INTRAVENOUS at 21:10

## 2024-12-25 RX ADMIN — LEVOTHYROXINE SODIUM 125 MCG: 125 TABLET ORAL at 05:43

## 2024-12-25 RX ADMIN — SODIUM CHLORIDE, PRESERVATIVE FREE 10 ML: 5 INJECTION INTRAVENOUS at 09:04

## 2024-12-25 RX ADMIN — PIPERACILLIN AND TAZOBACTAM 3375 MG: 3; .375 INJECTION, POWDER, FOR SOLUTION INTRAVENOUS at 05:43

## 2024-12-25 RX ADMIN — LOSARTAN POTASSIUM AND HYDROCHLOROTHIAZIDE 1 TABLET: 25; 100 TABLET ORAL at 09:05

## 2024-12-25 RX ADMIN — BUSPIRONE HYDROCHLORIDE 10 MG: 10 TABLET ORAL at 09:05

## 2024-12-25 RX ADMIN — SODIUM CHLORIDE, PRESERVATIVE FREE 10 ML: 5 INJECTION INTRAVENOUS at 22:18

## 2024-12-25 RX ADMIN — MONTELUKAST SODIUM 10 MG: 10 TABLET ORAL at 09:07

## 2024-12-25 RX ADMIN — PANTOPRAZOLE SODIUM 40 MG: 40 TABLET, DELAYED RELEASE ORAL at 05:39

## 2024-12-25 RX ADMIN — METOPROLOL SUCCINATE 50 MG: 50 TABLET, EXTENDED RELEASE ORAL at 09:06

## 2024-12-25 RX ADMIN — PIPERACILLIN AND TAZOBACTAM 3375 MG: 3; .375 INJECTION, POWDER, FOR SOLUTION INTRAVENOUS at 13:27

## 2024-12-25 RX ADMIN — WARFARIN SODIUM 10 MG: 10 TABLET ORAL at 17:06

## 2024-12-25 RX ADMIN — OXYCODONE 5 MG: 5 TABLET ORAL at 13:24

## 2024-12-25 RX ADMIN — ENOXAPARIN SODIUM 80 MG: 100 INJECTION SUBCUTANEOUS at 09:03

## 2024-12-25 RX ADMIN — OXYCODONE 5 MG: 5 TABLET ORAL at 21:20

## 2024-12-25 RX ADMIN — BUSPIRONE HYDROCHLORIDE 10 MG: 10 TABLET ORAL at 22:17

## 2024-12-25 ASSESSMENT — PAIN SCALES - GENERAL
PAINLEVEL_OUTOF10: 2
PAINLEVEL_OUTOF10: 10
PAINLEVEL_OUTOF10: 7

## 2024-12-25 ASSESSMENT — PAIN DESCRIPTION - DESCRIPTORS: DESCRIPTORS: ACHING

## 2024-12-25 ASSESSMENT — PAIN DESCRIPTION - LOCATION
LOCATION: ABDOMEN
LOCATION: ABDOMEN

## 2024-12-25 NOTE — PLAN OF CARE
Problem: Discharge Planning  Goal: Discharge to home or other facility with appropriate resources  12/25/2024 1611 by Madelin Workman RN  Outcome: Progressing  Flowsheets (Taken 12/25/2024 0800)  Discharge to home or other facility with appropriate resources:   Identify barriers to discharge with patient and caregiver   Identify discharge learning needs (meds, wound care, etc)  12/25/2024 0559 by Kulwant Peralta RN  Outcome: Progressing  Flowsheets (Taken 12/25/2024 0559)  Discharge to home or other facility with appropriate resources:   Identify barriers to discharge with patient and caregiver   Arrange for needed discharge resources and transportation as appropriate   Identify discharge learning needs (meds, wound care, etc)     Problem: Safety - Adult  Goal: Free from fall injury  12/25/2024 1611 by Madelin Workman RN  Outcome: Progressing  Flowsheets (Taken 12/25/2024 0800)  Free From Fall Injury: Instruct family/caregiver on patient safety  12/25/2024 0559 by Kulwant Peralta RN  Outcome: Progressing  Flowsheets (Taken 12/25/2024 0559)  Free From Fall Injury: Instruct family/caregiver on patient safety     Problem: ABCDS Injury Assessment  Goal: Absence of physical injury  12/25/2024 1611 by Madelin Workman RN  Outcome: Progressing  Flowsheets (Taken 12/25/2024 0800)  Absence of Physical Injury: Implement safety measures based on patient assessment  12/25/2024 0559 by Kulwant Peralta RN  Outcome: Progressing  Flowsheets (Taken 12/25/2024 0559)  Absence of Physical Injury: Implement safety measures based on patient assessment     Problem: Pain  Goal: Verbalizes/displays adequate comfort level or baseline comfort level  12/25/2024 1611 by Madelin Workman RN  Outcome: Progressing  12/25/2024 0559 by Kulwant Peralta RN  Outcome: Progressing  Flowsheets (Taken 12/25/2024 0559)  Verbalizes/displays adequate comfort level or baseline comfort level:   Encourage patient to monitor pain and request  per provider's orders  Goal: Return ADL status to a safe level of function  12/25/2024 1611 by Madelin Workman RN  Outcome: Progressing  Flowsheets (Taken 12/25/2024 0800)  Return ADL Status to a Safe Level of Function: Assist and instruct patient to increase activity and self care as tolerated  12/25/2024 0559 by Kulwant Peralta RN  Outcome: Progressing  Flowsheets (Taken 12/25/2024 0559)  Return ADL Status to a Safe Level of Function: Administer medication as ordered     Problem: Nutrition Deficit:  Goal: Optimize nutritional status  12/25/2024 1611 by Madelin Workman RN  Outcome: Progressing  12/25/2024 0559 by Kulwant Peralta RN  Outcome: Progressing  Flowsheets (Taken 12/25/2024 0559)  Nutrient intake appropriate for improving, restoring, or maintaining nutritional needs:   Assess nutritional status and recommend course of action   Monitor oral intake, labs, and treatment plans     Problem: Skin/Tissue Integrity  Goal: Absence of new skin breakdown  Description: 1.  Monitor for areas of redness and/or skin breakdown  2.  Assess vascular access sites hourly  3.  Every 4-6 hours minimum:  Change oxygen saturation probe site  4.  Every 4-6 hours:  If on nasal continuous positive airway pressure, respiratory therapy assess nares and determine need for appliance change or resting period.  12/25/2024 1611 by Madelin Workman RN  Outcome: Progressing  12/25/2024 0559 by Kulwant Peralta RN  Outcome: Progressing

## 2024-12-25 NOTE — PLAN OF CARE
Problem: Discharge Planning  Goal: Discharge to home or other facility with appropriate resources  Outcome: Progressing  Flowsheets (Taken 12/25/2024 0559)  Discharge to home or other facility with appropriate resources:   Identify barriers to discharge with patient and caregiver   Arrange for needed discharge resources and transportation as appropriate   Identify discharge learning needs (meds, wound care, etc)     Problem: Safety - Adult  Goal: Free from fall injury  Outcome: Progressing  Flowsheets (Taken 12/25/2024 0559)  Free From Fall Injury: Instruct family/caregiver on patient safety     Problem: ABCDS Injury Assessment  Goal: Absence of physical injury  Outcome: Progressing  Flowsheets (Taken 12/25/2024 0559)  Absence of Physical Injury: Implement safety measures based on patient assessment     Problem: Pain  Goal: Verbalizes/displays adequate comfort level or baseline comfort level  Outcome: Progressing  Flowsheets (Taken 12/25/2024 0559)  Verbalizes/displays adequate comfort level or baseline comfort level:   Encourage patient to monitor pain and request assistance   Assess pain using appropriate pain scale   Administer analgesics based on type and severity of pain and evaluate response   Implement non-pharmacological measures as appropriate and evaluate response     Problem: Respiratory - Adult  Goal: Achieves optimal ventilation and oxygenation  Outcome: Progressing  Flowsheets  Taken 12/25/2024 0559  Achieves optimal ventilation and oxygenation:   Assess for changes in mentation and behavior   Position to facilitate oxygenation and minimize respiratory effort   Assess for changes in respiratory status   Oxygen supplementation based on oxygen saturation or arterial blood gases  Taken 12/24/2024 2049  Achieves optimal ventilation and oxygenation:   Assess for changes in respiratory status   Assess for changes in mentation and behavior   Position to facilitate oxygenation and minimize respiratory  needed  Taken 12/24/2024 2049  Absence of urinary retention:   Assess patient’s ability to void and empty bladder   Monitor intake/output and perform bladder scan as needed     Problem: Nutrition Deficit:  Goal: Optimize nutritional status  Outcome: Progressing  Flowsheets (Taken 12/25/2024 0591)  Nutrient intake appropriate for improving, restoring, or maintaining nutritional needs:   Assess nutritional status and recommend course of action   Monitor oral intake, labs, and treatment plans     Problem: Skin/Tissue Integrity  Goal: Absence of new skin breakdown  Description: 1.  Monitor for areas of redness and/or skin breakdown  2.  Assess vascular access sites hourly  3.  Every 4-6 hours minimum:  Change oxygen saturation probe site  4.  Every 4-6 hours:  If on nasal continuous positive airway pressure, respiratory therapy assess nares and determine need for appliance change or resting period.  Outcome: Progressing     Problem: Musculoskeletal - Adult  Goal: Return mobility to safest level of function  Outcome: Progressing  Flowsheets (Taken 12/24/2024 2049)  Return Mobility to Safest Level of Function: Assess patient stability and activity tolerance for standing, transferring and ambulating with or without assistive devices  Goal: Maintain proper alignment of affected body part  Outcome: Progressing  Flowsheets (Taken 12/25/2024 0521)  Maintain proper alignment of affected body part: Support and protect limb and body alignment per provider's orders  Goal: Return ADL status to a safe level of function  Outcome: Progressing  Flowsheets (Taken 12/25/2024 0592)  Return ADL Status to a Safe Level of Function: Administer medication as ordered

## 2024-12-25 NOTE — PROGRESS NOTES
José Antonio Barger MD covering for Dr. Sindy CLARKE DR GENERAL SURGERY   General Surgery Daily Progress Note    Pt Name: Marry Ayers  Medical Record Number: [id number]  Date of Birth 1950   Today's Date: 12/25/2024  Chief complaint: Stricture of sigmoid colon   ASSESSMENT   POD#22 Open Sigmoid Colectomy, Partial Small Bowel Resection   POD # 11 repair of anastomotic leak and loop ileostomy  Acute hypoxic respiratory insufficiency - improving  History of PE and DVT  PALAK cultures with proteus and G+ cocci  Decontioning   has a past medical history of Allergic rhinitis, Anemia, Anxiety, Cataract, Essential hypertension, GERD (gastroesophageal reflux disease), Hx of blood clots, Hypokalemia, Hypothyroidism, and Pulmonary embolism (HCC).  PLAN   Analgesics and antiemetics as needed  Routine wound and ostomy care. Nurses following.  Removed remaining PALAK drain yesterday  Coumadin.  INR only 1.03.  Continue lovenox until therapeutic. Daily INRs.  Regular as tolerated  Continue Zosyn  PT/OT  Check labs this morning  Weaning supplemental oxygen. Continue pulmonary toileting.   Social work following. Planning ECF now. Likely discharge on Thursday. Will not accept today.  SUBJECTIVE   Marry is sitting up in bed. Feels better than she has but admits it is slow. States pain is more tolerable this morning. No fevers. Ostomy is functioning in her right lower quadrant.  Patient is tolerating diet.  She denies any nausea or vomiting. Denies any bloating. PALAK drain removed yesterday.  Lower incision still draining tan fluid but has decreased. No ostomy appliance in place. She is getting OOB better and moving around more.  Planning ECF tomorrow.     warfarin placeholder: dosing by pharmacy   Other RX Placeholder    enoxaparin  1 mg/kg SubCUTAneous BID    pantoprazole  40 mg Oral QAM AC    sodium chloride flush  5-40 mL IntraVENous 2 times per day    piperacillin-tazobactam  3,375 mg IntraVENous Q8H    montelukast  10 mg  10.3 cm (series 605, images 29 through 33). Ill-defined   peripherally enhancing interloop ascites is noted in the lower abdomen/pelvis.   No bowel obstruction is observed. Diffuse total body wall edema suggesting   anasarca is present.      3. Chronic findings are discussed.      **This report has been created using voice recognition software.  It may contain   minor errors which are inherent in voice recognition technology.**      Electronically signed by Dr. Christie Topete      CTA CHEST W WO CONTRAST   Final Result   1. No filling defects are noted within the pulmonary arterial vasculature to   suggest the presence of pulmonary embolus. Multifocal patchy groundglass   opacities, small bilateral pleural effusions, and moderate bilateral lower lobe   consolidation, right greater than left, suggest pneumonia in the appropriate   clinical setting.      2. Postoperative changes with surgical anastomosis is noted in the upper   abdominal small bowel as well as the distal colon. A large amount of   pneumoperitoneum is seen within the abdomen. Ill-defined pockets of gas and   fluid are seen along both paracolic gutters is concerning for an anastomotic   leak. An ill-defined collection of gas and fluid in the left paracolic gutter   measures up to 2.4 x 10.3 cm (series 605, images 29 through 33). Ill-defined   peripherally enhancing interloop ascites is noted in the lower abdomen/pelvis.   No bowel obstruction is observed. Diffuse total body wall edema suggesting   anasarca is present.      3. Chronic findings are discussed.      **This report has been created using voice recognition software.  It may contain   minor errors which are inherent in voice recognition technology.**      Electronically signed by Dr. Christie Topete      XR CHEST PORTABLE   Final Result      Small bilateral pleural effusions.               **This report has been created using voice recognition software. It may contain   minor errors which are

## 2024-12-25 NOTE — PROGRESS NOTES
Warfarin Pharmacy Consult Note    Warfarin Indication: Hx of DVT & PE  Target INR: 2.0-3.0  Dose prior to admission: 7.5mg Tuesday 5mg SunMWThFSat    Recent Labs     12/23/24  0446 12/24/24  0821 12/25/24  0446   HGB 8.8* 8.8* 8.5*   * 761* 699*     Recent Labs     12/23/24  0445 12/24/24  0821 12/25/24  0446   INR 0.93 0.98 1.03     Concurrent anticoagulants/antiplatelets: lovenox 80mg BID,   Significant warfarin drug-drug interactions: levothyroxine (HM)     Date INR Warfarin Dose   12/24/2024 0.93 (12/23) 7.5 mg   12/25/024   1.03 10 mg                                                 Monitoring:                   INR will be monitored daily.    **Please contact pharmacy for discharge instructions when indicated**    Layla Archuleta, PharmD  PGY1 Pharmacy Resident  12/25/2024 10:48 AM

## 2024-12-26 LAB — INR PPP: 1.14 (ref 0.85–1.13)

## 2024-12-26 PROCEDURE — 2060000000 HC ICU INTERMEDIATE R&B

## 2024-12-26 PROCEDURE — 97110 THERAPEUTIC EXERCISES: CPT

## 2024-12-26 PROCEDURE — 97116 GAIT TRAINING THERAPY: CPT

## 2024-12-26 PROCEDURE — 99024 POSTOP FOLLOW-UP VISIT: CPT | Performed by: SURGERY

## 2024-12-26 PROCEDURE — 6360000002 HC RX W HCPCS: Performed by: NURSE PRACTITIONER

## 2024-12-26 PROCEDURE — 6370000000 HC RX 637 (ALT 250 FOR IP)

## 2024-12-26 PROCEDURE — 6360000002 HC RX W HCPCS: Performed by: SURGERY

## 2024-12-26 PROCEDURE — 6370000000 HC RX 637 (ALT 250 FOR IP): Performed by: SURGERY

## 2024-12-26 PROCEDURE — 85610 PROTHROMBIN TIME: CPT

## 2024-12-26 PROCEDURE — 2580000003 HC RX 258: Performed by: SURGERY

## 2024-12-26 PROCEDURE — 97530 THERAPEUTIC ACTIVITIES: CPT

## 2024-12-26 PROCEDURE — 36415 COLL VENOUS BLD VENIPUNCTURE: CPT

## 2024-12-26 PROCEDURE — 2500000003 HC RX 250 WO HCPCS: Performed by: SURGERY

## 2024-12-26 RX ORDER — WARFARIN SODIUM 7.5 MG/1
7.5 TABLET ORAL
Status: COMPLETED | OUTPATIENT
Start: 2024-12-26 | End: 2024-12-26

## 2024-12-26 RX ADMIN — MONTELUKAST SODIUM 10 MG: 10 TABLET ORAL at 09:00

## 2024-12-26 RX ADMIN — ENOXAPARIN SODIUM 80 MG: 100 INJECTION SUBCUTANEOUS at 20:26

## 2024-12-26 RX ADMIN — PIPERACILLIN AND TAZOBACTAM 3375 MG: 3; .375 INJECTION, POWDER, FOR SOLUTION INTRAVENOUS at 06:27

## 2024-12-26 RX ADMIN — AMOXICILLIN AND CLAVULANATE POTASSIUM 1 TABLET: 875; 125 TABLET, FILM COATED ORAL at 20:26

## 2024-12-26 RX ADMIN — PANTOPRAZOLE SODIUM 40 MG: 40 TABLET, DELAYED RELEASE ORAL at 06:24

## 2024-12-26 RX ADMIN — BUSPIRONE HYDROCHLORIDE 10 MG: 10 TABLET ORAL at 09:00

## 2024-12-26 RX ADMIN — BUSPIRONE HYDROCHLORIDE 10 MG: 10 TABLET ORAL at 20:25

## 2024-12-26 RX ADMIN — LOSARTAN POTASSIUM AND HYDROCHLOROTHIAZIDE 1 TABLET: 25; 100 TABLET ORAL at 09:01

## 2024-12-26 RX ADMIN — OXYCODONE 5 MG: 5 TABLET ORAL at 20:26

## 2024-12-26 RX ADMIN — ENOXAPARIN SODIUM 80 MG: 100 INJECTION SUBCUTANEOUS at 09:02

## 2024-12-26 RX ADMIN — LEVOTHYROXINE SODIUM 125 MCG: 125 TABLET ORAL at 06:24

## 2024-12-26 RX ADMIN — SODIUM CHLORIDE, PRESERVATIVE FREE 10 ML: 5 INJECTION INTRAVENOUS at 09:02

## 2024-12-26 RX ADMIN — WARFARIN SODIUM 7.5 MG: 7.5 TABLET ORAL at 17:03

## 2024-12-26 RX ADMIN — OXYCODONE 5 MG: 5 TABLET ORAL at 10:48

## 2024-12-26 RX ADMIN — SODIUM CHLORIDE, PRESERVATIVE FREE 10 ML: 5 INJECTION INTRAVENOUS at 20:27

## 2024-12-26 RX ADMIN — METOPROLOL SUCCINATE 50 MG: 50 TABLET, EXTENDED RELEASE ORAL at 09:01

## 2024-12-26 ASSESSMENT — PAIN SCALES - GENERAL
PAINLEVEL_OUTOF10: 8
PAINLEVEL_OUTOF10: 5
PAINLEVEL_OUTOF10: 10
PAINLEVEL_OUTOF10: 10

## 2024-12-26 ASSESSMENT — PAIN SCALES - WONG BAKER
WONGBAKER_NUMERICALRESPONSE: HURTS A LITTLE BIT
WONGBAKER_NUMERICALRESPONSE: HURTS A LITTLE BIT

## 2024-12-26 ASSESSMENT — PAIN DESCRIPTION - ORIENTATION: ORIENTATION: RIGHT;LOWER

## 2024-12-26 ASSESSMENT — PAIN DESCRIPTION - DESCRIPTORS
DESCRIPTORS: ACHING
DESCRIPTORS: ACHING

## 2024-12-26 ASSESSMENT — PAIN DESCRIPTION - LOCATION
LOCATION: ABDOMEN
LOCATION: ABDOMEN

## 2024-12-26 ASSESSMENT — PAIN - FUNCTIONAL ASSESSMENT: PAIN_FUNCTIONAL_ASSESSMENT: ACTIVITIES ARE NOT PREVENTED

## 2024-12-26 ASSESSMENT — PAIN DESCRIPTION - ONSET: ONSET: ON-GOING

## 2024-12-26 ASSESSMENT — PAIN DESCRIPTION - PAIN TYPE: TYPE: ACUTE PAIN;SURGICAL PAIN

## 2024-12-26 ASSESSMENT — PAIN DESCRIPTION - FREQUENCY: FREQUENCY: CONTINUOUS

## 2024-12-26 NOTE — PROGRESS NOTES
[facility] Wound Ostomy Continence Nurse  Ostomy Referral Progress Note      NAME:  Marry Ayers  MEDICAL RECORD NUMBER:  [id number]  AGE: 74 y.o.   GENDER:  female  :  1950  TODAY'S DATE:  2024    Subjective   Reason for [facility] Evaluation and Assessment: new ileostomy    Surgeon: Dr Callahan     Date of surgery: 24    Assessment     Encounter: Present to patient room. Patient in chair. Pt noted to have pouch to distal midline incision. Removed pouching systems. Cleansed stoma with warm wash cloth, pat dry. Skin prep applied to varsha-stomal skin. Coloplast 2 piece red flange cut to fit to size, protective ring applied to inner edge of flange, centered over stoma, and applied. Pouch applied. Barrier extenders placed on outer edge of flange. Applied hands lightly over system to activate hydrocolloid. Educated patient step by step throughout pouching system application. Distal incision still draining moderate amount of drainage. Packed area with opticell packing and covered with bordered foam dressing. Would recommend daily dressing changes. Staff to change as needed for saturation. Answered questions and concerns. Will plan to change pouching system and educate next week.  Plans for SNF. Ostomy educational information given to patient, with additional pouching systems in room. Will continue to follow. Patient in chair, call light in reach.       24    Ileostomy/Jejunostomy RLQ Loop ileostomy (Active)   Stomal Appliance 2 piece;Flat;Changed 24 1219   Flange Size (inches) 35 Inches 24 1219   Stoma  Assessment Red;Moist;Protrudes 24 1219   Peristomal Assessment Clean, dry & intact 24 1219   Mucocutaneous Junction Separation 24 1219   Treatment Barrier ring;Pouch change;Site care;Liquid skin barrier 24 1219   Stool Appearance Loose 24 1219   Stool Color Brown 24 1219   Stool Amount Small 24   Output (mL) 100 ml 24 1219   Number  of days: 9     OSTOMY ASSESSMENT:  12/19/24    Ileostomy/Jejunostomy RLQ Loop ileostomy (Active)   Stomal Appliance 2 piece 12/19/24 1352   Flange Size (inches) 35 Inches 12/19/24 1352   Stoma  Assessment Red;Moist;Protrudes 12/19/24 1352   Peristomal Assessment Induration;Edema;Blanchable erythema 12/19/24 1352   Mucocutaneous Junction Intact 12/19/24 1352   Treatment Site care;Liquid skin barrier;Barrier ring;Pouch change 12/19/24 1352   Stool Appearance Watery 12/19/24 1352   Stool Color Brown;Yellow 12/19/24 1352   Stool Amount Medium 12/19/24 1352   Output (mL) 0 ml 12/19/24 0325   Number of days: 5     12/19/24    Peristomal induration/erythema    12/19/24    Right flank erythema and induration, warm to touch            Plan     Discharge Plan:  Placement for patient upon discharge: unknown  [] Home with HH  [] Home with self/family member changing  [] Inpatient Rehab  [x] ECF/SNF  [] [facility]/ LTAC    [x] Follow-up referral placed Outpatient Ostomy Clinic  [x] Follow-up appointment entered  [x] Discharge instructions in AVS  [x] New patient folder/education given  [x] Discharge supply pack given    Midline abdomen incision- Clean wound with normal saline or wound cleanser and gauze. Pat dry with clean gauze. Lightly pack wound with hydrofiber packing. Cover with bordered foam dressing or ABD pad. Secure with tape. Change dressing daily and PRN for strikethrough drainage/ saturation.      For [facility] outpatient ostomy clinic:   Recommend patient follow up in outpatient wound clinic with LOY Cerna-CNP.   [facility] Outpatient Wound and Ostomy [facility]  [address] [address]  Phone: [phone]  Fax: [phone]

## 2024-12-26 NOTE — PROGRESS NOTES
Comprehensive Nutrition Assessment    Type and Reason for Visit: Reassess    Nutrition Recommendations/Plan:   Continue diet as ordered.   Patient continues to decline all ONS and snack options.   Recommend MVI.   RD provided ostomy diet education to patient- reviewed handouts and answered questions on 12/26.      Malnutrition Assessment:  Malnutrition Status: Severe malnutrition  Context: Acute Illness       Findings of the 6 clinical characteristics of malnutrition:  Energy Intake:  50% or less of estimated energy requirements for 5 or more days (NPO ( had surgery 12/4))  Weight Loss:  1% to 2% over 1 week     Body Fat Loss:  Mild body fat loss Orbital   Muscle Mass Loss:  Mild muscle mass loss Temples (temporalis)  Fluid Accumulation:  Unable to assess     Strength:  Not Performed    Nutrition Assessment:    Pt stable from a nutritional standpoint AEB patient is tolerating regular diet well with no nausea, emesis or abdominal pain, continues with decreased appetite. At risk for further nutritional compromise r/t admit with stricture of sigmoid colon, colovaginal fistula, HTN, peritonitis increased needs for wound healing s/p surgery (12/4/24) see below and underlying medical condition (GERD, DVT, Blood Clots, Hypothyroidism, Pulmonary Embolism).     Nutrition Related Findings:    Pt. Report/Treatments/Miscellaneous: Patient seen, sitting up in bed. She reports that her appetite is alright- improving. She denies any nausea/ vomiting. RD reviewed ostomy diet education and answered questions.   GI Status: ostomy output: 1050 ml in the last 24 hours  Wound: Surgical Incision (((12/4) open sigmoid colectomy partial small bowel resection , wound vac abdomen,(12/13) exploratory lap creation of ileostomy,  (12/16) exploratory laparotomy repair of anastamosis, creation of loop ileostomy))   Edema: none, per flowsheet   Pertinent Labs:   No results found for: \"GLUF\", \"LABA1C\"  Recent Labs     12/24/24  0821

## 2024-12-26 NOTE — CARE COORDINATION
12/26/24, 11:12 AM EST  DISCHARGE PLANNING EVALUATION    SW received a call from spouse Naeem and he asked about discharge today. SW explained that attending has not rounded yet but SW will let him know as soon as SW knows discharge date.

## 2024-12-26 NOTE — PROGRESS NOTES
[facility]  INPATIENT PHYSICAL THERAPY  DAILY NOTE  STRZ ICU STEPDOWN TELEMETRY 4K - 4K-17/017-A      Discharge Recommendations: Subacte/Skilled Nursing Facility  Equipment Recommendations: No               Time In: 1405  Time Out: 1431  Timed Code Treatment Minutes: 26 Minutes  Minutes: 26          Date: 2024  Patient Name: Marry Ayers,  Gender:  female        MRN: [id number]  : 1950  (74 y.o.)     Referring Practitioner: Casimiro Callahan DO  Diagnosis: Sigmoid stricture (HCC)  Additional Pertinent Hx: Pt is s/p open sigmoid colectomy with a partial SB resection completed by Dr Callahan on . As of  abdomen is more distended than it was at the beginning of the shift. Chart reviewed. Patient had a KUB this AM that was concerning for ileus but she had been passing flatus and tolerating full liquids.      Repeat KUB ordered. Findings similar to this morning's xray but given increased pain, N/V, and distention, will place a NGT to LIWS . Chest xray -Small left-sided pleural effusion with adjacent atelectasis/infiltrate, Mild cardiomegaly.     Prior Level of Function:  Lives With: Spouse  Type of Home: House  Home Layout: One level  Home Access: Stairs to enter without rails  Entrance Stairs - Number of Steps: 3 GRACIELA  Home Equipment: Walker - Rolling, Rollator, Cane   Bathroom Shower/Tub: Tub/Shower unit  Bathroom Toilet: Standard  Bathroom Equipment: Shower chair    Prior Level of Assist for ADLs: Independent  Prior Level of Assist for Homemaking: Independent  Prior Level of Assist for Transfers: Independent  Active : Yes  Additional Comments: IND prior, used to care for her husbands parents and aunt  Prior Level of Assist for Ambulation: Independent household ambulator, with or without device  Has the patient had two or more falls in the past year or any fall with injury in the past year?: No    Restrictions/Precautions:  Restrictions/Precautions: General Precautions,  Fall Risk  Position Activity Restriction  Other Position/Activity Restrictions: PALAK drains x1, Ileostomy/jejunostomy     SUBJECTIVE: Pt in bed upon arrival, and agrees to therapy, RN approved session, Pt A&O X 4/4, Pt pleasant and cooperative     PAIN: abdomen is sore with movement    Vitals: Flowsheet reviewed and vitals WFL prior to session start    OBJECTIVE:  Bed Mobility:  Supine to Sit: Stand By Assistance, with head of bed raised, with increased time for completion  Sit to Supine: Stand By Assistance, with head of bed raised   Scooting: Stand By Assistance  HOB elevated and pt sat up in long sitting for completion of each    Transfers:  Sit to Stand: Contact Guard Assistance  Stand to Sit:Contact Guard Assistance    Ambulation:  Contact Guard Assistance, with verbal cues , with increased time for completion  Distance: 40ft  Surface: Level Tile  Device: Rolling Walker  Gait Deviations: Forward Flexed Posture, Slow Krystal, Decreased Step Length Bilaterally, Decreased Gait Speed, Decreased Heel Strike Bilaterally, Unsteady Gait, and Increased reliance on assistive device     Stairs:  Not Tested    Balance:  Pt statically stood ~1min with RW support while this writer pulled down bed sheet and remade the bed    Exercise:  Patient was guided in 1 set(s) 10 reps of exercises to both lower extremities: Ankle pumps, Glut sets, Quad sets, Heelslides,Hip abduction/adduction and abd bracing, vaccums. Exercises were completed for increased independence with functional mobility.    Functional Outcome Measures:  [facility] (6 CLICK) BASIC MOBILITY  AM-[facility] Inpatient Mobility Raw Score : 17  AM-[facility] Inpatient T-Scale Score : 42.13        Modified Williamson Scale:  Not Applicable    ASSESSMENT:  Assessment: Patient progressing toward established goals. Pt demonstrates impairments with strength, balance, endurance, activity tolerance, independence and would benefit from continued skilled PT improve these impairments, to maximize

## 2024-12-26 NOTE — PROGRESS NOTES
[facility]  STRZ ICU STEPDOWN TELEMETRY 4K  Occupational Therapy  Daily Note    Discharge Recommendations: Home with assist as needed and Home with Home Health OT  Equipment Recommendations: No continue to monitor      Time In: 1044  Time Out: 1107  Timed Code Treatment Minutes: 23 Minutes  Minutes: 23          Date: 2024  Patient Name: Marry Ayers,   Gender: female      Room: [address] [place] [address]  MRN: [id number]  : 1950  (74 y.o.)  Referring Practitioner: Casimiro Callahan DO  Diagnosis: Sigmoid stricture (HCC)  Additional Pertinent Hx: Pt is s/p open sigmoid colectomy with a partial SB resection completed by Dr Callahan on . As of  abdomen is more distended than it was at the beginning of the shift. Chart reviewed. Patient had a KUB this AM that was concerning for ileus but she had been passing flatus and tolerating full liquids.      Repeat KUB ordered. Findings similar to this morning's xray but given increased pain, N/V, and distention, will place a NGT to LIWS . Chest xray -Small left-sided pleural effusion with adjacent atelectasis/infiltrate, Mild cardiomegaly.  Open Sigmoid Colectomy, Partial Small Bowel Resection   12/10 Open sigmoid colectomy and partial small bowel resection.    EXPLORATORY LAPOROTOMY, creation of iliostomy     Exploratory laparotomy, repair of anastomosis, creation of loop ileostomy.    Restrictions/Precautions:  Restrictions/Precautions: General Precautions, Fall Risk  Position Activity Restriction  Other Position/Activity Restrictions: PALAK drains x1, Ileostomy/jejunostomy     Social/Functional History:  Lives With: Spouse  Type of Home: House  Home Layout: One level  Home Access: Stairs to enter without rails  Entrance Stairs - Number of Steps: 3 GRACIELA  Home Equipment: Walker - Rolling, Rollator, Cane   Bathroom Shower/Tub: Tub/Shower unit  Bathroom Toilet: Standard  Bathroom Equipment: Shower chair       Prior Level of Assist for ADLs:  Independent  Prior Level of Assist for Homemaking: Independent  Prior Level of Assist for Transfers: Independent  Prior Level of Assist for Ambulation: Independent household ambulator, with or without device  Has the patient had two or more falls in the past year or any fall with injury in the past year?: No    Active : Yes  Occupation: Retired  Additional Comments: IND prior, 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 Patient agreeable to EOB exer. Patient A & O x 3.     PAIN: 10/10r; in abdomen. Minimal facial grimacing during tx. RN provided meds during tx     Vitals: Vitals not assessed per clinical judgement, see nursing flowsheet    COGNITION: Decreased Insight and self limiting     ADL:   No ADL's completed this session..    IADL:   Not Tested    BALANCE:  Sitting Balance:  Supervision. Seated EOB    BED MOBILITY:  Supine to Sit: Stand By Assistance with use of bed rails  Sit to Supine: Stand By Assistance        ADDITIONAL ACTIVITIES:  -completed 1 set x 10-15 reps of UB exer with 1# free wt while seated EOB to increase UB strength for functional transfers. No c/o pain in abdomen during UB exer.        Modified Jing Scale:  Not Applicable    ASSESSMENT:     Activity Tolerance:  Patient tolerance of  treatment: Fair treatment tolerance      Plan: Times Per Week: 5-6x  Current Treatment Recommendations: Strengthening, Balance training, Endurance training, Safety education & training, Equipment evaluation, education, & procurement, Pain management, Functional mobility training, Patient/Caregiver education & training, Self-Care / ADL, Home management training    Education:  Learners: Patient  Role of OT, Plan of Care, Home Exercise Program, Precautions, Reviewed Prior Education, Importance of Increasing Activity, and Fall Prevention    Goals  Short Term

## 2024-12-26 NOTE — PROGRESS NOTES
Gabrielle Brown MD covering for Dr. Sindy CLARKE DR GENERAL SURGERY   General Surgery Daily Progress Note    Pt Name: Marry Ayers  Medical Record Number: [id number]  Date of Birth 1950   Today's Date: 12/26/2024  Chief complaint: Stricture of sigmoid colon   ASSESSMENT   POD#23 Open Sigmoid Colectomy, Partial Small Bowel Resection   POD # 12 repair of anastomotic leak and loop ileostomy  Acute hypoxic respiratory insufficiency - improving  History of PE and DVT  PALAK cultures with proteus and G+ cocci  Decontioning   has a past medical history of Allergic rhinitis, Anemia, Anxiety, Cataract, Essential hypertension, GERD (gastroesophageal reflux disease), Hx of blood clots, Hypokalemia, Hypothyroidism, and Pulmonary embolism (HCC).  PLAN   Analgesics and antiemetics as needed  Routine wound and ostomy care. Nurses following.  Removed remaining PALAK drain yesterday  Coumadin.  INR only 1.14.  Continue lovenox until therapeutic. Daily INRs.  Regular as tolerated  Transition to oral antibotics   PT/OT  Check labs this morning  Weaning supplemental oxygen. Continue pulmonary toileting.   Social work following.  Ready for ECF today, has a bed, patient refusing to go feels she needs another day.   SUBJECTIVE   Marry is sitting up in bed with wound and ostomy. There continues to be drainage from inferior portion of the wound that si well controlled with a dressing.  Planning ECF tomorrow.     warfarin  7.5 mg Oral Once    warfarin placeholder: dosing by pharmacy   Other RX Placeholder    enoxaparin  1 mg/kg SubCUTAneous BID    pantoprazole  40 mg Oral QAM AC    sodium chloride flush  5-40 mL IntraVENous 2 times per day    piperacillin-tazobactam  3,375 mg IntraVENous Q8H    montelukast  10 mg Oral QAM    losartan-hydroCHLOROthiazide  1 tablet Oral Daily    busPIRone  10 mg Oral BID    levothyroxine  125 mcg Oral Daily    metoprolol succinate  50 mg Oral Daily     Continuous Infusions:   sodium chloride       dextrose      sodium chloride       PRN Meds:.morphine, sodium chloride flush, sodium chloride, levalbuterol, acetaminophen, glucose, dextrose bolus **OR** dextrose bolus, glucagon (rDNA), dextrose, magnesium sulfate, sodium phosphate 15 mmol in sodium chloride 0.9 % 250 mL IVPB, hydrALAZINE, potassium chloride **OR** potassium alternative oral replacement **OR** potassium chloride, potassium chloride, sodium chloride, oxyCODONE, ondansetron  OBJECTIVE   CURRENT VITALS:  height is 1.549 m (5' 1\") and weight is 73 kg (160 lb 15 oz). Her oral temperature is 98.2 °F (36.8 °C). Her blood pressure is 134/64 and her pulse is 78. Her respiration is 16 and oxygen saturation is 94%.   Temperature Range (24h):Temp: 98.2 °F (36.8 °C) Temp  Av.2 °F (36.8 °C)  Min: 97.8 °F (36.6 °C)  Max: 98.5 °F (36.9 °C)  BP Range (24h): Systolic (24hrs), Av , Min:111 , Max:171     Diastolic (24hrs), Av, Min:49, Max:74    Pulse Range (24h): Pulse  Av  Min: 73  Max: 91  Respiration Range (24h): Resp  Av  Min: 16  Max: 18  Current Pulse Ox (24h):  SpO2: 94 %  Pulse Ox Range (24h):  SpO2  Av %  Min: 94 %  Max: 99 %  Oxygen Amount and Delivery: O2 Flow Rate (L/min): 1 L/min  Incentive Spirometry Tx:       Achieved Volume (mL): 500 mL    GENERAL: alert, no distress  LUNGS: lungs diminished   HEART: normal rate and regular rhythm  ABDOMEN: Ostomy in LLQ with + output. Distended + bowel sounds.  Incision: tan,milky fluid from inferior incision  EXTREMITY: no cyanosis, clubbing or edema    In: 506.1 [P.O.:240]  Out: 1400 [Urine:350]  Date 24 0000 - 24 235   Shift 1301-1009 7327-4976 8291-7954 24 Hour Total   INTAKE   P.O.(mL/kg/hr) 240(0.4)   240   IV Piggyback(mL/kg) 266.1(3.6)   266.1(3.6)   Shift Total(mL/kg) 506.1(6.9)   506.1(6.9)   OUTPUT   Urine(mL/kg/hr) 250(0.4)   250   Stool(mL/kg) 250(3.4)   250(3.4)   Shift Total(mL/kg) 500(6.8)   500(6.8)   Weight (kg) 73 73 73 73         LABS     Recent Labs

## 2024-12-26 NOTE — PLAN OF CARE
Problem: Discharge Planning  Goal: Discharge to home or other facility with appropriate resources  Outcome: Progressing  Flowsheets (Taken 12/26/2024 0800)  Discharge to home or other facility with appropriate resources:   Identify barriers to discharge with patient and caregiver   Identify discharge learning needs (meds, wound care, etc)     Problem: Safety - Adult  Goal: Free from fall injury  Outcome: Progressing     Problem: ABCDS Injury Assessment  Goal: Absence of physical injury  Outcome: Progressing     Problem: Pain  Goal: Verbalizes/displays adequate comfort level or baseline comfort level  Outcome: Progressing     Problem: Respiratory - Adult  Goal: Achieves optimal ventilation and oxygenation  Outcome: Progressing  Flowsheets (Taken 12/26/2024 0800)  Achieves optimal ventilation and oxygenation:   Assess for changes in respiratory status   Assess for changes in mentation and behavior     Problem: Skin/Tissue Integrity - Adult  Goal: Skin integrity remains intact  Outcome: Progressing  Flowsheets (Taken 12/26/2024 0800)  Skin Integrity Remains Intact: Monitor for areas of redness and/or skin breakdown  Goal: Incisions, wounds, or drain sites healing without S/S of infection  Outcome: Progressing  Flowsheets (Taken 12/26/2024 0800)  Incisions, Wounds, or Drain Sites Healing Without Sign and Symptoms of Infection: ADMISSION and DAILY: Assess and document risk factors for pressure ulcer development     Problem: Gastrointestinal - Adult  Goal: Minimal or absence of nausea and vomiting  Outcome: Progressing  Flowsheets (Taken 12/26/2024 0800)  Minimal or absence of nausea and vomiting: Provide nonpharmacologic comfort measures as appropriate  Goal: Maintains or returns to baseline bowel function  Outcome: Progressing  Flowsheets (Taken 12/26/2024 0800)  Maintains or returns to baseline bowel function: Assess bowel function  Goal: Maintains adequate nutritional intake  Outcome: Progressing  Flowsheets (Taken  12/26/2024 0800)  Maintains adequate nutritional intake: Monitor percentage of each meal consumed     Problem: Genitourinary - Adult  Goal: Absence of urinary retention  Outcome: Progressing  Flowsheets (Taken 12/26/2024 0800)  Absence of urinary retention: Assess patient’s ability to void and empty bladder     Problem: Musculoskeletal - Adult  Goal: Return mobility to safest level of function  Outcome: Progressing  Flowsheets (Taken 12/26/2024 0800)  Return Mobility to Safest Level of Function: Assess patient stability and activity tolerance for standing, transferring and ambulating with or without assistive devices  Goal: Maintain proper alignment of affected body part  Outcome: Progressing  Flowsheets (Taken 12/26/2024 0800)  Maintain proper alignment of affected body part: Support and protect limb and body alignment per provider's orders  Goal: Return ADL status to a safe level of function  Outcome: Progressing  Flowsheets (Taken 12/26/2024 0800)  Return ADL Status to a Safe Level of Function: Assist and instruct patient to increase activity and self care as tolerated     Problem: Nutrition Deficit:  Goal: Optimize nutritional status  12/26/2024 1855 by Madelin Workman, RN  Outcome: Progressing  12/26/2024 1040 by Melissa Lamb, RD  Outcome: Progressing  Flowsheets (Taken 12/26/2024 1040)  Nutrient intake appropriate for improving, restoring, or maintaining nutritional needs:   Assess nutritional status and recommend course of action   Monitor oral intake, labs, and treatment plans   Recommend appropriate diets, oral nutritional supplements, and vitamin/mineral supplements     Problem: Skin/Tissue Integrity  Goal: Absence of new skin breakdown  Description: 1.  Monitor for areas of redness and/or skin breakdown  2.  Assess vascular access sites hourly  3.  Every 4-6 hours minimum:  Change oxygen saturation probe site  4.  Every 4-6 hours:  If on nasal continuous positive airway pressure, respiratory  therapy assess nares and determine need for appliance change or resting period.  Outcome: Progressing

## 2024-12-26 NOTE — PROGRESS NOTES
Warfarin Pharmacy Consult Note    Warfarin Indication: Hx of DVT & PE  Target INR: 2.0-3.0  Dose prior to admission: 7.5mg Tuesday 5mg SunMWThFSat    Recent Labs     12/24/24  0821 12/25/24  0446   HGB 8.8* 8.5*   * 699*     Recent Labs     12/24/24  0821 12/25/24  0446 12/26/24  0505   INR 0.98 1.03 1.14*     Concurrent anticoagulants/antiplatelets: lovenox 80mg BID,   Significant warfarin drug-drug interactions: levothyroxine (HM)     Date INR Warfarin Dose   12/24/2024 0.93 (12/23) 7.5 mg   12/25/024   1.03 10 mg     12/26/2024   1.14 7.5 mg                                          Monitoring:                   INR will be monitored daily.    **Please contact pharmacy for discharge instructions when indicated**    Layla Archuleta, PharmD  PGY1 Pharmacy Resident  12/26/2024 9:58 AM

## 2024-12-27 LAB — INR PPP: 1.62 (ref 0.85–1.13)

## 2024-12-27 PROCEDURE — 6360000002 HC RX W HCPCS: Performed by: NURSE PRACTITIONER

## 2024-12-27 PROCEDURE — 6370000000 HC RX 637 (ALT 250 FOR IP)

## 2024-12-27 PROCEDURE — 85610 PROTHROMBIN TIME: CPT

## 2024-12-27 PROCEDURE — 97116 GAIT TRAINING THERAPY: CPT

## 2024-12-27 PROCEDURE — 6370000000 HC RX 637 (ALT 250 FOR IP): Performed by: SURGERY

## 2024-12-27 PROCEDURE — 2500000003 HC RX 250 WO HCPCS: Performed by: SURGERY

## 2024-12-27 PROCEDURE — 36415 COLL VENOUS BLD VENIPUNCTURE: CPT

## 2024-12-27 PROCEDURE — 97110 THERAPEUTIC EXERCISES: CPT

## 2024-12-27 PROCEDURE — 1200000000 HC SEMI PRIVATE

## 2024-12-27 RX ORDER — OXYCODONE HYDROCHLORIDE 5 MG/1
5 TABLET ORAL EVERY 4 HOURS PRN
Qty: 28 TABLET | Refills: 0 | Status: SHIPPED | OUTPATIENT
Start: 2024-12-27 | End: 2024-12-28

## 2024-12-27 RX ORDER — WARFARIN SODIUM 5 MG/1
5 TABLET ORAL
Status: COMPLETED | OUTPATIENT
Start: 2024-12-27 | End: 2024-12-27

## 2024-12-27 RX ADMIN — BUSPIRONE HYDROCHLORIDE 10 MG: 10 TABLET ORAL at 09:17

## 2024-12-27 RX ADMIN — AMOXICILLIN AND CLAVULANATE POTASSIUM 1 TABLET: 875; 125 TABLET, FILM COATED ORAL at 09:19

## 2024-12-27 RX ADMIN — PANTOPRAZOLE SODIUM 40 MG: 40 TABLET, DELAYED RELEASE ORAL at 05:46

## 2024-12-27 RX ADMIN — OXYCODONE 5 MG: 5 TABLET ORAL at 21:12

## 2024-12-27 RX ADMIN — OXYCODONE 5 MG: 5 TABLET ORAL at 04:48

## 2024-12-27 RX ADMIN — ENOXAPARIN SODIUM 80 MG: 100 INJECTION SUBCUTANEOUS at 09:17

## 2024-12-27 RX ADMIN — LEVOTHYROXINE SODIUM 125 MCG: 125 TABLET ORAL at 05:47

## 2024-12-27 RX ADMIN — ENOXAPARIN SODIUM 80 MG: 100 INJECTION SUBCUTANEOUS at 19:25

## 2024-12-27 RX ADMIN — MONTELUKAST SODIUM 10 MG: 10 TABLET ORAL at 09:17

## 2024-12-27 RX ADMIN — AMOXICILLIN AND CLAVULANATE POTASSIUM 1 TABLET: 875; 125 TABLET, FILM COATED ORAL at 19:26

## 2024-12-27 RX ADMIN — WARFARIN SODIUM 5 MG: 5 TABLET ORAL at 17:29

## 2024-12-27 RX ADMIN — BUSPIRONE HYDROCHLORIDE 10 MG: 10 TABLET ORAL at 19:28

## 2024-12-27 RX ADMIN — LOSARTAN POTASSIUM AND HYDROCHLOROTHIAZIDE 1 TABLET: 25; 100 TABLET ORAL at 09:17

## 2024-12-27 RX ADMIN — METOPROLOL SUCCINATE 50 MG: 50 TABLET, EXTENDED RELEASE ORAL at 09:17

## 2024-12-27 RX ADMIN — SODIUM CHLORIDE, PRESERVATIVE FREE 10 ML: 5 INJECTION INTRAVENOUS at 19:26

## 2024-12-27 RX ADMIN — OXYCODONE 5 MG: 5 TABLET ORAL at 14:12

## 2024-12-27 RX ADMIN — SODIUM CHLORIDE, PRESERVATIVE FREE 10 ML: 5 INJECTION INTRAVENOUS at 09:18

## 2024-12-27 ASSESSMENT — PAIN DESCRIPTION - DESCRIPTORS
DESCRIPTORS: ACHING;DISCOMFORT
DESCRIPTORS: ACHING
DESCRIPTORS: ACHING

## 2024-12-27 ASSESSMENT — PAIN DESCRIPTION - ORIENTATION
ORIENTATION: MID
ORIENTATION: RIGHT;LEFT;MID
ORIENTATION: RIGHT;LOWER

## 2024-12-27 ASSESSMENT — PAIN SCALES - GENERAL
PAINLEVEL_OUTOF10: 10
PAINLEVEL_OUTOF10: 9
PAINLEVEL_OUTOF10: 8
PAINLEVEL_OUTOF10: 0
PAINLEVEL_OUTOF10: 2
PAINLEVEL_OUTOF10: 5
PAINLEVEL_OUTOF10: 7

## 2024-12-27 ASSESSMENT — PAIN DESCRIPTION - FREQUENCY: FREQUENCY: CONTINUOUS

## 2024-12-27 ASSESSMENT — PAIN DESCRIPTION - PAIN TYPE: TYPE: ACUTE PAIN;SURGICAL PAIN

## 2024-12-27 ASSESSMENT — PAIN DESCRIPTION - LOCATION
LOCATION: ABDOMEN

## 2024-12-27 ASSESSMENT — PAIN DESCRIPTION - ONSET: ONSET: ON-GOING

## 2024-12-27 ASSESSMENT — PAIN SCALES - WONG BAKER: WONGBAKER_NUMERICALRESPONSE: HURTS A LITTLE BIT

## 2024-12-27 NOTE — PROGRESS NOTES
Warfarin Pharmacy Consult Note    Warfarin Indication: Hx of DVT & PE  Target INR: 2.0-3.0  Dose prior to admission: 7.5mg Tuesday 5mg SunMWThFSat    Recent Labs     12/25/24  0446   HGB 8.5*   *     Recent Labs     12/25/24  0446 12/26/24  0505 12/27/24  0519   INR 1.03 1.14* 1.62*     Concurrent anticoagulants/antiplatelets: lovenox 80mg BID,   Significant warfarin drug-drug interactions: levothyroxine (HM)     Date INR Warfarin Dose   12/24/2024 0.93 (12/23) 7.5 mg   12/25/024   1.03 10 mg     12/26/2024   1.14 7.5 mg    12/27/2024   1.62 5 mg                                Monitoring:                   INR will be monitored daily.    **Please contact pharmacy for discharge instructions when indicated**    Layla Archuleta, PharmD  PGY1 Pharmacy Resident  12/27/2024 10:50 AM

## 2024-12-27 NOTE — PROGRESS NOTES
[facility]  INPATIENT PHYSICAL THERAPY  DAILY NOTE  STRZ ONC MED 5K - 5K-21/021-A      Discharge Recommendations: Subacte/Skilled Nursing Facility  Equipment Recommendations: No               Time In: 1437  Time Out: 1509  Timed Code Treatment Minutes: 32 Minutes  Minutes: 32          Date: 2024  Patient Name: Marry Ayers,  Gender:  female        MRN: [id number]  : 1950  (74 y.o.)     Referring Practitioner: Casimiro Callahan DO  Diagnosis: Sigmoid stricture (HCC)  Additional Pertinent Hx: Pt is s/p open sigmoid colectomy with a partial SB resection completed by Dr Callahan on . As of  abdomen is more distended than it was at the beginning of the shift. Chart reviewed. Patient had a KUB this AM that was concerning for ileus but she had been passing flatus and tolerating full liquids.      Repeat KUB ordered. Findings similar to this morning's xray but given increased pain, N/V, and distention, will place a NGT to LIWS . Chest xray -Small left-sided pleural effusion with adjacent atelectasis/infiltrate, Mild cardiomegaly.     Prior Level of Function:  Lives With: Spouse  Type of Home: House  Home Layout: One level  Home Access: Stairs to enter without rails  Entrance Stairs - Number of Steps: 3 GRACIELA  Home Equipment: Walker - Rolling, Rollator, Cane   Bathroom Shower/Tub: Tub/Shower unit  Bathroom Toilet: Standard  Bathroom Equipment: Shower chair    Prior Level of Assist for ADLs: Independent  Prior Level of Assist for Homemaking: Independent  Prior Level of Assist for Transfers: Independent  Active : Yes  Additional Comments: IND prior, used to care for her husbands parents and aunt  Prior Level of Assist for Ambulation: Independent household ambulator, with or without device  Has the patient had two or more falls in the past year or any fall with injury in the past year?: No    Restrictions/Precautions:  Restrictions/Precautions: General Precautions, Fall

## 2024-12-27 NOTE — PROGRESS NOTES
[facility]  OCCUPATIONAL THERAPY MISSED TREATMENT NOTE  STRZ ONC MED 5K  5K-21/021-A      Date: 2024  Patient Name: Marry Ayers        CSN: [id number]   : 1950  (74 y.o.)  Gender: female   Referring Practitioner: Casimiro Callahan DO            REASON FOR MISSED TREATMENT:  Pt working with ancillary department for PT session this date. Will reattempt for next available date.

## 2024-12-27 NOTE — CARE COORDINATION
12/27/24, 1:25 PM EST    DISCHARGE PLANNING EVALUATION      Discharge order has been placed, however spoke with Julien at [facility], there is no transport available today. Spouse is not comfortable transporting. SW will set up ambulette for tomorrow morning.       Faxed transport forms to [facility] requesting 10am transport tomorrow morning. Updated RN and Dr. Callahan. Attempted call to [facility], no answer.

## 2024-12-27 NOTE — PLAN OF CARE
Problem: Discharge Planning  Goal: Discharge to home or other facility with appropriate resources  Outcome: Progressing  Flowsheets (Taken 12/27/2024 0742)  Discharge to home or other facility with appropriate resources: Identify barriers to discharge with patient and caregiver     Problem: Safety - Adult  Goal: Free from fall injury  Outcome: Progressing     Problem: ABCDS Injury Assessment  Goal: Absence of physical injury  Outcome: Progressing     Problem: Pain  Goal: Verbalizes/displays adequate comfort level or baseline comfort level  Outcome: Progressing  Flowsheets (Taken 12/27/2024 0742)  Verbalizes/displays adequate comfort level or baseline comfort level: Encourage patient to monitor pain and request assistance     Problem: Skin/Tissue Integrity - Adult  Goal: Skin integrity remains intact  Outcome: Progressing  Flowsheets (Taken 12/27/2024 0742)  Skin Integrity Remains Intact: Monitor for areas of redness and/or skin breakdown  Goal: Incisions, wounds, or drain sites healing without S/S of infection  Outcome: Progressing  Flowsheets (Taken 12/27/2024 0742)  Incisions, Wounds, or Drain Sites Healing Without Sign and Symptoms of Infection: TWICE DAILY: Assess and document skin integrity     Problem: Gastrointestinal - Adult  Goal: Minimal or absence of nausea and vomiting  Outcome: Progressing  Flowsheets (Taken 12/27/2024 0742)  Minimal or absence of nausea and vomiting: Nutrition consult to assist patient with adequate nutrition and appropriate food choices  Goal: Maintains or returns to baseline bowel function  Outcome: Progressing  Flowsheets (Taken 12/27/2024 0742)  Maintains or returns to baseline bowel function: Assess bowel function  Goal: Maintains adequate nutritional intake  Outcome: Progressing  Flowsheets (Taken 12/27/2024 0742)  Maintains adequate nutritional intake: Monitor percentage of each meal consumed     Problem: Musculoskeletal - Adult  Goal: Return mobility to safest level of

## 2024-12-28 VITALS
HEART RATE: 83 BPM | BODY MASS INDEX: 30.39 KG/M2 | DIASTOLIC BLOOD PRESSURE: 54 MMHG | RESPIRATION RATE: 18 BRPM | OXYGEN SATURATION: 93 % | HEIGHT: 61 IN | SYSTOLIC BLOOD PRESSURE: 139 MMHG | WEIGHT: 160.94 LBS | TEMPERATURE: 97.7 F

## 2024-12-28 LAB — INR PPP: 1.8 (ref 0.85–1.13)

## 2024-12-28 PROCEDURE — 6360000002 HC RX W HCPCS: Performed by: NURSE PRACTITIONER

## 2024-12-28 PROCEDURE — 6370000000 HC RX 637 (ALT 250 FOR IP): Performed by: SURGERY

## 2024-12-28 PROCEDURE — 2500000003 HC RX 250 WO HCPCS: Performed by: SURGERY

## 2024-12-28 PROCEDURE — 36415 COLL VENOUS BLD VENIPUNCTURE: CPT

## 2024-12-28 PROCEDURE — 85610 PROTHROMBIN TIME: CPT

## 2024-12-28 RX ORDER — OXYCODONE HYDROCHLORIDE 5 MG/1
5 TABLET ORAL EVERY 4 HOURS PRN
Qty: 28 TABLET | Refills: 0 | Status: SHIPPED | OUTPATIENT
Start: 2024-12-28 | End: 2024-12-28

## 2024-12-28 RX ORDER — WARFARIN SODIUM 5 MG/1
5 TABLET ORAL
Status: COMPLETED | OUTPATIENT
Start: 2024-12-28 | End: 2024-12-28

## 2024-12-28 RX ORDER — OXYCODONE HYDROCHLORIDE 5 MG/1
5 TABLET ORAL EVERY 4 HOURS PRN
Qty: 28 TABLET | Refills: 0 | Status: ON HOLD | OUTPATIENT
Start: 2024-12-28 | End: 2025-01-04

## 2024-12-28 RX ORDER — ENOXAPARIN SODIUM 100 MG/ML
1 INJECTION SUBCUTANEOUS 2 TIMES DAILY
Status: DISCONTINUED | OUTPATIENT
Start: 2024-12-28 | End: 2024-12-28 | Stop reason: HOSPADM

## 2024-12-28 RX ORDER — ENOXAPARIN SODIUM 100 MG/ML
1 INJECTION SUBCUTANEOUS 2 TIMES DAILY
Status: DISCONTINUED | OUTPATIENT
Start: 2024-12-28 | End: 2024-12-28

## 2024-12-28 RX ORDER — ENOXAPARIN SODIUM 100 MG/ML
1 INJECTION SUBCUTANEOUS 2 TIMES DAILY
Qty: 2.4 ML | Refills: 0 | Status: SHIPPED | OUTPATIENT
Start: 2024-12-28 | End: 2024-12-30

## 2024-12-28 RX ORDER — ENOXAPARIN SODIUM 100 MG/ML
1 INJECTION SUBCUTANEOUS 2 TIMES DAILY
Qty: 2.4 ML | Refills: 0 | Status: SHIPPED | OUTPATIENT
Start: 2024-12-28 | End: 2024-12-28

## 2024-12-28 RX ADMIN — BUSPIRONE HYDROCHLORIDE 10 MG: 10 TABLET ORAL at 08:17

## 2024-12-28 RX ADMIN — WARFARIN SODIUM 5 MG: 5 TABLET ORAL at 09:42

## 2024-12-28 RX ADMIN — AMOXICILLIN AND CLAVULANATE POTASSIUM 1 TABLET: 875; 125 TABLET, FILM COATED ORAL at 08:17

## 2024-12-28 RX ADMIN — ENOXAPARIN SODIUM 80 MG: 100 INJECTION SUBCUTANEOUS at 08:17

## 2024-12-28 RX ADMIN — OXYCODONE 5 MG: 5 TABLET ORAL at 10:32

## 2024-12-28 RX ADMIN — LOSARTAN POTASSIUM AND HYDROCHLOROTHIAZIDE 1 TABLET: 25; 100 TABLET ORAL at 08:16

## 2024-12-28 RX ADMIN — METOPROLOL SUCCINATE 50 MG: 50 TABLET, EXTENDED RELEASE ORAL at 08:18

## 2024-12-28 RX ADMIN — SODIUM CHLORIDE, PRESERVATIVE FREE 10 ML: 5 INJECTION INTRAVENOUS at 08:19

## 2024-12-28 RX ADMIN — OXYCODONE 5 MG: 5 TABLET ORAL at 06:07

## 2024-12-28 RX ADMIN — LEVOTHYROXINE SODIUM 125 MCG: 125 TABLET ORAL at 05:39

## 2024-12-28 RX ADMIN — PANTOPRAZOLE SODIUM 40 MG: 40 TABLET, DELAYED RELEASE ORAL at 05:39

## 2024-12-28 RX ADMIN — MONTELUKAST SODIUM 10 MG: 10 TABLET ORAL at 08:19

## 2024-12-28 ASSESSMENT — PAIN SCALES - GENERAL
PAINLEVEL_OUTOF10: 10
PAINLEVEL_OUTOF10: 0
PAINLEVEL_OUTOF10: 9

## 2024-12-28 ASSESSMENT — PAIN DESCRIPTION - DESCRIPTORS
DESCRIPTORS: ACHING;SHARP
DESCRIPTORS: SORE

## 2024-12-28 ASSESSMENT — PAIN DESCRIPTION - LOCATION
LOCATION: ABDOMEN
LOCATION: ABDOMEN

## 2024-12-28 ASSESSMENT — PAIN DESCRIPTION - ORIENTATION
ORIENTATION: LOWER
ORIENTATION: MID;LOWER

## 2024-12-28 NOTE — PLAN OF CARE
for areas of redness and/or skin breakdown     Problem: Skin/Tissue Integrity - Adult  Goal: Incisions, wounds, or drain sites healing without S/S of infection  12/27/2024 2225 by Luersman, Kristen, RN  Outcome: Progressing  Flowsheets (Taken 12/27/2024 2225)  Incisions, Wounds, or Drain Sites Healing Without Sign and Symptoms of Infection: TWICE DAILY: Assess and document skin integrity     Problem: Gastrointestinal - Adult  Goal: Minimal or absence of nausea and vomiting  12/27/2024 2225 by Luersman, Kristen, RN  Outcome: Progressing  Flowsheets (Taken 12/27/2024 2225)  Minimal or absence of nausea and vomiting:   Provide nonpharmacologic comfort measures as appropriate   Administer ordered antiemetic medications as needed     Problem: Gastrointestinal - Adult  Goal: Maintains or returns to baseline bowel function  12/27/2024 2225 by Luersman, Kristen, RN  Outcome: Progressing  Flowsheets (Taken 12/27/2024 2225)  Maintains or returns to baseline bowel function: Assess bowel function     Problem: Gastrointestinal - Adult  Goal: Maintains adequate nutritional intake  12/27/2024 2225 by Luersman, Kristen, RN  Outcome: Progressing  Flowsheets (Taken 12/27/2024 2225)  Maintains adequate nutritional intake: Monitor percentage of each meal consumed     Problem: Genitourinary - Adult  Goal: Absence of urinary retention  12/27/2024 2225 by Luersman, Kristen, RN  Outcome: Progressing  Flowsheets (Taken 12/27/2024 2225)  Absence of urinary retention: Assess patient’s ability to void and empty bladder     Problem: Nutrition Deficit:  Goal: Optimize nutritional status  12/27/2024 2225 by Luersman, Kristen, RN  Outcome: Progressing  Flowsheets (Taken 12/27/2024 2225)  Nutrient intake appropriate for improving, restoring, or maintaining nutritional needs:   Assess nutritional status and recommend course of action   Monitor oral intake, labs, and treatment plans     Problem: Skin/Tissue Integrity  Goal: Absence of new skin  breakdown  Description: 1.  Monitor for areas of redness and/or skin breakdown  2.  Assess vascular access sites hourly  3.  Every 4-6 hours minimum:  Change oxygen saturation probe site  4.  Every 4-6 hours:  If on nasal continuous positive airway pressure, respiratory therapy assess nares and determine need for appliance change or resting period.  12/27/2024 2225 by Luersman, Kristen, RN  Outcome: Progressing     Problem: Musculoskeletal - Adult  Goal: Return mobility to safest level of function  12/27/2024 2225 by Luersman, Kristen, RN  Outcome: Progressing  Flowsheets (Taken 12/27/2024 2225)  Return Mobility to Safest Level of Function: Assess patient stability and activity tolerance for standing, transferring and ambulating with or without assistive devices     Problem: Musculoskeletal - Adult  Goal: Maintain proper alignment of affected body part  12/27/2024 2225 by Luersman, Kristen, RN  Outcome: Progressing  Flowsheets (Taken 12/27/2024 2225)  Maintain proper alignment of affected body part: Support and protect limb and body alignment per provider's orders     Problem: Musculoskeletal - Adult  Goal: Return ADL status to a safe level of function  12/27/2024 2225 by Luersman, Kristen, RN  Outcome: Progressing  Flowsheets (Taken 12/27/2024 2225)  Return ADL Status to a Safe Level of Function:   Administer medication as ordered   Assess activities of daily living deficits and provide assistive devices as needed   Care plan reviewed with patient.  Patient verbalized understanding of the plan of care and contribute to goal setting.

## 2024-12-28 NOTE — PROGRESS NOTES
Clinical Pharmacy Note Warfarin Discharge Recommendations    Patient discharged from [facility] today on warfarin.    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  INR goal during admission: 2.0-3.0  Previous home warfarin regimen: 7.5mg Tuesday 5mg SunMWThFSat  Interacting medications at discharge: enoxaparin 80 mg BID  Coumadin 5 mg tabs    Hospital Warfarin Doses & INR Results    Concurrent anticoagulants/antiplatelets: lovenox 80mg BID,   Significant warfarin drug-drug interactions: levothyroxine (HM)     Date INR Warfarin Dose   12/24/2024 0.93 (12/23) 7.5 mg   12/25/024   1.03 10 mg     12/26/2024   1.14 7.5 mg    12/27/2024   1.62 5 mg     12/28/2024  1.8  5 mg     Recent INRs:  Recent Labs     12/28/24  0505   INR 1.80*         Day Lovenox AM Lovenox PM Coumadin PM     12/28/24   Prior to discharge   80 mg   Taken prior to discharge       12/29/24   80 mg   80 mg   5 mg       12/30/24     -   -   INR Check       INR to be checked:  12/30/24

## 2024-12-28 NOTE — PROGRESS NOTES
All discharge instructions given to patient/LACP with all signed scripts with no further questions at this time. Wound supplies sent with patient and CHG soap as well. Patient discharged off unit via wheelchair per LACP. Attempted to call [facility] back twice to update of Lovenox changes but unable to reach. Chart contents placed in yellow bin.

## 2024-12-28 NOTE — DISCHARGE INSTR - PHARMACY
[facility]       Day Lovenox AM Lovenox PM Coumadin PM     12/28/24   Prior to discharge   70 mg   Taken prior to discharge       12/29/24   70 mg   70 mg   5 mg       12/30/24     -   -   INR Check             INR to be checked:  12/30/24

## 2024-12-29 NOTE — PROGRESS NOTES
[facility] re[facility] called [facility] for questions on lovenox and coumadin dosing. All questions addressed be referencing to AVS orders. No other questions noted at this time.

## 2024-12-30 NOTE — CARE COORDINATION
12/30/24, 7:32 AM EST    Patient goals/plan/ treatment preferences discussed by  and .  Patient goals/plan/ treatment preferences reviewed with patient/ family.  Patient/ family verbalize understanding of discharge plan and are in agreement with goal/plan/treatment preferences.  Understanding was demonstrated using the teach back method.  AVS provided by RN at time of discharge, which includes all necessary medical information pertaining to the patients current course of illness, treatment, post-discharge goals of care, and treatment preferences.     Services At/After Discharge: Skilled Nursing Facility (SNF) [facility]    Per chart pt dc to [facility] 12/28

## 2024-12-31 ENCOUNTER — HOSPITAL ENCOUNTER (INPATIENT)
Age: 74
LOS: 7 days | Discharge: NURSING, REHAB OR LONG-TERM CARE | DRG: 683 | End: 2025-01-07
Attending: EMERGENCY MEDICINE | Admitting: STUDENT IN AN ORGANIZED HEALTH CARE EDUCATION/TRAINING PROGRAM
Payer: MEDICARE

## 2024-12-31 ENCOUNTER — APPOINTMENT (OUTPATIENT)
Dept: ULTRASOUND IMAGING | Age: 74
DRG: 683 | End: 2024-12-31
Payer: MEDICARE

## 2024-12-31 ENCOUNTER — APPOINTMENT (OUTPATIENT)
Dept: CT IMAGING | Age: 74
DRG: 683 | End: 2024-12-31
Payer: MEDICARE

## 2024-12-31 DIAGNOSIS — N17.9 AKI (ACUTE KIDNEY INJURY) (HCC): ICD-10-CM

## 2024-12-31 DIAGNOSIS — N17.9 ACUTE RENAL FAILURE, UNSPECIFIED ACUTE RENAL FAILURE TYPE (HCC): Primary | ICD-10-CM

## 2024-12-31 LAB
ALBUMIN SERPL BCG-MCNC: 2.9 G/DL (ref 3.5–5.1)
ALP SERPL-CCNC: 85 U/L (ref 38–126)
ALT SERPL W/O P-5'-P-CCNC: 9 U/L (ref 11–66)
ANION GAP SERPL CALC-SCNC: 17 MEQ/L (ref 8–16)
APTT PPP: 39.6 SECONDS (ref 22–38)
AST SERPL-CCNC: 10 U/L (ref 5–40)
BASOPHILS ABSOLUTE: 0.1 THOU/MM3 (ref 0–0.1)
BASOPHILS NFR BLD AUTO: 0.7 %
BILIRUB CONJ SERPL-MCNC: < 0.1 MG/DL (ref 0.1–13.8)
BILIRUB SERPL-MCNC: 0.2 MG/DL (ref 0.3–1.2)
BUN SERPL-MCNC: 37 MG/DL (ref 7–22)
CA-I BLD ISE-SCNC: 1.2 MMOL/L (ref 1.12–1.32)
CALCIUM SERPL-MCNC: 8.8 MG/DL (ref 8.5–10.5)
CHLORIDE SERPL-SCNC: 95 MEQ/L (ref 98–111)
CK SERPL-CCNC: 21 U/L (ref 30–135)
CO2 SERPL-SCNC: 22 MEQ/L (ref 23–33)
CREAT SERPL-MCNC: 5.9 MG/DL (ref 0.4–1.2)
CRP SERPL-MCNC: 21.49 MG/DL (ref 0–1)
DEPRECATED RDW RBC AUTO: 49.5 FL (ref 35–45)
EOSINOPHIL NFR BLD AUTO: 1.4 %
EOSINOPHILS ABSOLUTE: 0.1 THOU/MM3 (ref 0–0.4)
ERYTHROCYTE [DISTWIDTH] IN BLOOD BY AUTOMATED COUNT: 15.5 % (ref 11.5–14.5)
GFR SERPL CREATININE-BSD FRML MDRD: 7 ML/MIN/1.73M2
GLUCOSE SERPL-MCNC: 123 MG/DL (ref 70–108)
HCT VFR BLD AUTO: 28.2 % (ref 37–47)
HGB BLD-MCNC: 9.1 GM/DL (ref 12–16)
IMM GRANULOCYTES # BLD AUTO: 0.03 THOU/MM3 (ref 0–0.07)
IMM GRANULOCYTES NFR BLD AUTO: 0.3 %
INR PPP: 3.71 (ref 0.85–1.13)
LACTIC ACID, SEPSIS: 1.1 MMOL/L (ref 0.5–1.9)
LACTIC ACID, SEPSIS: 1.3 MMOL/L (ref 0.5–1.9)
LIPASE SERPL-CCNC: 22.2 U/L (ref 5.6–51.3)
LYMPHOCYTES ABSOLUTE: 1.4 THOU/MM3 (ref 1–4.8)
LYMPHOCYTES NFR BLD AUTO: 15.1 %
MCH RBC QN AUTO: 28.4 PG (ref 26–33)
MCHC RBC AUTO-ENTMCNC: 32.3 GM/DL (ref 32.2–35.5)
MCV RBC AUTO: 88.1 FL (ref 81–99)
MONOCYTES ABSOLUTE: 1 THOU/MM3 (ref 0.4–1.3)
MONOCYTES NFR BLD AUTO: 11.1 %
NEUTROPHILS ABSOLUTE: 6.5 THOU/MM3 (ref 1.8–7.7)
NEUTROPHILS NFR BLD AUTO: 71.4 %
NRBC BLD AUTO-RTO: 0 /100 WBC
OSMOLALITY SERPL CALC.SUM OF ELEC: 278.3 MOSMOL/KG (ref 275–300)
PH BLDV: 7.29 [PH] (ref 7.31–7.41)
PHOSPHATE SERPL-MCNC: 3.4 MG/DL (ref 2.4–4.7)
PLATELET # BLD AUTO: 621 THOU/MM3 (ref 130–400)
PLATELET BLD QL SMEAR: ADEQUATE
PMV BLD AUTO: 10 FL (ref 9.4–12.4)
POTASSIUM SERPL-SCNC: 3.8 MEQ/L (ref 3.5–5.2)
PROCALCITONIN SERPL IA-MCNC: 0.32 NG/ML (ref 0.01–0.09)
PROT SERPL-MCNC: 7.1 G/DL (ref 6.1–8)
RBC # BLD AUTO: 3.2 MILL/MM3 (ref 4.2–5.4)
SCAN OF BLOOD SMEAR: NORMAL
SODIUM SERPL-SCNC: 134 MEQ/L (ref 135–145)
STOMATOCYTES: ABNORMAL
TOXIC GRANULES BLD QL SMEAR: PRESENT
URATE SERPL-MCNC: 10.3 MG/DL (ref 2.4–5.7)
WBC # BLD AUTO: 9.1 THOU/MM3 (ref 4.8–10.8)

## 2024-12-31 PROCEDURE — 87040 BLOOD CULTURE FOR BACTERIA: CPT

## 2024-12-31 PROCEDURE — 2580000003 HC RX 258: Performed by: EMERGENCY MEDICINE

## 2024-12-31 PROCEDURE — 96361 HYDRATE IV INFUSION ADD-ON: CPT

## 2024-12-31 PROCEDURE — 6360000002 HC RX W HCPCS: Performed by: EMERGENCY MEDICINE

## 2024-12-31 PROCEDURE — 82800 BLOOD PH: CPT

## 2024-12-31 PROCEDURE — 84443 ASSAY THYROID STIM HORMONE: CPT

## 2024-12-31 PROCEDURE — 96374 THER/PROPH/DIAG INJ IV PUSH: CPT

## 2024-12-31 PROCEDURE — 85610 PROTHROMBIN TIME: CPT

## 2024-12-31 PROCEDURE — 87205 SMEAR GRAM STAIN: CPT

## 2024-12-31 PROCEDURE — 85025 COMPLETE CBC W/AUTO DIFF WBC: CPT

## 2024-12-31 PROCEDURE — 83540 ASSAY OF IRON: CPT

## 2024-12-31 PROCEDURE — 84550 ASSAY OF BLOOD/URIC ACID: CPT

## 2024-12-31 PROCEDURE — 82330 ASSAY OF CALCIUM: CPT

## 2024-12-31 PROCEDURE — 99223 1ST HOSP IP/OBS HIGH 75: CPT | Performed by: NURSE PRACTITIONER

## 2024-12-31 PROCEDURE — 82248 BILIRUBIN DIRECT: CPT

## 2024-12-31 PROCEDURE — 1200000003 HC TELEMETRY R&B

## 2024-12-31 PROCEDURE — 76770 US EXAM ABDO BACK WALL COMP: CPT

## 2024-12-31 PROCEDURE — 81001 URINALYSIS AUTO W/SCOPE: CPT

## 2024-12-31 PROCEDURE — 93005 ELECTROCARDIOGRAM TRACING: CPT | Performed by: EMERGENCY MEDICINE

## 2024-12-31 PROCEDURE — 83550 IRON BINDING TEST: CPT

## 2024-12-31 PROCEDURE — 84439 ASSAY OF FREE THYROXINE: CPT

## 2024-12-31 PROCEDURE — 74176 CT ABD & PELVIS W/O CONTRAST: CPT

## 2024-12-31 PROCEDURE — 85730 THROMBOPLASTIN TIME PARTIAL: CPT

## 2024-12-31 PROCEDURE — 83690 ASSAY OF LIPASE: CPT

## 2024-12-31 PROCEDURE — 82728 ASSAY OF FERRITIN: CPT

## 2024-12-31 PROCEDURE — 82550 ASSAY OF CK (CPK): CPT

## 2024-12-31 PROCEDURE — 2580000003 HC RX 258: Performed by: NURSE PRACTITIONER

## 2024-12-31 PROCEDURE — 84145 PROCALCITONIN (PCT): CPT

## 2024-12-31 PROCEDURE — 99285 EMERGENCY DEPT VISIT HI MDM: CPT

## 2024-12-31 PROCEDURE — 83605 ASSAY OF LACTIC ACID: CPT

## 2024-12-31 PROCEDURE — 36415 COLL VENOUS BLD VENIPUNCTURE: CPT

## 2024-12-31 PROCEDURE — 86140 C-REACTIVE PROTEIN: CPT

## 2024-12-31 PROCEDURE — 84100 ASSAY OF PHOSPHORUS: CPT

## 2024-12-31 PROCEDURE — 80053 COMPREHEN METABOLIC PANEL: CPT

## 2024-12-31 PROCEDURE — 87070 CULTURE OTHR SPECIMN AEROBIC: CPT

## 2024-12-31 PROCEDURE — 87075 CULTR BACTERIA EXCEPT BLOOD: CPT

## 2024-12-31 RX ORDER — ONDANSETRON 4 MG/1
4 TABLET, ORALLY DISINTEGRATING ORAL EVERY 8 HOURS PRN
Status: DISCONTINUED | OUTPATIENT
Start: 2024-12-31 | End: 2025-01-07 | Stop reason: HOSPADM

## 2024-12-31 RX ORDER — HEPARIN SODIUM 5000 [USP'U]/ML
5000 INJECTION, SOLUTION INTRAVENOUS; SUBCUTANEOUS EVERY 8 HOURS SCHEDULED
Status: DISCONTINUED | OUTPATIENT
Start: 2024-12-31 | End: 2024-12-31

## 2024-12-31 RX ORDER — SODIUM CHLORIDE 9 MG/ML
INJECTION, SOLUTION INTRAVENOUS PRN
Status: DISCONTINUED | OUTPATIENT
Start: 2024-12-31 | End: 2025-01-07 | Stop reason: HOSPADM

## 2024-12-31 RX ORDER — POLYETHYLENE GLYCOL 3350 17 G/17G
17 POWDER, FOR SOLUTION ORAL DAILY PRN
Status: DISCONTINUED | OUTPATIENT
Start: 2024-12-31 | End: 2025-01-07 | Stop reason: HOSPADM

## 2024-12-31 RX ORDER — SODIUM CHLORIDE 0.9 % (FLUSH) 0.9 %
5-40 SYRINGE (ML) INJECTION EVERY 12 HOURS SCHEDULED
Status: DISCONTINUED | OUTPATIENT
Start: 2024-12-31 | End: 2025-01-07 | Stop reason: HOSPADM

## 2024-12-31 RX ORDER — SODIUM CHLORIDE 0.9 % (FLUSH) 0.9 %
5-40 SYRINGE (ML) INJECTION PRN
Status: DISCONTINUED | OUTPATIENT
Start: 2024-12-31 | End: 2025-01-07 | Stop reason: HOSPADM

## 2024-12-31 RX ORDER — ACETAMINOPHEN 325 MG/1
650 TABLET ORAL EVERY 6 HOURS PRN
Status: DISCONTINUED | OUTPATIENT
Start: 2024-12-31 | End: 2025-01-07 | Stop reason: HOSPADM

## 2024-12-31 RX ORDER — ACETAMINOPHEN 650 MG/1
650 SUPPOSITORY RECTAL EVERY 6 HOURS PRN
Status: DISCONTINUED | OUTPATIENT
Start: 2024-12-31 | End: 2025-01-07 | Stop reason: HOSPADM

## 2024-12-31 RX ORDER — SODIUM CHLORIDE, SODIUM LACTATE, POTASSIUM CHLORIDE, CALCIUM CHLORIDE 600; 310; 30; 20 MG/100ML; MG/100ML; MG/100ML; MG/100ML
INJECTION, SOLUTION INTRAVENOUS CONTINUOUS
Status: DISCONTINUED | OUTPATIENT
Start: 2025-01-01 | End: 2025-01-01

## 2024-12-31 RX ORDER — 0.9 % SODIUM CHLORIDE 0.9 %
1000 INTRAVENOUS SOLUTION INTRAVENOUS ONCE
Status: COMPLETED | OUTPATIENT
Start: 2024-12-31 | End: 2024-12-31

## 2024-12-31 RX ORDER — ONDANSETRON 2 MG/ML
4 INJECTION INTRAMUSCULAR; INTRAVENOUS EVERY 6 HOURS PRN
Status: DISCONTINUED | OUTPATIENT
Start: 2024-12-31 | End: 2025-01-07 | Stop reason: HOSPADM

## 2024-12-31 RX ORDER — SODIUM CHLORIDE, SODIUM LACTATE, POTASSIUM CHLORIDE, CALCIUM CHLORIDE 600; 310; 30; 20 MG/100ML; MG/100ML; MG/100ML; MG/100ML
INJECTION, SOLUTION INTRAVENOUS CONTINUOUS
Status: DISCONTINUED | OUTPATIENT
Start: 2024-12-31 | End: 2025-01-01

## 2024-12-31 RX ORDER — THIAMINE HYDROCHLORIDE 100 MG/ML
100 INJECTION, SOLUTION INTRAMUSCULAR; INTRAVENOUS ONCE
Status: COMPLETED | OUTPATIENT
Start: 2024-12-31 | End: 2024-12-31

## 2024-12-31 RX ADMIN — THIAMINE HYDROCHLORIDE 100 MG: 100 INJECTION, SOLUTION INTRAMUSCULAR; INTRAVENOUS at 17:49

## 2024-12-31 RX ADMIN — SODIUM CHLORIDE 1000 ML: 9 INJECTION, SOLUTION INTRAVENOUS at 19:55

## 2024-12-31 RX ADMIN — SODIUM CHLORIDE 1000 ML: 9 INJECTION, SOLUTION INTRAVENOUS at 17:03

## 2024-12-31 RX ADMIN — SODIUM CHLORIDE, POTASSIUM CHLORIDE, SODIUM LACTATE AND CALCIUM CHLORIDE: 600; 310; 30; 20 INJECTION, SOLUTION INTRAVENOUS at 22:11

## 2024-12-31 ASSESSMENT — PAIN - FUNCTIONAL ASSESSMENT
PAIN_FUNCTIONAL_ASSESSMENT: NONE - DENIES PAIN
PAIN_FUNCTIONAL_ASSESSMENT: NONE - DENIES PAIN

## 2024-12-31 NOTE — ED NOTES
ED to inpatient nurses report      Chief Complaint:  Chief Complaint   Patient presents with    Wound Check     Present to ED from: [facility]    MOA:     LOC: alert and orientated to name, place, date  Mobility: Requires assistance * 2  Oxygen Baseline: ra    Current needs required: ra     Code Status:   Prior    What abnormal results were found and what did you give/do to treat them? Erik, purulent drainage from abd incision   Any procedures or intervention occur? IV fluids, see MAR    Mental Status:  Level of Consciousness: Alert (0)    Psych Assessment:        Vitals:  Patient Vitals for the past 24 hrs:   BP Temp Temp src Pulse Resp SpO2 Height Weight   12/31/24 1818 (!) 108/51 -- -- -- 20 99 % -- --   12/31/24 1815 -- -- -- 86 -- -- -- --   12/31/24 1750 (!) 121/46 -- -- 88 18 96 % -- --   12/31/24 1545 (!) 88/49 98.5 °F (36.9 °C) Oral 80 -- -- -- --   12/31/24 1542 -- -- -- -- 18 96 % 1.549 m (5' 1\") 78.5 kg (173 lb)        LDAs:   Peripheral IV 12/31/24 Left Antecubital (Active)       Ambulatory Status:  No data recorded    Diagnosis:  DISPOSITION Decision To Admit 12/31/2024 06:36:18 PM   Final diagnoses:   Acute renal failure, unspecified acute renal failure type (HCC)        Consults:  IP CONSULT TO GENERAL SURGERY     Pain Score:  Pain Assessment  Pain Assessment: None - Denies Pain    C-SSRS:   Risk of Suicide: No Risk    Sepsis Screening:       Stuart Fall Risk:       Swallow Screening        Preferred Language:   English      ALLERGIES     Seasonal    SURGICAL HISTORY       Past Surgical History:   Procedure Laterality Date    ABDOMEN SURGERY      CARDIAC CATHETERIZATION      CARPAL TUNNEL RELEASE Bilateral     CATARACT REMOVAL      CERVICAL SPINE SURGERY  2001    fusion    CLAVICLE SURGERY      COLONOSCOPY  08/07/2024    Dr. Prince    CYSTOSCOPY W/ URETERAL STENT PLACEMENT  02/06/2023    ESOPHAGOGASTRODUODENOSCOPY  09/19/2023    Dr. Tim Schirmer    ESOPHAGOGASTRODUODENOSCOPY  12/23/2022

## 2024-12-31 NOTE — ED PROVIDER NOTES
the past month and he thinks she has been getting upset quicker because of this and has also had periods of some confusion or forgetfulness.  [AC]   1805 /51 (69). Looks less pale than previously. No complaints at this time [AC]   1838 Admission request sent [AC]      ED Course User Index  [AC] Delmar Epstein DO     Vitals Reviewed:    Vitals:    12/31/24 1545 12/31/24 1750 12/31/24 1815 12/31/24 1818   BP: (!) 88/49 (!) 121/46  (!) 108/51   Pulse: 80 88 86    Resp:  18  20   Temp: 98.5 °F (36.9 °C)      TempSrc: Oral      SpO2:  96%  99%   Weight:       Height:           The patient was seen and examined. Appropriate diagnostic testing was performed and results reviewed with the patient.      The results of pertinent diagnostic studies and exam findings were discussed. The patient’s provisional diagnosis and plan of care were discussed with the patient and present family who expressed understanding. Any medications were reviewed and indications and risks of medications were discussed with the patient /family present. Strict verbal and written return precautions, instructions and appropriate follow-up provided to  the patient.     ED Medications administered this visit:  (None if blank)  Medications   sodium chloride 0.9 % bolus 1,000 mL (1,000 mLs IntraVENous New Bag 12/31/24 1703)   thiamine (B-1) injection 100 mg (100 mg IntraVENous Given 12/31/24 1749)         PROCEDURES: (None if blank)  Procedures:     CRITICAL CARE: (None if blank)      DISCHARGE PRESCRIPTIONS: (None if blank)  New Prescriptions    No medications on file       FINAL IMPRESSION      1. Acute renal failure, unspecified acute renal failure type (HCC)          DISPOSITION/PLAN   DISPOSITION Decision To Admit 12/31/2024 06:36:18 PM   DISPOSITION CONDITION Stable           OUTPATIENT FOLLOW UP THE PATIENT:  No follow-up provider specified.    eDlmar Epstein DO    This transcription was electronically signed. Parts of this transcriptions

## 2024-12-31 NOTE — ED NOTES
[facility] rehab  Gfr 7, not a dialysis patient. Abdominal pain wound is constantly draining, culture showed stool in the wound drainage. New ileostomy and colorectal fistula.

## 2024-12-31 NOTE — ED TRIAGE NOTES
Pt arrives from the nursing home. Pt c/o abd wound that has been draining purulent discharge intermittently since 12/23 after her surgery. Pt reports she has had 3 abd surgeries this month. Nursing home reports mild fever today. Pt endorses 0/10 pain at time of triage. Pt has an ileostomy. Pt had her blood drawn today and had a GFR of 7.

## 2025-01-01 PROBLEM — T88.8XXA FLUID COLLECTION AT SURGICAL SITE: Status: ACTIVE | Noted: 2025-01-01

## 2025-01-01 PROBLEM — Z93.2 ILEOSTOMY IN PLACE (HCC): Status: ACTIVE | Noted: 2024-12-16

## 2025-01-01 PROBLEM — Z90.49 H/O PARTIAL RESECTION OF COLON: Status: ACTIVE | Noted: 2025-01-01

## 2025-01-01 PROBLEM — K91.89 LARGE BOWEL ANASTOMOTIC LEAK: Status: ACTIVE | Noted: 2025-01-01

## 2025-01-01 LAB
ANION GAP SERPL CALC-SCNC: 13 MEQ/L (ref 8–16)
ANION GAP SERPL CALC-SCNC: 16 MEQ/L (ref 8–16)
BACTERIA URNS QL MICRO: ABNORMAL /HPF
BILIRUB UR QL STRIP.AUTO: NEGATIVE
BUN SERPL-MCNC: 28 MG/DL (ref 7–22)
BUN SERPL-MCNC: 36 MG/DL (ref 7–22)
CALCIUM SERPL-MCNC: 8.2 MG/DL (ref 8.5–10.5)
CALCIUM SERPL-MCNC: 8.4 MG/DL (ref 8.5–10.5)
CASTS #/AREA URNS LPF: ABNORMAL /LPF
CASTS 2: ABNORMAL /LPF
CHARACTER UR: CLEAR
CHLORIDE SERPL-SCNC: 97 MEQ/L (ref 98–111)
CHLORIDE SERPL-SCNC: 99 MEQ/L (ref 98–111)
CO2 SERPL-SCNC: 18 MEQ/L (ref 23–33)
CO2 SERPL-SCNC: 26 MEQ/L (ref 23–33)
COLOR, UA: YELLOW
CREAT SERPL-MCNC: 2.1 MG/DL (ref 0.4–1.2)
CREAT SERPL-MCNC: 5 MG/DL (ref 0.4–1.2)
CREAT UR-MCNC: 208.2 MG/DL
CRYSTALS URNS MICRO: ABNORMAL
EKG ATRIAL RATE: 84 BPM
EKG P AXIS: 62 DEGREES
EKG P-R INTERVAL: 168 MS
EKG Q-T INTERVAL: 334 MS
EKG QRS DURATION: 62 MS
EKG QTC CALCULATION (BAZETT): 394 MS
EKG R AXIS: 5 DEGREES
EKG T AXIS: 36 DEGREES
EKG VENTRICULAR RATE: 84 BPM
EOSINOPHIL SMEAR, URINE: NORMAL
EPITHELIAL CELLS, UA: ABNORMAL /HPF
FERRITIN SERPL IA-MCNC: 412 NG/ML (ref 10–291)
GFR SERPL CREATININE-BSD FRML MDRD: 24 ML/MIN/1.73M2
GFR SERPL CREATININE-BSD FRML MDRD: 9 ML/MIN/1.73M2
GLUCOSE SERPL-MCNC: 103 MG/DL (ref 70–108)
GLUCOSE SERPL-MCNC: 115 MG/DL (ref 70–108)
GLUCOSE UR QL STRIP.AUTO: NEGATIVE MG/DL
HGB UR QL STRIP.AUTO: NEGATIVE
INR PPP: 4.14 (ref 0.85–1.13)
IRON SATN MFR SERPL: 9 % (ref 20–50)
IRON SERPL-MCNC: 16 UG/DL (ref 50–170)
KETONES UR QL STRIP.AUTO: NEGATIVE
MISCELLANEOUS 2: ABNORMAL
MRSA DNA SPEC QL NAA+PROBE: NEGATIVE
NITRITE UR QL STRIP: NEGATIVE
ORIGINAL SAMPLE NUMBER: NORMAL
OSMOLALITY SERPL CALC.SUM OF ELEC: 275 MOSMOL/KG (ref 275–300)
OSMOLALITY UR: NORMAL MOSMOL/KG (ref 250–750)
PH UR STRIP.AUTO: 5 [PH] (ref 5–9)
POTASSIUM SERPL-SCNC: 3.1 MEQ/L (ref 3.5–5.2)
POTASSIUM SERPL-SCNC: 3.6 MEQ/L (ref 3.5–5.2)
PROT UR STRIP.AUTO-MCNC: ABNORMAL MG/DL
RBC URINE: ABNORMAL /HPF
REFERENCE LOCATION: NORMAL
REFERENCE RANGE: NORMAL
RENAL EPI CELLS #/AREA URNS HPF: ABNORMAL /[HPF]
SODIUM SERPL-SCNC: 133 MEQ/L (ref 135–145)
SODIUM SERPL-SCNC: 136 MEQ/L (ref 135–145)
SODIUM UR-SCNC: 38 MEQ/L
SP GR UR REFRACT.AUTO: 1.01 (ref 1–1.03)
T4 FREE SERPL-MCNC: 1.55 NG/DL (ref 0.93–1.68)
TEST RESULTS WITH UNITS: NORMAL
TEST(S) BEING PERFORMED: NORMAL
TIBC SERPL-MCNC: 171 UG/DL (ref 171–450)
TSH SERPL DL<=0.005 MIU/L-ACNC: 10.48 UIU/ML (ref 0.4–4.2)
UROBILINOGEN, URINE: 0.2 EU/DL (ref 0–1)
WBC #/AREA URNS HPF: ABNORMAL /HPF
WBC #/AREA URNS HPF: NEGATIVE /[HPF]
YEAST LIKE FUNGI URNS QL MICRO: ABNORMAL

## 2025-01-01 PROCEDURE — 93010 ELECTROCARDIOGRAM REPORT: CPT | Performed by: INTERNAL MEDICINE

## 2025-01-01 PROCEDURE — 99233 SBSQ HOSP IP/OBS HIGH 50: CPT | Performed by: PHYSICIAN ASSISTANT

## 2025-01-01 PROCEDURE — 99024 POSTOP FOLLOW-UP VISIT: CPT | Performed by: SURGERY

## 2025-01-01 PROCEDURE — 2580000003 HC RX 258: Performed by: NURSE PRACTITIONER

## 2025-01-01 PROCEDURE — 99223 1ST HOSP IP/OBS HIGH 75: CPT | Performed by: STUDENT IN AN ORGANIZED HEALTH CARE EDUCATION/TRAINING PROGRAM

## 2025-01-01 PROCEDURE — 2500000003 HC RX 250 WO HCPCS: Performed by: NURSE PRACTITIONER

## 2025-01-01 PROCEDURE — 83935 ASSAY OF URINE OSMOLALITY: CPT

## 2025-01-01 PROCEDURE — 1200000003 HC TELEMETRY R&B

## 2025-01-01 PROCEDURE — 89190 NASAL SMEAR FOR EOSINOPHILS: CPT

## 2025-01-01 PROCEDURE — 84300 ASSAY OF URINE SODIUM: CPT

## 2025-01-01 PROCEDURE — 82570 ASSAY OF URINE CREATININE: CPT

## 2025-01-01 PROCEDURE — 36415 COLL VENOUS BLD VENIPUNCTURE: CPT

## 2025-01-01 PROCEDURE — 6370000000 HC RX 637 (ALT 250 FOR IP): Performed by: NURSE PRACTITIONER

## 2025-01-01 PROCEDURE — 80048 BASIC METABOLIC PNL TOTAL CA: CPT

## 2025-01-01 PROCEDURE — 6360000002 HC RX W HCPCS: Performed by: NURSE PRACTITIONER

## 2025-01-01 PROCEDURE — 99222 1ST HOSP IP/OBS MODERATE 55: CPT | Performed by: INTERNAL MEDICINE

## 2025-01-01 PROCEDURE — 2580000003 HC RX 258: Performed by: INTERNAL MEDICINE

## 2025-01-01 PROCEDURE — 87641 MR-STAPH DNA AMP PROBE: CPT

## 2025-01-01 PROCEDURE — 85610 PROTHROMBIN TIME: CPT

## 2025-01-01 RX ORDER — BUSPIRONE HYDROCHLORIDE 10 MG/1
10 TABLET ORAL 2 TIMES DAILY
Status: DISCONTINUED | OUTPATIENT
Start: 2025-01-01 | End: 2025-01-07 | Stop reason: HOSPADM

## 2025-01-01 RX ORDER — MONTELUKAST SODIUM 10 MG/1
10 TABLET ORAL DAILY
Status: DISCONTINUED | OUTPATIENT
Start: 2025-01-01 | End: 2025-01-07 | Stop reason: HOSPADM

## 2025-01-01 RX ORDER — OXYCODONE HYDROCHLORIDE 5 MG/1
5 TABLET ORAL EVERY 4 HOURS PRN
Status: DISCONTINUED | OUTPATIENT
Start: 2025-01-01 | End: 2025-01-07 | Stop reason: HOSPADM

## 2025-01-01 RX ORDER — METOPROLOL SUCCINATE 50 MG/1
50 TABLET, EXTENDED RELEASE ORAL DAILY
Status: DISCONTINUED | OUTPATIENT
Start: 2025-01-01 | End: 2025-01-07 | Stop reason: HOSPADM

## 2025-01-01 RX ORDER — SODIUM CHLORIDE, SODIUM LACTATE, POTASSIUM CHLORIDE, CALCIUM CHLORIDE 600; 310; 30; 20 MG/100ML; MG/100ML; MG/100ML; MG/100ML
INJECTION, SOLUTION INTRAVENOUS CONTINUOUS
Status: DISCONTINUED | OUTPATIENT
Start: 2025-01-01 | End: 2025-01-04

## 2025-01-01 RX ORDER — CETIRIZINE HYDROCHLORIDE 5 MG/1
5 TABLET ORAL DAILY
Status: DISCONTINUED | OUTPATIENT
Start: 2025-01-01 | End: 2025-01-07 | Stop reason: HOSPADM

## 2025-01-01 RX ORDER — LEVOTHYROXINE SODIUM 125 UG/1
125 TABLET ORAL DAILY
Status: DISCONTINUED | OUTPATIENT
Start: 2025-01-01 | End: 2025-01-07 | Stop reason: HOSPADM

## 2025-01-01 RX ADMIN — LEVOTHYROXINE SODIUM 125 MCG: 0.12 TABLET ORAL at 06:51

## 2025-01-01 RX ADMIN — MONTELUKAST 10 MG: 10 TABLET, FILM COATED ORAL at 09:16

## 2025-01-01 RX ADMIN — OXYCODONE 5 MG: 5 TABLET ORAL at 09:16

## 2025-01-01 RX ADMIN — OXYCODONE 5 MG: 5 TABLET ORAL at 22:05

## 2025-01-01 RX ADMIN — SODIUM CHLORIDE, PRESERVATIVE FREE 10 ML: 5 INJECTION INTRAVENOUS at 22:07

## 2025-01-01 RX ADMIN — CETIRIZINE HYDROCHLORIDE 5 MG: 5 TABLET ORAL at 09:16

## 2025-01-01 RX ADMIN — METOPROLOL SUCCINATE 50 MG: 50 TABLET, EXTENDED RELEASE ORAL at 09:16

## 2025-01-01 RX ADMIN — OXYCODONE 5 MG: 5 TABLET ORAL at 17:58

## 2025-01-01 RX ADMIN — SODIUM CHLORIDE, SODIUM LACTATE, POTASSIUM CHLORIDE, CALCIUM CHLORIDE: 600; 310; 30; 20 INJECTION, SOLUTION INTRAVENOUS at 22:14

## 2025-01-01 RX ADMIN — SODIUM CHLORIDE, SODIUM LACTATE, POTASSIUM CHLORIDE, CALCIUM CHLORIDE: 600; 310; 30; 20 INJECTION, SOLUTION INTRAVENOUS at 13:27

## 2025-01-01 RX ADMIN — Medication 10.5 MG: at 22:05

## 2025-01-01 RX ADMIN — MEROPENEM 1000 MG: 1 INJECTION INTRAVENOUS at 11:41

## 2025-01-01 RX ADMIN — BUSPIRONE HYDROCHLORIDE 10 MG: 10 TABLET ORAL at 22:07

## 2025-01-01 RX ADMIN — SODIUM CHLORIDE, PRESERVATIVE FREE 10 ML: 5 INJECTION INTRAVENOUS at 09:18

## 2025-01-01 RX ADMIN — BUSPIRONE HYDROCHLORIDE 10 MG: 10 TABLET ORAL at 09:16

## 2025-01-01 RX ADMIN — SODIUM BICARBONATE: 84 INJECTION, SOLUTION INTRAVENOUS at 11:36

## 2025-01-01 RX ADMIN — SODIUM BICARBONATE: 84 INJECTION, SOLUTION INTRAVENOUS at 04:05

## 2025-01-01 RX ADMIN — Medication 2000 MG: at 11:54

## 2025-01-01 ASSESSMENT — PAIN SCALES - GENERAL
PAINLEVEL_OUTOF10: 9
PAINLEVEL_OUTOF10: 9
PAINLEVEL_OUTOF10: 10

## 2025-01-01 ASSESSMENT — PAIN DESCRIPTION - ORIENTATION
ORIENTATION: MID
ORIENTATION: RIGHT

## 2025-01-01 ASSESSMENT — PAIN DESCRIPTION - LOCATION
LOCATION: ABDOMEN
LOCATION: OTHER (COMMENT)
LOCATION: ABDOMEN

## 2025-01-01 ASSESSMENT — PAIN DESCRIPTION - DESCRIPTORS
DESCRIPTORS: ACHING
DESCRIPTORS: SHARP;BURNING

## 2025-01-01 ASSESSMENT — PAIN DESCRIPTION - FREQUENCY
FREQUENCY: CONTINUOUS
FREQUENCY: INTERMITTENT

## 2025-01-01 ASSESSMENT — PAIN DESCRIPTION - PAIN TYPE
TYPE: ACUTE PAIN
TYPE: ACUTE PAIN

## 2025-01-01 ASSESSMENT — PAIN DESCRIPTION - ONSET
ONSET: ON-GOING
ONSET: ON-GOING

## 2025-01-01 NOTE — H&P
Hospitalist  History and Physical    Patient:  Marry Ayers  MRN: [id number]    CHIEF COMPLAINT: Abdominal pain, drainage from incisional site    History Obtained From:  patient, family member -son, electronic medical record  PCP: Silvestre Sawyer MD    HISTORY OF PRESENT ILLNESS:   Marry Ayers is a 74-year-old female presented to [facility] 12/31/2024 from [facility] with chief complaint of abdominal pain and drainage from incisional site.  Drainage has been purulent in nature intermittent since 12/23/2024.  Recent [facility] hospitalization 12/4-12/2824 patient underwent open sigmoid colectomy, partial small bowel resection on 12/04/2024, repair of anastomotic leak and loop ileostomy on 12/13/2024, PALAK cultures revealed Proteus and gram-positive cocci.  Patient is on Coumadin with history of PE/DVT.  Discharge to rehab with Augmentin with tentative end date 1/7/2025    Son is present at bedside and assisting with history pieces.  Patient is a poor historian however she tells me she was participating in rehab.  Patient denies any nausea vomiting or diarrhea.  Positive for complaint of poor appetite.  Positive for complaint of purulent drainage from incisional wound.  Patient denied any dizziness with position changes or fall since discharge from the hospital. CT A/P completed.  While in the emergency room patient did receive 2L0.9 normal saline, thiamine.     Past Medical History:        Diagnosis Date    Allergic rhinitis     Anemia     Anxiety     Cataract     Essential hypertension     GERD (gastroesophageal reflux disease)     Hx of blood clots 06/2014    DVT    Hypokalemia     Hypothyroidism     Pulmonary embolism (HCC) 08/2012   Current everyday smoker      Past Surgical History:        Procedure Laterality Date    ABDOMEN SURGERY      CARDIAC CATHETERIZATION      CARPAL TUNNEL RELEASE Bilateral     CATARACT REMOVAL      CERVICAL SPINE SURGERY  2001    fusion    CLAVICLE SURGERY      COLONOSCOPY

## 2025-01-01 NOTE — CONSULTS
Hx and P/E :Infectious D.       Patient - Marry Ayers,  Age - 74 y.o.    - 1950      Room Number - 6K-27/027-A   N -  [id number]   formerly [facility] # - [id number]  Date of Admission -  2024  3:37 PM  Patient's PCP: Silvestre Sawyer MD     Requesting Physician: Lissa Heath PA-C    CHIEF COMPLAINT:     Abdominal drainage    ASSESSMENT AND PLAN     Patient is a 74-year-old female who I am consulted for drainage present from the prior incision site along the mid abdomen.  Though there is evidence of minimal fluid, on review of imaging obtained of the abdomen and pelvis this appears to be normal postoperative fluid from surgical intervention.  She does have a loop ileostomy in place.  I reviewed labs which demonstrate no evidence of leukocytosis or lactic acidosis.  She does have evidence of acute renal failure.    In my opinion, this fluid is more likely consistent with postoperative fluid following surgical intervention.  I would recommend discontinuation of further antimicrobials.  I would consider repeat imaging in approximately 1 week to reassess this fluid collection.    Discontinue antimicrobials  Stopped meropenem, vancomycin  Will monitor while off of therapy  Evaluated surgical incision site  Repeat imaging of the abdomen pelvis in 6 to 7 days      HISTORY OF PRESENT ILLNESS       This is a very pleasant 74 y.o. female who was admitted to the hospital with a chief complaints of abdominal pain and drainage from the incisional site along the mid abdomen.  Infectious disease consulted for antibiotic recommendations.    Patient had recently been hospitalized on  and at that time had undergone open sigmoid colectomy and partial small bowel resection.  She subsequently underwent repair of an anastomotic leak end ileostomy formation on .  She has since struggled with purulent drainage present from the incisional site that started on .  
[facility]    [address], [place] [address]  [phone]     Hospital Consult  1/1/2025 12:33 PM    Pt Name:    Marry Ayers  MRN:     [id number]   [id number]  YOB: 1950  Admit Date:    12/31/2024  3:37 PM  Primary Care Physician:  Silvestre Sawyer MD        Reason for Consult:  LINDA    History:   The patient is a 74 y.o. female seen in renal consult for LINDA. She has a hx of recent hospitalization where she required open sigmoid colectomy, partial small bowel resection  on 12/4 and repair of anastomotic leak and loop ileostomy on 12/13.  She was discharged to [facility] with augmentin. She presented to the hospital apparently with hypotension per patient along with increased would drainage and poor appetite.  She was found to have severe LINDA creatinine was 5.9 on admission. She has no prior hx of CKD and creatinine recently a week ago was 1.0 mg/dL.  UA is bland. Renal US negative. She was started on aggressive IV fluid resuscitation and nephrology consulted for further assistance in management. On hyzaar at home. Intermittent hypotension noted.    Past Medical History:  Past Medical History:   Diagnosis Date    Allergic rhinitis     Anemia     Anxiety     Cataract     Essential hypertension     GERD (gastroesophageal reflux disease)     Hx of blood clots 06/2014    DVT    Hypokalemia     Hypothyroidism     Pulmonary embolism (HCC) 08/2012       Past Surgical History:  Past Surgical History:   Procedure Laterality Date    ABDOMEN SURGERY      CARDIAC CATHETERIZATION      CARPAL TUNNEL RELEASE Bilateral     CATARACT REMOVAL      CERVICAL SPINE SURGERY  2001    fusion    CLAVICLE SURGERY      COLONOSCOPY  08/07/2024    Dr. Prince    CYSTOSCOPY W/ URETERAL STENT PLACEMENT  02/06/2023    ESOPHAGOGASTRODUODENOSCOPY  09/19/2023    Dr. Tim Schirmer    ESOPHAGOGASTRODUODENOSCOPY  12/23/2022    Dr. Tim Schirmer    EYE SURGERY Right     2010- macular Hole Repair    HERNIA 
affect.  SKIN: Skin color, texture, turgor normal. No rashes or lesions.  LYMPH: no cervical nodes, no inguinal nodes  HEENT: Head is normocephalic, atraumatic. EOMI, PERRLA.  NECK: Supple, symmetrical, trachea midline, no adenopathy, thyroid symmetric, not enlarged and no tenderness, skin normal.  CHEST/LUNGS: chest symmetric with normal A/P diameter, normal respiratory rate and rhythm, lungs clear to auscultation without wheezes, rales or rhonchi. No accessory muscle use. Scars None   CARDIOVASCULAR: Heart sounds are normal.  Regular rate and rhythm without murmur, gallop or rub. Normal S1 and S2. Carotid and femoral pulses 2+/4 and equal bilaterally.  ABDOMEN: Normal shape. large midline scar with small opening inferiorly with some drainage present as expected. Ostomy in the RLQ with + output.  Normal bowel sounds.  No bruits.  Soft, nondistended, no masses or organomegaly. no evidence of hernia. Percussion: Normal without hepatosplenomegally. Tenderness: absent.  RECTAL: deferred, not clinically indicated  NEUROLOGIC: There are no focalizing motor or sensory deficits. CN II-XII are grossly intact..   EXTREMITIES: no cyanosis, no clubbing, and no edema.  LABS:     Recent Labs     12/31/24  0155 12/31/24  1600 12/31/24  1623 12/31/24  2146   WBC  --  9.1  --   --    HGB  --  9.1*  --   --    HCT  --  28.2*  --   --    PLT  --  621*  --   --    NA  --  134*  --  133*   K  --  3.8  --  3.6   CL  --  95*  --  99   CO2  --  22*  --  18*   BUN  --  37*  --  36*   CREATININE  --  5.9*  --  5.0*   PHOS  --   --   --  3.4   CALCIUM  --  8.8  --  8.4*   INR  --   --  3.71*  --    AST  --  10  --   --    ALT  --  9*  --   --    BILITOT  --  0.2*  --   --    BILIDIR  --  <0.1  --   --    LIPASE  --  22.2  --   --    NITRU NEGATIVE  --   --   --    COLORU YELLOW  --   --   --    BACTERIA NONE SEEN  --   --   --      RADIOLOGY:   I have personally reviewed the following films:  US RENAL COMPLETE   Final Result   No sonographic

## 2025-01-01 NOTE — ED NOTES
Lower abd wound dressed with sterile gauze and abd pad secured with tape per dr gonzalez. Brief changed and blanket provided at this time.

## 2025-01-02 LAB
ANION GAP SERPL CALC-SCNC: 12 MEQ/L (ref 8–16)
ANION GAP SERPL CALC-SCNC: 14 MEQ/L (ref 8–16)
BACTERIA SPEC AEROBE CULT: NORMAL
BACTERIA SPEC ANAEROBE CULT: NORMAL
BASOPHILS ABSOLUTE: 0 THOU/MM3 (ref 0–0.1)
BASOPHILS NFR BLD AUTO: 0.6 %
BUN SERPL-MCNC: 22 MG/DL (ref 7–22)
BUN SERPL-MCNC: 27 MG/DL (ref 7–22)
CALCIUM SERPL-MCNC: 8.1 MG/DL (ref 8.5–10.5)
CALCIUM SERPL-MCNC: 8.2 MG/DL (ref 8.5–10.5)
CHLORIDE SERPL-SCNC: 100 MEQ/L (ref 98–111)
CHLORIDE SERPL-SCNC: 99 MEQ/L (ref 98–111)
CO2 SERPL-SCNC: 22 MEQ/L (ref 23–33)
CO2 SERPL-SCNC: 26 MEQ/L (ref 23–33)
CREAT SERPL-MCNC: 1.6 MG/DL (ref 0.4–1.2)
CREAT SERPL-MCNC: 2.1 MG/DL (ref 0.4–1.2)
DEPRECATED RDW RBC AUTO: 48.6 FL (ref 35–45)
EOSINOPHIL NFR BLD AUTO: 3.6 %
EOSINOPHILS ABSOLUTE: 0.3 THOU/MM3 (ref 0–0.4)
ERYTHROCYTE [DISTWIDTH] IN BLOOD BY AUTOMATED COUNT: 15.3 % (ref 11.5–14.5)
GFR SERPL CREATININE-BSD FRML MDRD: 24 ML/MIN/1.73M2
GFR SERPL CREATININE-BSD FRML MDRD: 33 ML/MIN/1.73M2
GLUCOSE SERPL-MCNC: 104 MG/DL (ref 70–108)
GLUCOSE SERPL-MCNC: 111 MG/DL (ref 70–108)
GRAM STN SPEC: NORMAL
HCT VFR BLD AUTO: 27.2 % (ref 37–47)
HGB BLD-MCNC: 8.6 GM/DL (ref 12–16)
HYPOCHROMIA BLD QL SMEAR: PRESENT
IMM GRANULOCYTES # BLD AUTO: 0.05 THOU/MM3 (ref 0–0.07)
IMM GRANULOCYTES NFR BLD AUTO: 0.6 %
INR PPP: 3.58 (ref 0.85–1.13)
LYMPHOCYTES ABSOLUTE: 2.3 THOU/MM3 (ref 1–4.8)
LYMPHOCYTES NFR BLD AUTO: 29.8 %
MCH RBC QN AUTO: 27.7 PG (ref 26–33)
MCHC RBC AUTO-ENTMCNC: 31.6 GM/DL (ref 32.2–35.5)
MCV RBC AUTO: 87.7 FL (ref 81–99)
MONOCYTES ABSOLUTE: 1.5 THOU/MM3 (ref 0.4–1.3)
MONOCYTES NFR BLD AUTO: 19.1 %
NEUTROPHILS ABSOLUTE: 3.6 THOU/MM3 (ref 1.8–7.7)
NEUTROPHILS NFR BLD AUTO: 46.3 %
NRBC BLD AUTO-RTO: 0 /100 WBC
PLATELET # BLD AUTO: 646 THOU/MM3 (ref 130–400)
PLATELET BLD QL SMEAR: ABNORMAL
PMV BLD AUTO: 9.9 FL (ref 9.4–12.4)
POTASSIUM SERPL-SCNC: 3.3 MEQ/L (ref 3.5–5.2)
POTASSIUM SERPL-SCNC: 3.5 MEQ/L (ref 3.5–5.2)
RBC # BLD AUTO: 3.1 MILL/MM3 (ref 4.2–5.4)
SCAN OF BLOOD SMEAR: NORMAL
SODIUM SERPL-SCNC: 135 MEQ/L (ref 135–145)
SODIUM SERPL-SCNC: 138 MEQ/L (ref 135–145)
TARGETS BLD QL SMEAR: ABNORMAL
VANCOMYCIN SERPL-MCNC: 10.9 UG/ML (ref 10–39.9)
WBC # BLD AUTO: 7.7 THOU/MM3 (ref 4.8–10.8)

## 2025-01-02 PROCEDURE — 99232 SBSQ HOSP IP/OBS MODERATE 35: CPT | Performed by: INTERNAL MEDICINE

## 2025-01-02 PROCEDURE — 2500000003 HC RX 250 WO HCPCS: Performed by: NURSE PRACTITIONER

## 2025-01-02 PROCEDURE — 6370000000 HC RX 637 (ALT 250 FOR IP): Performed by: INTERNAL MEDICINE

## 2025-01-02 PROCEDURE — 85025 COMPLETE CBC W/AUTO DIFF WBC: CPT

## 2025-01-02 PROCEDURE — 36415 COLL VENOUS BLD VENIPUNCTURE: CPT

## 2025-01-02 PROCEDURE — 2580000003 HC RX 258: Performed by: STUDENT IN AN ORGANIZED HEALTH CARE EDUCATION/TRAINING PROGRAM

## 2025-01-02 PROCEDURE — 99232 SBSQ HOSP IP/OBS MODERATE 35: CPT | Performed by: STUDENT IN AN ORGANIZED HEALTH CARE EDUCATION/TRAINING PROGRAM

## 2025-01-02 PROCEDURE — 97535 SELF CARE MNGMENT TRAINING: CPT

## 2025-01-02 PROCEDURE — 6370000000 HC RX 637 (ALT 250 FOR IP): Performed by: PHARMACIST

## 2025-01-02 PROCEDURE — 80202 ASSAY OF VANCOMYCIN: CPT

## 2025-01-02 PROCEDURE — 6370000000 HC RX 637 (ALT 250 FOR IP): Performed by: NURSE PRACTITIONER

## 2025-01-02 PROCEDURE — 2580000003 HC RX 258: Performed by: INTERNAL MEDICINE

## 2025-01-02 PROCEDURE — 80048 BASIC METABOLIC PNL TOTAL CA: CPT

## 2025-01-02 PROCEDURE — 85610 PROTHROMBIN TIME: CPT

## 2025-01-02 PROCEDURE — 97166 OT EVAL MOD COMPLEX 45 MIN: CPT

## 2025-01-02 PROCEDURE — 97530 THERAPEUTIC ACTIVITIES: CPT

## 2025-01-02 PROCEDURE — 99024 POSTOP FOLLOW-UP VISIT: CPT | Performed by: SURGERY

## 2025-01-02 PROCEDURE — 1200000003 HC TELEMETRY R&B

## 2025-01-02 PROCEDURE — 6360000002 HC RX W HCPCS: Performed by: STUDENT IN AN ORGANIZED HEALTH CARE EDUCATION/TRAINING PROGRAM

## 2025-01-02 PROCEDURE — 6370000000 HC RX 637 (ALT 250 FOR IP): Performed by: STUDENT IN AN ORGANIZED HEALTH CARE EDUCATION/TRAINING PROGRAM

## 2025-01-02 PROCEDURE — 99233 SBSQ HOSP IP/OBS HIGH 50: CPT | Performed by: PHYSICIAN ASSISTANT

## 2025-01-02 RX ORDER — FLUCONAZOLE 200 MG/1
400 TABLET ORAL DAILY
Status: DISCONTINUED | OUTPATIENT
Start: 2025-01-02 | End: 2025-01-07 | Stop reason: HOSPADM

## 2025-01-02 RX ORDER — POTASSIUM CHLORIDE 7.45 MG/ML
10 INJECTION INTRAVENOUS PRN
Status: DISCONTINUED | OUTPATIENT
Start: 2025-01-02 | End: 2025-01-07 | Stop reason: HOSPADM

## 2025-01-02 RX ORDER — POTASSIUM CHLORIDE 1500 MG/1
40 TABLET, EXTENDED RELEASE ORAL PRN
Status: DISCONTINUED | OUTPATIENT
Start: 2025-01-02 | End: 2025-01-07 | Stop reason: HOSPADM

## 2025-01-02 RX ORDER — POTASSIUM CHLORIDE 1500 MG/1
40 TABLET, EXTENDED RELEASE ORAL ONCE
Status: COMPLETED | OUTPATIENT
Start: 2025-01-02 | End: 2025-01-02

## 2025-01-02 RX ORDER — WARFARIN SODIUM 1 MG/1
1 TABLET ORAL ONCE
Status: COMPLETED | OUTPATIENT
Start: 2025-01-02 | End: 2025-01-02

## 2025-01-02 RX ADMIN — FLUCONAZOLE 400 MG: 200 TABLET ORAL at 09:45

## 2025-01-02 RX ADMIN — MEROPENEM 500 MG: 500 INJECTION INTRAVENOUS at 09:43

## 2025-01-02 RX ADMIN — LEVOTHYROXINE SODIUM 125 MCG: 0.12 TABLET ORAL at 05:46

## 2025-01-02 RX ADMIN — WARFARIN SODIUM 1 MG: 1 TABLET ORAL at 18:26

## 2025-01-02 RX ADMIN — OXYCODONE 5 MG: 5 TABLET ORAL at 15:54

## 2025-01-02 RX ADMIN — Medication 10.5 MG: at 20:46

## 2025-01-02 RX ADMIN — SODIUM CHLORIDE, PRESERVATIVE FREE 10 ML: 5 INJECTION INTRAVENOUS at 20:46

## 2025-01-02 RX ADMIN — VANCOMYCIN HYDROCHLORIDE 1000 MG: 1 INJECTION, POWDER, LYOPHILIZED, FOR SOLUTION INTRAVENOUS at 17:13

## 2025-01-02 RX ADMIN — SODIUM CHLORIDE, PRESERVATIVE FREE 10 ML: 5 INJECTION INTRAVENOUS at 08:25

## 2025-01-02 RX ADMIN — OXYCODONE 5 MG: 5 TABLET ORAL at 08:30

## 2025-01-02 RX ADMIN — MONTELUKAST 10 MG: 10 TABLET, FILM COATED ORAL at 08:25

## 2025-01-02 RX ADMIN — SODIUM CHLORIDE, SODIUM LACTATE, POTASSIUM CHLORIDE, CALCIUM CHLORIDE: 600; 310; 30; 20 INJECTION, SOLUTION INTRAVENOUS at 06:09

## 2025-01-02 RX ADMIN — BUSPIRONE HYDROCHLORIDE 10 MG: 10 TABLET ORAL at 20:46

## 2025-01-02 RX ADMIN — POTASSIUM CHLORIDE 40 MEQ: 1500 TABLET, EXTENDED RELEASE ORAL at 14:46

## 2025-01-02 RX ADMIN — METOPROLOL SUCCINATE 50 MG: 50 TABLET, EXTENDED RELEASE ORAL at 08:25

## 2025-01-02 RX ADMIN — MEROPENEM 1000 MG: 1 INJECTION INTRAVENOUS at 17:38

## 2025-01-02 RX ADMIN — CETIRIZINE HYDROCHLORIDE 5 MG: 5 TABLET ORAL at 08:25

## 2025-01-02 RX ADMIN — BUSPIRONE HYDROCHLORIDE 10 MG: 10 TABLET ORAL at 08:25

## 2025-01-02 ASSESSMENT — PAIN DESCRIPTION - PAIN TYPE
TYPE: ACUTE PAIN
TYPE: SURGICAL PAIN

## 2025-01-02 ASSESSMENT — PAIN DESCRIPTION - FREQUENCY
FREQUENCY: INTERMITTENT
FREQUENCY: INTERMITTENT

## 2025-01-02 ASSESSMENT — PAIN DESCRIPTION - ONSET
ONSET: ON-GOING
ONSET: ON-GOING

## 2025-01-02 ASSESSMENT — PAIN DESCRIPTION - LOCATION
LOCATION: ABDOMEN
LOCATION: ABDOMEN

## 2025-01-02 ASSESSMENT — PAIN DESCRIPTION - DESCRIPTORS
DESCRIPTORS: ACHING
DESCRIPTORS: ACHING

## 2025-01-02 ASSESSMENT — PAIN DESCRIPTION - ORIENTATION
ORIENTATION: RIGHT
ORIENTATION: MID

## 2025-01-02 ASSESSMENT — PAIN SCALES - GENERAL
PAINLEVEL_OUTOF10: 9
PAINLEVEL_OUTOF10: 10

## 2025-01-02 NOTE — CARE COORDINATION
1/2/25, 11:28 AM EST  Discharge Planning Evaluation  Social work consult received, patient from [facility].    Patient/Family preference is to NOT return to [facility].    The patient's current payor source at the facility is Medicare.   Medicare skilled days available: yes  Medicare does the patient have a three midnight qualifying stay? yes  Insurance precert:  no      Anticipated transport plan: spouse  Patient's Healthcare Decision Maker: Legal Next of Kin    SW met with pt and spoke with spouse, Naeem via phone.  Pt and Naeem would like referral to [facility] first and [facility] second.    SW spoke with Annamaria at [facility], they do NOT have beds.    SW completed referral with Merritt with [facility], await decision.      Readmission Risk Low 0-14, Mod 15-19), High > 20: Readmission Risk Score: 21.3    Current PCP: Silvestre Sawyer MD  PCP verified by CM? Yes    Patient Orientation: Alert and Oriented    Patient Cognition: Alert  History Provided by: Patient, Spouse    Advance Directives:      Code Status: Full Code   Patient's Primary Decision Maker is: Legal Next of Kin       Discharge Planning:    Patient lives with: Spouse/Significant Other, Other (Comment) (was at [facility]) Type of Home: Skilled Nursing Facility  Primary Care Giver: Self  Patient Support Systems include: Spouse/Significant Other, Children   Current Financial resources: Medicare  Current community resources: ECF/Home Care  Current services prior to admission: Skilled Nursing Facility            Current DME:              Type of Home Care services:  None    ADLS  Prior functional level: Independent in ADLs/IADLs, Bathing, Dressing, Toileting, Feeding, Cooking, Housework, Shopping, Mobility  Current functional level: Assistance with the following:, Other (see comment) (ostomy)    Family can provide assistance at DC: Yes  Would you like Case Management to discuss the discharge plan with any other family

## 2025-01-02 NOTE — CARE COORDINATION
1/2/25, 12:36 PM EST    DISCHARGE PLANNING EVALUATION    ROBIN spoke with pts spouse Naeem via phone.  Advised [facility].    Naeem is now requesting referral to [facility].    ROBIN spoke with Kathryn at [facility], they do have beds.  Referral faxed.

## 2025-01-02 NOTE — CARE COORDINATION
01/02/25 0719   Readmission Assessment   Number of Days since last admission? 1-7 days   Previous Disposition SNF   Who is being Interviewed Caregiver   What was the patient's/caregiver's perception as to why they think they needed to return back to the hospital? Other (Comment)  (SNF concerned with wound drainage.)   Did you visit your Primary Care Physician after you left the hospital, before you returned this time? Yes   Did you see a specialist, such as Cardiac, Pulmonary, Orthopedic Physician, etc. after you left the hospital? No   Who advised the patient to return to the hospital? Skilled Unit   Does the patient report anything that got in the way of taking their medications? No   In our efforts to provide the best possible care to you and others like you, can you think of anything that we could have done to help you after you left the hospital the first time, so that you might not have needed to return so soon? Other (Comment)  (no, per [facility]hel note, drainage is as expected for colon leak.)

## 2025-01-02 NOTE — CARE COORDINATION
Case Management Assessment Initial Evaluation    Date/Time of Evaluation: 2025 7:12 AM  Assessment Completed by: Lorraine Grewal RN    If patient is discharged prior to next notation, then this note serves as note for discharge by case management.    Patient Name: Marry Ayers                   YOB: 1950  Diagnosis: Acute kidney injury (HCC) [N17.9]  Acute renal failure, unspecified acute renal failure type (HCC) [N17.9]                   Date / Time: 2024  3:37 PM  Location: [address] [place] [address]     Patient Admission Status: Inpatient   Readmission Risk Low 0-14, Mod 15-19), High > 20: Readmission Risk Score: 21.3    Current PCP: Silvestre Sawyer MD  Health Care Decision Makers:     Additional Case Management Notes: Presented to ED from SNF for abdominal pain and wound drainage. SNF reports stool in draining from wound. 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 D/C . Hospitalist, ID, [facility], nephro following. LR @ 125ml/hr. Atb stopped. Plan to monitor site while off atb. Plan repeat imaging in 1 week. PT/OT. No plans for surgical intervention, eval by surgery and drainage is as expected for colon leak.     Procedures: none    Imagin/31 CT abdomen/pelvis:   1. Patchy subcutaneous fluid and pockets of air in the midline   infraumbilical anterior abdominal/pelvic wall in the region of incision.   This could relate to immediate postoperative fluid but can be followed up   to exclude phlegmon. No enhancement to suggest abscess at this time.  2. New right lower abdominal small bowel ostomy without inflammation or   abscess of the ostomy site.  3. Slightly hyperdense fluid in the right perihepatic region and right   pericolic gutter which could reflect mild hemorrhagic postoperative   ascites. This can be followed up for improvement.  3. Slight nodular liver contour which can be correlated for early   cirrhosis.   Renal US: no signs of hydronephrosis.     Patient

## 2025-01-03 LAB
ANION GAP SERPL CALC-SCNC: 11 MEQ/L (ref 8–16)
BUN SERPL-MCNC: 13 MG/DL (ref 7–22)
CALCIUM SERPL-MCNC: 8.4 MG/DL (ref 8.5–10.5)
CHLORIDE SERPL-SCNC: 106 MEQ/L (ref 98–111)
CO2 SERPL-SCNC: 26 MEQ/L (ref 23–33)
CREAT SERPL-MCNC: 1.2 MG/DL (ref 0.4–1.2)
DEPRECATED RDW RBC AUTO: 49.6 FL (ref 35–45)
ERYTHROCYTE [DISTWIDTH] IN BLOOD BY AUTOMATED COUNT: 15.7 % (ref 11.5–14.5)
GFR SERPL CREATININE-BSD FRML MDRD: 47 ML/MIN/1.73M2
GLUCOSE SERPL-MCNC: 102 MG/DL (ref 70–108)
HCT VFR BLD AUTO: 25.4 % (ref 37–47)
HGB BLD-MCNC: 8.2 GM/DL (ref 12–16)
INR PPP: 3.13 (ref 0.85–1.13)
MAGNESIUM SERPL-MCNC: 1 MG/DL (ref 1.6–2.4)
MAGNESIUM SERPL-MCNC: 1.5 MG/DL (ref 1.6–2.4)
MCH RBC QN AUTO: 28 PG (ref 26–33)
MCHC RBC AUTO-ENTMCNC: 32.3 GM/DL (ref 32.2–35.5)
MCV RBC AUTO: 86.7 FL (ref 81–99)
PLATELET # BLD AUTO: 664 THOU/MM3 (ref 130–400)
PMV BLD AUTO: 10.1 FL (ref 9.4–12.4)
POTASSIUM SERPL-SCNC: 3.7 MEQ/L (ref 3.5–5.2)
RBC # BLD AUTO: 2.93 MILL/MM3 (ref 4.2–5.4)
SODIUM SERPL-SCNC: 143 MEQ/L (ref 135–145)
VANCOMYCIN SERPL-MCNC: 8.7 UG/ML (ref 10–39.9)
WBC # BLD AUTO: 7.8 THOU/MM3 (ref 4.8–10.8)

## 2025-01-03 PROCEDURE — 2580000003 HC RX 258: Performed by: INTERNAL MEDICINE

## 2025-01-03 PROCEDURE — 99024 POSTOP FOLLOW-UP VISIT: CPT | Performed by: SURGERY

## 2025-01-03 PROCEDURE — 2580000003 HC RX 258: Performed by: STUDENT IN AN ORGANIZED HEALTH CARE EDUCATION/TRAINING PROGRAM

## 2025-01-03 PROCEDURE — 97116 GAIT TRAINING THERAPY: CPT

## 2025-01-03 PROCEDURE — 6360000002 HC RX W HCPCS: Performed by: STUDENT IN AN ORGANIZED HEALTH CARE EDUCATION/TRAINING PROGRAM

## 2025-01-03 PROCEDURE — 6370000000 HC RX 637 (ALT 250 FOR IP): Performed by: STUDENT IN AN ORGANIZED HEALTH CARE EDUCATION/TRAINING PROGRAM

## 2025-01-03 PROCEDURE — 2580000003 HC RX 258: Performed by: PHARMACIST

## 2025-01-03 PROCEDURE — 99232 SBSQ HOSP IP/OBS MODERATE 35: CPT | Performed by: INTERNAL MEDICINE

## 2025-01-03 PROCEDURE — 85027 COMPLETE CBC AUTOMATED: CPT

## 2025-01-03 PROCEDURE — 6370000000 HC RX 637 (ALT 250 FOR IP): Performed by: PHARMACIST

## 2025-01-03 PROCEDURE — 6360000002 HC RX W HCPCS: Performed by: PHARMACIST

## 2025-01-03 PROCEDURE — 2500000003 HC RX 250 WO HCPCS: Performed by: NURSE PRACTITIONER

## 2025-01-03 PROCEDURE — 80048 BASIC METABOLIC PNL TOTAL CA: CPT

## 2025-01-03 PROCEDURE — 6370000000 HC RX 637 (ALT 250 FOR IP): Performed by: NURSE PRACTITIONER

## 2025-01-03 PROCEDURE — 97161 PT EVAL LOW COMPLEX 20 MIN: CPT

## 2025-01-03 PROCEDURE — 1200000003 HC TELEMETRY R&B

## 2025-01-03 PROCEDURE — 80202 ASSAY OF VANCOMYCIN: CPT

## 2025-01-03 PROCEDURE — 6360000002 HC RX W HCPCS: Performed by: PHYSICIAN ASSISTANT

## 2025-01-03 PROCEDURE — 36415 COLL VENOUS BLD VENIPUNCTURE: CPT

## 2025-01-03 PROCEDURE — 85610 PROTHROMBIN TIME: CPT

## 2025-01-03 PROCEDURE — 99232 SBSQ HOSP IP/OBS MODERATE 35: CPT | Performed by: STUDENT IN AN ORGANIZED HEALTH CARE EDUCATION/TRAINING PROGRAM

## 2025-01-03 PROCEDURE — 83735 ASSAY OF MAGNESIUM: CPT

## 2025-01-03 RX ORDER — MAGNESIUM SULFATE IN WATER 40 MG/ML
4000 INJECTION, SOLUTION INTRAVENOUS ONCE
Status: DISCONTINUED | OUTPATIENT
Start: 2025-01-03 | End: 2025-01-07 | Stop reason: HOSPADM

## 2025-01-03 RX ORDER — MAGNESIUM SULFATE IN WATER 40 MG/ML
2000 INJECTION, SOLUTION INTRAVENOUS PRN
Status: DISCONTINUED | OUTPATIENT
Start: 2025-01-03 | End: 2025-01-07 | Stop reason: HOSPADM

## 2025-01-03 RX ORDER — WARFARIN SODIUM 1 MG/1
1.5 TABLET ORAL
Status: COMPLETED | OUTPATIENT
Start: 2025-01-03 | End: 2025-01-03

## 2025-01-03 RX ADMIN — MAGNESIUM SULFATE HEPTAHYDRATE 2000 MG: 40 INJECTION, SOLUTION INTRAVENOUS at 11:48

## 2025-01-03 RX ADMIN — MONTELUKAST 10 MG: 10 TABLET, FILM COATED ORAL at 09:39

## 2025-01-03 RX ADMIN — BUSPIRONE HYDROCHLORIDE 10 MG: 10 TABLET ORAL at 09:40

## 2025-01-03 RX ADMIN — CETIRIZINE HYDROCHLORIDE 5 MG: 5 TABLET ORAL at 09:40

## 2025-01-03 RX ADMIN — MAGNESIUM SULFATE HEPTAHYDRATE 2000 MG: 40 INJECTION, SOLUTION INTRAVENOUS at 16:48

## 2025-01-03 RX ADMIN — LEVOTHYROXINE SODIUM 125 MCG: 0.12 TABLET ORAL at 05:40

## 2025-01-03 RX ADMIN — Medication 10.5 MG: at 19:52

## 2025-01-03 RX ADMIN — VANCOMYCIN HYDROCHLORIDE 1250 MG: 5 INJECTION, POWDER, LYOPHILIZED, FOR SOLUTION INTRAVENOUS at 15:27

## 2025-01-03 RX ADMIN — WARFARIN SODIUM 1.5 MG: 1 TABLET ORAL at 18:30

## 2025-01-03 RX ADMIN — MEROPENEM 1000 MG: 1 INJECTION INTRAVENOUS at 05:40

## 2025-01-03 RX ADMIN — OXYCODONE 5 MG: 5 TABLET ORAL at 09:39

## 2025-01-03 RX ADMIN — SODIUM CHLORIDE, SODIUM LACTATE, POTASSIUM CHLORIDE, CALCIUM CHLORIDE: 600; 310; 30; 20 INJECTION, SOLUTION INTRAVENOUS at 00:22

## 2025-01-03 RX ADMIN — METOPROLOL SUCCINATE 50 MG: 50 TABLET, EXTENDED RELEASE ORAL at 09:39

## 2025-01-03 RX ADMIN — SODIUM CHLORIDE, PRESERVATIVE FREE 10 ML: 5 INJECTION INTRAVENOUS at 09:51

## 2025-01-03 RX ADMIN — BUSPIRONE HYDROCHLORIDE 10 MG: 10 TABLET ORAL at 19:52

## 2025-01-03 RX ADMIN — FLUCONAZOLE 400 MG: 200 TABLET ORAL at 09:39

## 2025-01-03 RX ADMIN — OXYCODONE 5 MG: 5 TABLET ORAL at 16:57

## 2025-01-03 RX ADMIN — MEROPENEM 1000 MG: 1 INJECTION INTRAVENOUS at 18:34

## 2025-01-03 ASSESSMENT — PAIN SCALES - GENERAL
PAINLEVEL_OUTOF10: 9
PAINLEVEL_OUTOF10: 0
PAINLEVEL_OUTOF10: 10
PAINLEVEL_OUTOF10: 10

## 2025-01-03 ASSESSMENT — PAIN DESCRIPTION - FREQUENCY
FREQUENCY: INTERMITTENT
FREQUENCY: INTERMITTENT

## 2025-01-03 ASSESSMENT — PAIN DESCRIPTION - LOCATION
LOCATION: HEAD
LOCATION: ABDOMEN
LOCATION: ABDOMEN

## 2025-01-03 ASSESSMENT — PAIN DESCRIPTION - PAIN TYPE
TYPE: ACUTE PAIN
TYPE: ACUTE PAIN

## 2025-01-03 ASSESSMENT — PAIN DESCRIPTION - DESCRIPTORS
DESCRIPTORS: SHARP
DESCRIPTORS: ACHING
DESCRIPTORS: SHARP

## 2025-01-03 ASSESSMENT — PAIN DESCRIPTION - ORIENTATION
ORIENTATION: MID
ORIENTATION: RIGHT;MID
ORIENTATION: RIGHT

## 2025-01-03 ASSESSMENT — PAIN DESCRIPTION - ONSET
ONSET: ON-GOING
ONSET: ON-GOING

## 2025-01-03 ASSESSMENT — PAIN SCALES - WONG BAKER: WONGBAKER_NUMERICALRESPONSE: HURTS A LITTLE BIT

## 2025-01-03 NOTE — CARE COORDINATION
1/3/25, 11:46 AM EST    DISCHARGE PLANNING EVALUATION    SW received call from Kathryn with [facility], advised that pt did walk 200' with therapy today.  Concerns that pt will NOT meet criteria for ECF under Medicare.      SW did have a conversation with pts spouse Antwon via phone.  Explained skilled services at an ECF.  Explained that the Nurse has been working with pt on education for her ostomy.      ROBIN stated that we will need to revisit discharge POC on Monday as pt may not be appropriate for ECF.  Antwon understood.

## 2025-01-03 NOTE — CARE COORDINATION
1/3/25, 9:17 AM EST    DISCHARGE ON GOING EVALUATION    [facility] day: 3  Location: -27/027-A Reason for admit: Acute kidney injury (HCC) [N17.9]  Acute renal failure, unspecified acute renal failure type (HCC) [N17.9]     Procedures: S/P sigmoid resection 12/4, repair of colon perforation and loop ileostomy 12/13/24     Imaging since last note: none    Barriers to Discharge: Mg 1.0, plt 664. No further surgical plans, per Dr. Callahan the wound drainage is as expected for the colon leak. LR infusion, IV merrem, IV vanc, pain control. Ostomy RN to see for education.     PCP: Silvestre Sawyer MD  Readmission Risk Score: 19.4    Patient Goals/Plan/Treatment Preferences: referral made to [facility]

## 2025-01-04 PROBLEM — E83.42 HYPOMAGNESEMIA: Status: ACTIVE | Noted: 2025-01-04

## 2025-01-04 LAB
ANION GAP SERPL CALC-SCNC: 12 MEQ/L (ref 8–16)
BUN SERPL-MCNC: 9 MG/DL (ref 7–22)
CALCIUM SERPL-MCNC: 8.4 MG/DL (ref 8.5–10.5)
CHLORIDE SERPL-SCNC: 101 MEQ/L (ref 98–111)
CO2 SERPL-SCNC: 24 MEQ/L (ref 23–33)
CREAT SERPL-MCNC: 1 MG/DL (ref 0.4–1.2)
DEPRECATED RDW RBC AUTO: 50.8 FL (ref 35–45)
ERYTHROCYTE [DISTWIDTH] IN BLOOD BY AUTOMATED COUNT: 15.9 % (ref 11.5–14.5)
GFR SERPL CREATININE-BSD FRML MDRD: 59 ML/MIN/1.73M2
GLUCOSE SERPL-MCNC: 106 MG/DL (ref 70–108)
HCT VFR BLD AUTO: 25.9 % (ref 37–47)
HGB BLD-MCNC: 8.1 GM/DL (ref 12–16)
INR PPP: 2.89 (ref 0.85–1.13)
MAGNESIUM SERPL-MCNC: 1.6 MG/DL (ref 1.6–2.4)
MCH RBC QN AUTO: 27.6 PG (ref 26–33)
MCHC RBC AUTO-ENTMCNC: 31.3 GM/DL (ref 32.2–35.5)
MCV RBC AUTO: 88.1 FL (ref 81–99)
PLATELET # BLD AUTO: 702 THOU/MM3 (ref 130–400)
PMV BLD AUTO: 9.7 FL (ref 9.4–12.4)
POTASSIUM SERPL-SCNC: 3.4 MEQ/L (ref 3.5–5.2)
RBC # BLD AUTO: 2.94 MILL/MM3 (ref 4.2–5.4)
SODIUM SERPL-SCNC: 137 MEQ/L (ref 135–145)
WBC # BLD AUTO: 9.8 THOU/MM3 (ref 4.8–10.8)

## 2025-01-04 PROCEDURE — 80048 BASIC METABOLIC PNL TOTAL CA: CPT

## 2025-01-04 PROCEDURE — 6360000002 HC RX W HCPCS: Performed by: PHARMACIST

## 2025-01-04 PROCEDURE — 6370000000 HC RX 637 (ALT 250 FOR IP): Performed by: INTERNAL MEDICINE

## 2025-01-04 PROCEDURE — APPSS30 APP SPLIT SHARED TIME 16-30 MINUTES: Performed by: NURSE PRACTITIONER

## 2025-01-04 PROCEDURE — 6370000000 HC RX 637 (ALT 250 FOR IP): Performed by: NURSE PRACTITIONER

## 2025-01-04 PROCEDURE — 6370000000 HC RX 637 (ALT 250 FOR IP): Performed by: PHYSICIAN ASSISTANT

## 2025-01-04 PROCEDURE — 99232 SBSQ HOSP IP/OBS MODERATE 35: CPT | Performed by: STUDENT IN AN ORGANIZED HEALTH CARE EDUCATION/TRAINING PROGRAM

## 2025-01-04 PROCEDURE — 36415 COLL VENOUS BLD VENIPUNCTURE: CPT

## 2025-01-04 PROCEDURE — 83735 ASSAY OF MAGNESIUM: CPT

## 2025-01-04 PROCEDURE — 2580000003 HC RX 258: Performed by: INTERNAL MEDICINE

## 2025-01-04 PROCEDURE — 99233 SBSQ HOSP IP/OBS HIGH 50: CPT | Performed by: PHYSICIAN ASSISTANT

## 2025-01-04 PROCEDURE — 85610 PROTHROMBIN TIME: CPT

## 2025-01-04 PROCEDURE — 99024 POSTOP FOLLOW-UP VISIT: CPT | Performed by: NURSE PRACTITIONER

## 2025-01-04 PROCEDURE — 85027 COMPLETE CBC AUTOMATED: CPT

## 2025-01-04 PROCEDURE — 1200000003 HC TELEMETRY R&B

## 2025-01-04 PROCEDURE — 2580000003 HC RX 258: Performed by: STUDENT IN AN ORGANIZED HEALTH CARE EDUCATION/TRAINING PROGRAM

## 2025-01-04 PROCEDURE — 99232 SBSQ HOSP IP/OBS MODERATE 35: CPT | Performed by: INTERNAL MEDICINE

## 2025-01-04 PROCEDURE — 6360000002 HC RX W HCPCS: Performed by: STUDENT IN AN ORGANIZED HEALTH CARE EDUCATION/TRAINING PROGRAM

## 2025-01-04 PROCEDURE — 2500000003 HC RX 250 WO HCPCS: Performed by: NURSE PRACTITIONER

## 2025-01-04 PROCEDURE — 2580000003 HC RX 258: Performed by: PHARMACIST

## 2025-01-04 PROCEDURE — 6370000000 HC RX 637 (ALT 250 FOR IP): Performed by: STUDENT IN AN ORGANIZED HEALTH CARE EDUCATION/TRAINING PROGRAM

## 2025-01-04 RX ORDER — WARFARIN SODIUM 1 MG/1
1.5 TABLET ORAL
Status: COMPLETED | OUTPATIENT
Start: 2025-01-04 | End: 2025-01-04

## 2025-01-04 RX ADMIN — VANCOMYCIN HYDROCHLORIDE 1250 MG: 5 INJECTION, POWDER, LYOPHILIZED, FOR SOLUTION INTRAVENOUS at 15:24

## 2025-01-04 RX ADMIN — OXYCODONE 5 MG: 5 TABLET ORAL at 00:29

## 2025-01-04 RX ADMIN — MEROPENEM 1000 MG: 1 INJECTION INTRAVENOUS at 17:40

## 2025-01-04 RX ADMIN — ACETAMINOPHEN 650 MG: 325 TABLET ORAL at 20:30

## 2025-01-04 RX ADMIN — MONTELUKAST 10 MG: 10 TABLET, FILM COATED ORAL at 09:07

## 2025-01-04 RX ADMIN — SODIUM CHLORIDE, SODIUM LACTATE, POTASSIUM CHLORIDE, CALCIUM CHLORIDE: 600; 310; 30; 20 INJECTION, SOLUTION INTRAVENOUS at 00:30

## 2025-01-04 RX ADMIN — OXYCODONE 5 MG: 5 TABLET ORAL at 09:15

## 2025-01-04 RX ADMIN — FLUCONAZOLE 400 MG: 200 TABLET ORAL at 09:07

## 2025-01-04 RX ADMIN — SODIUM CHLORIDE, PRESERVATIVE FREE 10 ML: 5 INJECTION INTRAVENOUS at 20:30

## 2025-01-04 RX ADMIN — CETIRIZINE HYDROCHLORIDE 5 MG: 5 TABLET ORAL at 09:07

## 2025-01-04 RX ADMIN — MEROPENEM 1000 MG: 1 INJECTION INTRAVENOUS at 05:07

## 2025-01-04 RX ADMIN — OXYCODONE 5 MG: 5 TABLET ORAL at 17:40

## 2025-01-04 RX ADMIN — LEVOTHYROXINE SODIUM 125 MCG: 0.12 TABLET ORAL at 05:04

## 2025-01-04 RX ADMIN — WARFARIN SODIUM 1.5 MG: 1 TABLET ORAL at 17:12

## 2025-01-04 RX ADMIN — METOPROLOL SUCCINATE 50 MG: 50 TABLET, EXTENDED RELEASE ORAL at 09:07

## 2025-01-04 RX ADMIN — POTASSIUM CHLORIDE 40 MEQ: 1500 TABLET, EXTENDED RELEASE ORAL at 09:07

## 2025-01-04 RX ADMIN — BUSPIRONE HYDROCHLORIDE 10 MG: 10 TABLET ORAL at 20:31

## 2025-01-04 RX ADMIN — BUSPIRONE HYDROCHLORIDE 10 MG: 10 TABLET ORAL at 09:07

## 2025-01-04 RX ADMIN — POTASSIUM BICARBONATE 40 MEQ: 782 TABLET, EFFERVESCENT ORAL at 15:25

## 2025-01-04 RX ADMIN — Medication 10.5 MG: at 20:30

## 2025-01-04 ASSESSMENT — PAIN DESCRIPTION - ORIENTATION
ORIENTATION: MID;UPPER
ORIENTATION: MID
ORIENTATION: MID
ORIENTATION: RIGHT;MID;LOWER

## 2025-01-04 ASSESSMENT — PAIN SCALES - GENERAL
PAINLEVEL_OUTOF10: 7
PAINLEVEL_OUTOF10: 6
PAINLEVEL_OUTOF10: 4
PAINLEVEL_OUTOF10: 10

## 2025-01-04 ASSESSMENT — PAIN DESCRIPTION - DESCRIPTORS
DESCRIPTORS: DISCOMFORT
DESCRIPTORS: ACHING;DISCOMFORT
DESCRIPTORS: ACHING;DISCOMFORT
DESCRIPTORS: SHARP

## 2025-01-04 ASSESSMENT — PAIN DESCRIPTION - PAIN TYPE: TYPE: ACUTE PAIN

## 2025-01-04 ASSESSMENT — PAIN - FUNCTIONAL ASSESSMENT
PAIN_FUNCTIONAL_ASSESSMENT: PREVENTS OR INTERFERES SOME ACTIVE ACTIVITIES AND ADLS
PAIN_FUNCTIONAL_ASSESSMENT: PREVENTS OR INTERFERES SOME ACTIVE ACTIVITIES AND ADLS
PAIN_FUNCTIONAL_ASSESSMENT: ACTIVITIES ARE NOT PREVENTED
PAIN_FUNCTIONAL_ASSESSMENT: PREVENTS OR INTERFERES SOME ACTIVE ACTIVITIES AND ADLS

## 2025-01-04 ASSESSMENT — PAIN DESCRIPTION - LOCATION
LOCATION: ABDOMEN

## 2025-01-04 ASSESSMENT — PAIN DESCRIPTION - ONSET: ONSET: ON-GOING

## 2025-01-05 LAB
ANION GAP SERPL CALC-SCNC: 10 MEQ/L (ref 8–16)
BACTERIA BLD AEROBE CULT: NORMAL
BACTERIA BLD AEROBE CULT: NORMAL
BUN SERPL-MCNC: 8 MG/DL (ref 7–22)
CA-I BLD ISE-SCNC: 1.22 MMOL/L (ref 1.12–1.32)
CALCIUM SERPL-MCNC: 8.5 MG/DL (ref 8.5–10.5)
CHLORIDE SERPL-SCNC: 107 MEQ/L (ref 98–111)
CO2 SERPL-SCNC: 25 MEQ/L (ref 23–33)
CREAT SERPL-MCNC: 1.1 MG/DL (ref 0.4–1.2)
DEPRECATED RDW RBC AUTO: 51.9 FL (ref 35–45)
ERYTHROCYTE [DISTWIDTH] IN BLOOD BY AUTOMATED COUNT: 16 % (ref 11.5–14.5)
GFR SERPL CREATININE-BSD FRML MDRD: 53 ML/MIN/1.73M2
GLUCOSE SERPL-MCNC: 105 MG/DL (ref 70–108)
HCT VFR BLD AUTO: 26.8 % (ref 37–47)
HGB BLD-MCNC: 8.3 GM/DL (ref 12–16)
INR PPP: 3.6 (ref 0.85–1.13)
MCH RBC QN AUTO: 27.5 PG (ref 26–33)
MCHC RBC AUTO-ENTMCNC: 31 GM/DL (ref 32.2–35.5)
MCV RBC AUTO: 88.7 FL (ref 81–99)
PLATELET # BLD AUTO: 694 THOU/MM3 (ref 130–400)
PMV BLD AUTO: 9.5 FL (ref 9.4–12.4)
POTASSIUM SERPL-SCNC: 4.7 MEQ/L (ref 3.5–5.2)
RBC # BLD AUTO: 3.02 MILL/MM3 (ref 4.2–5.4)
SODIUM SERPL-SCNC: 142 MEQ/L (ref 135–145)
VANCOMYCIN SERPL-MCNC: 14.2 UG/ML (ref 10–39.9)
WBC # BLD AUTO: 8.9 THOU/MM3 (ref 4.8–10.8)

## 2025-01-05 PROCEDURE — 85610 PROTHROMBIN TIME: CPT

## 2025-01-05 PROCEDURE — 2580000003 HC RX 258: Performed by: STUDENT IN AN ORGANIZED HEALTH CARE EDUCATION/TRAINING PROGRAM

## 2025-01-05 PROCEDURE — 80202 ASSAY OF VANCOMYCIN: CPT

## 2025-01-05 PROCEDURE — 6360000002 HC RX W HCPCS: Performed by: PHYSICIAN ASSISTANT

## 2025-01-05 PROCEDURE — 6370000000 HC RX 637 (ALT 250 FOR IP): Performed by: NURSE PRACTITIONER

## 2025-01-05 PROCEDURE — 2580000003 HC RX 258: Performed by: PHYSICIAN ASSISTANT

## 2025-01-05 PROCEDURE — 82330 ASSAY OF CALCIUM: CPT

## 2025-01-05 PROCEDURE — 99233 SBSQ HOSP IP/OBS HIGH 50: CPT | Performed by: PHYSICIAN ASSISTANT

## 2025-01-05 PROCEDURE — APPSS30 APP SPLIT SHARED TIME 16-30 MINUTES: Performed by: NURSE PRACTITIONER

## 2025-01-05 PROCEDURE — 99232 SBSQ HOSP IP/OBS MODERATE 35: CPT | Performed by: INTERNAL MEDICINE

## 2025-01-05 PROCEDURE — 36415 COLL VENOUS BLD VENIPUNCTURE: CPT

## 2025-01-05 PROCEDURE — 6370000000 HC RX 637 (ALT 250 FOR IP): Performed by: STUDENT IN AN ORGANIZED HEALTH CARE EDUCATION/TRAINING PROGRAM

## 2025-01-05 PROCEDURE — 1200000003 HC TELEMETRY R&B

## 2025-01-05 PROCEDURE — 80048 BASIC METABOLIC PNL TOTAL CA: CPT

## 2025-01-05 PROCEDURE — 85027 COMPLETE CBC AUTOMATED: CPT

## 2025-01-05 PROCEDURE — 99232 SBSQ HOSP IP/OBS MODERATE 35: CPT | Performed by: STUDENT IN AN ORGANIZED HEALTH CARE EDUCATION/TRAINING PROGRAM

## 2025-01-05 PROCEDURE — 6360000002 HC RX W HCPCS: Performed by: STUDENT IN AN ORGANIZED HEALTH CARE EDUCATION/TRAINING PROGRAM

## 2025-01-05 RX ADMIN — CETIRIZINE HYDROCHLORIDE 5 MG: 5 TABLET ORAL at 09:13

## 2025-01-05 RX ADMIN — Medication 10.5 MG: at 19:57

## 2025-01-05 RX ADMIN — AMPICILLIN SODIUM AND SULBACTAM SODIUM 3000 MG: 2; 1 INJECTION, POWDER, FOR SOLUTION INTRAMUSCULAR; INTRAVENOUS at 14:42

## 2025-01-05 RX ADMIN — MONTELUKAST 10 MG: 10 TABLET, FILM COATED ORAL at 09:13

## 2025-01-05 RX ADMIN — BUSPIRONE HYDROCHLORIDE 10 MG: 10 TABLET ORAL at 09:13

## 2025-01-05 RX ADMIN — BUSPIRONE HYDROCHLORIDE 10 MG: 10 TABLET ORAL at 20:00

## 2025-01-05 RX ADMIN — OXYCODONE 5 MG: 5 TABLET ORAL at 19:57

## 2025-01-05 RX ADMIN — FLUCONAZOLE 400 MG: 200 TABLET ORAL at 09:13

## 2025-01-05 RX ADMIN — METOPROLOL SUCCINATE 50 MG: 50 TABLET, EXTENDED RELEASE ORAL at 09:13

## 2025-01-05 RX ADMIN — ACETAMINOPHEN 650 MG: 325 TABLET ORAL at 15:59

## 2025-01-05 RX ADMIN — OXYCODONE 5 MG: 5 TABLET ORAL at 14:10

## 2025-01-05 RX ADMIN — LEVOTHYROXINE SODIUM 125 MCG: 0.12 TABLET ORAL at 06:14

## 2025-01-05 RX ADMIN — AMPICILLIN SODIUM AND SULBACTAM SODIUM 3000 MG: 2; 1 INJECTION, POWDER, FOR SOLUTION INTRAMUSCULAR; INTRAVENOUS at 20:00

## 2025-01-05 RX ADMIN — MEROPENEM 1000 MG: 1 INJECTION INTRAVENOUS at 06:14

## 2025-01-05 ASSESSMENT — PAIN DESCRIPTION - ORIENTATION
ORIENTATION: LOWER;MID
ORIENTATION: MID;LOWER
ORIENTATION: LOWER;MID

## 2025-01-05 ASSESSMENT — PAIN DESCRIPTION - DESCRIPTORS
DESCRIPTORS: ACHING;DISCOMFORT

## 2025-01-05 ASSESSMENT — PAIN SCALES - GENERAL
PAINLEVEL_OUTOF10: 9
PAINLEVEL_OUTOF10: 0
PAINLEVEL_OUTOF10: 9
PAINLEVEL_OUTOF10: 9

## 2025-01-05 ASSESSMENT — PAIN - FUNCTIONAL ASSESSMENT
PAIN_FUNCTIONAL_ASSESSMENT: PREVENTS OR INTERFERES SOME ACTIVE ACTIVITIES AND ADLS
PAIN_FUNCTIONAL_ASSESSMENT: ACTIVITIES ARE NOT PREVENTED
PAIN_FUNCTIONAL_ASSESSMENT: PREVENTS OR INTERFERES SOME ACTIVE ACTIVITIES AND ADLS

## 2025-01-05 ASSESSMENT — PAIN DESCRIPTION - LOCATION
LOCATION: ABDOMEN

## 2025-01-05 ASSESSMENT — PAIN DESCRIPTION - PAIN TYPE: TYPE: SURGICAL PAIN

## 2025-01-05 ASSESSMENT — PAIN SCALES - WONG BAKER: WONGBAKER_NUMERICALRESPONSE: NO HURT

## 2025-01-05 ASSESSMENT — PAIN DESCRIPTION - ONSET: ONSET: ON-GOING

## 2025-01-05 ASSESSMENT — PAIN DESCRIPTION - FREQUENCY: FREQUENCY: INTERMITTENT

## 2025-01-06 PROBLEM — E44.0 MODERATE MALNUTRITION (HCC): Status: ACTIVE | Noted: 2025-01-06

## 2025-01-06 PROBLEM — N17.9 ACUTE RENAL FAILURE (HCC): Status: ACTIVE | Noted: 2025-01-06

## 2025-01-06 LAB
ANION GAP SERPL CALC-SCNC: 12 MEQ/L (ref 8–16)
BUN SERPL-MCNC: 8 MG/DL (ref 7–22)
CALCIUM SERPL-MCNC: 8.5 MG/DL (ref 8.5–10.5)
CHLORIDE SERPL-SCNC: 106 MEQ/L (ref 98–111)
CO2 SERPL-SCNC: 25 MEQ/L (ref 23–33)
CREAT SERPL-MCNC: 1.2 MG/DL (ref 0.4–1.2)
DEPRECATED RDW RBC AUTO: 52.1 FL (ref 35–45)
ERYTHROCYTE [DISTWIDTH] IN BLOOD BY AUTOMATED COUNT: 16.1 % (ref 11.5–14.5)
GFR SERPL CREATININE-BSD FRML MDRD: 47 ML/MIN/1.73M2
GLUCOSE SERPL-MCNC: 113 MG/DL (ref 70–108)
HCT VFR BLD AUTO: 26.3 % (ref 37–47)
HGB BLD-MCNC: 8.3 GM/DL (ref 12–16)
INR PPP: 4.17 (ref 0.85–1.13)
MAGNESIUM SERPL-MCNC: 1.4 MG/DL (ref 1.6–2.4)
MCH RBC QN AUTO: 28.1 PG (ref 26–33)
MCHC RBC AUTO-ENTMCNC: 31.6 GM/DL (ref 32.2–35.5)
MCV RBC AUTO: 89.2 FL (ref 81–99)
PLATELET # BLD AUTO: 659 THOU/MM3 (ref 130–400)
PMV BLD AUTO: 9.4 FL (ref 9.4–12.4)
POTASSIUM SERPL-SCNC: 4.7 MEQ/L (ref 3.5–5.2)
RBC # BLD AUTO: 2.95 MILL/MM3 (ref 4.2–5.4)
SODIUM SERPL-SCNC: 143 MEQ/L (ref 135–145)
WBC # BLD AUTO: 8.3 THOU/MM3 (ref 4.8–10.8)

## 2025-01-06 PROCEDURE — 97535 SELF CARE MNGMENT TRAINING: CPT

## 2025-01-06 PROCEDURE — 6370000000 HC RX 637 (ALT 250 FOR IP): Performed by: NURSE PRACTITIONER

## 2025-01-06 PROCEDURE — 1200000003 HC TELEMETRY R&B

## 2025-01-06 PROCEDURE — 99232 SBSQ HOSP IP/OBS MODERATE 35: CPT | Performed by: STUDENT IN AN ORGANIZED HEALTH CARE EDUCATION/TRAINING PROGRAM

## 2025-01-06 PROCEDURE — 80048 BASIC METABOLIC PNL TOTAL CA: CPT

## 2025-01-06 PROCEDURE — 97530 THERAPEUTIC ACTIVITIES: CPT

## 2025-01-06 PROCEDURE — 99232 SBSQ HOSP IP/OBS MODERATE 35: CPT | Performed by: INTERNAL MEDICINE

## 2025-01-06 PROCEDURE — 2580000003 HC RX 258: Performed by: PHYSICIAN ASSISTANT

## 2025-01-06 PROCEDURE — 99232 SBSQ HOSP IP/OBS MODERATE 35: CPT | Performed by: PHYSICIAN ASSISTANT

## 2025-01-06 PROCEDURE — 85610 PROTHROMBIN TIME: CPT

## 2025-01-06 PROCEDURE — 85027 COMPLETE CBC AUTOMATED: CPT

## 2025-01-06 PROCEDURE — 6370000000 HC RX 637 (ALT 250 FOR IP): Performed by: STUDENT IN AN ORGANIZED HEALTH CARE EDUCATION/TRAINING PROGRAM

## 2025-01-06 PROCEDURE — 6370000000 HC RX 637 (ALT 250 FOR IP): Performed by: INTERNAL MEDICINE

## 2025-01-06 PROCEDURE — 6360000002 HC RX W HCPCS: Performed by: PHYSICIAN ASSISTANT

## 2025-01-06 PROCEDURE — 36415 COLL VENOUS BLD VENIPUNCTURE: CPT

## 2025-01-06 PROCEDURE — 2500000003 HC RX 250 WO HCPCS: Performed by: NURSE PRACTITIONER

## 2025-01-06 PROCEDURE — 83735 ASSAY OF MAGNESIUM: CPT

## 2025-01-06 RX ORDER — LANOLIN ALCOHOL/MO/W.PET/CERES
400 CREAM (GRAM) TOPICAL 2 TIMES DAILY
Status: DISCONTINUED | OUTPATIENT
Start: 2025-01-06 | End: 2025-01-07 | Stop reason: HOSPADM

## 2025-01-06 RX ORDER — FLUCONAZOLE 200 MG/1
400 TABLET ORAL DAILY
Qty: 12 TABLET | Refills: 0 | Status: CANCELLED | OUTPATIENT
Start: 2025-01-08 | End: 2025-01-14

## 2025-01-06 RX ADMIN — OXYCODONE 5 MG: 5 TABLET ORAL at 20:23

## 2025-01-06 RX ADMIN — AMPICILLIN SODIUM AND SULBACTAM SODIUM 3000 MG: 2; 1 INJECTION, POWDER, FOR SOLUTION INTRAMUSCULAR; INTRAVENOUS at 20:27

## 2025-01-06 RX ADMIN — AMPICILLIN SODIUM AND SULBACTAM SODIUM 3000 MG: 2; 1 INJECTION, POWDER, FOR SOLUTION INTRAMUSCULAR; INTRAVENOUS at 13:21

## 2025-01-06 RX ADMIN — Medication 400 MG: at 20:23

## 2025-01-06 RX ADMIN — AMPICILLIN SODIUM AND SULBACTAM SODIUM 3000 MG: 2; 1 INJECTION, POWDER, FOR SOLUTION INTRAMUSCULAR; INTRAVENOUS at 07:59

## 2025-01-06 RX ADMIN — BUSPIRONE HYDROCHLORIDE 10 MG: 10 TABLET ORAL at 08:00

## 2025-01-06 RX ADMIN — BUSPIRONE HYDROCHLORIDE 10 MG: 10 TABLET ORAL at 20:23

## 2025-01-06 RX ADMIN — OXYCODONE 5 MG: 5 TABLET ORAL at 14:16

## 2025-01-06 RX ADMIN — FLUCONAZOLE 400 MG: 200 TABLET ORAL at 08:00

## 2025-01-06 RX ADMIN — MONTELUKAST 10 MG: 10 TABLET, FILM COATED ORAL at 07:59

## 2025-01-06 RX ADMIN — SODIUM CHLORIDE, PRESERVATIVE FREE 10 ML: 5 INJECTION INTRAVENOUS at 20:23

## 2025-01-06 RX ADMIN — Medication 10.5 MG: at 20:23

## 2025-01-06 RX ADMIN — AMPICILLIN SODIUM AND SULBACTAM SODIUM 3000 MG: 2; 1 INJECTION, POWDER, FOR SOLUTION INTRAMUSCULAR; INTRAVENOUS at 01:35

## 2025-01-06 RX ADMIN — MAGNESIUM SULFATE HEPTAHYDRATE 2000 MG: 40 INJECTION, SOLUTION INTRAVENOUS at 08:37

## 2025-01-06 RX ADMIN — Medication 400 MG: at 10:43

## 2025-01-06 RX ADMIN — CETIRIZINE HYDROCHLORIDE 5 MG: 5 TABLET ORAL at 08:00

## 2025-01-06 RX ADMIN — LEVOTHYROXINE SODIUM 125 MCG: 0.12 TABLET ORAL at 06:13

## 2025-01-06 RX ADMIN — METOPROLOL SUCCINATE 50 MG: 50 TABLET, EXTENDED RELEASE ORAL at 07:59

## 2025-01-06 RX ADMIN — SODIUM CHLORIDE, PRESERVATIVE FREE 10 ML: 5 INJECTION INTRAVENOUS at 07:59

## 2025-01-06 ASSESSMENT — PAIN DESCRIPTION - ORIENTATION
ORIENTATION: RIGHT;LEFT
ORIENTATION: RIGHT;LOWER

## 2025-01-06 ASSESSMENT — PAIN DESCRIPTION - LOCATION
LOCATION: ABDOMEN
LOCATION: ABDOMEN

## 2025-01-06 ASSESSMENT — PAIN - FUNCTIONAL ASSESSMENT: PAIN_FUNCTIONAL_ASSESSMENT: ACTIVITIES ARE NOT PREVENTED

## 2025-01-06 ASSESSMENT — PAIN DESCRIPTION - DESCRIPTORS
DESCRIPTORS: ACHING
DESCRIPTORS: ACHING

## 2025-01-06 ASSESSMENT — PAIN SCALES - GENERAL
PAINLEVEL_OUTOF10: 7
PAINLEVEL_OUTOF10: 7
PAINLEVEL_OUTOF10: 10
PAINLEVEL_OUTOF10: 10

## 2025-01-06 NOTE — DISCHARGE INSTRUCTIONS
protective ring or paste to inner edge of flange OR to peristomal skin.   ONLY if pouching system has continued leaking, patient may benefit from use of NuHope adhesive (8332). Brush a thin layer of the adhesive with the included wand to the back of the flange and the varsha-stomal skin. Allow to become tacky to touch before applying.  Center the flange around the stoma and apply.   Apply the pouch by aligning the white edge of the pouch sticker with the blue line on the flange.  Run fingers around the back of flange and pouch to ensure the two pieces are adhered.   Apply barrier extenders around outer edge of flange (half on the flange, half on the skin).  Empty when 1/3 full.    Follow up with LOY Cerna at 1 week post discharge.  [facility]  [address] [place] [id number]  [phone]    Call surgeon or seek emergency medical care if the following symptoms present:  -Black/bright red stool or drainage from stoma  -Black or retracted stoma (ostomy falls into belly)  -No output for more than 48 hours  -Large amount of blood in pouching system   -Stoma output doubled in volume over 24 hours    DO NOT go to ER for pouching system supplies. Call home health for assistance.

## 2025-01-06 NOTE — DISCHARGE INSTR - COC
ml    Date of Last BM: 1/7/25    Intake/Output Summary (Last 24 hours) at 1/6/2025 1315  Last data filed at 1/6/2025 0432  Gross per 24 hour   Intake 480 ml   Output 1850 ml   Net -1370 ml     I/O last 3 completed shifts:  In: 720 [P.O.:720]  Out: 2500 [Urine:450; Other:100; Stool:1950]    Safety Concerns:     At Risk for Falls    Impairments/Disabilities:      None    Nutrition Therapy:  Current Nutrition Therapy:   - Oral Diet:  General    Routes of Feeding: Oral  Liquids: Thin Liquids  Daily Fluid Restriction: no  Last Modified Barium Swallow with Video (Video Swallowing Test): not done    Treatments at the Time of Hospital Discharge:   Respiratory Treatments: room air  Oxygen Therapy:  is not on home oxygen therapy.  Ventilator:    - No ventilator support    Rehab Therapies: Physical Therapy and Occupational Therapy  Weight Bearing Status/Restrictions: No weight bearing restrictions  Other Medical Equipment (for information only, NOT a DME order):  walker  Other Treatments: n/a    Patient's personal belongings (please select all that are sent with patient):  None    RN SIGNATURE:  Nina HEARN      CASE MANAGEMENT/SOCIAL WORK SECTION    Inpatient Status Date: 1/6/25    Readmission Risk Assessment Score:  [facility] RISK OF UNPLANNED READMISSION 2.0             17.3 Total Score        Discharging to Facility/ Agency   Name: [facility]  Address:    [address] [place] [address]             Contact Information    [phone]        Phone:  Fax:    Dialysis Facility (if applicable)   Name:  Address:  Dialysis Schedule:  Phone:  Fax:    / signature: Electronically signed by MARK Zamora, RENEEW on 1/6/25 at 1:16 PM EST    PHYSICIAN SECTION    Prognosis: Good    Condition at Discharge: Stable    Rehab Potential (if transferring to Rehab): Good    Recommended Labs or Other Treatments After Discharge: BMP    Physician Certification: I certify the above information and

## 2025-01-06 NOTE — CARE COORDINATION
1/6/25, 10:43 AM EST    DISCHARGE PLANNING EVALUATION     ROBIN spoke with Patient's spouse and he feels Patient will need rehab at discharge and they do prefer [facility]. Discussion with daughter in regards to concerns for Patient now having to walk with a walker, unable to prepare her meals, and managing of the ostomy, along with concerns for her mental health with all these changes as well. Daughter discussed that Patient had a pure wick over the weekend and RN reported that they did not want her up and ambulating due to the output from the open wound.  ROBIN spoke with NADIYA Zhao today and Cece stated that Patient is up with assist. ROBIN discussed with Marta that ROBIN will provide updated notes to [facility] with the understanding that they could decline to accept under her skilled medicare due to her being able to be managed by home health.      ROBIN faxed updates to Kathryn at [facility].    ROBIN spoke with Kathryn from [facility] and they are able to accept tomorrow.      ROBIN updated daughter Marta regarding [facility] accepting and discharge for tomorrow.  ROBIN discussed family transport for tomorrow AM as well.  Marta contacted ROBIN back and spouse does not want to transport due to not wanting to disrupt her wound and leakage happening during transport.   ROBIN informed Marta that transport can be arranged but will be private pay and they are in agreement.     ROBIN arranged for ambulette tomorrow at 10am. RN updated.

## 2025-01-07 VITALS
OXYGEN SATURATION: 93 % | DIASTOLIC BLOOD PRESSURE: 56 MMHG | WEIGHT: 194.67 LBS | HEIGHT: 61 IN | HEART RATE: 79 BPM | TEMPERATURE: 97.9 F | RESPIRATION RATE: 16 BRPM | SYSTOLIC BLOOD PRESSURE: 125 MMHG | BODY MASS INDEX: 36.75 KG/M2

## 2025-01-07 LAB
ANION GAP SERPL CALC-SCNC: 14 MEQ/L (ref 8–16)
BUN SERPL-MCNC: 9 MG/DL (ref 7–22)
CALCIUM SERPL-MCNC: 8.5 MG/DL (ref 8.5–10.5)
CHLORIDE SERPL-SCNC: 104 MEQ/L (ref 98–111)
CO2 SERPL-SCNC: 23 MEQ/L (ref 23–33)
CREAT SERPL-MCNC: 1.2 MG/DL (ref 0.4–1.2)
DEPRECATED RDW RBC AUTO: 53.2 FL (ref 35–45)
ERYTHROCYTE [DISTWIDTH] IN BLOOD BY AUTOMATED COUNT: 16.1 % (ref 11.5–14.5)
GFR SERPL CREATININE-BSD FRML MDRD: 47 ML/MIN/1.73M2
GLUCOSE SERPL-MCNC: 103 MG/DL (ref 70–108)
HCT VFR BLD AUTO: 27.4 % (ref 37–47)
HGB BLD-MCNC: 8.5 GM/DL (ref 12–16)
INR PPP: 3.88 (ref 0.85–1.13)
MAGNESIUM SERPL-MCNC: 1.8 MG/DL (ref 1.6–2.4)
MCH RBC QN AUTO: 27.9 PG (ref 26–33)
MCHC RBC AUTO-ENTMCNC: 31 GM/DL (ref 32.2–35.5)
MCV RBC AUTO: 89.8 FL (ref 81–99)
PLATELET # BLD AUTO: 676 THOU/MM3 (ref 130–400)
PMV BLD AUTO: 9.5 FL (ref 9.4–12.4)
POTASSIUM SERPL-SCNC: 4.1 MEQ/L (ref 3.5–5.2)
RBC # BLD AUTO: 3.05 MILL/MM3 (ref 4.2–5.4)
SODIUM SERPL-SCNC: 141 MEQ/L (ref 135–145)
WBC # BLD AUTO: 8.5 THOU/MM3 (ref 4.8–10.8)

## 2025-01-07 PROCEDURE — 2580000003 HC RX 258: Performed by: PHYSICIAN ASSISTANT

## 2025-01-07 PROCEDURE — 6370000000 HC RX 637 (ALT 250 FOR IP): Performed by: STUDENT IN AN ORGANIZED HEALTH CARE EDUCATION/TRAINING PROGRAM

## 2025-01-07 PROCEDURE — 6370000000 HC RX 637 (ALT 250 FOR IP): Performed by: INTERNAL MEDICINE

## 2025-01-07 PROCEDURE — 6370000000 HC RX 637 (ALT 250 FOR IP): Performed by: NURSE PRACTITIONER

## 2025-01-07 PROCEDURE — 85610 PROTHROMBIN TIME: CPT

## 2025-01-07 PROCEDURE — 99232 SBSQ HOSP IP/OBS MODERATE 35: CPT | Performed by: STUDENT IN AN ORGANIZED HEALTH CARE EDUCATION/TRAINING PROGRAM

## 2025-01-07 PROCEDURE — 99232 SBSQ HOSP IP/OBS MODERATE 35: CPT | Performed by: INTERNAL MEDICINE

## 2025-01-07 PROCEDURE — 36415 COLL VENOUS BLD VENIPUNCTURE: CPT

## 2025-01-07 PROCEDURE — 6360000002 HC RX W HCPCS: Performed by: PHYSICIAN ASSISTANT

## 2025-01-07 PROCEDURE — 83735 ASSAY OF MAGNESIUM: CPT

## 2025-01-07 PROCEDURE — 80048 BASIC METABOLIC PNL TOTAL CA: CPT

## 2025-01-07 PROCEDURE — 2500000003 HC RX 250 WO HCPCS: Performed by: NURSE PRACTITIONER

## 2025-01-07 PROCEDURE — 85027 COMPLETE CBC AUTOMATED: CPT

## 2025-01-07 RX ORDER — FLUCONAZOLE 200 MG/1
400 TABLET ORAL DAILY
DISCHARGE
Start: 2025-01-07 | End: 2025-01-14

## 2025-01-07 RX ADMIN — AMPICILLIN SODIUM AND SULBACTAM SODIUM 3000 MG: 2; 1 INJECTION, POWDER, FOR SOLUTION INTRAMUSCULAR; INTRAVENOUS at 02:41

## 2025-01-07 RX ADMIN — METOPROLOL SUCCINATE 50 MG: 50 TABLET, EXTENDED RELEASE ORAL at 08:01

## 2025-01-07 RX ADMIN — BUSPIRONE HYDROCHLORIDE 10 MG: 10 TABLET ORAL at 08:01

## 2025-01-07 RX ADMIN — SODIUM CHLORIDE, PRESERVATIVE FREE 10 ML: 5 INJECTION INTRAVENOUS at 08:01

## 2025-01-07 RX ADMIN — AMPICILLIN SODIUM AND SULBACTAM SODIUM 3000 MG: 2; 1 INJECTION, POWDER, FOR SOLUTION INTRAMUSCULAR; INTRAVENOUS at 08:13

## 2025-01-07 RX ADMIN — CETIRIZINE HYDROCHLORIDE 5 MG: 5 TABLET ORAL at 08:01

## 2025-01-07 RX ADMIN — FLUCONAZOLE 400 MG: 200 TABLET ORAL at 08:01

## 2025-01-07 RX ADMIN — MONTELUKAST 10 MG: 10 TABLET, FILM COATED ORAL at 08:01

## 2025-01-07 RX ADMIN — Medication 400 MG: at 08:01

## 2025-01-07 RX ADMIN — LEVOTHYROXINE SODIUM 125 MCG: 0.12 TABLET ORAL at 05:16

## 2025-01-07 NOTE — DISCHARGE SUMMARY
Coumadin therapy to be continued pharmacy to dose -monitor daily INR  INR 3.58  Hypothyroidism: History Synthroid was continued  Metabolic anion gap acidosis: Secondary to above  Thrombocytosis: Likely reactive, monitor  Deconditioned: PT and OT to evaluate and treat.       Initial H and P and Hospital course:-  \"\"Marry Ayers is a 74-year-old female presented to [facility] 12/31/2024 from [facility] with chief complaint of abdominal pain and drainage from incisional site.  Drainage has been purulent in nature intermittent since 12/23/2024.  Recent [facility] hospitalization 12/4-12/2824 patient underwent open sigmoid colectomy, partial small bowel resection on 12/04/2024, repair of anastomotic leak and loop ileostomy on 12/13/2024, PALAK cultures revealed Proteus and gram-positive cocci.  Patient is on Coumadin with history of PE/DVT.  Discharge to rehab with [facility] with tentative end date 1/7/2025     Son is present at bedside and assisting with history pieces.  Patient is a poor historian however she tells me she was participating in rehab.  Patient denies any nausea vomiting or diarrhea.  Positive for complaint of poor appetite.  Positive for complaint of purulent drainage from incisional wound.  Patient denied any dizziness with position changes or fall since discharge from the hospital. CT A/P completed.  While in the emergency room patient did receive 2L0.9 normal saline, thiamine. \"    1/1: pt is doing alright, clinically she does not look too bad. She states that she never really even felt bad. Ostomy output is about where it normally is. She is eating and drinking okay so far today, admits to 10/10 abd pain, generalized. Was up and ambulating in the halls.          1/2: Patient is seen lying in bed.  She states that she has had a little bit of increased ostomy output     1/3: pt seen lying in bed napping. She awakens easily. Admits to HA. She has had a great deal of drainage that W&O described as

## 2025-01-07 NOTE — DISCHARGE INSTR - DIET

## 2025-01-07 NOTE — PLAN OF CARE
Problem: Discharge Planning  Goal: Discharge to home or other facility with appropriate resources  1/1/2025 1428 by Halle Khan RN  Outcome: Progressing  Flowsheets (Taken 1/1/2025 0900)  Discharge to home or other facility with appropriate resources: Identify barriers to discharge with patient and caregiver  1/1/2025 0318 by Birgit Hutson RN  Outcome: Progressing     Problem: Skin/Tissue Integrity  Goal: Absence of new skin breakdown  Description: 1.  Monitor for areas of redness and/or skin breakdown  2.  Assess vascular access sites hourly  3.  Every 4-6 hours minimum:  Change oxygen saturation probe site  4.  Every 4-6 hours:  If on nasal continuous positive airway pressure, respiratory therapy assess nares and determine need for appliance change or resting period.  1/1/2025 1428 by Halle Khan RN  Outcome: Progressing  1/1/2025 0318 by Birgit Hutson RN  Outcome: Progressing     Problem: Safety - Adult  Goal: Free from fall injury  1/1/2025 1428 by Halle Khan RN  Outcome: Progressing  Flowsheets (Taken 1/1/2025 1428)  Free From Fall Injury: Instruct family/caregiver on patient safety  1/1/2025 0318 by Birgit Hutson RN  Outcome: Progressing     Problem: ABCDS Injury Assessment  Goal: Absence of physical injury  1/1/2025 1428 by Halle Khan RN  Outcome: Progressing  Flowsheets (Taken 1/1/2025 1428)  Absence of Physical Injury: Implement safety measures based on patient assessment  1/1/2025 0318 by Birgit Hutson RN  Outcome: Progressing     Problem: Skin/Tissue Integrity - Adult  Goal: Skin integrity remains intact  1/1/2025 1428 by Halle Khan RN  Outcome: Progressing  Flowsheets (Taken 1/1/2025 0900)  Skin Integrity Remains Intact: Monitor for areas of redness and/or skin breakdown  1/1/2025 0318 by Birgit Hutson RN  Outcome: Progressing  Goal: Incisions, wounds, or drain sites healing without S/S of infection  1/1/2025 1428 by Halle Khan RN  Outcome: 
  Problem: Discharge Planning  Goal: Discharge to home or other facility with appropriate resources  1/2/2025 0146 by Yaquelin Laws, RN  Outcome: Progressing  Flowsheets (Taken 1/1/2025 2001)  Discharge to home or other facility with appropriate resources: Identify barriers to discharge with patient and caregiver     Problem: Skin/Tissue Integrity  Goal: Absence of new skin breakdown  Description: 1.  Monitor for areas of redness and/or skin breakdown  2.  Assess vascular access sites hourly  3.  Every 4-6 hours minimum:  Change oxygen saturation probe site  4.  Every 4-6 hours:  If on nasal continuous positive airway pressure, respiratory therapy assess nares and determine need for appliance change or resting period.  1/2/2025 0146 by Yaquelin Laws, RN  Outcome: Progressing  Note: No signs of new skin breakdown with each assessment. Skin remains warm, dry, intact. Mucous membranes pink & moist. Patient is being turned every 2 hours and as needed to prevent skin breakdown.     Problem: Safety - Adult  Goal: Free from fall injury  1/2/2025 0146 by Yaquelin Laws, RN  Outcome: Progressing  Note: Call light within reach.  Side rails up x2.  Bed alarm on. Non skid slippers available. Hourly rounding is being completed to check for any needs of patient.     Problem: ABCDS Injury Assessment  Goal: Absence of physical injury  1/2/2025 0146 by Yaquelin Laws, RN  Outcome: Progressing  Flowsheets (Taken 1/2/2025 0146)  Absence of Physical Injury: Implement safety measures based on patient assessment     Problem: Pain  Goal: Verbalizes/displays adequate comfort level or baseline comfort level  1/2/2025 0146 by Yaquelin Laws, RN  Outcome: Progressing  Flowsheets (Taken 1/2/2025 0146)  Verbalizes/displays adequate comfort level or baseline comfort level:   Encourage patient to monitor pain and request assistance   Assess pain using appropriate pain scale     
  Problem: Discharge Planning  Goal: Discharge to home or other facility with appropriate resources  1/4/2025 1312 by Kim Rudolph RN  Outcome: Progressing  Flowsheets (Taken 1/4/2025 1312)  Discharge to home or other facility with appropriate resources: Identify barriers to discharge with patient and caregiver  Note: Pt plans to d/c home     Problem: Skin/Tissue Integrity  Goal: Absence of new skin breakdown  Description: 1.  Monitor for areas of redness and/or skin breakdown  2.  Assess vascular access sites hourly  3.  Every 4-6 hours minimum:  Change oxygen saturation probe site  4.  Every 4-6 hours:  If on nasal continuous positive airway pressure, respiratory therapy assess nares and determine need for appliance change or resting period.  1/4/2025 1312 by Kim Rudolph, RN  Outcome: Progressing  Note: No new signs or symptoms of skin breakdown noted this shift, encouraging patient to turn and reposition self in bed q2h       Problem: Safety - Adult  Goal: Free from fall injury  1/4/2025 1312 by Kim Rudolph RN  Outcome: Progressing  Flowsheets (Taken 1/4/2025 1312)  Free From Fall Injury: Instruct family/caregiver on patient safety  Note: No falls noted this shift. Continue falling star program. Bed alarm on, bed in low position. Call light and personal belongings in reach.  Patient uses call light appropriately.       Problem: Skin/Tissue Integrity - Adult  Goal: Skin integrity remains intact  Outcome: Progressing  Flowsheets (Taken 1/4/2025 1312)  Skin Integrity Remains Intact: Monitor for areas of redness and/or skin breakdown  Note: No new signs or symptoms of skin breakdown noted this shift, encouraging patient to turn and reposition self in bed q2h     Care plan reviewed with patient. Patient verbalizes understanding of plan of care and contributes to goal setting.   
  Problem: Discharge Planning  Goal: Discharge to home or other facility with appropriate resources  1/4/2025 2255 by Kingsley Manzano RN  Outcome: Progressing     Problem: Skin/Tissue Integrity  Goal: Absence of new skin breakdown  1/4/2025 2255 by Kingsley Manzano RN  Outcome: Progressing    Problem: Safety - Adult  Goal: Free from fall injury  1/4/2025 2255 by Kingsley Manzano RN  Outcome: Progressing     Problem: ABCDS Injury Assessment  Goal: Absence of physical injury  1/4/2025 2255 by Kingsley Manzano RN  Outcome: Progressing     Problem: Skin/Tissue Integrity - Adult  Goal: Skin integrity remains intact  1/4/2025 2255 by Kingsley Manzano RN  Outcome: Progressing  Goal: Incisions, wounds, or drain sites healing without S/S of infection  1/4/2025 2255 by Kingsley Manzano RN  Outcome: Progressing     Problem: Gastrointestinal - Adult  Goal: Establish and maintain optimal ostomy function  1/4/2025 2255 by Kingsley Manzano RN  Outcome: Progressing     Problem: Infection - Adult  Goal: Absence of infection at discharge  1/4/2025 2255 by Kingsley Manzano RN  Outcome: Progressing  Goal: Absence of infection during hospitalization  1/4/2025 2255 by Kingsley Manzano RN  Outcome: Progressing     Problem: Pain  Goal: Verbalizes/displays adequate comfort level or baseline comfort level  1/4/2025 2255 by Kingsley Manzano RN  Outcome: Progressing     
  Problem: Discharge Planning  Goal: Discharge to home or other facility with appropriate resources  Outcome: Progressing     Problem: Skin/Tissue Integrity  Goal: Absence of new skin breakdown  Description: 1.  Monitor for areas of redness and/or skin breakdown  2.  Assess vascular access sites hourly  3.  Every 4-6 hours minimum:  Change oxygen saturation probe site  4.  Every 4-6 hours:  If on nasal continuous positive airway pressure, respiratory therapy assess nares and determine need for appliance change or resting period.  Outcome: Progressing     Problem: Safety - Adult  Goal: Free from fall injury  Outcome: Progressing     Problem: ABCDS Injury Assessment  Goal: Absence of physical injury  Outcome: Progressing     Problem: Skin/Tissue Integrity - Adult  Goal: Skin integrity remains intact  Outcome: Progressing  Goal: Incisions, wounds, or drain sites healing without S/S of infection  Outcome: Progressing     Problem: Gastrointestinal - Adult  Goal: Establish and maintain optimal ostomy function  Outcome: Progressing     Problem: Infection - Adult  Goal: Absence of infection at discharge  Outcome: Progressing  Goal: Absence of infection during hospitalization  Outcome: Progressing     
  Problem: Discharge Planning  Goal: Discharge to home or other facility with appropriate resources  Outcome: Progressing     Problem: Skin/Tissue Integrity  Goal: Absence of new skin breakdown  Outcome: Progressing     Problem: Safety - Adult  Goal: Free from fall injury  Outcome: Progressing     Problem: ABCDS Injury Assessment  Goal: Absence of physical injury  Outcome: Progressing     Problem: Skin/Tissue Integrity - Adult  Goal: Skin integrity remains intact  Outcome: Progressing  Goal: Incisions, wounds, or drain sites healing without S/S of infection  Outcome: Progressing     Problem: Gastrointestinal - Adult  Goal: Establish and maintain optimal ostomy function  Outcome: Progressing     Problem: Infection - Adult  Goal: Absence of infection at discharge  Outcome: Progressing  Goal: Absence of infection during hospitalization  Outcome: Progressing     Problem: Pain  Goal: Verbalizes/displays adequate comfort level or baseline comfort level  Outcome: Progressing     
  Problem: Discharge Planning  Goal: Discharge to home or other facility with appropriate resources  Outcome: Progressing  Flowsheets  Taken 1/3/2025 0329  Discharge to home or other facility with appropriate resources: Identify barriers to discharge with patient and caregiver  Taken 1/2/2025 2040  Discharge to home or other facility with appropriate resources:   Identify barriers to discharge with patient and caregiver   Arrange for needed discharge resources and transportation as appropriate   Identify discharge learning needs (meds, wound care, etc)   Arrange for interpreters to assist at discharge as needed     Problem: Skin/Tissue Integrity  Goal: Absence of new skin breakdown  Description: 1.  Monitor for areas of redness and/or skin breakdown  2.  Assess vascular access sites hourly  3.  Every 4-6 hours minimum:  Change oxygen saturation probe site  4.  Every 4-6 hours:  If on nasal continuous positive airway pressure, respiratory therapy assess nares and determine need for appliance change or resting period.  Outcome: Progressing     Problem: Safety - Adult  Goal: Free from fall injury  Outcome: Progressing     Problem: Gastrointestinal - Adult  Goal: Establish and maintain optimal ostomy function  Recent Flowsheet Documentation  Taken 1/2/2025 2040 by Nani Savage, RN  Establish and maintain optimal ostomy function:   Monitor output from ostomies   Administer IV fluids and TPN as ordered   Introduce and advance enteral feedings as ordered   Gastric suctioning as ordered     
  Problem: Discharge Planning  Goal: Discharge to home or other facility with appropriate resources  Outcome: Progressing  Flowsheets (Taken 1/4/2025 0433)  Discharge to home or other facility with appropriate resources:   Identify barriers to discharge with patient and caregiver   Identify discharge learning needs (meds, wound care, etc)     Problem: Skin/Tissue Integrity  Goal: Absence of new skin breakdown  Description: 1.  Monitor for areas of redness and/or skin breakdown  2.  Assess vascular access sites hourly  3.  Every 4-6 hours minimum:  Change oxygen saturation probe site  4.  Every 4-6 hours:  If on nasal continuous positive airway pressure, respiratory therapy assess nares and determine need for appliance change or resting period.  Outcome: Progressing  Note: No new skin breakdown noted.  Encouraged patient to reposition in bed.        Problem: Safety - Adult  Goal: Free from fall injury  Outcome: Progressing  Note: No falls noted this shift. Continue falling star program. Bed alarm on, bed in low position. Call light and personal belongings in reach.  Patient uses call light appropriately.      Problem: ABCDS Injury Assessment  Goal: Absence of physical injury  Outcome: Progressing  Flowsheets (Taken 1/4/2025 0433)  Absence of Physical Injury: Implement safety measures based on patient assessment     Problem: Gastrointestinal - Adult  Goal: Establish and maintain optimal ostomy function  Outcome: Progressing  Flowsheets (Taken 1/4/2025 0433)  Establish and maintain optimal ostomy function:   Monitor output from ostomies   Administer IV fluids and TPN as ordered     Problem: Infection - Adult  Goal: Absence of infection at discharge  Outcome: Progressing  Flowsheets (Taken 1/4/2025 0433)  Absence of infection at discharge:   Assess and monitor for signs and symptoms of infection   Monitor lab/diagnostic results   Instruct and encourage patient and family to use good hand hygiene technique     Problem: 
[facility] for rehab and ostomy care  
2152)  Incisions, Wounds, or Drain Sites Healing Without Sign and Symptoms of Infection:   TWICE DAILY: Assess and document skin integrity   Implement wound care per orders     Problem: Gastrointestinal - Adult  Goal: Establish and maintain optimal ostomy function  1/5/2025 2152 by Jakclyn Alford RN  Outcome: Progressing  Flowsheets (Taken 1/5/2025 2152)  Establish and maintain optimal ostomy function: Monitor output from ostomies     Problem: Infection - Adult  Goal: Absence of infection at discharge  1/5/2025 2152 by Jacklyn Alford RN  Outcome: Progressing  Flowsheets (Taken 1/5/2025 2152)  Absence of infection at discharge:   Assess and monitor for signs and symptoms of infection   Monitor lab/diagnostic results   Monitor all insertion sites i.e., indwelling lines, tubes and drains     Problem: Infection - Adult  Goal: Absence of infection during hospitalization  1/5/2025 2152 by Jacklyn Alford RN  Outcome: Progressing  Flowsheets (Taken 1/5/2025 2152)  Absence of infection during hospitalization:   Assess and monitor for signs and symptoms of infection   Monitor lab/diagnostic results   Monitor all insertion sites i.e., indwelling lines, tubes and drains     Problem: Pain  Goal: Verbalizes/displays adequate comfort level or baseline comfort level  1/5/2025 2152 by Jacklyn Alford RN  Outcome: Progressing  Flowsheets (Taken 1/5/2025 2152)  Verbalizes/displays adequate comfort level or baseline comfort level:   Encourage patient to monitor pain and request assistance   Assess pain using appropriate pain scale   Administer analgesics based on type and severity of pain and evaluate response   Implement non-pharmacological measures as appropriate and evaluate response

## 2025-01-07 NOTE — PROGRESS NOTES
Clinical Pharmacy Note                                               Warfarin Discharge Recommendations    Patient discharged from [facility] today on warfarin.    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  INR goal during admission: 2.0-3.0  Previous home warfarin regimen: 7.5mg Tuesday 5mg SunMWThFSat   Interacting medications at discharge: fluconazole  Coumadin 5 mg tabs    Hospital Warfarin Doses & INR Results  Date INR Warfarin Dose   12/30/2024 2.7 @ facility 5 mg @ facility   12/31/2024 3.71 No warfarin    1/1/2025 4.14   No warfarin    1/2/2025  3.58 1 mg     1/3/2025 3.13   1.5 mg    1/4/2025 2.89   1.5 mg    1/5/2025 3.60   none    1/6/2025 4.17  none   1/7/2025 3.88 none     Recent INRs:  Recent Labs     01/07/25  0629   INR 3.88*     Warfarin Discharge Instructions:   Date Warfarin Dose   1/8/2025 Check INR (supratherapeutic most of this admission due to fluconazole)     Provider dosing warfarin: ECF provider at [facility]  Next INR date: 1/8/2025    Princess Rodriguez, PharmD, BCPS, BCCCP  1/7/2025 9:28 AM    
          [facility] Infectious Disease         Progress Note      Marry Ayers  MEDICAL RECORD NUMBER:  [id number]  AGE: 74 y.o.   GENDER: female  : 1950  EPISODE DATE:  2024      Assessment:     Patient is a 74-year-old female who I am consulted for incisional site drainage from the inferior mid abdomen.  The fluid has a milky consistency and appears to be purulent in nature.  I did review CT scan of the abdomen and pelvis which demonstrates significant postoperative inflammation around the surgical site.  I discussed case with general surgery given the physical exam findings.  This patient had fairly poor anatomy, on review of operative note there was evidence of multiple interloop adhesions and several areas of fluid collections.  Irrigation was performed as much as able and loop ileostomy was created to protect from further issues.  During surgical exploration, there was evidence of peritonitis as well as phlegmon in muscle and fascia.     Continue empiric coverage with vancomycin and meropenem  Continue therapy with fluconazole  Duration of therapy to be decided  Examined prior surgical site with evidence of significant purulent drainage    Vancomycin therapeutic drug monitoring  -Drawn 30 minutes prior to infusion for trough level  -Assessment of vancomycin toxicity: Nephrotoxicity and infusion reactions  -Labs evaluated include CBC, serum creatinine and vancomycin trough      Subjective:     Chief Complaint   Patient presents with    Wound Check         No acute events overnight.  No new complaints or concerns this morning.     Patient Active Problem List   Diagnosis Code    Allergic rhinitis J30.9    Anemia D64.9    Anxiety F41.9    Colovesical fistula N32.1    Essential hypertension I10    Gastroesophageal reflux disease K21.9    History of pulmonary embolus (PE) Z86.711    Hypokalemia E87.6    Hypothyroidism E03.9    Personal history of venous thrombosis and embolism Z86.718    Stricture 
          [facility] Infectious Disease         Progress Note      Marry Ayers  MEDICAL RECORD NUMBER:  [id number]  AGE: 74 y.o.   GENDER: female  : 1950  EPISODE DATE:  2024      Assessment:     Patient is a 74-year-old female who I am consulted for incisional site drainage from the inferior mid abdomen.  The fluid has a milky consistency and appears to be purulent in nature.  I did review CT scan of the abdomen and pelvis which demonstrates significant postoperative inflammation around the surgical site.  I discussed case with general surgery given the physical exam findings.  This patient had fairly poor anatomy, on review of operative note there was evidence of multiple interloop adhesions and several areas of fluid collections.  Irrigation was performed as much as able and loop ileostomy was created to protect from further issues.  During surgical exploration, there was evidence of peritonitis as well as phlegmon in muscle and fascia.     Unfortunately, with the amount of intra-abdominal infection present, there is some expected elements of postoperative drainage.  This will best be followed by symptoms and follow-up imaging to reassess this area.  For now, it is reasonable to continue empiric antimicrobials but I would suspect that there will be polymicrobial evidence which will be difficult to cover with a single agent.    Continue empiric coverage with vancomycin and meropenem  Initiate therapy with fluconazole with Candida present  Duration of therapy to be decided  Examined prior surgical site with evidence of significant purulent drainage        Subjective:     Chief Complaint   Patient presents with    Wound Check         No acute events overnight.  Examine surgical site today and found evidence of continued inferior surgical wound drainage.      Patient Active Problem List   Diagnosis Code    Allergic rhinitis J30.9    Anemia D64.9    Anxiety F41.9    Colovesical fistula N32.1    
          [facility] Infectious Disease         Progress Note      Marry Ayers  MEDICAL RECORD NUMBER:  [id number]  AGE: 74 y.o.   GENDER: female  : 1950  EPISODE DATE:  2024      Assessment:     Patient is a 74-year-old female who I am consulted for incisional site drainage from the inferior mid abdomen.  The fluid has a milky consistency and appears to be purulent in nature.  I did review CT scan of the abdomen and pelvis which demonstrates significant postoperative inflammation around the surgical site.  I discussed case with general surgery given the physical exam findings.  This patient had fairly poor anatomy, on review of operative note there was evidence of multiple interloop adhesions and several areas of fluid collections.  Irrigation was performed as much as able and loop ileostomy was created to protect from further issues.  During surgical exploration, there was evidence of peritonitis as well as phlegmon in muscle and fascia.     Continue meropenem  Continue therapy with fluconazole  Vancomycin discontinued on   Duration of therapy to be decided  Examined prior surgical site with evidence of significant purulent drainage  When ready for discharge, I would recommend to transition from meropenem to Augmentin   Continue fluconazole for Candida coverage    Vancomycin therapeutic drug monitoring  -Drawn 30 minutes prior to infusion for trough level  -Assessment of vancomycin toxicity: Nephrotoxicity and infusion reactions  -Labs evaluated include CBC, serum creatinine and vancomycin trough      Subjective:     Chief Complaint   Patient presents with    Wound Check         No acute events overnight.  No new complaints or concerns this morning.  Reviewed hemodynamics which all appear to be stable without evidence of fevers.    Patient Active Problem List   Diagnosis Code    Allergic rhinitis J30.9    Anemia D64.9    Anxiety F41.9    Colovesical fistula N32.1    Essential 
          [facility] Infectious Disease         Progress Note      Marry Ayers  MEDICAL RECORD NUMBER:  [id number]  AGE: 74 y.o.   GENDER: female  : 1950  EPISODE DATE:  2024      Assessment:     Patient is a 74-year-old female who I am consulted for incisional site drainage from the inferior mid abdomen.  The fluid has a milky consistency and appears to be purulent in nature.  I did review CT scan of the abdomen and pelvis which demonstrates significant postoperative inflammation around the surgical site.  I discussed case with general surgery given the physical exam findings.  This patient had fairly poor anatomy, on review of operative note there was evidence of multiple interloop adhesions and several areas of fluid collections.  Irrigation was performed as much as able and loop ileostomy was created to protect from further issues.  During surgical exploration, there was evidence of peritonitis as well as phlegmon in muscle and fascia.     Unfortunately, with the amount of intra-abdominal infection present, there is some expected elements of postoperative drainage.  This will best be followed by symptoms and follow-up imaging to reassess this area.  For now, it is reasonable to continue empiric antimicrobials but I would suspect that there will be polymicrobial evidence which will be difficult to cover with a single agent.    Continue empiric coverage with vancomycin and meropenem  Continue therapy with fluconazole  Duration of therapy to be decided  Examined prior surgical site with evidence of significant purulent drainage      Subjective:     Chief Complaint   Patient presents with    Wound Check         No acute events overnight.  No new complaints or concerns this morning.  She does continue to have significant drainage from the inferior wound site.    Patient Active Problem List   Diagnosis Code    Allergic rhinitis J30.9    Anemia D64.9    Anxiety F41.9    Colovesical fistula N32.1    
      General Surgery Progress Note  Casimiro Callahan DO    Pt Name: Marry Ayers  MRN: [id number]  YOB: 1950  Date of evaluation: 1/3/2025  Primary Care Physician: Silvestre Sawyer MD  IMPRESSIONS:   S/P sigmoid resection 12/4, repair of colon perforation and loop ileostomy 12/13/24  Inferior wound drainage as expected for the colon leak. Already protected by loop ileostomy  ARF - improved  Hx PE on coumadin  does not have any pertinent problems on file.  PLANS:   Ostomy care  Diet as tolerated  Medical workup/management of ARF (improving)  Local wound care  SUBJECTIVE:   History of Chief Complaint:    Marry is feeling better. Tolerating diet, + ostomy output. Continues with inferior wound drainage.   Medications  Prior to Admission medications    Medication Sig Start Date End Date Taking? Authorizing Provider   amoxicillin-clavulanate (AUGMENTIN) 875-125 MG per tablet Take 1 tablet by mouth every 12 hours for 10 days 12/28/24 1/7/25 Yes Deepti Orozco PA-C   oxyCODONE (ROXICODONE) 5 MG immediate release tablet Take 1 tablet by mouth every 4 hours as needed for Pain for up to 7 days. Max Daily Amount: 30 mg 12/28/24 1/4/25 Yes Mery Toney MD   Lactobacillus (PROBIOTIC ACIDOPHILUS PO) Take 1 tablet by mouth daily (with breakfast)   Yes Provider, MD Katie   cetirizine (ZYRTEC) 5 MG tablet Take 1 tablet by mouth daily   Yes Provider, Historical, MD   Calcium Carb-Cholecalciferol 500-5 MG-MCG TABS Take 1 tablet by mouth daily   Yes Provider, Historical, MD   busPIRone (BUSPAR) 10 MG tablet Take 1 tablet by mouth 2 times daily 7/30/24 1/26/25 Yes Provider, Historical, MD   levothyroxine (SYNTHROID) 125 MCG tablet Take 1 tablet by mouth daily 4/24/24 12/31/24 Yes Provider, Historical, MD   losartan-hydroCHLOROthiazide (HYZAAR) 100-25 MG per tablet Take 1 tablet by mouth daily 7/30/24 1/26/25 Yes Provider, MD Katie   Melatonin 10 MG TABS Take 10 mg by mouth nightly   Yes 
      General Surgery Progress Note  Kelly Lea, APRN - CNP    Pt Name: Marry Ayers  MRN: [id number]  YOB: 1950  Date of evaluation: 1/4/2025  Primary Care Physician: Silvestre Sawyer MD  IMPRESSIONS:   S/P sigmoid resection 12/4, repair of colon perforation and loop ileostomy 12/13/24  Inferior wound drainage as expected for the colon leak. Already protected by loop ileostomy  ARF - improved  Hx PE on coumadin INR 2.89   does not have any pertinent problems on file.  PLANS:   Ostomy care  Diet as tolerated  Medical workup/management of ARF (improving)  Local wound care  Coumadin resumed   SUBJECTIVE:   History of Chief Complaint:    Marry is feeling better. Tolerating diet, + ostomy output. Continues with inferior wound drainage. NO new complaints   Medications  Prior to Admission medications    Medication Sig Start Date End Date Taking? Authorizing Provider   amoxicillin-clavulanate (AUGMENTIN) 875-125 MG per tablet Take 1 tablet by mouth every 12 hours for 10 days 12/28/24 1/7/25 Yes Deepti Orozco PA-C   oxyCODONE (ROXICODONE) 5 MG immediate release tablet Take 1 tablet by mouth every 4 hours as needed for Pain for up to 7 days. Max Daily Amount: 30 mg 12/28/24 1/4/25 Yes Mery Toney MD   Lactobacillus (PROBIOTIC ACIDOPHILUS PO) Take 1 tablet by mouth daily (with breakfast)   Yes Provider, Historical, MD   cetirizine (ZYRTEC) 5 MG tablet Take 1 tablet by mouth daily   Yes Provider, Historical, MD   Calcium Carb-Cholecalciferol 500-5 MG-MCG TABS Take 1 tablet by mouth daily   Yes Provider, Historical, MD   busPIRone (BUSPAR) 10 MG tablet Take 1 tablet by mouth 2 times daily 7/30/24 1/26/25 Yes Provider, Historical, MD   levothyroxine (SYNTHROID) 125 MCG tablet Take 1 tablet by mouth daily 4/24/24 12/31/24 Yes Provider, Historical, MD   losartan-hydroCHLOROthiazide (HYZAAR) 100-25 MG per tablet Take 1 tablet by mouth daily 7/30/24 1/26/25 Yes Provider, Historical, MD   Melatonin 
      General Surgery Progress Note  Kelly Lea, APRN - CNP  On behalf of Dr Brown   covering for Dr Callahan     Pt Name: Marry Ayers  MRN: [id number]  YOB: 1950  Date of evaluation: 1/5/2025  Primary Care Physician: Silvestre Sawyer MD  IMPRESSIONS:   S/P sigmoid resection 12/4, repair of colon perforation and loop ileostomy 12/13/24  Inferior wound drainage as expected for the colon leak. Already protected by loop ileostomy  ARF - improved  Hx PE on coumadin INR supratherapeutic 3.6  does not have any pertinent problems on file.  PLANS:   Medicine admitting for medical management   Ostomy care and education   Diet as tolerated  Medical workup/management of ARF (improving)  Local wound care  Coumadin resumed   ID following cultures for another 24 hours   Discharge hopefully soon will need home health and referral to ostomy clinic     Dispo: discussed case with Lissa MARES Hospitalist   SUBJECTIVE:   History of Chief Complaint:    Marry is feeling better. Tolerating diet, + ostomy output. Continues with inferior wound drainage. No new complaints   Medications  Prior to Admission medications    Medication Sig Start Date End Date Taking? Authorizing Provider   amoxicillin-clavulanate (AUGMENTIN) 875-125 MG per tablet Take 1 tablet by mouth every 12 hours for 10 days 12/28/24 1/7/25 Yes Deepti Orozco PA-C   Lactobacillus (PROBIOTIC ACIDOPHILUS PO) Take 1 tablet by mouth daily (with breakfast)   Yes Provider, Historical, MD   cetirizine (ZYRTEC) 5 MG tablet Take 1 tablet by mouth daily   Yes Provider, Historical, MD   Calcium Carb-Cholecalciferol 500-5 MG-MCG TABS Take 1 tablet by mouth daily   Yes Provider, Historical, MD   busPIRone (BUSPAR) 10 MG tablet Take 1 tablet by mouth 2 times daily 7/30/24 1/26/25 Yes Provider, Historical, MD   levothyroxine (SYNTHROID) 125 MCG tablet Take 1 tablet by mouth daily 4/24/24 12/31/24 Yes Provider, Historical, MD   losartan-hydroCHLOROthiazide 
    Hospitalist Progress Note      Patient:  Marry Ayers 74 y.o. female     Unit/Bed:[facility]27/027-A    Date of Admission: 12/31/2024      ASSESSMENT AND PLAN    Active Problems  Inferior wound drainage s/p loop ileostomy  S/p resection of sigmoid stricture and partial SBO on 12/4 with return to OR 12/13 for leak -ileostomy performed at that time  General Surgery consulted and following, appreciate assistance  Plan to remove remaining staples and treat drainage with local wound care  Ostomy care -W&O. Education given to pt for management.   Diet as tolerated  Infectious diseases consulted  Fluid consistent with postoperative changes - however given discussion with surgeon ID plans to cont as follows  Merrem/Vanco (duration planned per ID)  Diflucan  Repeat imaging in ~ 7 days    Severe LINDA, resolved  Metabolic acidosis  Nephrology consulted and following, appreciate assistance  Suspect prerenal from hypotension, poor p.o. intake compounded by Hyzaar (held)  IV fluids, LR reduced to 60 mL/h  Renally dose medications    Hypomagnesemia  Due to GI losses, replete per protocol and repeat in am     Hypokalemia, resolved  K noted at 3.3, repleted with 40 mEq KCl  BMP in a.m.    Chronic normocytic anemia  No gross bleeding identified, near baseline which is approximately 8-9 range  Continue to monitor CBC    Generalized weakness/physical deconditioning  PT/OT  SW for disposition planning  SNF versus other?      Resolved Problems  Hyponatremia   Hypotension    Chronic Conditions (reviewed and stable unless otherwise stated)  History of PE and DVT, with current supratherapeutic INR   Coumadin therapy to be continued pharmacy to dose -monitor daily INR  INR 3.58  Hypothyroidism: History Synthroid was continued  Metabolic anion gap acidosis: Secondary to above  Thrombocytosis: Likely reactive, monitor  Deconditioned: PT and OT to evaluate and treat.      LDA: []CVC / []PICC / []Midline / []Phelps / []Drains / []Mediport / 
    Hospitalist Progress Note      Patient:  Marry Ayers 74 y.o. female     Unit/Bed:[facility]27/027-A    Date of Admission: 12/31/2024      ASSESSMENT AND PLAN    Active Problems  Inferior wound drainage s/p loop ileostomy  General Surgery consulted and following, appreciate assistance  Plan to remove remaining staples and treat drainage with local wound care  Ostomy care -W&O  Diet as tolerated  Infectious diseases consulted  Fluid consistent with postoperative changes -recommended discontinuation of antibiotic  Stopped Merrem/Vanco  Continue to monitor while off therapy  Consider repeat imaging in approximately 1 week to reassess fluid collection    Severe LINDA  Metabolic acidosis  Nephrology consulted and following, appreciate assistance  Suspect prerenal from hypotension, poor p.o. intake compounded by Hyzaar (held)  Aggressive IV fluids,  mL/h  Renally dose medications    Hyponatremia   Chronic normocytic anemia  Generalized weakness/physical deconditioning  PT/OT  SW for disposition planning      Resolved Problems    Chronic Conditions (reviewed and stable unless otherwise stated)  History of PE and DVT, with current supratherapeutic INR   Coumadin therapy to be continued pharmacy to dose -monitor daily INR  Hypothyroidism: History Synthroid was continued  Metabolic anion gap acidosis: Secondary to above  Thrombocytosis: Likely reactive, monitor  Deconditioned: PT and OT to evaluate and treat.      LDA: []CVC / []PICC / []Midline / []Phelps / []Drains / []Mediport / [x]None  Antibiotics:   Steroids:   Labs (still needed?): [x]Yes / []No  IVF (still needed?): [x]Yes / []No    Level of care: []Step Down / [x]Med-Surg  Bed Status: [x]Inpatient / []Observation  Telemetry: [x]Yes / []No  PT/OT: [x]Yes / []No    DVT Prophylaxis: [] Lovenox / [] Heparin / [] SCDs / [x] Already on Systemic Anticoagulation / [] None   Code status: Full Code     Expected discharge date: 2 days  Disposition: 
  Pharmacy Note - Extended Infusion Beta-Lactam Dose Adjustment    Meropenem 500 mg q12h intermittent infusion for treatment of Intra-abdominal Infection. Per [facility] Extended Infusion Beta-Lactam Policy, meropenem will be changed to   1000 mg q12h extended infusion    Estimated Creatinine Clearance: Estimated Creatinine Clearance: 31 mL/min (A) (based on SCr of 1.6 mg/dL (H)).    Dialysis Status, LINDA, CKD: LINDA    BMI: Body mass index is 36.41 kg/m².    Rationale for Adjustment: Dose adjusted per [facility] Extended Infusion Policy based on renal function and indication. The above medication is renally eliminated and demonstrates time-dependent effects on bacterial eradication. Extended-infusion dosing strategy aims to enhance microbiologic and clinical efficacy.     Pharmacy will monitor renal function daily and adjust dose as necessary.      Please call with any questions.    Thank you,  Pratibha Johnson PharmD 1/2/2025 4:06 PM      
1/4/25 5:36 PM   [phone] From: Kim HEARN [facility] Unit 6K RE: PITO WHYTE Pt just had bowel come out of her rectum. Colostomy had 300mL in bag again... didnt know if this needs to be addressed. Just never had a pt with a colostomy that was still producing stool from rectum    1/4/25 5:59 PM From Debi Wiggins NP  Yes there may be discharge from the rectum. It’s normal.    1/4/25 6:02 PM   It was yellowish, brown color stool that went into the toilet ... Id say a small BM. She already has the dehissed abd wound that is leaking something brown and has a colostomy bag around it in addition to colostomy bag on ostomy      1/4/25 6:08 PM  That’s rectal discharge and is normal.      Hospitalist and Surgical on call notified of pt leaking stool from rectum.   
AVS, NICK, and last dose MAR faxed to [facility]. This RN called report to [place] at [facility]. Patient transported via [facility]. Patient discharged to [facility].   
Abdominal midline staples removed. Dressing saturated in thick white purulent  drainage. Incision cleansed with normal saline. Clean dry dressing applied. Dry gauze x2, ABD, and tape.   
[facility]   Pharmacy Pharmacokinetic Monitoring Service - Vancomycin     Marry Ayers is a 74 y.o. female starting on vancomycin therapy for Intra-abdominal infection. Pharmacy consulted by Dr Edward for monitoring and adjustment.    Target Concentration: Dosing based on anticipated concentration < 15 mg/L due to renal impairment/insufficiency    Additional Antimicrobials: Meropenem    Pertinent Laboratory Values:   Wt Readings from Last 1 Encounters:   01/02/25 87.4 kg (192 lb 10.9 oz)     Temp Readings from Last 1 Encounters:   01/02/25 97.7 °F (36.5 °C) (Oral)     Estimated Creatinine Clearance: 31 mL/min (A) (based on SCr of 1.6 mg/dL (H)).  Recent Labs     12/31/24  1600 12/31/24  2146 01/02/25  0151 01/02/25  0839   CREATININE 5.9*   < > 2.1* 1.6*   BUN 37*   < > 27* 22   WBC 9.1  --   --  7.7    < > = values in this interval not displayed.     Pertinent Cultures:  Date Source Results   12/31/24 Abdomen swab Swarming proteus, enterococcus species   MRSA Nasal Swab: N/A. Non-respiratory infection.    Plan:  Concentration-guided dosing due to renal impairment/insufficiency  Received vancomycin 2000 mg IV 01/01/25 1154  Renal labs as indicated   Vancomycin concentration ordered for 1/2/2025 1100   Pharmacy will continue to monitor patient and adjust therapy as indicated    Thank you for the consult,  Pratibha Johnson PharmD 1/2/2025 9:34 AM      
[facility]   Pharmacy Pharmacokinetic Monitoring Service - Vancomycin    Indication: intra abdominal infection  Target Concentration: Goal AUC/BLANCA 400-600 mg*hr/L, LINDA resolved  Day of Therapy: 5  Additional Antimicrobials:Fluconazole, Meropenem    Pertinent Laboratory Values:   Wt Readings from Last 1 Encounters:   01/02/25 87.4 kg (192 lb 10.9 oz)     Temp Readings from Last 1 Encounters:   01/05/25 97.8 °F (36.6 °C) (Oral)     Estimated Creatinine Clearance: 45 mL/min (based on SCr of 1.1 mg/dL).  Recent Labs     01/04/25  0701 01/05/25  0632   CREATININE 1.0 1.1   BUN 9 8   WBC 9.8 8.9         Recent vancomycin administrations                     vancomycin (VANCOCIN) 1250 mg in sodium chloride 0.9% 250 mL IVPB (mg) 1,250 mg New Bag 01/04/25 1524     1,250 mg New Bag 01/03/25 1527    vancomycin (VANCOCIN) 1,000 mg in sodium chloride 0.9 % 250 mL IVPB ([id number]) (mg) 1,000 mg New Bag 01/02/25 1713                    Assessment:  Date/Time Current Dose Concentration Timing of Concentration (hr) AUC C trough,ss   01/05/2025 1250 mg 14.2 06:32 485 14.3   Note: Serum concentrations collected for AUC dosing may appear elevated if collected in close proximity to the dose administered, this is not necessarily an indication of toxicity    Plan:  Current dosing regimen is therapeutic  Continue current dose of 1250 mg IV every 24 hours  Repeat vancomycin concentration ordered for 01/07/2025 @ 06:00.   Pharmacy will monitor renal function daily and adjust therapy as indicated.    Thank you for the consult,  Audi Perez RPh      
[facility]   Pharmacy Pharmacokinetic Monitoring Service - Vancomycin    Indication: intra-abdominal infection  Target Concentration: Dosing based on anticipated concentration < 15 mg/L due to renal impairment/insufficiency  Day of Therapy: 3  Additional Antimicrobials: meropenem, fluconazole    Pertinent Laboratory Values:   Wt Readings from Last 1 Encounters:   01/02/25 87.4 kg (192 lb 10.9 oz)     Temp Readings from Last 1 Encounters:   01/03/25 98.1 °F (36.7 °C) (Oral)     Estimated Creatinine Clearance: 41 mL/min (based on SCr of 1.2 mg/dL).  Recent Labs     01/02/25  0839 01/03/25  0553   CREATININE 1.6* 1.2   BUN 22 13   WBC 7.7 7.8     Pertinent Cultures:  Date Source Results   12/31/24 Blood x 2 NGTD   12/31/24 abdomen Swarming proteus, enterococcus species    MRSA Nasal Swab: N/A. Non-respiratory infection.    Recent vancomycin administrations                     vancomycin (VANCOCIN) 1,000 mg in sodium chloride 0.9 % 250 mL IVPB ([id number]) (mg) 1,000 mg New Bag 01/02/25 1713    vancomycin (VANCOCIN) 2000 mg in 0.9% sodium chloride 500 mL IVPB (mg) 2,000 mg New Bag 01/01/25 1154                    Assessment:  Date/Time Current Dose Concentration Timing of Concentration (hr)   1/3/2025 1139 Intermittent due to LINDA 8.7 18 h 26 min post dose   Note: Serum concentrations collected for AUC dosing may appear elevated if collected in close proximity to the dose administered, this is not necessarily an indication of toxicity    Plan:  LINDA/SCr improved. Will begin vancomycin 1250mg IV Q24H for anticipated  and Ctrough 15.4  Repeat vancomycin concentration ordered for 1/5/24 @ 0600   Pharmacy will monitor renal function daily and adjust therapy as indicated.    Thank you for the consult,  Cristina London, PharmD, BCPS 1/3/2025 1:59 PM     
[facility]   Pharmacy Pharmacokinetic Monitoring Service - Vancomycin     Marry Ayers is a 74 y.o. female starting on vancomycin therapy for intra-abdominal infection. Pharmacy consulted by Patric Sheriff CNP for monitoring and adjustment.    Target Concentration: Dosing based on anticipated concentration < 15 mg/L due to renal impairment/insufficiency    Additional Antimicrobials: meropenem    Pertinent Laboratory Values:   Wt Readings from Last 1 Encounters:   12/31/24 78.5 kg (173 lb)     Temp Readings from Last 1 Encounters:   01/01/25 97 °F (36.1 °C) (Oral)     Estimated Creatinine Clearance: 9 mL/min (A) (based on SCr of 5 mg/dL (HH)).  Recent Labs     12/31/24  1600 12/31/24  2146   CREATININE 5.9* 5.0*   BUN 37* 36*   WBC 9.1  --      Pertinent Cultures:  Date Source Results   12/31/24 BCx2 sent   12/31/24 Abdomen sent   MRSA Nasal Swab: was ordered by provider, awaiting results.    Plan:  Concentration-guided dosing due to renal impairment/insufficiency  Start vancomycin 25 mg/kg IV once  Renal labs as indicated   Vancomycin concentration ordered for 1/2/25 @ 1000   Pharmacy will continue to monitor patient and adjust therapy as indicated    Thank you for the consult,  Catie Steven PharmD, BCPS 1/1/2025 8:28 AM      
[facility]   Pharmacy Pharmacokinetic Monitoring Service - Vancomycin    Indication: Intra-abdominal infection  Target Concentration: Dosing based on anticipated concentration < 15 mg/L due to renal impairment/insufficiency  Day of Therapy: 2  Additional Antimicrobials: Meropenem, fluconazole    Pertinent Laboratory Values:   Wt Readings from Last 1 Encounters:   01/02/25 87.4 kg (192 lb 10.9 oz)     Temp Readings from Last 1 Encounters:   01/02/25 97.8 °F (36.6 °C) (Oral)     Estimated Creatinine Clearance: 31 mL/min (A) (based on SCr of 1.6 mg/dL (H)).  Recent Labs     12/31/24  1600 12/31/24  2146 01/02/25  0151 01/02/25  0839   CREATININE 5.9*   < > 2.1* 1.6*   BUN 37*   < > 27* 22   WBC 9.1  --   --  7.7    < > = values in this interval not displayed.     Recent vancomycin administrations                     vancomycin (VANCOCIN) 2000 mg in 0.9% sodium chloride 500 mL IVPB (mg) 2,000 mg New Bag 01/01/25 1154                    Assessment:  Date/Time Current Dose Concentration Timing of Concentration (hr)   1/2/2025 1051 Intermittent due to LINDA 10.9 22h 57m post loading dose     Plan:  Vancomycin 1000 mg IV X 1 now  Repeat vancomycin concentration ordered for 1/3/24 @ 1200   Pharmacy will monitor renal function daily and adjust therapy as indicated.    Thank you for the consult,  Pratibha Johnson PharmD 1/2/2025 3:59 PM      
Comprehensive Nutrition Assessment    Type and Reason for Visit:  Initial, Consult (ostomy, decreased appetite, asking for lactose free ONS options)    Nutrition Recommendations/Plan:   Recommend MVI  ONS (Ensure Plus) discontinued per patient request; agrees to Yogurt with meals  Continue current diet   RD provided ostomy diet education to patient on 12/26/24     Malnutrition Assessment:  Malnutrition Status:  Moderate malnutrition (01/06/25 1414)    Context:  Acute Illness     Findings of the 6 clinical characteristics of malnutrition:  Energy Intake:  50% or less of estimated energy requirements for 5 or more days  Weight Loss:  No weight loss     Body Fat Loss:  Mild body fat loss Orbital   Muscle Mass Loss:  Mild muscle mass loss Temples (temporalis)  Fluid Accumulation:  No fluid accumulation     Strength:  Not Performed    Nutrition Assessment:    Pt. moderately malnourished AEB criteria as listed above.  At risk for further nutrition compromise r/t admit with LINDA- resolved, inferior wound drainage s/p loop ileostomy, generalized weakness and underlying medical condition (GERD, DVT, hypothyroidism, pulmonary embolism).        Nutrition Related Findings:    Pt. Report/Treatments/Miscellaneous: Patient seen and reports appetite and intake has remained poor since having surgery last month, declines Ensure shakes due to causing upset stomach, agrees to have yogurt sent with meals, reports being pleased to be discharged tomorrow  GI Status: ileostomy with 1500ml output past 24 hours   Pertinent Labs: glucose 113  Pertinent Meds: unasyn, diflucan      Wound Type: Surgical Incision (12/4/24: open sigmoid colectomy partial small bowel resection, abdomen wound vac; 12/13: explor lap creation of ileostomy; 12/16 explor lap repair of anastamosis, creation of loop ileostomy)       Current Nutrition Intake & Therapies:    Average Meal Intake: 26-50%, 51-75%, %  Average Supplements Intake: Refusing to take 
Dressing to midline incision saturated with purulent drainage. Bottom one inch of incision was dehisced with more purulent drainage coming out of opening. I cleaned the area around the incision with warm soapy water and dried. I cleansed the small opening to incision with normal saline. Clean dry dressing of 2-4x4's and 2-ABD's applied and secured with tape.  
[facility]   Nephrology progress note  1/4/2025, 11:52 AM      Pt Name:    Marry Ayers  MRN:     [id number]     YOB: 1950  Admit Date:    12/31/2024  3:37 PM    Chief Complaint: Nephrology following for LINDA    Subjective:  Patient was seen and examined this morning.   Doing better.     Objective:  24HR INTAKE/OUTPUT:    Intake/Output Summary (Last 24 hours) at 1/4/2025 1152  Last data filed at 1/4/2025 1151  Gross per 24 hour   Intake 1260 ml   Output 1700 ml   Net -440 ml         I/O last 3 completed shifts:  In: 1420 [P.O.:1420]  Out: 2220 [Urine:350; Stool:1870]  I/O this shift:  In: 480 [P.O.:480]  Out: 800 [Stool:800]   Admission weight: 78.5 kg (173 lb)  Wt Readings from Last 3 Encounters:   01/02/25 87.4 kg (192 lb 10.9 oz)   12/26/24 73 kg (160 lb 15 oz)   11/26/24 85 kg (187 lb 6.3 oz)        Vitals :   Vitals:    01/04/25 0433 01/04/25 0900 01/04/25 0945 01/04/25 1146   BP: 127/62 (!) 142/66  (!) 144/76   Pulse: 92 94  87   Resp: 16 18 16 19   Temp: 98.7 °F (37.1 °C) 98.1 °F (36.7 °C)  98 °F (36.7 °C)   TempSrc: Oral Oral  Oral   SpO2: 92% 93%  92%   Weight:       Height:           Physical examination  General Appearance: alert and cooperative with exam, appears comfortable, no distress  Mouth/Throat: Oral mucosa moist  Neck: No JVD  Lungs:  no use of accessory muscles  GI: soft,+ostomy  Extremities:no leg edema    Medications:  Infusion:    lactated ringers 60 mL/hr at 01/04/25 0030    sodium chloride       Meds:    magnesium sulfate  4,000 mg IntraVENous Once    vancomycin  1,250 mg IntraVENous Q24H    fluconazole  400 mg Oral Daily    vancomycin (VANCOCIN) intermittent dosing (placeholder)   Other RX Placeholder    meropenem  1,000 mg IntraVENous Q12H    melatonin  10.5 mg Oral Nightly    busPIRone  10 mg Oral BID    cetirizine  5 mg Oral Daily    levothyroxine  125 mcg Oral Daily    metoprolol succinate  50 mg Oral Daily    montelukast  10 mg Oral Daily    
[facility]   Nephrology progress note  1/5/2025, 11:50 AM      Pt Name:    Marry Ayers  MRN:     [id number]     YOB: 1950  Admit Date:    12/31/2024  3:37 PM    Chief Complaint: Nephrology following for LINDA    Subjective:  Patient was seen and examined this morning.   No new complaints    Objective:  24HR INTAKE/OUTPUT:    Intake/Output Summary (Last 24 hours) at 1/5/2025 1150  Last data filed at 1/5/2025 1010  Gross per 24 hour   Intake 480 ml   Output 2100 ml   Net -1620 ml         I/O last 3 completed shifts:  In: 960 [P.O.:960]  Out: 2550 [Stool:2550]  I/O this shift:  In: 240 [P.O.:240]  Out: 200 [Stool:200]   Admission weight: 78.5 kg (173 lb)  Wt Readings from Last 3 Encounters:   01/02/25 87.4 kg (192 lb 10.9 oz)   12/26/24 73 kg (160 lb 15 oz)   11/26/24 85 kg (187 lb 6.3 oz)        Vitals :   Vitals:    01/05/25 0015 01/05/25 0410 01/05/25 0915 01/05/25 1100   BP: (!) 117/50 (!) 135/59 (!) 144/60 131/61   Pulse: 67 70 69 73   Resp: 18 18 18 18   Temp: 98.1 °F (36.7 °C) 98.5 °F (36.9 °C) 97.8 °F (36.6 °C) 97.6 °F (36.4 °C)   TempSrc: Oral Oral Oral Oral   SpO2: 95% 95% 96% 95%   Weight:       Height:           Physical examination  General Appearance: alert and cooperative with exam, appears comfortable, no distress  Mouth/Throat: Oral mucosa moist  Neck: No JVD  Lungs:  no use of accessory muscles  GI: soft,+ostomy  Extremities:no leg edema    Medications:  Infusion:    sodium chloride       Meds:    magnesium sulfate  4,000 mg IntraVENous Once    vancomycin  1,250 mg IntraVENous Q24H    fluconazole  400 mg Oral Daily    vancomycin (VANCOCIN) intermittent dosing (placeholder)   Other RX Placeholder    meropenem  1,000 mg IntraVENous Q12H    melatonin  10.5 mg Oral Nightly    busPIRone  10 mg Oral BID    cetirizine  5 mg Oral Daily    levothyroxine  125 mcg Oral Daily    metoprolol succinate  50 mg Oral Daily    montelukast  10 mg Oral Daily    sodium chloride flush  
[facility]   Nephrology progress note  1/7/2025, 9:05 AM      Pt Name:    Marry Ayers  MRN:     [id number]     YOB: 1950  Admit Date:    12/31/2024  3:37 PM    Chief Complaint: Nephrology following for LINDA    Subjective:  Patient was seen and examined this morning.   No new complaints, states she's getting discharged today.    Objective:  24HR INTAKE/OUTPUT:    Intake/Output Summary (Last 24 hours) at 1/7/2025 0905  Last data filed at 1/7/2025 0640  Gross per 24 hour   Intake 150 ml   Output 1000 ml   Net -850 ml         I/O last 3 completed shifts:  In: 390 [P.O.:390]  Out: 1800 [Urine:650; Other:100; Stool:1050]  No intake/output data recorded.   Admission weight: 78.5 kg (173 lb)  Wt Readings from Last 3 Encounters:   01/07/25 88.3 kg (194 lb 10.7 oz)   12/26/24 73 kg (160 lb 15 oz)   11/26/24 85 kg (187 lb 6.3 oz)        Vitals :   Vitals:    01/07/25 0015 01/07/25 0353 01/07/25 0556 01/07/25 0758   BP: (!) 140/64 (!) 126/56  (!) 125/56   Pulse: 74 79  79   Resp: 18 18  16   Temp: 98.5 °F (36.9 °C) 98.9 °F (37.2 °C)  97.9 °F (36.6 °C)   TempSrc: Oral Oral  Oral   SpO2: 90% 93%  93%   Weight:   88.3 kg (194 lb 10.7 oz)    Height:           Physical examination  General Appearance: alert and cooperative with exam, appears comfortable, no distress  Mouth/Throat: Oral mucosa moist  Neck: No JVD  Lungs:  no use of accessory muscles  GI: soft,+ostomy  Extremities:no leg edema    Medications:  Infusion:    sodium chloride       Meds:    magnesium oxide  400 mg Oral BID    ampicillin-sulbactam  3,000 mg IntraVENous Q6H    magnesium sulfate  4,000 mg IntraVENous Once    fluconazole  400 mg Oral Daily    melatonin  10.5 mg Oral Nightly    busPIRone  10 mg Oral BID    cetirizine  5 mg Oral Daily    levothyroxine  125 mcg Oral Daily    metoprolol succinate  50 mg Oral Daily    montelukast  10 mg Oral Daily    sodium chloride flush  5-40 mL IntraVENous 2 times per day    warfarin 
[facility] Nurse  Progress Note       Marry Ayers  AGE: 74 y.o.   GENDER: female  : 1950  UNIT: 6K-27/027-A  TODAY'S DATE:  1/3/2025  ADMISSION DATE: 2024  3:37 PM  Subjective:     Reason for [facility] Evaluation and Assessment: New ileostomy, dehisced abdominal incision      Marry Ayers is a 74 y.o. female referred by:   [] Physician/PA/APRN  [x] Nursing  [] Other:     Objective:     Bert Risk Score: Bert Scale Score: 19    Assessment:     Encounter: Present to patient room for new ileostomy. Patient in bed. Assessment and photo to follow. Midline incision dehisced at distal end, feculent drainage noted. Two piece red, flat pouching system removed. Cleansed stoma with warm wash cloth, pat dry. Stoma red, moist, and protrudes.  Peristomal skin grossly intact, minimal fungal rash observed. Stoma located right mid abdomen. Mucocutaneous junction separation noted from 2-5 oclock, sutures intact. Stoma measured 35-45mm oval. Skin prep applied to varsha-stomal skin. Coloplast 2 piece red, flat flange cut to fit to size, protective ring applied to inner edge of flange, centered over stoma. Pouch applied. Barrier extenders placed on outer edge of flange. Patient hesitant to watch pouching system change. Patient did listen to step-by-step education. Advised patient to have home health for discharge to assist with twice weekly pouching system changes and to continue ostomy education. Answered questions and concerns. Opted to apply 2 piece pouch to distal dehisced incision. Cleansed varsha-wound with normal saline and gauze. Red flat flange cut to fit, paste applied to cut edge. NuHope adhesive applied to back of flange and varsha-wound skin, allowed to become tacky. Flange applied, pouch applied. Barrier extenders applied. Will plan to change pouching system and educate early next week. Highly recommend patient have home health assist with ostomy care after discharge. Additional pouching 
Pharmacy Consult      Marry Ayers is a 74 y.o. female for whom pharmacy has been consulted to dose meropenem.    Patient Active Problem List   Diagnosis    Allergic rhinitis    Anemia    Anxiety    Colovesical fistula    Essential hypertension    Gastroesophageal reflux disease    History of pulmonary embolus (PE)    Hypokalemia    Hypothyroidism    Personal history of venous thrombosis and embolism    Stricture of sigmoid colon (HCC)    Sigmoid stricture (HCC)    Severe malnutrition (HCC)    Peritonitis (HCC)    Ileostomy care (HCC)    Acute kidney injury (HCC)     Allergies:  Seasonal     Recent Labs     12/31/24  1600 12/31/24  2146   CREATININE 5.9* 5.0*     Ht/Wt:   Ht Readings from Last 1 Encounters:   12/31/24 1.549 m (5' 1\")        Wt Readings from Last 1 Encounters:   12/31/24 78.5 kg (173 lb)       Estimated Creatinine Clearance: 9 mL/min (A) (based on SCr of 5 mg/dL (HH)).    Assessment/Plan:  Meropenem will be started at 500 mg IV every 12 hours.     Thank you for the consult.  Will continue to follow.    Catie Steven PharmD, BCPS 1/1/2025 7:56 AM      
Pharmacy Consult      Marry Ayers is a 74 y.o. female for whom pharmacy has been consulted to dose meropenem.    Patient Active Problem List   Diagnosis    Allergic rhinitis    Anemia    Anxiety    Colovesical fistula    Essential hypertension    Gastroesophageal reflux disease    History of pulmonary embolus (PE)    Hypokalemia    Hypothyroidism    Personal history of venous thrombosis and embolism    Stricture of sigmoid colon (HCC)    Sigmoid stricture (HCC)    Severe malnutrition (HCC)    Peritonitis (HCC)    Ileostomy care (HCC)    Acute kidney injury (HCC)     Allergies:  Seasonal     Recent Labs     12/31/24  1600 12/31/24  2146   CREATININE 5.9* 5.0*     Ht/Wt:   Ht Readings from Last 1 Encounters:   12/31/24 1.549 m (5' 1\")        Wt Readings from Last 1 Encounters:   12/31/24 78.5 kg (173 lb)       Estimated Creatinine Clearance: 9 mL/min (A) (based on SCr of 5 mg/dL (HH)).    Assessment/Plan:  Meropenem will be started at 500 mg IV every 12 hours.     Thank you for the consult.  Will continue to follow.    Catie Steven PharmD, BCPS 1/1/2025 7:56 AM      
Pharmacy Medication History Note    List of current medications patient is taking is complete.    Source of information: Order Summary Report from [facility]; last doses per patient     No changes made to medication list.    Warfarin regimen  Regimen: 5 mg daily in the evening  Indication: history of DVT & PE  INR goal: 2-3  Managed by [facility] at this time   Last dose 12/30/24 per patient     Electronically signed by Halima Mayer RPH on 12/31/2024 at 7:35 PM   
Pt's colostomy bag and wound drainage bag were leaking.  Both replaced.  Pt tolerated well.  Will continue to monitor.   
Warfarin Pharmacy Consult Note    Marry Ayers is a 74 y.o. female for whom pharmacy has been asked to manage warfarin therapy.     Consulting Provider: LOY Núñez - CNP   Indication: Hx of DVT & PE  Target INR: 2.0-3.0   Warfarin dose prior to admission: 5 mg daily   Outpatient warfarin provider: [facility]     Recent Labs     12/31/24  1600   HGB 9.1*   *     Recent Labs     12/31/24  1623   INR 3.71*     Concurrent anticoagulants/antiplatelets: None  Significant warfarin drug-drug interactions: None    Date INR Warfarin Dose   12/30/24 2.7 @ facility 5 mg @ facility   12/31/24 3.71 No dose                                   INR will be monitored routinely until therapeutic INR is achieved.    Pharmacy will continue to follow. Thank you for the consult,     Halima Mayer, PharmD  12/31/2024 9:50 PM   
Warfarin Pharmacy Consult Note    Warfarin Indication: Hx of DVT & PE  Target INR: 2.0-3.0  Dose prior to admission: 5 mg daily    Recent Labs     12/31/24  1600   HGB 9.1*   *     Recent Labs     12/31/24  1623 01/01/25  0608   INR 3.71* 4.14*     Concurrent anticoagulants/antiplatelets: None  Significant warfarin drug-drug interactions: None     Date INR Warfarin Dose   12/30/24 2.7 @ facility 5 mg @ facility   12/31/24 3.71 No dose    1/1/25 4.14   None                                                Monitoring:                   INR will be monitored daily.    **Please contact pharmacy for discharge instructions when indicated**    Catie Steven PharmD, BCPS 1/1/2025 12:26 PM      
Warfarin Pharmacy Consult Note    Warfarin Indication: Hx of DVT & PE  Target INR: 2.0-3.0  Dose prior to admission: 7.5 mg Tu, 5 mg MWThFSatSun    Recent Labs     01/02/25  0839 01/03/25  0553 01/04/25  0701   HGB 8.6* 8.2* 8.1*   * 664* 702*     Recent Labs     01/02/25  0151 01/03/25  0553 01/04/25  0701   INR 3.58* 3.13* 2.89*     Concurrent anticoagulants/antiplatelets: None  Significant warfarin drug-drug interactions: levothyroxine, Fluconazole 400 mg PO daily (started 1/2/25)      Date INR Warfarin Dose   12/30/2024 2.7 @ facility 5 mg @ facility   12/31/2024 3.71 No warfarin    1/1/2025 4.14   No warfarin    1/2/2025  3.58 1 mg     1/3/2025 3.13   1.5 mg    1/4/2025 2.89   1.5 mg                       Monitoring:                   INR will be monitored daily.    **Please contact pharmacy for discharge instructions when indicated**    Daisy Milligan, PharmD, BCPS  1/4/2025  12:28 PM      
Warfarin Pharmacy Consult Note    Warfarin Indication: Hx of DVT & PE  Target INR: 2.0-3.0  Dose prior to admission: 7.5 mg Tuesday, 5 mg all other days    Recent Labs     01/03/25  0553 01/04/25  0701 01/05/25  0632   HGB 8.2* 8.1* 8.3*   * 702* 694*     Recent Labs     01/03/25  0553 01/04/25  0701 01/05/25  0632   INR 3.13* 2.89* 3.60*     Concurrent anticoagulants/antiplatelets: None  Significant warfarin drug-drug interactions: levothyroxine, Fluconazole 400 mg PO daily (started 1/2/25)      Date INR Warfarin Dose   12/30/2024 2.7 @ facility 5 mg @ facility   12/31/2024 3.71 No warfarin    1/1/2025 4.14   No warfarin    1/2/2025  3.58 1 mg     1/3/2025 3.13   1.5 mg    1/4/2025 2.89   1.5 mg    1/5/2025 3.60   none                Monitoring:                   INR will be monitored daily.    **Please contact pharmacy for discharge instructions when indicated**    Catie Steven PharmD, BCPS 1/5/2025 8:31 AM      
Warfarin Pharmacy Consult Note    Warfarin Indication: Hx of DVT & PE  Target INR: 2.0-3.0  Dose prior to admission: 7.5 mg Tuesday, 5 mg all other days    Recent Labs     01/04/25  0701 01/05/25  0632 01/06/25  0641   HGB 8.1* 8.3* 8.3*   * 694* 659*     Recent Labs     01/04/25  0701 01/05/25  0632 01/06/25  0641   INR 2.89* 3.60* 4.17*     Concurrent anticoagulants/antiplatelets: None  Significant warfarin drug-drug interactions: levothyroxine, Fluconazole 400 mg PO daily (started 1/2/25)      Date INR Warfarin Dose   12/30/2024 2.7 @ facility 5 mg @ facility   12/31/2024 3.71 No warfarin    1/1/2025 4.14   No warfarin    1/2/2025  3.58 1 mg     1/3/2025 3.13   1.5 mg    1/4/2025 2.89   1.5 mg    1/5/2025 3.60   none    1/6/2025 4.17  none      Monitoring:                   INR will be monitored daily.    **Please contact pharmacy for discharge instructions when indicated**    Princess Rodriguez, PharmD, BCPS, BCCCP  1/6/2025 7:41 AM    
Warfarin Pharmacy Consult Note    Warfarin Indication: Hx of DVT & PE  Target INR: 2.0-3.0  Dose prior to admission: 7.5 mg Tuesday, 5 mg all other days    Recent Labs     01/05/25  0632 01/06/25  0641 01/07/25  0629   HGB 8.3* 8.3* 8.5*   * 659* 676*     Recent Labs     01/05/25  0632 01/06/25  0641 01/07/25  0629   INR 3.60* 4.17* 3.88*     Concurrent anticoagulants/antiplatelets: None  Significant warfarin drug-drug interactions: levothyroxine, Fluconazole 400 mg PO daily (started 1/2/25)      Date INR Warfarin Dose   12/30/2024 2.7 @ facility 5 mg @ facility   12/31/2024 3.71 No warfarin    1/1/2025 4.14   No warfarin    1/2/2025  3.58 1 mg     1/3/2025 3.13   1.5 mg    1/4/2025 2.89   1.5 mg    1/5/2025 3.60   none    1/6/2025 4.17  none   1/7/2025 3.88 none      Monitoring:                   INR will be monitored daily.    **Please contact pharmacy for discharge instructions when indicated**    Princess Rodriguez, PharmD, BCPS, BCCCP  1/7/2025 9:12 AM    
Warfarin Pharmacy Consult Note    Warfarin Indication: Hx of DVT & PE  Target INR: 2.0-3.0  Dose prior to admission: 7.5mg Tuesday 5mg SunMWThFSat     Recent Labs     12/31/24  1600 01/02/25  0839   HGB 9.1* 8.6*   * 646*     Recent Labs     12/31/24  1623 01/01/25  0608 01/02/25  0151   INR 3.71* 4.14* 3.58*     Concurrent anticoagulants/antiplatelets: None  Significant warfarin drug-drug interactions: levothyroxine, Fluconazole 400 mg PO daily (started 1/2/25)      Date INR Warfarin Dose   12/30/24 2.7 @ facility 5 mg @ facility   12/31/24 3.71 No dose    1/1/25 4.14   None    1/2/2025  3.58 1 mg                                     Monitoring:                   INR will be monitored daily.    **Please contact pharmacy for discharge instructions when indicated**    Pratibha Johnson PharmD 1/2/2025 12:46 PM      
Warfarin Pharmacy Consult Note    Warfarin Indication: Hx of DVT & PE  Target INR: 2.0-3.0  Dose prior to admission: 7.5mg Tuesday 5mg SunMWThFSat     Recent Labs     12/31/24  1600 01/02/25  0839 01/03/25  0553   HGB 9.1* 8.6* 8.2*   * 646* 664*     Recent Labs     01/01/25  0608 01/02/25  0151 01/03/25  0553   INR 4.14* 3.58* 3.13*     Concurrent anticoagulants/antiplatelets: None  Significant warfarin drug-drug interactions: levothyroxine, Fluconazole 400 mg PO daily (started 1/2/25)      Date INR Warfarin Dose   12/30/2024 2.7 @ facility 5 mg @ facility   12/31/2024 3.71 No dose    1/1/2025 4.14   None    1/2/2025  3.58 1 mg     1/3/2025 3.13   1.5 mg                               Monitoring:                   INR will be monitored daily.    **Please contact pharmacy for discharge instructions when indicated**    Cristina London, PharmD, BCPS 1/3/2025 9:54 AM     
   montelukast  10 mg Oral Daily    sodium chloride flush  5-40 mL IntraVENous 2 times per day    warfarin placeholder: dosing by pharmacy   Oral RX Placeholder     Meds prn: magnesium sulfate, potassium chloride **OR** potassium alternative oral replacement **OR** potassium chloride, oxyCODONE, sodium chloride flush, sodium chloride, ondansetron **OR** ondansetron, polyethylene glycol, acetaminophen **OR** acetaminophen, melatonin     Lab Data :  CBC:   Recent Labs     12/31/24  1600 01/02/25  0839 01/03/25  0553   WBC 9.1 7.7 7.8   HGB 9.1* 8.6* 8.2*   HCT 28.2* 27.2* 25.4*   * 646* 664*     CMP:  Recent Labs     12/31/24  2146 01/01/25  2136 01/02/25  0151 01/02/25  0839 01/03/25  0553   *   < > 135 138 143   K 3.6   < > 3.5 3.3* 3.7   CL 99   < > 99 100 106   CO2 18*   < > 22* 26 26   BUN 36*   < > 27* 22 13   CREATININE 5.0*   < > 2.1* 1.6* 1.2   GLUCOSE 103   < > 111* 104 102   CALCIUM 8.4*   < > 8.1* 8.2* 8.4*   MG  --   --   --   --  1.0*   PHOS 3.4  --   --   --   --     < > = values in this interval not displayed.     Hepatic:   Recent Labs     12/31/24  1600   AST 10   ALT 9*   BILITOT 0.2*   ALKPHOS 85         Assessment and Plan:  LINDA  : prerenal from hypotension , increased ostomy output and poor po intake compounded by Hyzaar   Improving . Reduce LR to 60 ml/hr  Hold hyzaar  Metabolic acidosis: better  Hypokalemia: better  Hypomag: replacement ordered  Hypotension, better  Hx open sigmoid colectomy with partial SBR  Abdominal Wound drainage      D/W patient and RN    Stephanie Cardona, DO  [facility]    This report has been created using voice recognition software. It may contain minor errors which are inherent in voice recognition technology   
Placeholder     Meds prn: magnesium sulfate, potassium chloride **OR** potassium alternative oral replacement **OR** potassium chloride, oxyCODONE, sodium chloride flush, sodium chloride, ondansetron **OR** ondansetron, polyethylene glycol, acetaminophen **OR** acetaminophen, melatonin     Lab Data :  CBC:   Recent Labs     01/04/25  0701 01/05/25  0632 01/06/25  0641   WBC 9.8 8.9 8.3   HGB 8.1* 8.3* 8.3*   HCT 25.9* 26.8* 26.3*   * 694* 659*     CMP:  Recent Labs     01/03/25  1501 01/04/25  0701 01/05/25  0632 01/06/25  0641   NA  --  137 142 143   K  --  3.4* 4.7 4.7   CL  --  101 107 106   CO2  --  24 25 25   BUN  --  9 8 8   CREATININE  --  1.0 1.1 1.2   GLUCOSE  --  106 105 113*   CALCIUM  --  8.4* 8.5 8.5   MG 1.5* 1.6  --  1.4*     Hepatic:   No results for input(s): \"LABALBU\", \"AST\", \"ALT\", \"BILITOT\", \"ALKPHOS\" in the last 72 hours.    Invalid input(s): \"ALB\"        Assessment and Plan:  LINDA  : prerenal from hypotension , increased ostomy output and poor po intake compounded by Hyzaar   Improved.Off IV fluids  Metabolic acidosis: better  Hypokalemia: better  Hypotension, better  Hypomag: replaced IV.  Add po mag oxide  Hx open sigmoid colectomy with partial SBR  Abdominal Wound infection  Stable from renal standpoint      Stephanie Cardona, DO  [facility]    This report has been created using voice recognition software. It may contain minor errors which are inherent in voice recognition technology   
Tub/Shower unit  Bathroom Toilet: Standard  Bathroom Equipment: Built-in shower seat, Grab bars in shower    Receives Help From: Family  Prior Level of Assist for ADLs: Independent  Prior Level of Assist for Homemaking: Independent  Homemaking Responsibilities: Yes  Ambulation Assistance: Independent  Prior Level of Assist for Transfers: Independent       Active : No  Mode of Transportation: Family  Occupation: Retired  Additional Comments: Per report, pt was IND prior to collectomy beginning of Dec. Has not been home since. Reports participating in rehab at SNF.    SUBJECTIVE: Patient laying in bed upon arrival; agreeable to therapy this date.  Patient pleasant and cooperative.  Patient expresses concerns with going home d/t inability to change ostomy d/t limited ability to see area requesting to go to SNF, followed up with nursing regarding education, see additional activities below.    PAIN: 0/10:      Vitals: Vitals not assessed per clinical judgement, see nursing flowsheet    COGNITION: Decreased Problem Solving and Decreased Safety Awareness    ADL:   Grooming: Stand By Assistance.  Standing at sink to wash hands  Footwear Management: Supervision.  Seated in bedside chair to doff/za B socks  Toileting: Stand By Assistance.     Toilet Transfer: Stand By Assistance. Standard toilet .  Education provided to patient regarding utilized external cath and utilizing bathroom as patient ambulates well to increase activity tolerance, endurance, strength, balance however patient notes urgency and wanting to use.    BALANCE:  Sitting Balance:  Modified Independent.    Standing Balance: Stand By Assistance. No device, no LOB unsteadiness noted d/t patient expressing dizziness    BED MOBILITY:  Supine to Sit: Supervision, X 1, with head of bed raised, with rail, with increased time for completion      TRANSFERS:  Sit to Stand:  Stand By Assistance, X 1, with increased time for completion, to/from chair without arms. 
levothyroxine  125 mcg Oral Daily    metoprolol succinate  50 mg Oral Daily    montelukast  10 mg Oral Daily    sodium chloride flush  5-40 mL IntraVENous 2 times per day    warfarin placeholder: dosing by pharmacy   Oral RX Placeholder     Meds prn: potassium chloride **OR** potassium alternative oral replacement **OR** potassium chloride, oxyCODONE, sodium chloride flush, sodium chloride, ondansetron **OR** ondansetron, polyethylene glycol, acetaminophen **OR** acetaminophen, melatonin     Lab Data :  CBC:   Recent Labs     12/31/24  1600 01/02/25  0839   WBC 9.1 7.7   HGB 9.1* 8.6*   HCT 28.2* 27.2*   * 646*     CMP:  Recent Labs     12/31/24  2146 01/01/25  2136 01/02/25  0151 01/02/25  0839   * 136 135 138   K 3.6 3.1* 3.5 3.3*   CL 99 97* 99 100   CO2 18* 26 22* 26   BUN 36* 28* 27* 22   CREATININE 5.0* 2.1* 2.1* 1.6*   GLUCOSE 103 115* 111* 104   CALCIUM 8.4* 8.2* 8.1* 8.2*   PHOS 3.4  --   --   --      Hepatic:   Recent Labs     12/31/24  1600   AST 10   ALT 9*   BILITOT 0.2*   ALKPHOS 85         Assessment and Plan:  LINDA  : prerenal from hypotension , increased ostomy output and poor po intake compounded by Hyzaar   Improving quickly  Continue LR  Hold hyzaar  Metabolic acidosis: better  Hypokalemia: give 40 meq kcl  Hypotension, better  Hx open sigmoid colectomy with partial SBR  Abdominal Wound drainage      D/W patient and RN    Stephanie Cardona, DO  [facility]    This report has been created using voice recognition software. It may contain minor errors which are inherent in voice recognition technology   
recommend patient have home health assist with ostomy care after discharge. Additional pouching systems in room. Will continue to follow. Bed in low, call light in reach.       1/6/24    RLQ stoma with peristomal denudement    1/3/25   01/03/25 1309   Ileostomy/Jejunostomy RLQ Loop ileostomy   Placement Date/Time: 12/13/24 1634   Present on Admission/Arrival: No  Location: RLQ  Ostomy Type: Loop ileostomy   Stomal Appliance 2 piece;Flat;Changed   Flange Size (inches) 35 Inches   Stoma  Assessment Red;Moist;Protrudes   Peristomal Assessment Intact;Other (Comment)  (fungal rash)   Mucocutaneous Junction Separation   Treatment Liquid skin barrier;Pouch change;Site care;Barrier ring   Stool Appearance Watery   Stool Color Green;Brown   Stool Amount Medium   Incision 12/13/24 Abdomen Lower;Medial   Date First Assessed/Time First Assessed: 12/13/24 1631   Location: Abdomen  Incision Location Orientation: Lower;Medial  Incision Description (Comments): staples, 4x4's, abd tape  Incision Outcome: Well approximated   Dressing Status Old drainage noted;New dressing applied   Incision Cleansed Cleansed with saline   Dressing/Treatment Barrier film;Liquid/adhesive;Other (comment)  (Ostomy pouch)   Closure Open to air   Margins Other (Comment);Approximated  (Dehisced distal end)   Incision Assessment Erythema;Devitalized tissue   Drainage Amount Large (50-75% saturated)   Drainage Description Brown   Odor Fecal   Eliza-incision Assessment Blanchable erythema;Denuded       Plan:     Treatment Recommendations:   Twice weekly pouching system changes.  Maintain distal incision pouching system.    Specialty Bed Required :   [x] Low Air Loss   [] Pressure Redistribution  [] Fluid Immersion- Dolphin  [] Bariatric  [] RotoProne   [] Other:     Discharge Plan:  Placement for patient upon discharge: home vs SNF    Recommend patient follow up in outpatient wound clinic. Referral in place.  [facility]  443 
verbalized understanding.       General:  Overall Orientation Status: Within Functional Limits  Vision: Within Functional Limits  Hearing: Within functional limits       Pain: 10/10: stomach     Vitals: Vitals not assessed per clinical judgement, see nursing flowsheet    Social/Functional History:    Lives With: Spouse  Type of Home: House  Home Layout: Two level, Able to Live on Main level with bedroom/bathroom  Home Access: Stairs to enter with rails  Entrance Stairs - Number of Steps: 3  Home Equipment: Walker - Rolling, Rollator, Cane, Wheelchair - Manual     Bathroom Shower/Tub: Tub/Shower unit  Bathroom Toilet: Standard  Bathroom Equipment: Built-in shower seat, Grab bars in shower    Receives Help From: Family  Prior Level of Assist for ADLs: Independent  Prior Level of Assist for Homemaking: Independent  Homemaking Responsibilities: Yes  Ambulation Assistance: Independent  Prior Level of Assist for Transfers: Independent       Active : No  Mode of Transportation: Family  Occupation: Retired  Additional Comments: Per report, pt was IND prior to collectomy beginning of Dec. Has not been home since. Reports participating in rehab at [facility].    OBJECTIVE:  Range of Motion:  Bilateral Lower Extremity: WFL    Strength:  Bilateral Lower Extremity: Impaired - grossly deconditioned     Balance:  Static Sitting Balance:  Stand By Assistance  Static Standing Balance: Stand By Assistance    Bed Mobility:  Supine to Sit: Stand By Assistance, with head of bed raised, with rail  Sit to Supine: Stand By Assistance, with head of bed raised, with rail     Transfers:  Sit to Stand: Stand By Assistance  Stand to Sit:Stand By Assistance    Ambulation:  Stand By Assistance  Distance: 200'  Surface: Level Tile  Device: Rolling Walker  Gait Deviations: Decreased Step Length Bilaterally and Decreased Gait Speed     Stairs:  Not Tested    Exercise:  None    Functional Outcome Measures:  [facility] (6 CLICK) BASIC MOBILITY  AM-PAC 
Max:64    Pulse Range (24h): Pulse  Av.7  Min: 80  Max: 91  Respiration Range (24h): Resp  Av.4  Min: 16  Max: 18  Current Pulse Ox (24h):  SpO2: 93 %  Pulse Ox Range (24h):  SpO2  Av.4 %  Min: 90 %  Max: 94 %  Oxygen Amount and Delivery:    CONSTITUTIONAL: Alert and oriented times 3, no acute distress and cooperative to examination with proper mood and affect.  CHEST/LUNGS: chest symmetric with normal A/P diameter, normal respiratory rate and rhythm, lungs clear to auscultation without wheezes, rales or rhonchi. No accessory muscle use. Scars None   CARDIOVASCULAR: Heart sounds are normal.  Regular rate and rhythm without murmur, gallop or rub. Normal S1 nd S2. Carotid and femoral pulses 2+/4 and equal bilaterally.  ABDOMEN: Normal shape. large midline scar with small opening inferiorly with some drainage present as expected. Ostomy in the RLQ with + output.  Normal bowel sounds.  No bruits.  Soft, nondistended, no masses or organomegaly. no evidence of hernia. Percussion: Normal without hepatosplenomegally. Tenderness: absent.  LABS:     Recent Labs     24  0155 24  1600 24  1600 24  1623 24  2146 25  0608 25  2136 25  0151 25  0839   WBC  --  9.1  --   --   --   --   --   --  7.7   HGB  --  9.1*  --   --   --   --   --   --  8.6*   HCT  --  28.2*  --   --   --   --   --   --  27.2*   PLT  --  621*  --   --   --   --   --   --  646*   NA  --  134*   < >  --  133*  --  136 135 138   K  --  3.8   < >  --  3.6  --  3.1* 3.5 3.3*   CL  --  95*   < >  --  99  --  97* 99 100   CO2  --  22*   < >  --  18*  --  26 22* 26   BUN  --  37*   < >  --  36*  --  28* 27* 22   CREATININE  --  5.9*   < >  --  5.0*  --  2.1* 2.1* 1.6*   PHOS  --   --   --   --  3.4  --   --   --   --    CALCIUM  --  8.8   < >  --  8.4*  --  8.2* 8.1* 8.2*   INR  --   --   --  3.71*  --  4.14*  --  3.58*  --    AST  --  10  --   --   --   --   --   --   --    ALT  --  9*  --   --   
appropriate care there, she also feels that there is a mental barrier and some depression that is affecting the patient and her ability to help maintain her ostomy and be receptive to the education.  Discussed that PT/OT will follow-up tomorrow and therefore updated information she will be had.  While in the room, patient's pouch over the lower incision again \"burst\".  W&O will need to evaluate tomorrow for potentially other supplies / type / barriers / options. No acute complaints otherwise.  Medication adjustments made per discussion with ID.        HPI / Hospital Course:  \"Marry Ayers is a 74-year-old female presented to [facility] 12/31/2024 from [facility] with chief complaint of abdominal pain and drainage from incisional site.  Drainage has been purulent in nature intermittent since 12/23/2024.  Recent [facility] hospitalization 12/4-12/2824 patient underwent open sigmoid colectomy, partial small bowel resection on 12/04/2024, repair of anastomotic leak and loop ileostomy on 12/13/2024, PALAK cultures revealed Proteus and gram-positive cocci.  Patient is on Coumadin with history of PE/DVT.  Discharge to rehab with Augmentin with tentative end date 1/7/2025     Son is present at bedside and assisting with history pieces.  Patient is a poor historian however she tells me she was participating in rehab.  Patient denies any nausea vomiting or diarrhea.  Positive for complaint of poor appetite.  Positive for complaint of purulent drainage from incisional wound.  Patient denied any dizziness with position changes or fall since discharge from the hospital. CT A/P completed.  While in the emergency room patient did receive 2L0.9 normal saline, thiamine. \"    Medications:    Infusion Medications    sodium chloride      Scheduled Medications    magnesium oxide  400 mg Oral BID    ampicillin-sulbactam  3,000 mg IntraVENous Q6H    magnesium sulfate  4,000 mg IntraVENous Once    fluconazole  400 mg Oral Daily    
in the emergency room patient did receive 2L0.9 normal saline, thiamine. \"    Medications:    Infusion Medications    lactated ringers 125 mL/hr at 01/02/25 0609    sodium chloride      Scheduled Medications    meropenem  500 mg IntraVENous Q12H    fluconazole  400 mg Oral Daily    melatonin  10.5 mg Oral Nightly    busPIRone  10 mg Oral BID    cetirizine  5 mg Oral Daily    levothyroxine  125 mcg Oral Daily    metoprolol succinate  50 mg Oral Daily    montelukast  10 mg Oral Daily    sodium chloride flush  5-40 mL IntraVENous 2 times per day    warfarin placeholder: dosing by pharmacy   Oral RX Placeholder    PRN Meds: oxyCODONE, sodium chloride flush, sodium chloride, ondansetron **OR** ondansetron, polyethylene glycol, acetaminophen **OR** acetaminophen, melatonin    Exam:  BP 94/64   Pulse 84   Temp 97.7 °F (36.5 °C) (Oral)   Resp 16   Ht 1.549 m (5' 1\")   Wt 87.4 kg (192 lb 10.9 oz)   SpO2 93%   BMI 36.41 kg/m²   General: Chronically ill-appearing no distress, appears stated age.  Eyes:  PERRL. Conjunctivae/corneas clear.  HENT: Head normal appearing. Nares normal. Oral mucosa moist.  Hearing intact.   Neck: Supple, with full range of motion. Trachea midline.  No gross JVD appreciated.  Respiratory:  Normal effort. Clear to auscultation, without rales or wheezes or rhonchi.  Cardiovascular: Normal rate, regular rhythm with normal S1/S2 without murmurs.    No lower extremity edema.   Abdomen: Soft, midline incision with staples currently in place, nonerythematous surrounding non-tender, non-distended with active bowel sounds.  Musculoskeletal: No joint swelling or tenderness. Normal tone. No abnormal movements.   Skin: Warm and dry. No rashes or lesions.  Neurologic:  No focal sensory/motor deficits in the upper or lower extremities. Cranial nerves:  grossly non-focal 2-12.     Psychiatric: Alert and oriented, normal insight and thought content.         Labs/Radiology: See chart or assessment above. 
85   Temp 98.6 °F (37 °C) (Oral)   Resp 19   Ht 1.549 m (5' 1\")   Wt 87.4 kg (192 lb 10.9 oz)   SpO2 94%   BMI 36.41 kg/m²   General: Chronically ill-appearing no distress, appears stated age.  Eyes:  PERRL. Conjunctivae/corneas clear.  HENT: Head normal appearing. Nares normal. Oral mucosa moist.  Hearing intact.   Neck: Supple, with full range of motion. Trachea midline.  No gross JVD appreciated.  Respiratory:  Normal effort. Clear to auscultation, without rales or wheezes or rhonchi.  Cardiovascular: Normal rate, regular rhythm with normal S1/S2 without murmurs.    No lower extremity edema.   Abdomen: ostomy with brown liquid, Soft, midline incision with staples currently in place, nonerythematous surrounding non-tender, non-distended with active bowel sounds.  Musculoskeletal: No joint swelling or tenderness. Normal tone. No abnormal movements.   Skin: Warm and dry. No rashes or lesions.  Neurologic:  No focal sensory/motor deficits in the upper or lower extremities. Cranial nerves:  grossly non-focal 2-12.     Psychiatric: Alert and oriented, normal insight and thought content.         Labs/Radiology: See chart or assessment above.     Electronically signed by Lissa Heath PA-C on 1/4/2025 at 5:04 PM    
mild hemorrhagic postoperative    ascites. This can be followed up for improvement.   3. Slight nodular liver contour which can be correlated for early    cirrhosis.      This document has been electronically signed by: Ryan Nunez MD on    12/31/2024 06:25 PM      All CTs at this facility use dose modulation techniques and iterative    reconstructions, and/or weight-based dosing   when appropriate to reduce radiation to a low as reasonably achievable.          Electronically signed by Casimiro Callahan DO on 1/7/2025 at 9:01 AM            
montelukast  10 mg Oral Daily    sodium chloride flush  5-40 mL IntraVENous 2 times per day    warfarin placeholder: dosing by pharmacy   Oral RX Placeholder    PRN Meds: magnesium sulfate, potassium chloride **OR** potassium alternative oral replacement **OR** potassium chloride, oxyCODONE, sodium chloride flush, sodium chloride, ondansetron **OR** ondansetron, polyethylene glycol, acetaminophen **OR** acetaminophen, melatonin    Exam:  /61   Pulse 73   Temp 97.6 °F (36.4 °C) (Oral)   Resp 16   Ht 1.549 m (5' 1\")   Wt 89 kg (196 lb 3.4 oz)   SpO2 95%   BMI 37.07 kg/m²   General: Chronically ill-appearing no distress, appears stated age.  Eyes:  PERRL. Conjunctivae/corneas clear.  HENT: Head normal appearing. Nares normal. Oral mucosa moist.  Hearing intact.   Neck: Supple, with full range of motion. Trachea midline.  No gross JVD appreciated.  Respiratory:  Normal effort. Clear to auscultation, without rales or wheezes or rhonchi.  Cardiovascular: Normal rate, regular rhythm with normal S1/S2 without murmurs.    No lower extremity edema.   Abdomen: ostomy with brown liquid, Soft, midline incision with staples currently in place, nonerythematous surrounding non-tender, non-distended with active bowel sounds.  Musculoskeletal: No joint swelling or tenderness. Normal tone. No abnormal movements.   Skin: Warm and dry. No rashes or lesions.  Neurologic:  No focal sensory/motor deficits in the upper or lower extremities. Cranial nerves:  grossly non-focal 2-12.     Psychiatric: Alert and oriented, normal insight and thought content.         Labs/Radiology: See chart or assessment above.     Electronically signed by Lissa Heath PA-C on 1/5/2025 at 3:56 PM    
stones  Musculoskeletal: no joint pain, swelling , stiffness,  Endocrine: no polyuria, polydipsia, no cold or heat intolerance  Hematology: no anemia, no easy brusing or bleeding, no hx of clotting disorder  Dermatology: no skin rash, no eczema, no pruritis,  Psychiatry: no depression, no anxiety,no panic attacks, no suicide ideation      Objective:      BP (!) 136/53   Pulse 68   Temp 98.2 °F (36.8 °C) (Oral)   Resp 17   Ht 1.549 m (5' 1\")   Wt 89 kg (196 lb 3.4 oz)   SpO2 92%   BMI 37.07 kg/m²     Wt Readings from Last 3 Encounters:   01/05/25 89 kg (196 lb 3.4 oz)   12/26/24 73 kg (160 lb 15 oz)   11/26/24 85 kg (187 lb 6.3 oz)       Immunization History   Administered Date(s) Administered    COVID-19, MODERNA Bivalent, (age 12y+), IM, 50 mcg/0.5 mL 10/05/2023    COVID-19, MODERNA, (age 12y+), IM, 50mcg/0.5mL 09/03/2024    COVID-19, PFIZER Bivalent, DO NOT Dilute, (age 12y+), IM, 30 mcg/0.3 mL 10/28/2022    COVID-19, PFIZER PURPLE top, DILUTE for use, (age 12 y+), 30mcg/0.3mL 03/11/2021, 04/02/2021, 10/25/2021    Influenza, FLUZONE High Dose (age 65 y+), IM, Quadv, 0.7mL 09/05/2020, 09/01/2023    Zoster Recombinant (Shingrix) 10/05/2023, 01/22/2024       PHYSICAL EXAM    BP (!) 136/53   Pulse 68   Temp 98.2 °F (36.8 °C) (Oral)   Resp 17   Ht 1.549 m (5' 1\")   Wt 89 kg (196 lb 3.4 oz)   SpO2 92%   BMI 37.07 kg/m²   General:  Awake, alert, not in distress.  HEENT: pink conjunctiva, unicteric sclera, moist oral mucosa.  Chest:  bilateral air entry, Clear to auscultation,   Cardiovascular:  RRR ,S1S2, no murmur or gallop.  Abdomen:  Soft, non tender to palpation.  Extremities: no edema  Skin:  Warm and dry.  CNS: aox3         LABS       CBC:   Lab Results   Component Value Date/Time    WBC 8.9 01/05/2025 06:32 AM    HGB 8.3 01/05/2025 06:32 AM    HCT 26.8 01/05/2025 06:32 AM    MCV 88.7 01/05/2025 06:32 AM     01/05/2025 06:32 AM     BMP:   Lab Results   Component Value Date/Time     
104 01/07/2025 06:29 AM    CO2 23 01/07/2025 06:29 AM    PHOS 3.4 12/31/2024 09:46 PM    BUN 9 01/07/2025 06:29 AM    CREATININE 1.2 01/07/2025 06:29 AM     PT/INR:   Lab Results   Component Value Date/Time    INR 3.88 01/07/2025 06:29 AM     Prealbumin: No results found for: \"PREALBUMIN\"  Albumin:No results found for: \"LABALBU\"  Sed Rate:No results found for: \"SEDRATE\"  CRP:   Lab Results   Component Value Date/Time    CRP 21.49 12/31/2024 09:46 PM     Micro:   Lab Results   Component Value Date/Time    BC  12/31/2024 04:45 PM     No growth 24 hours. No growth 48 hours. No growth at 5 days      Hemoglobin A1C: No results found for: \"LABA1C\"        Electronically signed by Sp Edward MD TD@ at 7:26 AM    
See long-term goal time frame for expected duration of plan of care.  If no long-term goals established, a short length of stay is anticipated.    Goals:  Patient goals : return home  Short Term Goals  Time Frame for Short Term Goals: by discharge  Short Term Goal 1: Pt will complete ADL transfer w/ Supervision via LRD.  Short Term Goal 2: Pt will complete LE dressing w/ Min assist and LHAD PRN.  Short Term Goal 3: Pt will complete dynamic standing task >12 min for improved sinkside grooming tolerance.  Short Term Goal 4: Pt will complete functional mobility at household distances with 0-2 cues for safety awareness and technique.  Long Term Goals  Time Frame for Long Term Goals : No LTGs set 2/2 short OS    AM-PAC Inpatient Daily Activity Raw Score: 20  AM-PAC Inpatient ADL T-Scale Score : 42.03    Following session, patient left in safe position with all fall risk precautions in place.

## 2025-01-07 NOTE — DISCHARGE INSTR - MEDS
Clinical Pharmacy Note                                               Warfarin Discharge Recommendations    Patient discharged from [facility] today on warfarin.    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  INR goal during admission: 2.0-3.0  Previous home warfarin regimen: 7.5mg Tuesday 5mg [id number]   Interacting medications at discharge: fluconazole  Coumadin 5 mg tabs    Hospital Warfarin Doses & INR Results  Date INR Warfarin Dose   12/30/2024 2.7 @ facility 5 mg @ facility   12/31/2024 3.71 No warfarin    1/1/2025 4.14   No warfarin    1/2/2025  3.58 1 mg     1/3/2025 3.13   1.5 mg    1/4/2025 2.89   1.5 mg    1/5/2025 3.60   none    1/6/2025 4.17  none   1/7/2025 3.88 none     Recent INRs:  Recent Labs     01/07/25  0629   INR 3.88*     Warfarin Discharge Instructions:   Date Warfarin Dose   1/8/2025 Check INR (supratherapeutic most of this admission due to fluconazole)     Provider dosing warfarin: ECF provider at [facility]  Next INR date: 1/8/2025

## 2025-01-07 NOTE — CARE COORDINATION
1/7/25, 9:16 AM EST    Patient goals/plan/ treatment preferences discussed by  and .  Patient goals/plan/ treatment preferences reviewed with patient/ family.  Patient/ family verbalize understanding of discharge plan and are in agreement with goal/plan/treatment preferences.  Understanding was demonstrated using the teach back method.  AVS provided by RN at time of discharge, which includes all necessary medical information pertaining to the patients current course of illness, treatment, post-discharge goals of care, and treatment preferences.     Services At/After Discharge: Skilled Nursing Facility (SNF)    Patient discharging today to [facility] under her skilled medicare. [employer] arranged private pay for 10:00am today. RN updated and will fax AVS and call report. Blue envelope placed on chart.

## 2025-01-10 ENCOUNTER — OFFICE VISIT (OUTPATIENT)
Dept: SURGERY | Age: 75
End: 2025-01-10

## 2025-01-10 ENCOUNTER — HOSPITAL ENCOUNTER (OUTPATIENT)
Dept: WOUND CARE | Age: 75
Discharge: HOME | End: 2025-01-10
Attending: NURSE PRACTITIONER
Payer: MEDICARE

## 2025-01-10 VITALS
TEMPERATURE: 98 F | SYSTOLIC BLOOD PRESSURE: 132 MMHG | DIASTOLIC BLOOD PRESSURE: 76 MMHG | OXYGEN SATURATION: 98 % | WEIGHT: 180 LBS | HEART RATE: 98 BPM | HEIGHT: 61 IN | BODY MASS INDEX: 33.99 KG/M2 | RESPIRATION RATE: 18 BRPM

## 2025-01-10 VITALS — RESPIRATION RATE: 18 BRPM | TEMPERATURE: 97.6 F

## 2025-01-10 DIAGNOSIS — L30.8 PERISTOMAL DERMATITIS ASSOCIATED WITH MOISTURE: ICD-10-CM

## 2025-01-10 DIAGNOSIS — Z71.89 ENCOUNTER FOR OSTOMY CARE EDUCATION: ICD-10-CM

## 2025-01-10 DIAGNOSIS — K56.699 STRICTURE OF SIGMOID COLON (HCC): Primary | ICD-10-CM

## 2025-01-10 DIAGNOSIS — Z93.2 ILEOSTOMY IN PLACE (HCC): Primary | ICD-10-CM

## 2025-01-10 PROCEDURE — 99214 OFFICE O/P EST MOD 30 MIN: CPT

## 2025-01-10 PROCEDURE — 99024 POSTOP FOLLOW-UP VISIT: CPT | Performed by: SURGERY

## 2025-01-10 NOTE — PATIENT INSTRUCTIONS
Visit Discharge/ physician orders for ostomy:  -Roque Jose- Please see new ostomy orders below:  -Please use crusting technique on irritated peristomal skin by using stoma powder and skin prep.  -Stop use of glue around stoma, use the ring only.   -Make sure to stay well hydrated, try small frequent meals, increase protein levels.        -Recommend [place] Pouchkins-[id number] for wound if using pouching system (wound orders will be per )                  Supplies   Size   Order #     Rosi One-Piece soft convex Cut to fit to 40mm-Oval [id number]   Brava Protective Seal-Ring Thick [id number]   Brava Skin Barrier Wipes 30 wipes/box [id number]   Rosi Pouchkins (for wound) Cut to fit to size of wound 3797     Brava Stoma Powder      [id number]       Change your pouch 1-2   times / week and when leaking    Application of one Piece Colostomy/Ileostomy Pouch:  Assemble above supplies in order of application before removing pouch.  If not pre-cut: Cut a hole in the flange to fit the size of your stoma. Remove paper backing.  Remove your worn appliance by gently pulling away from skin and discard.  Wash skin with warm water, rinse and pat dry.  DO NOT USE SOAP!  Inspect your skin for redness or irritation.  If present, apply a small amount of powder to skin around stoma.  Brush off excess powder with a Kleenex. Do not use ointments.  - Brava Stoma Powder  - for wet, weepy skin  - Antifungal powder - for red,itchy rash   Center the flange around your stoma.  Smooth the adhesive collar to your abdomen.  Close the end of your pouch.  Empty when 1/3 full.    Follow up: 3 weeks- Friday January 31st @ 10 am      [facility]ound [facility] ostomy [facility]  [address] [address]  Phone: [phone]

## 2025-01-10 NOTE — PROGRESS NOTES
Clinical Level of Care Assessment    Outpatient Ostomy Care      NAME:  Marry Ayers  YOB: 1950  MEDICAL RECORD NUMBER:  [id number]   DATE:  1/10/2025      Patient /Ostomy Assessment- Document in Flowsheet I&O   Points   Review of chart []   0   Assess the Complete Ostomy tab in Navigator for assessment of; stoma status, peristomal skin, presence of hernia/stool consistency/diet/related medications.   Simple adjustments to pouch size/pouch system, new stoma pattern, accessory addition/deletion.   []   1   Assess the Complete Ostomy tab in Navigator for assessment of; stoma status, peristomal skin, presence of hernia/stool consistency/diet/related medications.   Moderate adjustments to pouch size/pouch system, new stoma pattern, and accessory addition/deletion.  Observe patient/caregiver with hands-on care.   1-2 adjustments to pouch size/system/skincare/accessory addition or deletion.    []   2   Assess the Complete Ostomy tab in Navigator for assessment of; stoma status, peristomal skin, presence of hernia/stool consistency/diet/related medications.   Complex adjustments to pouch size/pouch system, new stoma pattern, accessory addition/deletion.  3 or more complex adjustments to pouch size/system/skincare/accessory addition or deletion.  Observe patient/caregiver with hands-on care.   Assess patient/patient's abdomen for optimal pre-marked stoma site.  Assess the patient's abdomen for a type of hernia belt/accessory needed. [x]   3         Ambulation Status Documented in CN Clinical Note  Status Definition Points   Independent Independently able to ambulate.  Fully able (without any assistance) to get on/off the exam table/chair.    []   0   Minimal Physical Assistance Requires physical assistance of one person to ambulate and position the patient to be examined. Includes necessary physical assistance to position lower extremities on/off the stool.   []   1   Moderate Physical Assistance

## 2025-01-10 NOTE — PROGRESS NOTES
Casimiro Callahan D.O. DELORIS  [facility]  [address]  [place] [address]  [phone]  Post Procedure Evaluation in Office    Pt Name: Marry Ayers  Date of Birth 1950   Today's Date: 1/10/2025  Medical Record Number: [id number]  Referring Provider: No ref. provider found  Primary Care Provider: Silvestre Sawyer MD  Chief Complaint   Patient presents with    Post-Op Check     S/P open sigmoid colectomy and partial small bowel resection 12/4/24.  S/P Exploratory laparotomy, repair of anastomosis, creation of loop ileostomy 12/13/24     ASSESSMENT       Diagnosis Orders   1. Stricture of sigmoid colon (HCC)      S/P resection of sigmoid stricture and partial small bowel resection 12/4/24 S/p repair of colon perforation and loop ileostomy 12/13/24      Incision is clean, dry and intact or healing as expected   PLANS   Assessment & Plan   Pathology reviewed with the patient who understands. All questions were answered.  Pt with leakage around flange of ostomy and drainage back causing skin irritation. Being evaluated by ostomy nurses immediately following this visit.   Follow up: Return in about 2 weeks (around 1/24/2025).      MARY Tuttle is seen today for post-op follow-up. She is S/P resection of sigmoid stricture and partial small bowel resection 12/4/24 S/p repair of colon perforation and loop ileostomy 12/13/24. She is tolerating a regular diet, having ostomy output. Stil has some drainage from the midline. Bigger issue is skin irritation from the ostomy flanges leaking.   Past Medical History   has a past medical history of Allergic rhinitis, Anemia, Anxiety, Cataract, Essential hypertension, GERD (gastroesophageal reflux disease), Hx of blood clots, Hypokalemia, Hypothyroidism, and Pulmonary embolism (HCC).  Past Surgical History   has a past surgical history that includes Colonoscopy (08/07/2024); Esophagogastroduodenoscopy (09/19/2023); Sigmoid

## 2025-01-10 NOTE — PLAN OF CARE
Problem: Chronic Conditions and Co-morbidities  Goal: Patient's chronic conditions and co-morbidity symptoms are monitored and maintained or improved  Outcome: Progressing     Patient seen at the wound clinic today for evaluation of ostomy, please see AVS. Care plan reviewed with patient and daughter, Marta.  Patient and Marta verbalize understanding of the plan of care and contribute to goal setting.

## 2025-01-10 NOTE — PROGRESS NOTES
[facility]  Ostomy Progress Note      NAME:  Marry Ayers  MEDICAL RECORD NUMBER:  [id number]  AGE: 74 y.o.   GENDER:  female  :  1950  TODAY'S DATE:  1/10/2025  Referring Provider: Casimiro Callahan DO   Reason for Referral: ostomy care    Subjective       Chief Complaint   Patient presents with    Wound Check     Ostomy          HISTORY of PRESENT ILLNESS     Marry Ayers is a 74 y.o. female New patient  who presents today for ostomy/stoma evaluation.     History of Ostomy Context: Patient presents today for ostomy care education and evaluation s/p open sigmoid colectomy, partial small bowel resection 24 and exploratory laporotomy with creation of ileostomy 24 per Dr. Callahan secondary to sigmoid stricture.   Current ostomy care includes Coloplast, 2 piece convex pouch with stoma paste, stoma ring, Nuhope adhesive and barrier extenders.  Patient reports very poor wear time, states that pouch required changing 5 times in one night.  She reports liquid output from stoma.  Also has midline abdominal incision that is being pouched as per Dr. Callahan's orders.  She reports generalized abdominal pain at level of skin secondary to irritation from leakage.  She has had poor appetite and oral intake at home.  She has not been completing ostomy care at [facility].  She states that plan is for ostomy reversal in the future and returning home.  She denies any further needs or concerns.     PAST MEDICAL HISTORY        Diagnosis Date    Allergic rhinitis     Anemia     Anxiety     Cataract     Essential hypertension     GERD (gastroesophageal reflux disease)     Hx of blood clots 2014    DVT    Hypokalemia     Hypothyroidism     Pulmonary embolism (HCC) 2012       PAST SURGICAL HISTORY    Past Surgical History:   Procedure Laterality Date    ABDOMEN SURGERY      CARDIAC CATHETERIZATION      CARPAL TUNNEL RELEASE Bilateral     CATARACT REMOVAL      CERVICAL SPINE SURGERY

## 2025-01-16 ENCOUNTER — APPOINTMENT (OUTPATIENT)
Dept: GENERAL RADIOLOGY | Age: 75
DRG: 872 | End: 2025-01-16
Payer: MEDICARE

## 2025-01-16 ENCOUNTER — APPOINTMENT (OUTPATIENT)
Dept: INTERVENTIONAL RADIOLOGY/VASCULAR | Age: 75
DRG: 872 | End: 2025-01-16
Payer: MEDICARE

## 2025-01-16 ENCOUNTER — APPOINTMENT (OUTPATIENT)
Dept: CT IMAGING | Age: 75
DRG: 872 | End: 2025-01-16
Payer: MEDICARE

## 2025-01-16 ENCOUNTER — HOSPITAL ENCOUNTER (INPATIENT)
Age: 75
LOS: 5 days | Discharge: NURSING, REHAB OR LONG-TERM CARE | DRG: 872 | End: 2025-01-23
Attending: EMERGENCY MEDICINE | Admitting: NURSE PRACTITIONER
Payer: MEDICARE

## 2025-01-16 DIAGNOSIS — N17.9 AKI (ACUTE KIDNEY INJURY) (HCC): Primary | ICD-10-CM

## 2025-01-16 DIAGNOSIS — Z86.718 PERSONAL HISTORY OF VENOUS THROMBOSIS AND EMBOLISM: ICD-10-CM

## 2025-01-16 DIAGNOSIS — E87.5 HYPERKALEMIA: ICD-10-CM

## 2025-01-16 DIAGNOSIS — I82.431 ACUTE DEEP VEIN THROMBOSIS (DVT) OF POPLITEAL VEIN OF RIGHT LOWER EXTREMITY (HCC): ICD-10-CM

## 2025-01-16 DIAGNOSIS — E83.39 HYPERPHOSPHATEMIA: ICD-10-CM

## 2025-01-16 DIAGNOSIS — R78.81 BACTEREMIA: ICD-10-CM

## 2025-01-16 DIAGNOSIS — Z86.711 HISTORY OF PULMONARY EMBOLUS (PE): ICD-10-CM

## 2025-01-16 DIAGNOSIS — R06.02 SHORTNESS OF BREATH: ICD-10-CM

## 2025-01-16 LAB
ALBUMIN SERPL BCG-MCNC: 2.8 G/DL (ref 3.5–5.1)
ALP SERPL-CCNC: 148 U/L (ref 38–126)
ALT SERPL W/O P-5'-P-CCNC: 6 U/L (ref 11–66)
ANION GAP SERPL CALC-SCNC: 14 MEQ/L (ref 8–16)
APTT PPP: 46.2 SECONDS (ref 22–38)
AST SERPL-CCNC: 6 U/L (ref 5–40)
BACTERIA URNS QL MICRO: ABNORMAL /HPF
BASOPHILS ABSOLUTE: 0.1 THOU/MM3 (ref 0–0.1)
BASOPHILS NFR BLD AUTO: 0.5 %
BILIRUB SERPL-MCNC: 0.2 MG/DL (ref 0.3–1.2)
BILIRUB UR QL STRIP.AUTO: NEGATIVE
BUN SERPL-MCNC: 58 MG/DL (ref 7–22)
CALCIUM SERPL-MCNC: 8.9 MG/DL (ref 8.5–10.5)
CASTS #/AREA URNS LPF: ABNORMAL /LPF
CHARACTER UR: CLEAR
CHLORIDE SERPL-SCNC: 105 MEQ/L (ref 98–111)
CO2 SERPL-SCNC: 13 MEQ/L (ref 23–33)
COLOR, UA: YELLOW
CREAT SERPL-MCNC: 2.8 MG/DL (ref 0.4–1.2)
CRYSTALS URNS MICRO: ABNORMAL
D DIMER PPP IA.FEU-MCNC: 903 NG/ML FEU (ref 0–500)
DEPRECATED RDW RBC AUTO: 54.2 FL (ref 35–45)
EKG ATRIAL RATE: 101 BPM
EKG P AXIS: 73 DEGREES
EKG P-R INTERVAL: 156 MS
EKG Q-T INTERVAL: 320 MS
EKG QRS DURATION: 60 MS
EKG QTC CALCULATION (BAZETT): 414 MS
EKG R AXIS: 59 DEGREES
EKG T AXIS: 62 DEGREES
EKG VENTRICULAR RATE: 101 BPM
EOSINOPHIL NFR BLD AUTO: 0.9 %
EOSINOPHILS ABSOLUTE: 0.2 THOU/MM3 (ref 0–0.4)
EPITHELIAL CELLS, UA: ABNORMAL /HPF
ERYTHROCYTE [DISTWIDTH] IN BLOOD BY AUTOMATED COUNT: 17.2 % (ref 11.5–14.5)
GFR SERPL CREATININE-BSD FRML MDRD: 17 ML/MIN/1.73M2
GLUCOSE BLD STRIP.AUTO-MCNC: 104 MG/DL (ref 70–108)
GLUCOSE BLD STRIP.AUTO-MCNC: 117 MG/DL (ref 70–108)
GLUCOSE BLD STRIP.AUTO-MCNC: 124 MG/DL (ref 70–108)
GLUCOSE BLD STRIP.AUTO-MCNC: 82 MG/DL (ref 70–108)
GLUCOSE SERPL-MCNC: 106 MG/DL (ref 70–108)
GLUCOSE UR QL STRIP.AUTO: NEGATIVE MG/DL
HCT VFR BLD AUTO: 27.1 % (ref 37–47)
HGB BLD-MCNC: 8.6 GM/DL (ref 12–16)
HGB UR QL STRIP.AUTO: NEGATIVE
IMM GRANULOCYTES # BLD AUTO: 0.12 THOU/MM3 (ref 0–0.07)
IMM GRANULOCYTES NFR BLD AUTO: 0.7 %
INR PPP: 3.5 (ref 0.85–1.13)
KETONES UR QL STRIP.AUTO: NEGATIVE
LACTIC ACID, SEPSIS: 1.1 MMOL/L (ref 0.5–1.9)
LIPASE SERPL-CCNC: 61.1 U/L (ref 5.6–51.3)
LYMPHOCYTES ABSOLUTE: 2.2 THOU/MM3 (ref 1–4.8)
LYMPHOCYTES NFR BLD AUTO: 12.7 %
MAGNESIUM SERPL-MCNC: 1.5 MG/DL (ref 1.6–2.4)
MCH RBC QN AUTO: 27.5 PG (ref 26–33)
MCHC RBC AUTO-ENTMCNC: 31.7 GM/DL (ref 32.2–35.5)
MCV RBC AUTO: 86.6 FL (ref 81–99)
MISCELLANEOUS 2: ABNORMAL
MONOCYTES ABSOLUTE: 1.1 THOU/MM3 (ref 0.4–1.3)
MONOCYTES NFR BLD AUTO: 6.5 %
NEUTROPHILS ABSOLUTE: 13.4 THOU/MM3 (ref 1.8–7.7)
NEUTROPHILS NFR BLD AUTO: 78.7 %
NITRITE UR QL STRIP: NEGATIVE
NRBC BLD AUTO-RTO: 1 /100 WBC
OSMOLALITY SERPL CALC.SUM OF ELEC: 281.1 MOSMOL/KG (ref 275–300)
PH UR STRIP.AUTO: 5 [PH] (ref 5–9)
PHOSPHATE SERPL-MCNC: 6.1 MG/DL (ref 2.4–4.7)
PLATELET # BLD AUTO: 783 THOU/MM3 (ref 130–400)
PMV BLD AUTO: 9.5 FL (ref 9.4–12.4)
POTASSIUM SERPL-SCNC: 5.8 MEQ/L (ref 3.5–5.2)
PROCALCITONIN SERPL IA-MCNC: 54.76 NG/ML (ref 0.01–0.09)
PROT SERPL-MCNC: 7.5 G/DL (ref 6.1–8)
PROT UR STRIP.AUTO-MCNC: 30 MG/DL
PTH-INTACT SERPL-MCNC: 50.9 PG/ML (ref 15–65)
RBC # BLD AUTO: 3.13 MILL/MM3 (ref 4.2–5.4)
RBC URINE: ABNORMAL /HPF
RENAL EPI CELLS #/AREA URNS HPF: ABNORMAL /[HPF]
SODIUM SERPL-SCNC: 132 MEQ/L (ref 135–145)
SP GR UR REFRACT.AUTO: 1.03 (ref 1–1.03)
TROPONIN, HIGH SENSITIVITY: 14 NG/L (ref 0–12)
UROBILINOGEN, URINE: 0.2 EU/DL (ref 0–1)
WBC # BLD AUTO: 17 THOU/MM3 (ref 4.8–10.8)
WBC #/AREA URNS HPF: ABNORMAL /HPF
WBC #/AREA URNS HPF: NEGATIVE /[HPF]
YEAST LIKE FUNGI URNS QL MICRO: ABNORMAL

## 2025-01-16 PROCEDURE — G0378 HOSPITAL OBSERVATION PER HR: HCPCS

## 2025-01-16 PROCEDURE — 6370000000 HC RX 637 (ALT 250 FOR IP)

## 2025-01-16 PROCEDURE — 83935 ASSAY OF URINE OSMOLALITY: CPT

## 2025-01-16 PROCEDURE — 85025 COMPLETE CBC W/AUTO DIFF WBC: CPT

## 2025-01-16 PROCEDURE — 74176 CT ABD & PELVIS W/O CONTRAST: CPT

## 2025-01-16 PROCEDURE — 2500000003 HC RX 250 WO HCPCS

## 2025-01-16 PROCEDURE — 96361 HYDRATE IV INFUSION ADD-ON: CPT

## 2025-01-16 PROCEDURE — 83605 ASSAY OF LACTIC ACID: CPT

## 2025-01-16 PROCEDURE — 96360 HYDRATION IV INFUSION INIT: CPT

## 2025-01-16 PROCEDURE — 36415 COLL VENOUS BLD VENIPUNCTURE: CPT

## 2025-01-16 PROCEDURE — 83690 ASSAY OF LIPASE: CPT

## 2025-01-16 PROCEDURE — 80053 COMPREHEN METABOLIC PANEL: CPT

## 2025-01-16 PROCEDURE — 84484 ASSAY OF TROPONIN QUANT: CPT

## 2025-01-16 PROCEDURE — 93010 ELECTROCARDIOGRAM REPORT: CPT | Performed by: INTERNAL MEDICINE

## 2025-01-16 PROCEDURE — 84145 PROCALCITONIN (PCT): CPT

## 2025-01-16 PROCEDURE — 99285 EMERGENCY DEPT VISIT HI MDM: CPT

## 2025-01-16 PROCEDURE — 2580000003 HC RX 258

## 2025-01-16 PROCEDURE — 87086 URINE CULTURE/COLONY COUNT: CPT

## 2025-01-16 PROCEDURE — 82948 REAGENT STRIP/BLOOD GLUCOSE: CPT

## 2025-01-16 PROCEDURE — 81001 URINALYSIS AUTO W/SCOPE: CPT

## 2025-01-16 PROCEDURE — 96375 TX/PRO/DX INJ NEW DRUG ADDON: CPT

## 2025-01-16 PROCEDURE — 83735 ASSAY OF MAGNESIUM: CPT

## 2025-01-16 PROCEDURE — 6360000002 HC RX W HCPCS

## 2025-01-16 PROCEDURE — 93970 EXTREMITY STUDY: CPT

## 2025-01-16 PROCEDURE — 87040 BLOOD CULTURE FOR BACTERIA: CPT

## 2025-01-16 PROCEDURE — 96365 THER/PROPH/DIAG IV INF INIT: CPT

## 2025-01-16 PROCEDURE — 99223 1ST HOSP IP/OBS HIGH 75: CPT

## 2025-01-16 PROCEDURE — 71045 X-RAY EXAM CHEST 1 VIEW: CPT

## 2025-01-16 PROCEDURE — 85379 FIBRIN DEGRADATION QUANT: CPT

## 2025-01-16 PROCEDURE — 84100 ASSAY OF PHOSPHORUS: CPT

## 2025-01-16 PROCEDURE — 85610 PROTHROMBIN TIME: CPT

## 2025-01-16 PROCEDURE — 83970 ASSAY OF PARATHORMONE: CPT

## 2025-01-16 PROCEDURE — 85730 THROMBOPLASTIN TIME PARTIAL: CPT

## 2025-01-16 PROCEDURE — 93005 ELECTROCARDIOGRAM TRACING: CPT

## 2025-01-16 RX ORDER — CALCIUM GLUCONATE 20 MG/ML
1000 INJECTION, SOLUTION INTRAVENOUS ONCE
Status: COMPLETED | OUTPATIENT
Start: 2025-01-16 | End: 2025-01-16

## 2025-01-16 RX ORDER — GLUCAGON 1 MG/ML
1 KIT INJECTION PRN
Status: DISCONTINUED | OUTPATIENT
Start: 2025-01-16 | End: 2025-01-23 | Stop reason: HOSPADM

## 2025-01-16 RX ORDER — POLYETHYLENE GLYCOL 3350 17 G/17G
17 POWDER, FOR SOLUTION ORAL DAILY PRN
Status: DISCONTINUED | OUTPATIENT
Start: 2025-01-16 | End: 2025-01-23 | Stop reason: HOSPADM

## 2025-01-16 RX ORDER — ONDANSETRON 2 MG/ML
4 INJECTION INTRAMUSCULAR; INTRAVENOUS EVERY 6 HOURS PRN
Status: DISCONTINUED | OUTPATIENT
Start: 2025-01-16 | End: 2025-01-23 | Stop reason: HOSPADM

## 2025-01-16 RX ORDER — METOPROLOL SUCCINATE 50 MG/1
50 TABLET, EXTENDED RELEASE ORAL DAILY
Status: DISCONTINUED | OUTPATIENT
Start: 2025-01-16 | End: 2025-01-20

## 2025-01-16 RX ORDER — SODIUM CHLORIDE 0.9 % (FLUSH) 0.9 %
5-40 SYRINGE (ML) INJECTION PRN
Status: DISCONTINUED | OUTPATIENT
Start: 2025-01-16 | End: 2025-01-23 | Stop reason: HOSPADM

## 2025-01-16 RX ORDER — SODIUM CHLORIDE 0.9 % (FLUSH) 0.9 %
5-40 SYRINGE (ML) INJECTION EVERY 12 HOURS SCHEDULED
Status: DISCONTINUED | OUTPATIENT
Start: 2025-01-16 | End: 2025-01-23 | Stop reason: HOSPADM

## 2025-01-16 RX ORDER — PANTOPRAZOLE SODIUM 40 MG/1
40 TABLET, DELAYED RELEASE ORAL NIGHTLY
Status: DISCONTINUED | OUTPATIENT
Start: 2025-01-16 | End: 2025-01-23 | Stop reason: HOSPADM

## 2025-01-16 RX ORDER — ACETAMINOPHEN 650 MG/1
650 SUPPOSITORY RECTAL EVERY 6 HOURS PRN
Status: DISCONTINUED | OUTPATIENT
Start: 2025-01-16 | End: 2025-01-23 | Stop reason: HOSPADM

## 2025-01-16 RX ORDER — LEVOTHYROXINE SODIUM 125 UG/1
125 TABLET ORAL DAILY
Status: DISCONTINUED | OUTPATIENT
Start: 2025-01-17 | End: 2025-01-23 | Stop reason: HOSPADM

## 2025-01-16 RX ORDER — LOSARTAN POTASSIUM AND HYDROCHLOROTHIAZIDE 25; 100 MG/1; MG/1
1 TABLET ORAL DAILY
Status: DISCONTINUED | OUTPATIENT
Start: 2025-01-16 | End: 2025-01-23 | Stop reason: HOSPADM

## 2025-01-16 RX ORDER — POTASSIUM CHLORIDE 750 MG/1
20 TABLET, EXTENDED RELEASE ORAL DAILY
Status: DISCONTINUED | OUTPATIENT
Start: 2025-01-16 | End: 2025-01-23 | Stop reason: HOSPADM

## 2025-01-16 RX ORDER — WARFARIN SODIUM 2.5 MG/1
2.5 TABLET ORAL EVERY EVENING
Status: DISCONTINUED | OUTPATIENT
Start: 2025-01-16 | End: 2025-01-16 | Stop reason: SDUPTHER

## 2025-01-16 RX ORDER — ONDANSETRON 4 MG/1
4 TABLET, ORALLY DISINTEGRATING ORAL EVERY 8 HOURS PRN
Status: DISCONTINUED | OUTPATIENT
Start: 2025-01-16 | End: 2025-01-23 | Stop reason: HOSPADM

## 2025-01-16 RX ORDER — SODIUM CHLORIDE 9 MG/ML
INJECTION, SOLUTION INTRAVENOUS PRN
Status: DISCONTINUED | OUTPATIENT
Start: 2025-01-16 | End: 2025-01-23 | Stop reason: HOSPADM

## 2025-01-16 RX ORDER — ACETAMINOPHEN 325 MG/1
650 TABLET ORAL EVERY 6 HOURS PRN
Status: DISCONTINUED | OUTPATIENT
Start: 2025-01-16 | End: 2025-01-23 | Stop reason: HOSPADM

## 2025-01-16 RX ORDER — SODIUM CHLORIDE 9 MG/ML
INJECTION, SOLUTION INTRAVENOUS CONTINUOUS
Status: ACTIVE | OUTPATIENT
Start: 2025-01-16 | End: 2025-01-17

## 2025-01-16 RX ORDER — BUSPIRONE HYDROCHLORIDE 10 MG/1
10 TABLET ORAL 2 TIMES DAILY
Status: DISCONTINUED | OUTPATIENT
Start: 2025-01-16 | End: 2025-01-23 | Stop reason: HOSPADM

## 2025-01-16 RX ORDER — DEXTROSE MONOHYDRATE 100 MG/ML
INJECTION, SOLUTION INTRAVENOUS CONTINUOUS PRN
Status: DISCONTINUED | OUTPATIENT
Start: 2025-01-16 | End: 2025-01-23 | Stop reason: HOSPADM

## 2025-01-16 RX ORDER — SODIUM CHLORIDE, SODIUM LACTATE, POTASSIUM CHLORIDE, AND CALCIUM CHLORIDE .6; .31; .03; .02 G/100ML; G/100ML; G/100ML; G/100ML
1000 INJECTION, SOLUTION INTRAVENOUS ONCE
Status: COMPLETED | OUTPATIENT
Start: 2025-01-16 | End: 2025-01-16

## 2025-01-16 RX ORDER — MONTELUKAST SODIUM 10 MG/1
10 TABLET ORAL DAILY
Status: DISCONTINUED | OUTPATIENT
Start: 2025-01-17 | End: 2025-01-23 | Stop reason: HOSPADM

## 2025-01-16 RX ADMIN — DEXTROSE MONOHYDRATE 250 ML: 100 INJECTION, SOLUTION INTRAVENOUS at 18:55

## 2025-01-16 RX ADMIN — ACETAMINOPHEN 650 MG: 325 TABLET ORAL at 19:04

## 2025-01-16 RX ADMIN — PANTOPRAZOLE SODIUM 40 MG: 40 TABLET, DELAYED RELEASE ORAL at 20:48

## 2025-01-16 RX ADMIN — BUSPIRONE HYDROCHLORIDE 10 MG: 10 TABLET ORAL at 20:48

## 2025-01-16 RX ADMIN — SODIUM CHLORIDE, POTASSIUM CHLORIDE, SODIUM LACTATE AND CALCIUM CHLORIDE 1000 ML: 600; 310; 30; 20 INJECTION, SOLUTION INTRAVENOUS at 10:58

## 2025-01-16 RX ADMIN — CALCIUM GLUCONATE 1000 MG: 20 INJECTION, SOLUTION INTRAVENOUS at 17:48

## 2025-01-16 RX ADMIN — METOPROLOL SUCCINATE 50 MG: 50 TABLET, EXTENDED RELEASE ORAL at 17:48

## 2025-01-16 RX ADMIN — SODIUM CHLORIDE, PRESERVATIVE FREE 10 ML: 5 INJECTION INTRAVENOUS at 20:49

## 2025-01-16 RX ADMIN — SODIUM CHLORIDE: 9 INJECTION, SOLUTION INTRAVENOUS at 16:54

## 2025-01-16 RX ADMIN — INSULIN HUMAN 10 UNITS: 100 INJECTION, SOLUTION PARENTERAL at 18:51

## 2025-01-16 ASSESSMENT — PAIN DESCRIPTION - ORIENTATION
ORIENTATION: LEFT
ORIENTATION: LEFT

## 2025-01-16 ASSESSMENT — PAIN DESCRIPTION - DESCRIPTORS
DESCRIPTORS: SHARP;SHOOTING
DESCRIPTORS: SHARP;SHOOTING

## 2025-01-16 ASSESSMENT — PAIN DESCRIPTION - LOCATION
LOCATION: FLANK
LOCATION: FLANK

## 2025-01-16 ASSESSMENT — PAIN DESCRIPTION - PAIN TYPE: TYPE: ACUTE PAIN

## 2025-01-16 ASSESSMENT — PAIN SCALES - GENERAL
PAINLEVEL_OUTOF10: 4
PAINLEVEL_OUTOF10: 0

## 2025-01-16 ASSESSMENT — PAIN DESCRIPTION - ONSET: ONSET: ON-GOING

## 2025-01-16 ASSESSMENT — PAIN - FUNCTIONAL ASSESSMENT: PAIN_FUNCTIONAL_ASSESSMENT: ACTIVITIES ARE NOT PREVENTED

## 2025-01-16 ASSESSMENT — PAIN DESCRIPTION - FREQUENCY: FREQUENCY: INTERMITTENT

## 2025-01-16 NOTE — ED NOTES
ED to inpatient nurses report      Chief Complaint:  Chief Complaint   Patient presents with    Wound Check     Present to ED from: Nursing home    MOA:     LOC: alert and orientated to name, place, date  Mobility: Fully dependent  Oxygen Baseline: 0    Current needs required: 0     Code Status:   Full Code    What abnormal results were found and what did you give/do to treat them? LINDA, hyperkalemia, hyperphosphatemia, bacteremia  Any procedures or intervention occur? No    Mental Status:  Level of Consciousness: Alert (0)    Psych Assessment:        Vitals:  Patient Vitals for the past 24 hrs:   BP Temp Temp src Pulse Resp SpO2   01/16/25 1420 (!) 140/74 -- -- (!) 109 15 97 %   01/16/25 1340 (!) 162/52 -- -- (!) 112 20 98 %   01/16/25 1310 (!) 142/44 -- -- 99 18 96 %   01/16/25 1222 (!) 144/65 97.5 °F (36.4 °C) Oral (!) 110 19 99 %   01/16/25 1057 (!) 144/50 -- -- 100 18 94 %   01/16/25 1022 -- -- -- -- -- 97 %   01/16/25 1015 (!) 134/52 -- -- -- -- 98 %   01/16/25 0920 (!) 144/65 97.4 °F (36.3 °C) -- (!) 107 19 96 %        LDAs:   Peripheral IV 01/16/25 Proximal;Right;Anterior Forearm (Active)   Site Assessment Clean, dry & intact 01/16/25 1421   Line Status Normal saline locked 01/16/25 1421   Phlebitis Assessment No symptoms 01/16/25 1421   Infiltration Assessment 0 01/16/25 1421   Alcohol Cap Used Yes 01/16/25 1421   Dressing Status Clean, dry & intact 01/16/25 1421   Dressing Type Transparent 01/16/25 1421       Ambulatory Status:  No data recorded    Diagnosis:  DISPOSITION Admitted 01/16/2025 01:46:21 PM   Final diagnoses:   LINDA (acute kidney injury) (HCC)   Hyperkalemia   Hyperphosphatemia   Bacteremia        Consults:  None     Pain Score:  Pain Assessment  Pain Assessment: None - Denies Pain    C-SSRS:   Risk of Suicide: No Risk    Sepsis Screening:       Appling Fall Risk:       Swallow Screening        Preferred Language:   English      ALLERGIES     Seasonal    SURGICAL HISTORY       Past Surgical

## 2025-01-16 NOTE — PROGRESS NOTES
Warfarin Pharmacy Consult Note    Marry Ayers is a 74 y.o. female for whom pharmacy has been asked to manage warfarin therapy.     Consulting Provider: Otoniel Ag PA-C  Indication: Hx of DVT & PE  Target INR: 2.0-3.0   Warfarin dose PTA: variable since 1/7 discharge, on hold since 1/12/25 due to high INR   Outpatient warfarin provider: Roque Jose provider    Recent Labs     01/16/25  0948   HGB 8.6*   *     Recent Labs     01/16/25  1044   INR 3.50*     Concurrent anticoagulants/antiplatelets: none  Significant warfarin drug-drug interactions: levothyroxine, completed 7 day course of fluconazole 200 mg on 1/14    Date INR Warfarin Dose   1/16/2025 3.5 none                                   INR will be monitored routinely until therapeutic INR is achieved.    Pharmacy will continue to follow. Thank you for the consult,   Pratibha Johnson PharmD 1/16/2025 4:41 PM

## 2025-01-16 NOTE — H&P
Hospitalist History & Physical     Patient: Marry Ayers 74 y.o. female : 1950  Date of Admission: 2025  CODE STATUS: Prior    Date of Service: Pt seen/examined on 25 and Admitted to Observation with expected LOS less than two midnights due to medical therapy.     ASSESSMENT AND PLAN    Active Problems:  Ileostomy with possible leak:   Patient had recent laparoscopic ileostomy creation. She has 2nd ostomy created for drainage of abdominal fluid.   Ostomy appeared irritated.  In person consult with Dr. Callahan, he stated recent OV, ostomy appeared healthy, agreed to see tomorrow.  Pt had leak and ID was consulted for infection. Placed on Unasyn and Diflucan transitioned to Augmentin and Diflucan on discharge.   Start Unasyn during admin. Hold diflucan for concerns regarding warfarin interaction.   Wound/ostomy care and General Surgery consulted.   LINDA, recurrence:  Baseline creatinine is WNL. On admission it is 2.8. GFR is 17. Had LINDA on previous admission up to Creatinine up to 5.0.  Was discharged with holding Hyzaar, restarted on admission to SNF.   Hold nephrotoxic agents. Hydrate with IVF. Monitor with daily BMP.   Hyperkalemia:   Admission was 5.8. Likely due to LINDA.  EKG did not show electrolyte abnormalities.   Insulin and Dextrose given. Calcium given .   Sepsis  Identified at 10:44  SIRS Criteria Heart Rate >90 and WBC >12k or <4k or >10% Bands  Organ Dysfunction: Creatinine >2.0 and INR >1.5 or aPTT >60sec  Cultures Blood x2 and Urine  Antibiotics Unasyn  Volume Expansion: 30cc/kg of Lactated Ringers was received/ordered  Trended lactic is normal. Repeat in morning  Supratherapeutic INR:  Possibly due to prescription of Diflucan  Vitamin K given at [facility].   Has now resoled. Currently at 3.5  Warfarin continued, pharmacy to dose.   Hypomagnesia:  Admission was 1.5.   Likely caused by Poor intake.   Magnesium replacement protocol in place.  Repeat Magnesium in morning.   Hx of DVT  other components within normal limits   GLOMERULAR FILTRATION RATE, ESTIMATED - Abnormal; Notable for the following components:    Est, Glom Filt Rate 17 (*)     All other components within normal limits   LIPASE - Abnormal; Notable for the following components:    Lipase 61.1 (*)     All other components within normal limits   D-DIMER, QUANTITATIVE - Abnormal; Notable for the following components:    D-Dimer, Quant 903.00 (*)     All other components within normal limits   TROPONIN - Abnormal; Notable for the following components:    Troponin, High Sensitivity 14 (*)     All other components within normal limits   PROTIME-INR - Abnormal; Notable for the following components:    INR 3.50 (*)     All other components within normal limits   APTT - Abnormal; Notable for the following components:    aPTT 46.2 (*)     All other components within normal limits   MAGNESIUM - Abnormal; Notable for the following components:    Magnesium 1.5 (*)     All other components within normal limits   PHOSPHORUS - Abnormal; Notable for the following components:    Phosphorus 6.1 (*)     All other components within normal limits   PROCALCITONIN - Abnormal; Notable for the following components:    Procalcitonin 54.76 (*)     All other components within normal limits   URINE WITH REFLEXED MICRO - Abnormal; Notable for the following components:    Protein, UA 30 (*)     All other components within normal limits     Electronically signed by Otoniel Ag PA-C on 1/16/2025 at 1:46 PM.

## 2025-01-16 NOTE — ED NOTES
Patient resting in bed with eyes closed. Respirations easy and unlabored. No distress noted. Call light within reach.

## 2025-01-16 NOTE — PROGRESS NOTES
Pt admitted to  6K2 from ED.   Complaints: wound check.    V site free of s/s of infection or infiltration. Vital signs obtained. Assessment and data collection initiated. Two nurse skin assessment performed by Kassie HEARN and Kim HEARN. Oriented to room. Policies and procedures for  explained. Kassie HEARN discussed hourly rounding with patient addressing 5 P's. Fall prevention and safety brochure discussed with patient.  Bed alarm on. Call light in reach.  The best day to schedule a follow up Dr appointment is:  Monday, Tuesday, and Wednesday a.m.  Explained patients right to have family, representative or physician notified of their admission.  Patient has Declined for physician to be notified.  Patient has Declined for family/representative to be notified. All questions answered with no further questions at this time.

## 2025-01-16 NOTE — ED PROVIDER NOTES
[facility] EMERGENCY DEPARTMENT  EMERGENCY DEPARTMENT ENCOUNTER          Pt Name: Marry Ayers  MRN: [id number]  Birthdate 1950  Date of evaluation: 1/16/2025  Physician: Jose Guadalupe Shah MD  Supervising Attending Physician: Nav Oconnor DO       CHIEF COMPLAINT       Chief Complaint   Patient presents with    Wound Check         HISTORY OF PRESENT ILLNESS    HPI  Marry Ayers is a 74 y.o. female with past medical history of stricture of sigmoid colon, peritonitis, ileostomy in place, large bowel anastomotic leak, moderate malnutrition, pulmonary embolism, hypertension, hypothyroidism.  Who presents to the emergency department from nursing home for evaluation of wound check for ostomy. The patient reports recently she did a surgery for the stricture of sigmoid colon, and ileostomy in place. The patient report that she is in nursing home and one of the nurse notice the patient having tachycardia, dehydrated, lack of appetite, color change in her stool in her ileostomy bags and felt that the patient was going septic.  Patient reported having lateral chest pain due to the hitting herself 2 days ago on the Cabinet, headache, nausea, and weight loss.    Patient denies fever, chills, lightheadedness, dizziness, vision changes, tinnitus, cough, congestion, sore throat, neck pain/stiffness, shortness of breath, abdominal pain, vomiting, constipation, diarrhea, dysuria, blood in the urine or stool, numbness/tingling/weakness in extremities.    The patient has no other acute complaints at this time.      PAST MEDICAL AND SURGICAL HISTORY     Past Medical History:   Diagnosis Date    Allergic rhinitis     Anemia     Anxiety     Cataract     Essential hypertension     GERD (gastroesophageal reflux disease)     Hx of blood clots 06/2014    DVT    Hypokalemia     Hypothyroidism     Pulmonary embolism (HCC) 08/2012     Past Surgical History:   Procedure Laterality Date    ABDOMEN SURGERY  range of motion and neck supple. No rigidity or tenderness.      Right lower leg: No edema.      Left lower leg: No edema.   Skin:     General: Skin is warm and dry.      Capillary Refill: Capillary refill takes less than 2 seconds.      Coloration: Skin is not jaundiced or pale.      Findings: Bruising present. No lesion or rash.   Neurological:      General: No focal deficit present.      Mental Status: She is alert and oriented to person, place, and time.      Cranial Nerves: No cranial nerve deficit.      Sensory: No sensory deficit.      Motor: No weakness.   Psychiatric:         Mood and Affect: Mood normal.         Behavior: Behavior normal.         Thought Content: Thought content normal.         ED RESULTS   Laboratory results (none if blank):  Labs Reviewed   CBC WITH AUTO DIFFERENTIAL - Abnormal; Notable for the following components:       Result Value    WBC 17.0 (*)     RBC 3.13 (*)     Hemoglobin 8.6 (*)     Hematocrit 27.1 (*)     MCHC 31.7 (*)     RDW-CV 17.2 (*)     RDW-SD 54.2 (*)     Platelets 783 (*)     Neutrophils Absolute 13.4 (*)     Immature Grans (Abs) 0.12 (*)     All other components within normal limits   COMPREHENSIVE METABOLIC PANEL - Abnormal; Notable for the following components:    Creatinine 2.8 (*)     BUN 58 (*)     Sodium 132 (*)     Potassium 5.8 (*)     CO2 13 (*)     Alkaline Phosphatase 148 (*)     Albumin 2.8 (*)     Total Bilirubin 0.2 (*)     ALT 6 (*)     All other components within normal limits   GLOMERULAR FILTRATION RATE, ESTIMATED - Abnormal; Notable for the following components:    Est, Glom Filt Rate 17 (*)     All other components within normal limits   CULTURE, BLOOD 1   CULTURE, BLOOD 1   ANION GAP   OSMOLALITY   LIPASE   LACTATE, SEPSIS   LACTATE, SEPSIS   D-DIMER, QUANTITATIVE   TROPONIN   PROTIME-INR   APTT   URINALYSIS WITH REFLEX TO CULTURE   MAGNESIUM   PHOSPHORUS     All laboratory results are individually reviewed and interpreted by me in the clinical

## 2025-01-16 NOTE — ED NOTES
Per the nursing home packet, patient is on coumadin. Patient is on oral antibiotics as well for a previous noted infection. Nursing home packet states they are concerned for worsening abdominal wound infection.

## 2025-01-17 PROBLEM — K63.2 ENTERO-ENTERIC FISTULA: Status: ACTIVE | Noted: 2025-01-17

## 2025-01-17 PROBLEM — E87.5 HYPERKALEMIA: Status: ACTIVE | Noted: 2025-01-17

## 2025-01-17 PROBLEM — E66.811 OBESITY (BMI 30.0-34.9): Status: ACTIVE | Noted: 2025-01-17

## 2025-01-17 LAB
ANION GAP SERPL CALC-SCNC: 19 MEQ/L (ref 8–16)
BACTERIA UR CULT: ABNORMAL
BUN SERPL-MCNC: 39 MG/DL (ref 7–22)
CALCIUM SERPL-MCNC: 8.4 MG/DL (ref 8.5–10.5)
CHLORIDE SERPL-SCNC: 110 MEQ/L (ref 98–111)
CK SERPL-CCNC: 11 U/L (ref 30–135)
CO2 SERPL-SCNC: 12 MEQ/L (ref 23–33)
CREAT SERPL-MCNC: 1.5 MG/DL (ref 0.4–1.2)
EKG ATRIAL RATE: 113 BPM
EKG P AXIS: 70 DEGREES
EKG P-R INTERVAL: 154 MS
EKG Q-T INTERVAL: 318 MS
EKG QRS DURATION: 60 MS
EKG QTC CALCULATION (BAZETT): 436 MS
EKG R AXIS: 50 DEGREES
EKG T AXIS: 66 DEGREES
EKG VENTRICULAR RATE: 113 BPM
GFR SERPL CREATININE-BSD FRML MDRD: 36 ML/MIN/1.73M2
GLUCOSE BLD STRIP.AUTO-MCNC: 101 MG/DL (ref 70–108)
GLUCOSE BLD STRIP.AUTO-MCNC: 120 MG/DL (ref 70–108)
GLUCOSE BLD STRIP.AUTO-MCNC: 122 MG/DL (ref 70–108)
GLUCOSE BLD STRIP.AUTO-MCNC: 126 MG/DL (ref 70–108)
GLUCOSE SERPL-MCNC: 95 MG/DL (ref 70–108)
INR PPP: 3.23 (ref 0.85–1.13)
MAGNESIUM SERPL-MCNC: 1.1 MG/DL (ref 1.6–2.4)
ORGANISM: ABNORMAL
OSMOLALITY UR: 436 MOSMOL/KG (ref 250–750)
PHOSPHATE SERPL-MCNC: 4.1 MG/DL (ref 2.4–4.7)
POTASSIUM SERPL-SCNC: 4.8 MEQ/L (ref 3.5–5.2)
POTASSIUM SERPL-SCNC: 5.3 MEQ/L (ref 3.5–5.2)
SODIUM SERPL-SCNC: 141 MEQ/L (ref 135–145)

## 2025-01-17 PROCEDURE — 6360000002 HC RX W HCPCS: Performed by: NURSE PRACTITIONER

## 2025-01-17 PROCEDURE — 6360000002 HC RX W HCPCS

## 2025-01-17 PROCEDURE — 6370000000 HC RX 637 (ALT 250 FOR IP): Performed by: PHARMACIST

## 2025-01-17 PROCEDURE — 99233 SBSQ HOSP IP/OBS HIGH 50: CPT | Performed by: NURSE PRACTITIONER

## 2025-01-17 PROCEDURE — 6370000000 HC RX 637 (ALT 250 FOR IP)

## 2025-01-17 PROCEDURE — 96361 HYDRATE IV INFUSION ADD-ON: CPT

## 2025-01-17 PROCEDURE — 83735 ASSAY OF MAGNESIUM: CPT

## 2025-01-17 PROCEDURE — 99223 1ST HOSP IP/OBS HIGH 75: CPT | Performed by: STUDENT IN AN ORGANIZED HEALTH CARE EDUCATION/TRAINING PROGRAM

## 2025-01-17 PROCEDURE — 93005 ELECTROCARDIOGRAM TRACING: CPT

## 2025-01-17 PROCEDURE — 2500000003 HC RX 250 WO HCPCS

## 2025-01-17 PROCEDURE — 36415 COLL VENOUS BLD VENIPUNCTURE: CPT

## 2025-01-17 PROCEDURE — 84132 ASSAY OF SERUM POTASSIUM: CPT

## 2025-01-17 PROCEDURE — 82550 ASSAY OF CK (CPK): CPT

## 2025-01-17 PROCEDURE — 93010 ELECTROCARDIOGRAM REPORT: CPT | Performed by: INTERNAL MEDICINE

## 2025-01-17 PROCEDURE — 85610 PROTHROMBIN TIME: CPT

## 2025-01-17 PROCEDURE — 82948 REAGENT STRIP/BLOOD GLUCOSE: CPT

## 2025-01-17 PROCEDURE — 80048 BASIC METABOLIC PNL TOTAL CA: CPT

## 2025-01-17 PROCEDURE — 96367 TX/PROPH/DG ADDL SEQ IV INF: CPT

## 2025-01-17 PROCEDURE — 2580000003 HC RX 258

## 2025-01-17 PROCEDURE — G0378 HOSPITAL OBSERVATION PER HR: HCPCS

## 2025-01-17 PROCEDURE — 84100 ASSAY OF PHOSPHORUS: CPT

## 2025-01-17 PROCEDURE — 96366 THER/PROPH/DIAG IV INF ADDON: CPT

## 2025-01-17 RX ORDER — MAGNESIUM SULFATE IN WATER 40 MG/ML
2000 INJECTION, SOLUTION INTRAVENOUS PRN
Status: DISCONTINUED | OUTPATIENT
Start: 2025-01-17 | End: 2025-01-23 | Stop reason: HOSPADM

## 2025-01-17 RX ORDER — WARFARIN SODIUM 1 MG/1
0.5 TABLET ORAL ONCE
Status: COMPLETED | OUTPATIENT
Start: 2025-01-17 | End: 2025-01-17

## 2025-01-17 RX ADMIN — SODIUM CHLORIDE, PRESERVATIVE FREE 10 ML: 5 INJECTION INTRAVENOUS at 20:08

## 2025-01-17 RX ADMIN — ACETAMINOPHEN 650 MG: 325 TABLET ORAL at 10:59

## 2025-01-17 RX ADMIN — MAGNESIUM SULFATE HEPTAHYDRATE 2000 MG: 40 INJECTION, SOLUTION INTRAVENOUS at 09:50

## 2025-01-17 RX ADMIN — AMPICILLIN SODIUM AND SULBACTAM SODIUM 3000 MG: 2; 1 INJECTION, POWDER, FOR SOLUTION INTRAMUSCULAR; INTRAVENOUS at 17:41

## 2025-01-17 RX ADMIN — METOPROLOL SUCCINATE 50 MG: 50 TABLET, EXTENDED RELEASE ORAL at 09:46

## 2025-01-17 RX ADMIN — LEVOTHYROXINE SODIUM 125 MCG: 0.12 TABLET ORAL at 06:17

## 2025-01-17 RX ADMIN — AMPICILLIN SODIUM AND SULBACTAM SODIUM 3000 MG: 2; 1 INJECTION, POWDER, FOR SOLUTION INTRAMUSCULAR; INTRAVENOUS at 06:16

## 2025-01-17 RX ADMIN — ACETAMINOPHEN 650 MG: 325 TABLET ORAL at 23:17

## 2025-01-17 RX ADMIN — BUSPIRONE HYDROCHLORIDE 10 MG: 10 TABLET ORAL at 09:46

## 2025-01-17 RX ADMIN — MONTELUKAST 10 MG: 10 TABLET, FILM COATED ORAL at 09:46

## 2025-01-17 RX ADMIN — MAGNESIUM SULFATE HEPTAHYDRATE 2000 MG: 40 INJECTION, SOLUTION INTRAVENOUS at 11:42

## 2025-01-17 RX ADMIN — PANTOPRAZOLE SODIUM 40 MG: 40 TABLET, DELAYED RELEASE ORAL at 20:09

## 2025-01-17 RX ADMIN — ACETAMINOPHEN 650 MG: 325 TABLET ORAL at 03:36

## 2025-01-17 RX ADMIN — WARFARIN SODIUM 0.5 MG: 1 TABLET ORAL at 17:39

## 2025-01-17 RX ADMIN — ACETAMINOPHEN 650 MG: 325 TABLET ORAL at 17:39

## 2025-01-17 RX ADMIN — SODIUM CHLORIDE, PRESERVATIVE FREE 10 ML: 5 INJECTION INTRAVENOUS at 09:46

## 2025-01-17 RX ADMIN — SODIUM CHLORIDE: 9 INJECTION, SOLUTION INTRAVENOUS at 09:56

## 2025-01-17 RX ADMIN — BUSPIRONE HYDROCHLORIDE 10 MG: 10 TABLET ORAL at 20:08

## 2025-01-17 ASSESSMENT — PAIN SCALES - GENERAL
PAINLEVEL_OUTOF10: 5
PAINLEVEL_OUTOF10: 8
PAINLEVEL_OUTOF10: 8
PAINLEVEL_OUTOF10: 0
PAINLEVEL_OUTOF10: 10
PAINLEVEL_OUTOF10: 0

## 2025-01-17 ASSESSMENT — PAIN DESCRIPTION - DESCRIPTORS
DESCRIPTORS: ACHING;THROBBING
DESCRIPTORS: ACHING
DESCRIPTORS: ACHING
DESCRIPTORS: SHARP

## 2025-01-17 ASSESSMENT — PAIN DESCRIPTION - ORIENTATION
ORIENTATION: MID;POSTERIOR
ORIENTATION: LEFT

## 2025-01-17 ASSESSMENT — PAIN SCALES - WONG BAKER: WONGBAKER_NUMERICALRESPONSE: NO HURT

## 2025-01-17 ASSESSMENT — PAIN DESCRIPTION - ONSET: ONSET: ON-GOING

## 2025-01-17 ASSESSMENT — PAIN DESCRIPTION - LOCATION
LOCATION: HEAD
LOCATION: FLANK

## 2025-01-17 ASSESSMENT — PAIN DESCRIPTION - FREQUENCY: FREQUENCY: INTERMITTENT

## 2025-01-17 ASSESSMENT — PAIN DESCRIPTION - PAIN TYPE: TYPE: ACUTE PAIN

## 2025-01-17 ASSESSMENT — PAIN - FUNCTIONAL ASSESSMENT: PAIN_FUNCTIONAL_ASSESSMENT: ACTIVITIES ARE NOT PREVENTED

## 2025-01-17 NOTE — PROGRESS NOTES
[facility] Nurse  Progress Note       Marry Ayers  AGE: 74 y.o.   GENDER: female  : 1950  UNIT: 6K-02/002-A  TODAY'S DATE:  2025  ADMISSION DATE: 2025  9:16 AM    Subjective     Reason for [facility] Evaluation and Assessment: Ostomy abdomen      Marry Ayers is a 74 y.o. female referred by:   [x] Physician/ Resident/ PA/ APRN-CNP  [] Nursing  [] Other:     Plan     Consult received for ileostomy. Patient s/p ileostomy creation on 24. Patient admitted with new evidence of potential EC fistula formation. Dr Edward consulted, as well as general surgery. Will follow for assistance with ileostomy pouching system changes. Staff to utilize supplies located on unit if pouching system should need changed emergently. Call with concerns/ questions.

## 2025-01-17 NOTE — PROGRESS NOTES
Warfarin Pharmacy Consult Note    Warfarin Indication: Hx of DVT & PE  Target INR: 2.0-3.0  Dose prior to admission: variable at ECF; on hold since 1/12 due to high INRs.     Recent Labs     01/16/25  0948   HGB 8.6*   *     Recent Labs     01/16/25  1044 01/17/25  0650   INR 3.50* 3.23*     Concurrent anticoagulants/antiplatelets: none  Significant warfarin drug-drug interactions: levothyroxine, completed 7 day course of fluconazole 200 mg on 1/14    Date INR Warfarin Dose   1/16/2025 3.5 none   1/17/2025  3.23 0.5 mg                                Monitoring:                   INR will be monitored daily.    **Please contact pharmacy for discharge instructions when indicated**    Kamryn Russo, Pharm.D., BCPS, BCCCP 1/17/2025 10:29 AM

## 2025-01-17 NOTE — PROCEDURES
PROCEDURE NOTE  Date: 1/17/2025   Name: Marry Ayers  YOB: 1950    Procedures    12 lead EKG completed. Results handed to RN

## 2025-01-17 NOTE — CARE COORDINATION
01/17/25 0821   Readmission Assessment   Number of Days since last admission? 8-30 days   Previous Disposition SNF   Who is being Interviewed Patient   What was the patient's/caregiver's perception as to why they think they needed to return back to the hospital? Other (Comment)  (concern for sepsis at SNF)   Did you visit your Primary Care Physician after you left the hospital, before you returned this time? Yes  (at [facility])   Did you see a specialist, such as Cardiac, Pulmonary, Orthopedic Physician, etc. after you left the hospital? No   Who advised the patient to return to the hospital? Skilled Unit   Does the patient report anything that got in the way of taking their medications? No   In our efforts to provide the best possible care to you and others like you, can you think of anything that we could have done to help you after you left the hospital the first time, so that you might not have needed to return so soon? Other (Comment)  (at [facility])

## 2025-01-17 NOTE — PROGRESS NOTES
Hospitalist Progress Note    Patient:  Marry Ayers      Unit/Bed:6K-02/002-A    YOB: 1950    MRN: [id number]       Acct: [id number]     PCP: Silvestre Sawyer MD    Date of Admission: 1/16/2025    Assessment/Plan:    Abdominal pain. Imaging for enteric fistula. Concerns for Ileostomy leak on admission. Patient had recent laparoscopic ileostomy creation. She has 2nd ostomy created for drainage of abdominal fluid. Recent admission for leak and ID was consulted for infection.Treated with IV Unasyn and Diflucan transitioned to Augmentin and Diflucan on discharge. Unasyn has been restarted. ID has been re consulted. Awaiting general surgery recommendations.   Recurrent LINDA,. Creatinine on arrival 2.8. Down to 1.5 today. Baseline 1.  Holding Hyzaar.  Avoid nephrotoxic agents. Renally dose medications. Continue to hydrate with IVF. Repeat BMP in AM.  Hyperkalemia: Due to LINDA. Resolved with Insulin and Dextrose given. Calcium given.  Probable Sepsis(POA) due to #1. SIRS Criteria on arrival: Heart Rate >90 and WBC >12k or <4k or >10% Bands. Organ Dysfunction: Creatinine >2.0 and INR >1.5 or aPTT >60sec. Cultures Blood x2 and Urine pending. Continue IV Unasyn. Volume Expansion: 30cc/kg of Lactated Ringers was given. Continue maintenance fluids.   Supratherapeutic INR: Possibly due to prescription of Diflucan. Diflucan has been held.  Vitamin K given at SNF. No active bleeding. INR 3.23. No plans to repeat Vit K with no active bleeding or surgical plans.   Hypomagnesia, mag 1.1. Magnesium replacement protocol in place. Replace in AM.   Hx of DVT and PE: Managed with warfarin. Lower extremity duplex notes lack of flow and compressibility in the right peroneal vein and a soleal vein consistent with thrombosis. Continue warfarin. Pharmacy to dose.   Anxiety: Managed with Buspar. Continue home meds.   GERD: Managed with pantoprazole. Continue home meds  Hypothyroidism: Managed with levothyroxine.   Primary

## 2025-01-17 NOTE — ED PROVIDER NOTES
I performed a history and physical examination of the patient and discussed management with the resident. I reviewed the resident’s note and agree with the documented findings and plan of care. Any areas of disagreement are noted on the chart. I was personally present for the key portions of any procedures. I have documented in the chart those procedures where I was not present during the key portions. I have reviewed the emergency nurses triage note. I agree with the chief complaint, past medical history, past surgical history, allergies, medications, social and family history as documented unless otherwise noted below. Documentation of the HPI, Physical Exam and Medical Decision Making performed by medical students or scribes is based on my personal performance of the HPI, PE and MDM. For Phys Assistant/ Nurse Practitioner cases/documentation I have personally evaluated this patient and have completed at least one if not all key elements of the E/M (history, physical exam, and MDM). My findings are as noted below.    In other words, I personally saw and examined the patient I have reviewed and agreed with the resident findings including all diagnostic interpretations and treatment plans as written.  I was present for the key portion of any procedures performed and the inclusive time noted in any critical care statement.    Patient presents today for chief complaint of wound check abdominal wound.  Patient has a history of stricture of the sigmoid colon peritonitis and ileostomy placed and then a large bowel anastomotic leak.  Patient comes in from the nursing home for complaints of lower abdominal pain.  Here today patient presents with some tachycardia some nausea, she has not been eating or drinking very well.  Apparently the color of her stool has changed in her ileostomy bag.  Here today the patient is resting comfortably on the cot no apparent distress hemodynamically stable slightly tachycardic no other

## 2025-01-17 NOTE — CARE COORDINATION
Case Management Assessment Initial Evaluation    Date/Time of Evaluation: 2025 8:15 AM  Assessment Completed by: Ramiro Duron RN    If patient is discharged prior to next notation, then this note serves as note for discharge by case management.    Patient Name: Marry Ayers                   YOB: 1950  Diagnosis: Bacteremia [R78.81]  Personal history of venous thrombosis and embolism [Z86.718]  Hyperkalemia [E87.5]  Hyperphosphatemia [E83.39]  LINDA (acute kidney injury) (HCC) [N17.9]  Acute kidney injury (HCC) [N17.9]  History of pulmonary embolus (PE) [Z86.711]                   Date / Time: 2025  9:16 AM  [facility]     Patient Admission Status: Observation   Readmission Risk Low 0-14, Mod 15-19), High > 20: Readmission Risk Score: 17.6    Current PCP: Silvestre Sawyer MD  Health Care Decision Makers:     Additional Case Management Notes: K+5.3, PCT 54.76, creat 2.8. Sinus tachy. IVF, IV unasyn, pain control.     Procedures: none    Imagin/16 CT abd: Anterior midline pelvic wall enteric fistula. Left pleural effusion with lower lobe atelectasis   doppler Lack of flow and compressibility in the right peroneal vein and a soleal vein consistent with thrombosis:     Patient [facility]als[facility]Plan/[facility] [place].

## 2025-01-18 PROBLEM — E66.01 MORBID OBESITY: Status: ACTIVE | Noted: 2025-01-18

## 2025-01-18 PROBLEM — B99.9 INTRA-ABDOMINAL INFECTION: Status: ACTIVE | Noted: 2025-01-18

## 2025-01-18 PROBLEM — K63.2 ENTEROENTERIC FISTULA: Status: ACTIVE | Noted: 2025-01-18

## 2025-01-18 LAB
ANION GAP SERPL CALC-SCNC: 17 MEQ/L (ref 8–16)
ANTIBODY SCREEN: NORMAL
BASOPHILS ABSOLUTE: 0.1 THOU/MM3 (ref 0–0.1)
BASOPHILS NFR BLD AUTO: 0.5 %
BUN SERPL-MCNC: 18 MG/DL (ref 7–22)
CALCIUM SERPL-MCNC: 8.6 MG/DL (ref 8.5–10.5)
CHLORIDE SERPL-SCNC: 109 MEQ/L (ref 98–111)
CO2 SERPL-SCNC: 16 MEQ/L (ref 23–33)
CREAT SERPL-MCNC: 1 MG/DL (ref 0.4–1.2)
DEPRECATED RDW RBC AUTO: 53.1 FL (ref 35–45)
EKG ATRIAL RATE: 99 BPM
EKG P AXIS: 53 DEGREES
EKG P-R INTERVAL: 162 MS
EKG Q-T INTERVAL: 336 MS
EKG QRS DURATION: 60 MS
EKG QTC CALCULATION (BAZETT): 431 MS
EKG R AXIS: 25 DEGREES
EKG T AXIS: 49 DEGREES
EKG VENTRICULAR RATE: 99 BPM
EOSINOPHIL NFR BLD AUTO: 1.8 %
EOSINOPHILS ABSOLUTE: 0.2 THOU/MM3 (ref 0–0.4)
ERYTHROCYTE [DISTWIDTH] IN BLOOD BY AUTOMATED COUNT: 16.9 % (ref 11.5–14.5)
GFR SERPL CREATININE-BSD FRML MDRD: 59 ML/MIN/1.73M2
GLUCOSE BLD STRIP.AUTO-MCNC: 105 MG/DL (ref 70–108)
GLUCOSE BLD STRIP.AUTO-MCNC: 116 MG/DL (ref 70–108)
GLUCOSE BLD STRIP.AUTO-MCNC: 124 MG/DL (ref 70–108)
GLUCOSE BLD STRIP.AUTO-MCNC: 139 MG/DL (ref 70–108)
GLUCOSE SERPL-MCNC: 104 MG/DL (ref 70–108)
HCT VFR BLD AUTO: 21.5 % (ref 37–47)
HCT VFR BLD AUTO: 24.4 % (ref 37–47)
HEMOCCULT STL QL: POSITIVE
HGB BLD-MCNC: 6.8 GM/DL (ref 12–16)
HGB BLD-MCNC: 7.7 GM/DL (ref 12–16)
IMM GRANULOCYTES # BLD AUTO: 0.05 THOU/MM3 (ref 0–0.07)
IMM GRANULOCYTES NFR BLD AUTO: 0.5 %
INR PPP: 2.13 (ref 0.85–1.13)
LACTATE SERPL-SCNC: 1.4 MMOL/L (ref 0.5–2)
LYMPHOCYTES ABSOLUTE: 2 THOU/MM3 (ref 1–4.8)
LYMPHOCYTES NFR BLD AUTO: 17.8 %
MCH RBC QN AUTO: 27.1 PG (ref 26–33)
MCHC RBC AUTO-ENTMCNC: 31.6 GM/DL (ref 32.2–35.5)
MCV RBC AUTO: 85.9 FL (ref 81–99)
MONOCYTES ABSOLUTE: 0.9 THOU/MM3 (ref 0.4–1.3)
MONOCYTES NFR BLD AUTO: 7.8 %
NEUTROPHILS ABSOLUTE: 7.9 THOU/MM3 (ref 1.8–7.7)
NEUTROPHILS NFR BLD AUTO: 71.6 %
NRBC BLD AUTO-RTO: 0 /100 WBC
PLATELET # BLD AUTO: 844 THOU/MM3 (ref 130–400)
PMV BLD AUTO: 9.5 FL (ref 9.4–12.4)
POTASSIUM SERPL-SCNC: 4.3 MEQ/L (ref 3.5–5.2)
RBC # BLD AUTO: 2.84 MILL/MM3 (ref 4.2–5.4)
SODIUM SERPL-SCNC: 142 MEQ/L (ref 135–145)
WBC # BLD AUTO: 11 THOU/MM3 (ref 4.8–10.8)

## 2025-01-18 PROCEDURE — 36430 TRANSFUSION BLD/BLD COMPNT: CPT

## 2025-01-18 PROCEDURE — 82948 REAGENT STRIP/BLOOD GLUCOSE: CPT

## 2025-01-18 PROCEDURE — 1200000000 HC SEMI PRIVATE

## 2025-01-18 PROCEDURE — 2580000003 HC RX 258: Performed by: NURSE PRACTITIONER

## 2025-01-18 PROCEDURE — 6360000002 HC RX W HCPCS

## 2025-01-18 PROCEDURE — 99232 SBSQ HOSP IP/OBS MODERATE 35: CPT | Performed by: STUDENT IN AN ORGANIZED HEALTH CARE EDUCATION/TRAINING PROGRAM

## 2025-01-18 PROCEDURE — 86923 COMPATIBILITY TEST ELECTRIC: CPT

## 2025-01-18 PROCEDURE — P9016 RBC LEUKOCYTES REDUCED: HCPCS

## 2025-01-18 PROCEDURE — 86901 BLOOD TYPING SEROLOGIC RH(D): CPT

## 2025-01-18 PROCEDURE — 82272 OCCULT BLD FECES 1-3 TESTS: CPT

## 2025-01-18 PROCEDURE — 6370000000 HC RX 637 (ALT 250 FOR IP): Performed by: PHARMACIST

## 2025-01-18 PROCEDURE — 93010 ELECTROCARDIOGRAM REPORT: CPT | Performed by: INTERNAL MEDICINE

## 2025-01-18 PROCEDURE — 85014 HEMATOCRIT: CPT

## 2025-01-18 PROCEDURE — 86900 BLOOD TYPING SEROLOGIC ABO: CPT

## 2025-01-18 PROCEDURE — 36415 COLL VENOUS BLD VENIPUNCTURE: CPT

## 2025-01-18 PROCEDURE — 85610 PROTHROMBIN TIME: CPT

## 2025-01-18 PROCEDURE — 99233 SBSQ HOSP IP/OBS HIGH 50: CPT | Performed by: NURSE PRACTITIONER

## 2025-01-18 PROCEDURE — 85025 COMPLETE CBC W/AUTO DIFF WBC: CPT

## 2025-01-18 PROCEDURE — 80048 BASIC METABOLIC PNL TOTAL CA: CPT

## 2025-01-18 PROCEDURE — 2580000003 HC RX 258

## 2025-01-18 PROCEDURE — 2500000003 HC RX 250 WO HCPCS

## 2025-01-18 PROCEDURE — 6360000002 HC RX W HCPCS: Performed by: NURSE PRACTITIONER

## 2025-01-18 PROCEDURE — 85018 HEMOGLOBIN: CPT

## 2025-01-18 PROCEDURE — 96366 THER/PROPH/DIAG IV INF ADDON: CPT

## 2025-01-18 PROCEDURE — 83605 ASSAY OF LACTIC ACID: CPT

## 2025-01-18 PROCEDURE — 6370000000 HC RX 637 (ALT 250 FOR IP)

## 2025-01-18 PROCEDURE — 86850 RBC ANTIBODY SCREEN: CPT

## 2025-01-18 PROCEDURE — 30233N1 TRANSFUSION OF NONAUTOLOGOUS RED BLOOD CELLS INTO PERIPHERAL VEIN, PERCUTANEOUS APPROACH: ICD-10-PCS | Performed by: INTERNAL MEDICINE

## 2025-01-18 PROCEDURE — 93005 ELECTROCARDIOGRAM TRACING: CPT | Performed by: NURSE PRACTITIONER

## 2025-01-18 RX ORDER — WARFARIN SODIUM 1 MG/1
1.5 TABLET ORAL ONCE
Status: COMPLETED | OUTPATIENT
Start: 2025-01-18 | End: 2025-01-18

## 2025-01-18 RX ORDER — SODIUM CHLORIDE 9 MG/ML
INJECTION, SOLUTION INTRAVENOUS CONTINUOUS
Status: DISCONTINUED | OUTPATIENT
Start: 2025-01-18 | End: 2025-01-19

## 2025-01-18 RX ORDER — SODIUM CHLORIDE 9 MG/ML
INJECTION, SOLUTION INTRAVENOUS PRN
Status: DISCONTINUED | OUTPATIENT
Start: 2025-01-18 | End: 2025-01-23 | Stop reason: HOSPADM

## 2025-01-18 RX ADMIN — AMPICILLIN SODIUM AND SULBACTAM SODIUM 3000 MG: 2; 1 INJECTION, POWDER, FOR SOLUTION INTRAMUSCULAR; INTRAVENOUS at 17:35

## 2025-01-18 RX ADMIN — SODIUM CHLORIDE: 9 INJECTION, SOLUTION INTRAVENOUS at 12:05

## 2025-01-18 RX ADMIN — ACETAMINOPHEN 650 MG: 325 TABLET ORAL at 09:19

## 2025-01-18 RX ADMIN — ACETAMINOPHEN 650 MG: 325 TABLET ORAL at 23:26

## 2025-01-18 RX ADMIN — BUSPIRONE HYDROCHLORIDE 10 MG: 10 TABLET ORAL at 09:19

## 2025-01-18 RX ADMIN — SODIUM CHLORIDE, PRESERVATIVE FREE 10 ML: 5 INJECTION INTRAVENOUS at 09:20

## 2025-01-18 RX ADMIN — AMPICILLIN SODIUM AND SULBACTAM SODIUM 3000 MG: 2; 1 INJECTION, POWDER, FOR SOLUTION INTRAMUSCULAR; INTRAVENOUS at 13:01

## 2025-01-18 RX ADMIN — ACETAMINOPHEN 650 MG: 325 TABLET ORAL at 16:28

## 2025-01-18 RX ADMIN — PANTOPRAZOLE SODIUM 40 MG: 40 TABLET, DELAYED RELEASE ORAL at 21:27

## 2025-01-18 RX ADMIN — SODIUM CHLORIDE 20 ML/HR: 9 INJECTION, SOLUTION INTRAVENOUS at 06:01

## 2025-01-18 RX ADMIN — METOPROLOL SUCCINATE 50 MG: 50 TABLET, EXTENDED RELEASE ORAL at 09:19

## 2025-01-18 RX ADMIN — WARFARIN SODIUM 1.5 MG: 1 TABLET ORAL at 17:33

## 2025-01-18 RX ADMIN — LEVOTHYROXINE SODIUM 125 MCG: 0.12 TABLET ORAL at 07:09

## 2025-01-18 RX ADMIN — MONTELUKAST 10 MG: 10 TABLET, FILM COATED ORAL at 09:19

## 2025-01-18 RX ADMIN — AMPICILLIN SODIUM AND SULBACTAM SODIUM 3000 MG: 2; 1 INJECTION, POWDER, FOR SOLUTION INTRAMUSCULAR; INTRAVENOUS at 06:02

## 2025-01-18 RX ADMIN — BUSPIRONE HYDROCHLORIDE 10 MG: 10 TABLET ORAL at 21:27

## 2025-01-18 ASSESSMENT — PAIN SCALES - WONG BAKER
WONGBAKER_NUMERICALRESPONSE: NO HURT

## 2025-01-18 ASSESSMENT — PAIN DESCRIPTION - ORIENTATION: ORIENTATION: POSTERIOR

## 2025-01-18 ASSESSMENT — PAIN SCALES - GENERAL
PAINLEVEL_OUTOF10: 0
PAINLEVEL_OUTOF10: 10
PAINLEVEL_OUTOF10: 5
PAINLEVEL_OUTOF10: 0
PAINLEVEL_OUTOF10: 0
PAINLEVEL_OUTOF10: 5

## 2025-01-18 ASSESSMENT — PAIN DESCRIPTION - LOCATION
LOCATION: HEAD

## 2025-01-18 ASSESSMENT — PAIN DESCRIPTION - DESCRIPTORS
DESCRIPTORS: ACHING
DESCRIPTORS: THROBBING
DESCRIPTORS: ACHING

## 2025-01-18 NOTE — PROCEDURES
PROCEDURE NOTE  Date: 1/18/2025   Name: Marry Ayers  YOB: 1950    Procedures  12 lead EKG completed. Results handed to Jose HEARN. Cyndy VAZQUEZ

## 2025-01-18 NOTE — PROGRESS NOTES
Warfarin Pharmacy Consult Note    Warfarin Indication: Hx of DVT & PE  Target INR: 2.0-3.0  Dose prior to admission: variable    Recent Labs     01/16/25  0948 01/18/25  0609   HGB 8.6* 7.7*   * 844*     Recent Labs     01/16/25  1044 01/17/25  0650 01/18/25  0609   INR 3.50* 3.23* 2.13*   Concurrent anticoagulants/antiplatelets: none  Significant warfarin drug-drug interactions: levothyroxine, completed 7 day course of fluconazole 200 mg on 1/14     Date INR Warfarin Dose   1/16/2025 3.5 none   1/17/2025  3.23 0.5 mg   1/18/25  2.13  1.5 mg                                        Monitoring:                   INR will be monitored daily.    **Please contact pharmacy for discharge instructions when indicated**    Anastasia BURROUGHSPh., BCPS., 1/18/2025,12:06 PM

## 2025-01-18 NOTE — PROGRESS NOTES
Hospitalist Progress Note    Patient:  Marry Ayers      Unit/Bed:6K-02/002-A    YOB: 1950    MRN: [id number]       Acct: [id number]     PCP: Silvestre Sawyer MD    Date of Admission: 1/16/2025    Assessment/Plan:    Abdominal pain. Imaging for enteric fistula. Concerns for Ileostomy leak on admission. Patient had recent laparoscopic ileostomy creation. She has 2nd ostomy created for drainage of abdominal fluid. Recent admission for leak and ID was consulted for infection.Treated with IV Unasyn and Diflucan transitioned to Augmentin and Diflucan on discharge. Continue IV Unasyn. ID has been re consulted. Discussed case with general surgery they will see on Monday.  Recurrent LINDA, resolving. Creatinine on arrival 2.8. Down to 1 today which is her baseline. Holding Hyzaar.  Avoid nephrotoxic agents. Renally dose medications. Repeat BMP in AM.  High anion gap metabolic acidosis, improved. IVF. Check lactic acid.  Hyperkalemia, resolved. Due to LINDA. S/p Insulin and Dextrose given. Calcium given.  Probable Sepsis(POA) due to #1. SIRS Criteria on arrival: Heart Rate >90 and WBC >12k or <4k or >10% Bands. Organ Dysfunction: Creatinine >2.0 and INR >1.5 or aPTT >60sec. Cultures Blood x2 and Urine pending. Continue IV Unasyn. Volume Expansion: 30cc/kg of Lactated Ringers was given. Continue maintenance fluids. WBC downward trend 11 today. Afebrile.   Supratherapeutic INR, resolved: Possibly due to prescription of Diflucan. Diflucan has been held.  Vitamin K given at [facility]. No active bleeding. INR 2.13 today. Pharmacy dosing Coumadin. Daily INRs.  Hypomagnesia, mag 1.1. Magnesium replacement protocol in place. Repeat mag in AM.   Hyponatremia, resolved.   Normocytic anemia. Hbg 7.7. No active bleeding. MATY noted on labs 12/2024. Will hold off on iron with active infection until discharge. Monitor HH, repeat today at 1800. Transfuse if <7 or hemodynamically unstable.  Hx of DVT and PE: Managed with

## 2025-01-18 NOTE — PLAN OF CARE
Problem: Discharge Planning  Goal: Discharge to home or other facility with appropriate resources  Outcome: Progressing  Flowsheets (Taken 1/17/2025 2120)  Discharge to home or other facility with appropriate resources:   Refer to discharge planning if patient needs post-hospital services based on physician order or complex needs related to functional status, cognitive ability or social support system   Arrange for interpreters to assist at discharge as needed   Arrange for needed discharge resources and transportation as appropriate   Identify discharge learning needs (meds, wound care, etc)   Identify barriers to discharge with patient and caregiver     Problem: Safety - Adult  Goal: Free from fall injury  Outcome: Progressing  Flowsheets (Taken 1/17/2025 2120)  Free From Fall Injury:   Based on caregiver fall risk screen, instruct family/caregiver to ask for assistance with transferring infant if caregiver noted to have fall risk factors   Instruct family/caregiver on patient safety     Problem: Pain  Goal: Verbalizes/displays adequate comfort level or baseline comfort level  Outcome: Progressing  Flowsheets (Taken 1/17/2025 2120)  Verbalizes/displays adequate comfort level or baseline comfort level:   Encourage patient to monitor pain and request assistance   Assess pain using appropriate pain scale   Consider cultural and social influences on pain and pain management   Implement non-pharmacological measures as appropriate and evaluate response   Administer analgesics based on type and severity of pain and evaluate response   Notify Licensed Independent Practitioner if interventions unsuccessful or patient reports new pain  Care plan reviewed with patient.  Patient verbalizes understanding of the care plan and contributed to goal setting.

## 2025-01-18 NOTE — PROGRESS NOTES
[facility] Infectious Disease         Progress Note      Marry Ayers  MEDICAL RECORD NUMBER:  [id number]  AGE: 74 y.o.   GENDER: female  : 1950  EPISODE DATE:  2025      Assessment:     Patient is a 74-year-old female who I am consulted for persistent drainage from an inferior wound.     This patient has a fairly complicated abdominal surgical history and initially underwent resection of sigmoid as well as partial small bowel resection on  followed by repair of a colonic perforation and creation of a loop ileostomy on .  She had a subsequent ostomy bag placed at the inferior abdominal wound to help collect drainage.  The intra-abdominal findings on initial operative intervention demonstrated diffuse evidence of infection throughout.  This patient is a high risk for recurrent abdominal complications and additional surgery will likely lead to potentially more issues.      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  At some point, resistance will develop with continued antimicrobials.  Currently on Unasyn.     General surgery to evaluate with new evidence of fistula formation  Remains on therapy with ampicillin-sulbactam empirically  Duration of therapy remains to be decided    Subjective:     Chief Complaint   Patient presents with    Wound Check         No acute events overnight.  Patient resting comfortably in bed, still awaiting general surgery evaluation for plan of inferior abdominal drainage.      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  Ears: no hearing difficulty, no tinnitus  Mouth/throat: no ulceration, dental caries , dysphagia  Lungs: no cough no chest pain  CVS: no palpitation, no chest pain, no shortness of breath  GI: no abdominal pain, no nausea , no vomiting, no constipation  ALEENA: no dysuria, frequency and urgency, no hematuria, no kidney stones  Musculoskeletal: no joint pain, swelling , stiffness,  Endocrine: no polyuria, polydipsia, no cold or heat intolerance  Hematology: no anemia, no easy brusing or bleeding, no hx of clotting disorder  Dermatology: no skin rash, no eczema, no pruritis,  Psychiatry: no depression, no anxiety,no panic attacks, no suicide ideation      Objective:      BP (!) 140/70   Pulse (!) 106   Temp 98 °F (36.7 °C) (Oral)   Resp 19   Wt 79.5 kg (175 lb 4.3 oz)   SpO2 96%   BMI 33.12 kg/m²     Wt Readings from Last 3 Encounters:   01/17/25 79.5 kg (175 lb 4.3 oz)   01/10/25 81.6 kg (180 lb)   01/07/25 88.3 kg (194 lb 10.7 oz)       Immunization History   Administered Date(s) Administered    COVID-19, MODERNA Bivalent, (age 12y+), IM, 50 mcg/0.5 mL 10/05/2023    COVID-19, MODERNA, (age 12y+), IM, 50mcg/0.5mL 09/03/2024    COVID-19, PFIZER Bivalent, DO NOT Dilute, (age 12y+), IM, 30 mcg/0.3 mL 10/28/2022    COVID-19, PFIZER PURPLE top, DILUTE for use, (age 12 y+), 30mcg/0.3mL 03/11/2021, 04/02/2021, 10/25/2021    Influenza, FLUZONE High Dose (age 65 y+), IM, Quadv, 0.7mL 09/05/2020, 09/01/2023    Zoster Recombinant (Shingrix) 10/05/2023, 01/22/2024       PHYSICAL EXAM    BP (!) 140/70   Pulse (!) 106   Temp 98 °F (36.7 °C) (Oral)   Resp 19   Wt 79.5 kg (175 lb 4.3 oz)   SpO2 96%   BMI 33.12 kg/m²   General:  Awake, alert, not in distress.  HEENT: pink conjunctiva, unicteric sclera, moist oral mucosa.  Chest:  bilateral air entry, Clear to auscultation,   Cardiovascular:  RRR ,S1S2, no murmur or gallop.  Abdomen:  Soft, non tender to palpation.  Extremities: no

## 2025-01-19 ENCOUNTER — APPOINTMENT (OUTPATIENT)
Dept: GENERAL RADIOLOGY | Age: 75
DRG: 872 | End: 2025-01-19
Payer: MEDICARE

## 2025-01-19 LAB
ANION GAP SERPL CALC-SCNC: 11 MEQ/L (ref 8–16)
BASOPHILS ABSOLUTE: 0 THOU/MM3 (ref 0–0.1)
BASOPHILS NFR BLD AUTO: 0.5 %
BUN SERPL-MCNC: 9 MG/DL (ref 7–22)
CA-I BLD ISE-SCNC: 1.14 MMOL/L (ref 1.12–1.32)
CALCIUM SERPL-MCNC: 8.1 MG/DL (ref 8.5–10.5)
CHLORIDE SERPL-SCNC: 108 MEQ/L (ref 98–111)
CO2 SERPL-SCNC: 18 MEQ/L (ref 23–33)
CREAT SERPL-MCNC: 0.7 MG/DL (ref 0.4–1.2)
DEPRECATED RDW RBC AUTO: 51.3 FL (ref 35–45)
EKG ATRIAL RATE: 118 BPM
EKG P AXIS: 71 DEGREES
EKG P-R INTERVAL: 156 MS
EKG Q-T INTERVAL: 316 MS
EKG QRS DURATION: 62 MS
EKG QTC CALCULATION (BAZETT): 442 MS
EKG R AXIS: 42 DEGREES
EKG T AXIS: 78 DEGREES
EKG VENTRICULAR RATE: 118 BPM
EOSINOPHIL NFR BLD AUTO: 2.6 %
EOSINOPHILS ABSOLUTE: 0.2 THOU/MM3 (ref 0–0.4)
ERYTHROCYTE [DISTWIDTH] IN BLOOD BY AUTOMATED COUNT: 16 % (ref 11.5–14.5)
GFR SERPL CREATININE-BSD FRML MDRD: 90 ML/MIN/1.73M2
GLUCOSE BLD STRIP.AUTO-MCNC: 100 MG/DL (ref 70–108)
GLUCOSE BLD STRIP.AUTO-MCNC: 120 MG/DL (ref 70–108)
GLUCOSE BLD STRIP.AUTO-MCNC: 122 MG/DL (ref 70–108)
GLUCOSE BLD STRIP.AUTO-MCNC: 123 MG/DL (ref 70–108)
GLUCOSE SERPL-MCNC: 103 MG/DL (ref 70–108)
HCT VFR BLD AUTO: 27.2 % (ref 37–47)
HGB BLD-MCNC: 8.5 GM/DL (ref 12–16)
IMM GRANULOCYTES # BLD AUTO: 0.05 THOU/MM3 (ref 0–0.07)
IMM GRANULOCYTES NFR BLD AUTO: 0.5 %
INR PPP: 1.82 (ref 0.85–1.13)
LYMPHOCYTES ABSOLUTE: 2.3 THOU/MM3 (ref 1–4.8)
LYMPHOCYTES NFR BLD AUTO: 23.7 %
MAGNESIUM SERPL-MCNC: 1.4 MG/DL (ref 1.6–2.4)
MCH RBC QN AUTO: 27.3 PG (ref 26–33)
MCHC RBC AUTO-ENTMCNC: 31.3 GM/DL (ref 32.2–35.5)
MCV RBC AUTO: 87.5 FL (ref 81–99)
MONOCYTES ABSOLUTE: 0.9 THOU/MM3 (ref 0.4–1.3)
MONOCYTES NFR BLD AUTO: 9.3 %
NEUTROPHILS ABSOLUTE: 6.1 THOU/MM3 (ref 1.8–7.7)
NEUTROPHILS NFR BLD AUTO: 63.4 %
NRBC BLD AUTO-RTO: 0 /100 WBC
NT-PROBNP SERPL IA-MCNC: 961.4 PG/ML (ref 0–124)
PLATELET # BLD AUTO: 787 THOU/MM3 (ref 130–400)
PMV BLD AUTO: 9.3 FL (ref 9.4–12.4)
POTASSIUM SERPL-SCNC: 3.5 MEQ/L (ref 3.5–5.2)
RBC # BLD AUTO: 3.11 MILL/MM3 (ref 4.2–5.4)
SODIUM SERPL-SCNC: 137 MEQ/L (ref 135–145)
TROPONIN, HIGH SENSITIVITY: 9 NG/L (ref 0–12)
WBC # BLD AUTO: 9.6 THOU/MM3 (ref 4.8–10.8)

## 2025-01-19 PROCEDURE — 82330 ASSAY OF CALCIUM: CPT

## 2025-01-19 PROCEDURE — 6370000000 HC RX 637 (ALT 250 FOR IP)

## 2025-01-19 PROCEDURE — 6370000000 HC RX 637 (ALT 250 FOR IP): Performed by: PHARMACIST

## 2025-01-19 PROCEDURE — 2500000003 HC RX 250 WO HCPCS

## 2025-01-19 PROCEDURE — 1200000000 HC SEMI PRIVATE

## 2025-01-19 PROCEDURE — 6360000002 HC RX W HCPCS: Performed by: NURSE PRACTITIONER

## 2025-01-19 PROCEDURE — 36415 COLL VENOUS BLD VENIPUNCTURE: CPT

## 2025-01-19 PROCEDURE — 99232 SBSQ HOSP IP/OBS MODERATE 35: CPT | Performed by: STUDENT IN AN ORGANIZED HEALTH CARE EDUCATION/TRAINING PROGRAM

## 2025-01-19 PROCEDURE — 93005 ELECTROCARDIOGRAM TRACING: CPT | Performed by: NURSE PRACTITIONER

## 2025-01-19 PROCEDURE — 85610 PROTHROMBIN TIME: CPT

## 2025-01-19 PROCEDURE — 6370000000 HC RX 637 (ALT 250 FOR IP): Performed by: NURSE PRACTITIONER

## 2025-01-19 PROCEDURE — 2580000003 HC RX 258

## 2025-01-19 PROCEDURE — 93010 ELECTROCARDIOGRAM REPORT: CPT | Performed by: INTERNAL MEDICINE

## 2025-01-19 PROCEDURE — 80048 BASIC METABOLIC PNL TOTAL CA: CPT

## 2025-01-19 PROCEDURE — 83880 ASSAY OF NATRIURETIC PEPTIDE: CPT

## 2025-01-19 PROCEDURE — 2580000003 HC RX 258: Performed by: NURSE PRACTITIONER

## 2025-01-19 PROCEDURE — 71045 X-RAY EXAM CHEST 1 VIEW: CPT

## 2025-01-19 PROCEDURE — 99233 SBSQ HOSP IP/OBS HIGH 50: CPT | Performed by: NURSE PRACTITIONER

## 2025-01-19 PROCEDURE — 82948 REAGENT STRIP/BLOOD GLUCOSE: CPT

## 2025-01-19 PROCEDURE — 84484 ASSAY OF TROPONIN QUANT: CPT

## 2025-01-19 PROCEDURE — 85025 COMPLETE CBC W/AUTO DIFF WBC: CPT

## 2025-01-19 PROCEDURE — 83735 ASSAY OF MAGNESIUM: CPT

## 2025-01-19 RX ORDER — WARFARIN SODIUM 2 MG/1
2 TABLET ORAL ONCE
Status: COMPLETED | OUTPATIENT
Start: 2025-01-19 | End: 2025-01-19

## 2025-01-19 RX ORDER — MORPHINE SULFATE 2 MG/ML
2 INJECTION, SOLUTION INTRAMUSCULAR; INTRAVENOUS ONCE
Status: COMPLETED | OUTPATIENT
Start: 2025-01-19 | End: 2025-01-19

## 2025-01-19 RX ORDER — LOSARTAN POTASSIUM 50 MG/1
50 TABLET ORAL 2 TIMES DAILY
Status: DISCONTINUED | OUTPATIENT
Start: 2025-01-19 | End: 2025-01-19

## 2025-01-19 RX ORDER — LABETALOL HYDROCHLORIDE 5 MG/ML
5 INJECTION, SOLUTION INTRAVENOUS ONCE
Status: COMPLETED | OUTPATIENT
Start: 2025-01-19 | End: 2025-01-19

## 2025-01-19 RX ORDER — LOSARTAN POTASSIUM 50 MG/1
50 TABLET ORAL ONCE
Status: DISCONTINUED | OUTPATIENT
Start: 2025-01-19 | End: 2025-01-19

## 2025-01-19 RX ORDER — LOSARTAN POTASSIUM 50 MG/1
50 TABLET ORAL ONCE
Status: COMPLETED | OUTPATIENT
Start: 2025-01-19 | End: 2025-01-19

## 2025-01-19 RX ORDER — LOSARTAN POTASSIUM 50 MG/1
50 TABLET ORAL 2 TIMES DAILY
Status: DISCONTINUED | OUTPATIENT
Start: 2025-01-19 | End: 2025-01-23 | Stop reason: HOSPADM

## 2025-01-19 RX ORDER — FUROSEMIDE 10 MG/ML
20 INJECTION INTRAMUSCULAR; INTRAVENOUS ONCE
Status: COMPLETED | OUTPATIENT
Start: 2025-01-19 | End: 2025-01-19

## 2025-01-19 RX ADMIN — LOSARTAN POTASSIUM 50 MG: 50 TABLET, FILM COATED ORAL at 03:31

## 2025-01-19 RX ADMIN — LABETALOL HYDROCHLORIDE 5 MG: 5 INJECTION, SOLUTION INTRAVENOUS at 00:51

## 2025-01-19 RX ADMIN — MORPHINE SULFATE 2 MG: 2 INJECTION, SOLUTION INTRAMUSCULAR; INTRAVENOUS at 03:30

## 2025-01-19 RX ADMIN — LOSARTAN POTASSIUM 50 MG: 50 TABLET, FILM COATED ORAL at 20:07

## 2025-01-19 RX ADMIN — WARFARIN SODIUM 2 MG: 2 TABLET ORAL at 19:33

## 2025-01-19 RX ADMIN — ACETAMINOPHEN 650 MG: 325 TABLET ORAL at 14:26

## 2025-01-19 RX ADMIN — SODIUM CHLORIDE 20 ML/HR: 9 INJECTION, SOLUTION INTRAVENOUS at 20:03

## 2025-01-19 RX ADMIN — MONTELUKAST 10 MG: 10 TABLET, FILM COATED ORAL at 07:22

## 2025-01-19 RX ADMIN — PANTOPRAZOLE SODIUM 40 MG: 40 TABLET, DELAYED RELEASE ORAL at 19:33

## 2025-01-19 RX ADMIN — SODIUM CHLORIDE: 9 INJECTION, SOLUTION INTRAVENOUS at 14:14

## 2025-01-19 RX ADMIN — SODIUM CHLORIDE, PRESERVATIVE FREE 10 ML: 5 INJECTION INTRAVENOUS at 07:22

## 2025-01-19 RX ADMIN — SODIUM CHLORIDE 20 ML/HR: 9 INJECTION, SOLUTION INTRAVENOUS at 00:18

## 2025-01-19 RX ADMIN — METOPROLOL SUCCINATE 50 MG: 50 TABLET, EXTENDED RELEASE ORAL at 07:22

## 2025-01-19 RX ADMIN — AMPICILLIN SODIUM AND SULBACTAM SODIUM 3000 MG: 2; 1 INJECTION, POWDER, FOR SOLUTION INTRAMUSCULAR; INTRAVENOUS at 20:05

## 2025-01-19 RX ADMIN — AMPICILLIN SODIUM AND SULBACTAM SODIUM 3000 MG: 2; 1 INJECTION, POWDER, FOR SOLUTION INTRAMUSCULAR; INTRAVENOUS at 00:21

## 2025-01-19 RX ADMIN — FUROSEMIDE 20 MG: 10 INJECTION, SOLUTION INTRAMUSCULAR; INTRAVENOUS at 06:32

## 2025-01-19 RX ADMIN — SODIUM CHLORIDE 20 ML/HR: 9 INJECTION, SOLUTION INTRAVENOUS at 06:29

## 2025-01-19 RX ADMIN — ACETAMINOPHEN 650 MG: 325 TABLET ORAL at 22:45

## 2025-01-19 RX ADMIN — AMPICILLIN SODIUM AND SULBACTAM SODIUM 3000 MG: 2; 1 INJECTION, POWDER, FOR SOLUTION INTRAMUSCULAR; INTRAVENOUS at 12:18

## 2025-01-19 RX ADMIN — MAGNESIUM SULFATE HEPTAHYDRATE 2000 MG: 40 INJECTION, SOLUTION INTRAVENOUS at 06:32

## 2025-01-19 RX ADMIN — BUSPIRONE HYDROCHLORIDE 10 MG: 10 TABLET ORAL at 07:22

## 2025-01-19 RX ADMIN — AMPICILLIN SODIUM AND SULBACTAM SODIUM 3000 MG: 2; 1 INJECTION, POWDER, FOR SOLUTION INTRAMUSCULAR; INTRAVENOUS at 06:30

## 2025-01-19 RX ADMIN — BUSPIRONE HYDROCHLORIDE 10 MG: 10 TABLET ORAL at 19:33

## 2025-01-19 RX ADMIN — LEVOTHYROXINE SODIUM 125 MCG: 0.12 TABLET ORAL at 06:36

## 2025-01-19 RX ADMIN — SODIUM CHLORIDE, PRESERVATIVE FREE 10 ML: 5 INJECTION INTRAVENOUS at 19:34

## 2025-01-19 ASSESSMENT — PAIN DESCRIPTION - ORIENTATION
ORIENTATION: LEFT
ORIENTATION: RIGHT

## 2025-01-19 ASSESSMENT — PAIN SCALES - GENERAL
PAINLEVEL_OUTOF10: 8
PAINLEVEL_OUTOF10: 9
PAINLEVEL_OUTOF10: 9

## 2025-01-19 ASSESSMENT — PAIN DESCRIPTION - LOCATION
LOCATION: EYE
LOCATION: HEAD
LOCATION: HEAD

## 2025-01-19 ASSESSMENT — PAIN DESCRIPTION - DESCRIPTORS
DESCRIPTORS: HEAVINESS;THROBBING
DESCRIPTORS: ACHING;THROBBING

## 2025-01-19 NOTE — PLAN OF CARE
Problem: Discharge Planning  Goal: Discharge to home or other facility with appropriate resources  1/19/2025 0152 by Natalio Hogan RN  Outcome: Progressing  Flowsheets (Taken 1/19/2025 0152)  Discharge to home or other facility with appropriate resources:   Identify discharge learning needs (meds, wound care, etc)   Arrange for needed discharge resources and transportation as appropriate   Identify barriers to discharge with patient and caregiver   Arrange for interpreters to assist at discharge as needed   Refer to discharge planning if patient needs post-hospital services based on physician order or complex needs related to functional status, cognitive ability or social support system     Problem: Safety - Adult  Goal: Free from fall injury  1/19/2025 0152 by Natalio Hogan RN  Outcome: Progressing  Flowsheets (Taken 1/19/2025 0152)  Free From Fall Injury:   Instruct family/caregiver on patient safety   Based on caregiver fall risk screen, instruct family/caregiver to ask for assistance with transferring infant if caregiver noted to have fall risk factors     Problem: Pain  Goal: Verbalizes/displays adequate comfort level or baseline comfort level  1/19/2025 0152 by Natalio Hogan RN  Outcome: Progressing  Flowsheets (Taken 1/19/2025 0152)  Verbalizes/displays adequate comfort level or baseline comfort level:   Notify Licensed Independent Practitioner if interventions unsuccessful or patient reports new pain   Consider cultural and social influences on pain and pain management   Implement non-pharmacological measures as appropriate and evaluate response   Administer analgesics based on type and severity of pain and evaluate response   Assess pain using appropriate pain scale   Encourage patient to monitor pain and request assistance     Problem: Chronic Conditions and Co-morbidities  Goal: Patient's chronic conditions and co-morbidity symptoms are monitored and maintained or improved  Outcome:  Progressing  Flowsheets (Taken 1/19/2025 0152)  Care Plan - Patient's Chronic Conditions and Co-Morbidity Symptoms are Monitored and Maintained or Improved:   Monitor and assess patient's chronic conditions and comorbid symptoms for stability, deterioration, or improvement   Collaborate with multidisciplinary team to address chronic and comorbid conditions and prevent exacerbation or deterioration   Update acute care plan with appropriate goals if chronic or comorbid symptoms are exacerbated and prevent overall improvement and discharge     Problem: Hematologic - Adult  Goal: Maintains hematologic stability  Outcome: Progressing  Flowsheets (Taken 1/19/2025 0152)  Maintains hematologic stability:   Administer blood products/factors as ordered   Assess for signs and symptoms of bleeding or hemorrhage   Monitor labs for bleeding or clotting disorders  Care plan reviewed with patient.  Patient verbalizes understanding of the care plan and contributed to goal setting.

## 2025-01-19 NOTE — PROGRESS NOTES
Warfarin Pharmacy Consult Note    Warfarin Indication: Hx of DVT & PE  Target INR: 2.0-3.0  Dose prior to admission: variable    Recent Labs     01/18/25  0609 01/18/25  1810 01/19/25  0338   HGB 7.7* 6.8* 8.5*   *  --  787*     Recent Labs     01/17/25  0650 01/18/25  0609 01/19/25  0338   INR 3.23* 2.13* 1.82*     Concurrent anticoagulants/antiplatelets: none  Significant warfarin drug-drug interactions: levothyroxine, completed 7 day course of fluconazole 200 mg on 1/14     Date INR Warfarin Dose   1/16/2025 3.5 none   1/17/2025  3.23 0.5 mg   1/18/25  2.13  1.5 mg     1/19/25  1.82 2 mg                                Monitoring:                   INR will be monitored daily.    **Please contact pharmacy for discharge instructions when indicated**    Anastasia Stoddard R.Ph., BCPS., 1/19/2025,12:06 PM

## 2025-01-19 NOTE — PROGRESS NOTES
[facility] Infectious Disease         Progress Note      Marry Ayers  MEDICAL RECORD NUMBER:  [id number]  AGE: 74 y.o.   GENDER: female  : 1950  EPISODE DATE:  2025      Assessment:     Patient is a 74-year-old female who I am consulted for persistent drainage from an inferior wound.     This patient has a fairly complicated abdominal surgical history and initially underwent resection of sigmoid as well as partial small bowel resection on  followed by repair of a colonic perforation and creation of a loop ileostomy on .  She had a subsequent ostomy bag placed at the inferior abdominal wound to help collect drainage.  The intra-abdominal findings on initial operative intervention demonstrated diffuse evidence of infection throughout.  This patient is a high risk for recurrent abdominal complications and additional surgery will likely lead to potentially more issues.      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  At some point, resistance will develop with continued antimicrobials.  Currently on Unasyn.     Will await general surgery evaluation given the evidence of a fistula  Remains on therapy with ampicillin-sulbactam empirically  Duration of therapy remains to be decided on above    Subjective:     Chief Complaint   Patient presents with    Wound Check         No acute events overnight.  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

## 2025-01-19 NOTE — CONSENT
Informed Consent for Blood Component Transfusion Note    I have discussed with the patient the rationale for blood component transfusion; its benefits in treating or preventing fatigue, organ damage, or death; and its risk which includes mild transfusion reactions, rare risk of blood borne infection, or more serious but rare reactions. I have discussed the alternatives to transfusion, including the risk and consequences of not receiving transfusion. The patient had an opportunity to ask questions and had agreed to proceed with transfusion of blood components.    Electronically signed by LOY Braden CNP on 1/18/25 at 11:52 PM EST

## 2025-01-19 NOTE — PROGRESS NOTES
Hospitalist Progress Note    Patient:  Marry Ayers      Unit/Bed:6K-02/002-A    YOB: 1950    MRN: [id number]       Acct: [id number]     PCP: Silvestre Sawyer MD    Date of Admission: 1/16/2025    Assessment/Plan:    Chest pressure after blood transfusion. Resolved this AM. Troponin negative. No EKG changes. Echo ordered.  Abdominal pain. Imaging for enteric fistula. Concerns for Ileostomy leak on admission. Patient had recent laparoscopic ileostomy creation. She has 2nd ostomy created for drainage of abdominal fluid. Recent admission for leak and ID was consulted for infection.Treated with IV Unasyn and Diflucan transitioned to Augmentin and Diflucan on discharge. Continue IV Unasyn. ID has been re consulted. Dr. Callahan will see in AM.  Recurrent LINDA, resolved with hydration. Creatinine on arrival 2.8. Down to 0.7 today which is her baseline. Hyzaar held. Restarted Cozaar 1/18.  Avoid nephrotoxic agents. Renally dose medications. Repeat BMP in AM.  High anion gap metabolic acidosis, improved. Lactic acid 1.4. IV stopped 1/18.   Small left pleural effusion. Given Lasix x 1 1/18.  Hyperkalemia, resolved. Due to LINDA. S/p Insulin and Dextrose given. Calcium given.  Probable Sepsis(POA) due to #1. SIRS Criteria on arrival: Heart Rate >90 and WBC >12k or <4k or >10% Bands. Organ Dysfunction: Creatinine >2.0 and INR >1.5 or aPTT >60sec. Cultures Blood x2 and Urine pending. Continue IV Unasyn. Volume Expansion: 30cc/kg of Lactated Ringers was given. Continue maintenance fluids. WBC downward trend 9.6 today. Afebrile.   Supratherapeutic INR, resolved: Possibly due to prescription of Diflucan. Diflucan has been held.  Vitamin K given at [facility]. No active bleeding. INR 1.82 today. Pharmacy dosing Coumadin. Daily INRs.  Sinus tachycardia. EKG reviewed. Echo ordered. Replacing electrolytes.   Hypomagnesia, mag 1.4. Magnesium replacement protocol in place. Repeat mag in AM.   Hyponatremia, resolved.   Acute  electronically signed by: Pavel Ramos MD on    01/16/2025 08:49 PM      All CTs at this facility use dose modulation techniques and iterative    reconstructions, and/or weight-based dosing   when appropriate to reduce radiation to a low as reasonably achievable.      Vascular duplex lower extremity venous bilateral   Final Result      1. Lack of flow and compressibility in the right peroneal vein and a soleal vein   consistent with thrombosis.   2. The remaining vessels in the lower extremities are patent..               **This report has been created using voice recognition software. It may contain   minor errors which are inherent in voice recognition technology.**            Electronically signed by Dr. Ramos Day      XR CHEST PORTABLE   Final Result   No interval change  since previous study dated 12/22/2024..               **This report has been created using voice recognition software. It may contain   minor errors which are inherent in voice recognition technology.**      Electronically signed by Dr. Ramos Day          Diet: ADULT DIET; Regular    DVT prophylaxis: [x] Lovenox                                 [] SCDs                                 [] SQ Heparin                                 [] Encourage ambulation           [] Already on Anticoagulation     Disposition:    [] Home       [] TCU       [] Rehab       [] Psych       [x] SNF       [] Long Term Care Facility       [] Other-    Code Status: Full Code    PT/OT Eval Status: Yes        Electronically signed by LOY Peterson CNP on 1/19/2025 at 11:57 AM

## 2025-01-19 NOTE — PROCEDURES
PROCEDURE NOTE  Date: 1/19/2025   Name: Marry Ayers  YOB: 1950    Procedures      EKG was completed and handed to RN

## 2025-01-19 NOTE — SIGNIFICANT EVENT
Marry Ayers is a 74-year-old female presented to [facility] 1/16/25 with chief complaint of sepsis, acute kidney injury, supratherapeutic INR, ileostomy with possible leak.    Patient has past medical history significant for current everyday smoker, essential hypertension, GERD, DVT/PE, hypothyroidism.    Patient is currently receiving PRBC for hemoglobin of 6.8.    Nursing called stating patient had gotten up to walk to the bathroom when returning back to bed became tachycardic with complaint of midsternal chest pressure.  Patient identifies complaint of midsternal chest pressure that is aggravated with deep inspiration.  She tells me that \"it is all gone at the time my evaluation.  This was not associated with any diaphoresis nausea vomiting or shortness of breath.  EKG was completed sinus tachycardia heart rate 118  QTc 442.  Patient is currently on 2 L per nasal cannula  1/18/2025 INR was 2.13  Labs are pending at this time.    Chest x-ray was reviewed from earlier 1/19/2025

## 2025-01-20 ENCOUNTER — APPOINTMENT (OUTPATIENT)
Dept: CT IMAGING | Age: 75
DRG: 872 | End: 2025-01-20
Payer: MEDICARE

## 2025-01-20 LAB
ANION GAP SERPL CALC-SCNC: 16 MEQ/L (ref 8–16)
BASOPHILS ABSOLUTE: 0.1 THOU/MM3 (ref 0–0.1)
BASOPHILS NFR BLD AUTO: 0.5 %
BUN SERPL-MCNC: 7 MG/DL (ref 7–22)
CALCIUM SERPL-MCNC: 8.4 MG/DL (ref 8.5–10.5)
CHLORIDE SERPL-SCNC: 105 MEQ/L (ref 98–111)
CO2 SERPL-SCNC: 18 MEQ/L (ref 23–33)
CREAT SERPL-MCNC: 0.8 MG/DL (ref 0.4–1.2)
DEPRECATED RDW RBC AUTO: 52.1 FL (ref 35–45)
EOSINOPHIL NFR BLD AUTO: 3.7 %
EOSINOPHILS ABSOLUTE: 0.4 THOU/MM3 (ref 0–0.4)
ERYTHROCYTE [DISTWIDTH] IN BLOOD BY AUTOMATED COUNT: 16.2 % (ref 11.5–14.5)
GFR SERPL CREATININE-BSD FRML MDRD: 77 ML/MIN/1.73M2
GLUCOSE BLD STRIP.AUTO-MCNC: 107 MG/DL (ref 70–108)
GLUCOSE BLD STRIP.AUTO-MCNC: 118 MG/DL (ref 70–108)
GLUCOSE BLD STRIP.AUTO-MCNC: 124 MG/DL (ref 70–108)
GLUCOSE BLD STRIP.AUTO-MCNC: 125 MG/DL (ref 70–108)
GLUCOSE SERPL-MCNC: 106 MG/DL (ref 70–108)
HCT VFR BLD AUTO: 27.8 % (ref 37–47)
HGB BLD-MCNC: 8.6 GM/DL (ref 12–16)
IMM GRANULOCYTES # BLD AUTO: 0.07 THOU/MM3 (ref 0–0.07)
IMM GRANULOCYTES NFR BLD AUTO: 0.6 %
INR PPP: 1.9 (ref 0.85–1.13)
LYMPHOCYTES ABSOLUTE: 1.9 THOU/MM3 (ref 1–4.8)
LYMPHOCYTES NFR BLD AUTO: 16.8 %
MAGNESIUM SERPL-MCNC: 1.7 MG/DL (ref 1.6–2.4)
MCH RBC QN AUTO: 27.2 PG (ref 26–33)
MCHC RBC AUTO-ENTMCNC: 30.9 GM/DL (ref 32.2–35.5)
MCV RBC AUTO: 88 FL (ref 81–99)
MONOCYTES ABSOLUTE: 0.9 THOU/MM3 (ref 0.4–1.3)
MONOCYTES NFR BLD AUTO: 7.8 %
NEUTROPHILS ABSOLUTE: 8 THOU/MM3 (ref 1.8–7.7)
NEUTROPHILS NFR BLD AUTO: 70.6 %
NRBC BLD AUTO-RTO: 0 /100 WBC
PLATELET # BLD AUTO: 907 THOU/MM3 (ref 130–400)
PMV BLD AUTO: 9.4 FL (ref 9.4–12.4)
POTASSIUM SERPL-SCNC: 3.6 MEQ/L (ref 3.5–5.2)
RBC # BLD AUTO: 3.16 MILL/MM3 (ref 4.2–5.4)
SODIUM SERPL-SCNC: 139 MEQ/L (ref 135–145)
WBC # BLD AUTO: 11.4 THOU/MM3 (ref 4.8–10.8)

## 2025-01-20 PROCEDURE — 97530 THERAPEUTIC ACTIVITIES: CPT

## 2025-01-20 PROCEDURE — 99024 POSTOP FOLLOW-UP VISIT: CPT | Performed by: SURGERY

## 2025-01-20 PROCEDURE — 1200000000 HC SEMI PRIVATE

## 2025-01-20 PROCEDURE — 6360000002 HC RX W HCPCS: Performed by: NURSE PRACTITIONER

## 2025-01-20 PROCEDURE — 83735 ASSAY OF MAGNESIUM: CPT

## 2025-01-20 PROCEDURE — 99233 SBSQ HOSP IP/OBS HIGH 50: CPT | Performed by: NURSE PRACTITIONER

## 2025-01-20 PROCEDURE — 6370000000 HC RX 637 (ALT 250 FOR IP): Performed by: NURSE PRACTITIONER

## 2025-01-20 PROCEDURE — 71275 CT ANGIOGRAPHY CHEST: CPT

## 2025-01-20 PROCEDURE — 85025 COMPLETE CBC W/AUTO DIFF WBC: CPT

## 2025-01-20 PROCEDURE — 6360000004 HC RX CONTRAST MEDICATION: Performed by: STUDENT IN AN ORGANIZED HEALTH CARE EDUCATION/TRAINING PROGRAM

## 2025-01-20 PROCEDURE — 2580000003 HC RX 258: Performed by: NURSE PRACTITIONER

## 2025-01-20 PROCEDURE — 2580000003 HC RX 258

## 2025-01-20 PROCEDURE — 36415 COLL VENOUS BLD VENIPUNCTURE: CPT

## 2025-01-20 PROCEDURE — 82948 REAGENT STRIP/BLOOD GLUCOSE: CPT

## 2025-01-20 PROCEDURE — 97535 SELF CARE MNGMENT TRAINING: CPT

## 2025-01-20 PROCEDURE — 85610 PROTHROMBIN TIME: CPT

## 2025-01-20 PROCEDURE — 6370000000 HC RX 637 (ALT 250 FOR IP)

## 2025-01-20 PROCEDURE — 99232 SBSQ HOSP IP/OBS MODERATE 35: CPT | Performed by: STUDENT IN AN ORGANIZED HEALTH CARE EDUCATION/TRAINING PROGRAM

## 2025-01-20 PROCEDURE — 97166 OT EVAL MOD COMPLEX 45 MIN: CPT

## 2025-01-20 PROCEDURE — 2500000003 HC RX 250 WO HCPCS

## 2025-01-20 PROCEDURE — 97162 PT EVAL MOD COMPLEX 30 MIN: CPT

## 2025-01-20 PROCEDURE — 80048 BASIC METABOLIC PNL TOTAL CA: CPT

## 2025-01-20 RX ORDER — WARFARIN SODIUM 1 MG/1
1 TABLET ORAL ONCE
Status: COMPLETED | OUTPATIENT
Start: 2025-01-20 | End: 2025-01-20

## 2025-01-20 RX ORDER — ENOXAPARIN SODIUM 100 MG/ML
1 INJECTION SUBCUTANEOUS EVERY 12 HOURS
Status: DISCONTINUED | OUTPATIENT
Start: 2025-01-20 | End: 2025-01-21

## 2025-01-20 RX ORDER — METOPROLOL SUCCINATE 25 MG/1
75 TABLET, EXTENDED RELEASE ORAL DAILY
Status: ON HOLD | DISCHARGE
Start: 2025-01-20

## 2025-01-20 RX ORDER — FERROUS SULFATE 325(65) MG
325 TABLET ORAL 2 TIMES DAILY
Status: ON HOLD | DISCHARGE
Start: 2025-01-20

## 2025-01-20 RX ORDER — LOSARTAN POTASSIUM 50 MG/1
50 TABLET ORAL 2 TIMES DAILY
Status: ON HOLD | DISCHARGE
Start: 2025-01-20

## 2025-01-20 RX ORDER — IOPAMIDOL 755 MG/ML
80 INJECTION, SOLUTION INTRAVASCULAR
Status: COMPLETED | OUTPATIENT
Start: 2025-01-20 | End: 2025-01-20

## 2025-01-20 RX ADMIN — ACETAMINOPHEN 650 MG: 325 TABLET ORAL at 22:13

## 2025-01-20 RX ADMIN — LOSARTAN POTASSIUM 50 MG: 50 TABLET, FILM COATED ORAL at 20:04

## 2025-01-20 RX ADMIN — SODIUM CHLORIDE 20 ML/HR: 9 INJECTION, SOLUTION INTRAVENOUS at 05:45

## 2025-01-20 RX ADMIN — METOPROLOL SUCCINATE 75 MG: 25 TABLET, EXTENDED RELEASE ORAL at 08:47

## 2025-01-20 RX ADMIN — SODIUM CHLORIDE, PRESERVATIVE FREE 10 ML: 5 INJECTION INTRAVENOUS at 20:05

## 2025-01-20 RX ADMIN — BUSPIRONE HYDROCHLORIDE 10 MG: 10 TABLET ORAL at 20:04

## 2025-01-20 RX ADMIN — AMPICILLIN SODIUM AND SULBACTAM SODIUM 3000 MG: 2; 1 INJECTION, POWDER, FOR SOLUTION INTRAMUSCULAR; INTRAVENOUS at 05:47

## 2025-01-20 RX ADMIN — BUSPIRONE HYDROCHLORIDE 10 MG: 10 TABLET ORAL at 08:47

## 2025-01-20 RX ADMIN — WARFARIN SODIUM 1 MG: 1 TABLET ORAL at 18:57

## 2025-01-20 RX ADMIN — AMPICILLIN SODIUM AND SULBACTAM SODIUM 3000 MG: 2; 1 INJECTION, POWDER, FOR SOLUTION INTRAMUSCULAR; INTRAVENOUS at 19:27

## 2025-01-20 RX ADMIN — LOSARTAN POTASSIUM 50 MG: 50 TABLET, FILM COATED ORAL at 08:47

## 2025-01-20 RX ADMIN — SODIUM CHLORIDE 20 ML/HR: 9 INJECTION, SOLUTION INTRAVENOUS at 00:29

## 2025-01-20 RX ADMIN — MONTELUKAST 10 MG: 10 TABLET, FILM COATED ORAL at 08:47

## 2025-01-20 RX ADMIN — AMPICILLIN SODIUM AND SULBACTAM SODIUM 3000 MG: 2; 1 INJECTION, POWDER, FOR SOLUTION INTRAMUSCULAR; INTRAVENOUS at 00:30

## 2025-01-20 RX ADMIN — PANTOPRAZOLE SODIUM 40 MG: 40 TABLET, DELAYED RELEASE ORAL at 20:04

## 2025-01-20 RX ADMIN — ENOXAPARIN SODIUM 80 MG: 100 INJECTION SUBCUTANEOUS at 20:04

## 2025-01-20 RX ADMIN — LEVOTHYROXINE SODIUM 125 MCG: 0.12 TABLET ORAL at 05:40

## 2025-01-20 RX ADMIN — SODIUM CHLORIDE 125 MG: 9 INJECTION, SOLUTION INTRAVENOUS at 14:40

## 2025-01-20 RX ADMIN — AMPICILLIN SODIUM AND SULBACTAM SODIUM 3000 MG: 2; 1 INJECTION, POWDER, FOR SOLUTION INTRAMUSCULAR; INTRAVENOUS at 13:28

## 2025-01-20 RX ADMIN — IOPAMIDOL 80 ML: 755 INJECTION, SOLUTION INTRAVENOUS at 12:05

## 2025-01-20 ASSESSMENT — PAIN DESCRIPTION - ORIENTATION: ORIENTATION: RIGHT;LEFT

## 2025-01-20 ASSESSMENT — PAIN - FUNCTIONAL ASSESSMENT: PAIN_FUNCTIONAL_ASSESSMENT: ACTIVITIES ARE NOT PREVENTED

## 2025-01-20 ASSESSMENT — PAIN SCALES - GENERAL: PAINLEVEL_OUTOF10: 9

## 2025-01-20 ASSESSMENT — PAIN DESCRIPTION - LOCATION: LOCATION: HEAD

## 2025-01-20 ASSESSMENT — PAIN DESCRIPTION - DESCRIPTORS: DESCRIPTORS: ACHING

## 2025-01-20 NOTE — CARE COORDINATION
1/20/25, 3:36 PM EST    DISCHARGE PLANNING EVALUATION     SW consult received as Patient is from [facility] and was wants to return at discharge.  ROBIN had received a message from Kathryn asking for updates on Friday.  ROBIN faxing updates today and ROBIN left message with Kathryn regarding possible discharge tomorrow.

## 2025-01-20 NOTE — PROGRESS NOTES
CT ABDOMEN PELVIS WO CONTRAST Additional Contrast? None   Final Result   Impression:      Anterior midline pelvic wall enteric fistula      Left pleural effusion with lower lobe atelectasis      No other change from previous study      This document has been electronically signed by: Pavel Ramos MD on    01/16/2025 08:49 PM      All CTs at this facility use dose modulation techniques and iterative    reconstructions, and/or weight-based dosing   when appropriate to reduce radiation to a low as reasonably achievable.      Vascular duplex lower extremity venous bilateral   Final Result      1. Lack of flow and compressibility in the right peroneal vein and a soleal vein   consistent with thrombosis.   2. The remaining vessels in the lower extremities are patent..               **This report has been created using voice recognition software. It may contain   minor errors which are inherent in voice recognition technology.**            Electronically signed by Dr. Ramos Day      XR CHEST PORTABLE   Final Result   No interval change  since previous study dated 12/22/2024..               **This report has been created using voice recognition software. It may contain   minor errors which are inherent in voice recognition technology.**      Electronically signed by Dr. Ramos Day      CTA CHEST W WO CONTRAST    (Results Pending)       Diet: ADULT DIET; Regular    DVT prophylaxis: [x] Lovenox                                 [] SCDs                                 [] SQ Heparin                                 [] Encourage ambulation           [x] Already on Anticoagulation     Disposition:    [] Home       [] TCU       [] Rehab       [] Psych       [x] SNF       [] Long Term Care Facility       [] Other-    Code Status: Full Code    PT/OT Eval Status: Yes        Electronically signed by LOY Peterson CNP on 1/20/2025 at 12:34 PM

## 2025-01-20 NOTE — PLAN OF CARE
Problem: Discharge Planning  Goal: Discharge to home or other facility with appropriate resources  Outcome: Progressing  Flowsheets (Taken 1/20/2025 0401)  Discharge to home or other facility with appropriate resources:   Identify barriers to discharge with patient and caregiver   Identify discharge learning needs (meds, wound care, etc)   Arrange for interpreters to assist at discharge as needed   Arrange for needed discharge resources and transportation as appropriate   Refer to discharge planning if patient needs post-hospital services based on physician order or complex needs related to functional status, cognitive ability or social support system     Problem: Safety - Adult  Goal: Free from fall injury  Outcome: Progressing  Flowsheets (Taken 1/20/2025 0401)  Free From Fall Injury:   Instruct family/caregiver on patient safety   Based on caregiver fall risk screen, instruct family/caregiver to ask for assistance with transferring infant if caregiver noted to have fall risk factors     Problem: Pain  Goal: Verbalizes/displays adequate comfort level or baseline comfort level  Outcome: Progressing  Flowsheets (Taken 1/20/2025 0401)  Verbalizes/displays adequate comfort level or baseline comfort level:   Encourage patient to monitor pain and request assistance   Assess pain using appropriate pain scale   Implement non-pharmacological measures as appropriate and evaluate response   Consider cultural and social influences on pain and pain management   Notify Licensed Independent Practitioner if interventions unsuccessful or patient reports new pain   Administer analgesics based on type and severity of pain and evaluate response     Problem: Chronic Conditions and Co-morbidities  Goal: Patient's chronic conditions and co-morbidity symptoms are monitored and maintained or improved  Outcome: Progressing  Flowsheets (Taken 1/20/2025 0401)  Care Plan - Patient's Chronic Conditions and Co-Morbidity Symptoms are Monitored

## 2025-01-20 NOTE — PROGRESS NOTES
Used   Substance Use Topics    Alcohol use: Not Currently    Drug use: Never       ALLERGIES    Allergies   Allergen Reactions    Seasonal Cough     Molds, Cats       MEDICATIONS    No current facility-administered medications on file prior to encounter.     Current Outpatient Medications on File Prior to Encounter   Medication Sig Dispense Refill    amoxicillin-clavulanate (AUGMENTIN) 875-125 MG per tablet Take 1 tablet by mouth 2 times daily for 13 days      Lactobacillus (PROBIOTIC ACIDOPHILUS PO) Take 1 tablet by mouth daily (with breakfast)      cetirizine (ZYRTEC) 5 MG tablet Take 1 tablet by mouth daily      Calcium Carb-Cholecalciferol 500-5 MG-MCG TABS Take 1 tablet by mouth daily      busPIRone (BUSPAR) 10 MG tablet Take 1 tablet by mouth 2 times daily      levothyroxine (SYNTHROID) 125 MCG tablet Take 1 tablet by mouth daily      losartan-hydroCHLOROthiazide (HYZAAR) 100-25 MG per tablet Take 1 tablet by mouth daily      Melatonin 10 MG TABS Take 10 mg by mouth nightly      metoprolol succinate (TOPROL XL) 50 MG extended release tablet Take 1 tablet by mouth daily      montelukast (SINGULAIR) 10 MG tablet Take 1 tablet by mouth daily      pantoprazole (PROTONIX) 40 MG tablet Take 1 tablet by mouth nightly      potassium chloride (K-TAB) 20 MEQ TBCR extended release tablet Take 1 tablet by mouth daily      warfarin (COUMADIN) 5 MG tablet Take 0.5 tablets by mouth every evening      acetaminophen (TYLENOL) 325 MG tablet Take 1 tablet by mouth every 6 hours as needed         REVIEW OF SYSTEMS    Constitutional: no fever, no night sweats, no fatigue.  Head: no head ache , no head injury, no migranes.  Eye: no blurring of vision, no double vision.  Ears: no hearing difficulty, no tinnitus  Mouth/throat: no ulceration, dental caries , dysphagia  Lungs: no cough no chest pain  CVS: no palpitation, no chest pain, no shortness of breath  GI: no abdominal pain, no nausea , no vomiting, no constipation  ALEENA: no  dysuria, frequency and urgency, no hematuria, no kidney stones  Musculoskeletal: no joint pain, swelling , stiffness,  Endocrine: no polyuria, polydipsia, no cold or heat intolerance  Hematology: no anemia, no easy brusing or bleeding, no hx of clotting disorder  Dermatology: no skin rash, no eczema, no pruritis,  Psychiatry: no depression, no anxiety,no panic attacks, no suicide ideation      Objective:      BP (!) 148/63   Pulse (!) 118   Temp 97.4 °F (36.3 °C) (Oral)   Resp 18   Wt 77.3 kg (170 lb 6.4 oz)   SpO2 96%   BMI 32.20 kg/m²     Wt Readings from Last 3 Encounters:   01/19/25 77.3 kg (170 lb 6.4 oz)   01/10/25 81.6 kg (180 lb)   01/07/25 88.3 kg (194 lb 10.7 oz)       Immunization History   Administered Date(s) Administered    COVID-19, MODERNA Bivalent, (age 12y+), IM, 50 mcg/0.5 mL 10/05/2023    COVID-19, MODERNA, (age 12y+), IM, 50mcg/0.5mL 09/03/2024    COVID-19, PFIZER Bivalent, DO NOT Dilute, (age 12y+), IM, 30 mcg/0.3 mL 10/28/2022    COVID-19, PFIZER PURPLE top, DILUTE for use, (age 12 y+), 30mcg/0.3mL 03/11/2021, 04/02/2021, 10/25/2021    Influenza, FLUZONE High Dose (age 65 y+), IM, Quadv, 0.7mL 09/05/2020, 09/01/2023    Zoster Recombinant (Shingrix) 10/05/2023, 01/22/2024       PHYSICAL EXAM    BP (!) 148/63   Pulse (!) 118   Temp 97.4 °F (36.3 °C) (Oral)   Resp 18   Wt 77.3 kg (170 lb 6.4 oz)   SpO2 96%   BMI 32.20 kg/m²   General:  Awake, alert, not in distress.  HEENT: pink conjunctiva, unicteric sclera, moist oral mucosa.  Chest:  bilateral air entry, Clear to auscultation,   Cardiovascular:  RRR ,S1S2, no murmur or gallop.  Abdomen:  Soft, non tender to palpation.  Extremities: no edema  Skin:  Warm and dry.  CNS: aox3         LABS       CBC:   Lab Results   Component Value Date/Time    WBC 9.6 01/19/2025 03:38 AM    HGB 8.5 01/19/2025 03:38 AM    HCT 27.2 01/19/2025 03:38 AM    MCV 87.5 01/19/2025 03:38 AM     01/19/2025 03:38 AM     BMP:   Lab Results   Component Value

## 2025-01-20 NOTE — PROGRESS NOTES
Warfarin Pharmacy Consult Note    Warfarin Indication: Hx of DVT & PE  Target INR: 2.0-3.0  Dose prior to admission: variable since 1/7 discharge, (on hold since 1/12/25 due to high INR-fluconazole 200 mg daily 1/8-1/14, Prior to this Warfarin dose PTA: 7.5mg Tuesday 5mg [facility] with [facility] Coumadin [facility])    Recent Labs     01/18/25  0609 01/18/25  1810 01/19/25  0338 01/20/25  0608   HGB 7.7* 6.8* 8.5* 8.6*   *  --  787* 907*     Recent Labs     01/18/25  0609 01/19/25  0338 01/20/25  0608   INR 2.13* 1.82* 1.90*     Concurrent anticoagulants/antiplatelets: lovenox 80mg Q12H.   Significant warfarin drug-drug interactions: levothyroxine, completed 7 day course of fluconazole 200 mg on 1/14     Date INR Warfarin Dose   1/16/2025 3.5 none   1/17/2025  3.23 0.5 mg   1/18/2025  2.13  1.5 mg     1/19/2025  1.82 2 mg     1/20/2025 1.9  1 mg                      Monitoring:                   INR will be monitored daily.    **Please contact pharmacy for discharge instructions when indicated**  Monika Glass, PharmD 1/20/2025 7:44 AM

## 2025-01-20 NOTE — PLAN OF CARE
Problem: Discharge Planning  Goal: Discharge to home or other facility with appropriate resources  1/20/2025 1544 by Chaya Ladd, MSW, LSW  Outcome: Progressing  Flowsheets (Taken 1/20/2025 1544)  Discharge to home or other facility with appropriate resources: Identify barriers to discharge with patient and caregiver  Note: Return to [facility], see SW notes 1/20/25.

## 2025-01-20 NOTE — PROGRESS NOTES
[facility]  INPATIENT PHYSICAL THERAPY  EVALUATION  STRZ RENAL TELEMETRY 6K - 6K-02/002-A    Discharge Recommendations: Home with Home health PT  Equipment Recommendations: No               Time In: 0944  Time Out: 1010  Timed Code Treatment Minutes: 15 Minutes  Minutes: 26          Date: 2025  Patient Name: Marry Ayers,  Gender:  female        MRN: [id number]  : 1950  (74 y.o.)      Referring Practitioner: Cata White APRN - CNP  Diagnosis: Acute kidney injury (HCC)  Additional Pertinent Hx: Per EMR: \"74-year-old female with fairly complicated abdominal surgical history and initially underwent resection of sigmoid as well as partial small bowel resection on  followed by repair of a colonic perforation and creation of a loop ileostomy on .  She had a subsequent ostomy bag placed at the inferior abdominal wound to help collect drainage. The intra-abdominal findings on initial operative intervention demonstrated diffuse evidence of infection throughout. Nursing home staff reported tachycardia, dehydration, lack of appetite, and stool change in ostomy bag. Concerns for Ileostomy leak on admission\"     Restrictions/Precautions:  Restrictions/Precautions: General Precautions       Other Position/Activity Restrictions: *leaking ileostomy           Subjective:  Chart Reviewed: Yes  Patient assessed for rehabilitation services?: Yes  Family/Caregiver Present: Yes (Daughter)  Subjective: Clearance from RN to see pt this date. Pt was semi-supine in bed when PT arrived. Daughter is present. Pt is agreeable to PT evaluation. She reports mild increase in fear of mobility 2/2 ileostomy frequently coming off.    General:  Overall Orientation Status: Within Normal Limits  Orientation Level: Oriented X4  Overall Cognitive Status: WNL  Vision: Within Functional Limits  Hearing: Within functional limits       Pain: -/10: PT does not quantify pain but reports burning  ,DirectAddress_Unknown

## 2025-01-20 NOTE — PROGRESS NOTES
Occupational Therapy  [facility]  INPATIENT OCCUPATIONAL THERAPY  STRZ RENAL TELEMETRY 6K  EVALUATION      Discharge Recommendations: Continue to assess pending progress, Home with Home health OT  Equipment Recommendations: No      Time In: 1130  Time Out: 1155  Timed Code Treatment Minutes: 17 Minutes  Minutes: 25          Date: 2025  Patient Name: Marry Ayers,   Gender: female      MRN: [id number]  : 1950  (74 y.o.)  Referring Practitioner: Cata White APRN - CNP  Diagnosis: Acute kidney injury (HCC)  Additional Pertinent Hx: 74-year-old female with fairly complicated abdominal surgical history and initially underwent resection of sigmoid as well as partial small bowel resection on  followed by repair of a colonic perforation and creation of a loop ileostomy on .  She had a subsequent ostomy bag placed at the inferior abdominal wound to help collect drainage. The intra-abdominal findings on initial operative intervention demonstrated diffuse evidence of infection throughout. Nursing home staff reported tachycardia, dehydration, lack of appetite, and stool change in ostomy bag. Concerns for Ileostomy leak on admission    Restrictions/Precautions:  Restrictions/Precautions: General Precautions  Position Activity Restriction  Other Position/Activity Restrictions: *leaking ileostomy    Subjective  After using bathroom, pt reports her ileostomy is leaking causing burning pain. Pt returns to supine. Therapist informs PCT as RN initially not able to be reached. PCT in room emptying ileostomy bag. Later, this therapist informs RN.    Chart Reviewed: Yes, Orders, Progress Notes, History and Physical  Patient assessed for rehabilitation services?: Yes  Family / Caregiver Present: Yes       Pain: 0/10: after activity pt reports her ileostomy bag is leaking,causing burning pain. RN informed.    Vitals: Vitals not assessed per clinical judgement, see nursing  flowsheet    Social/Functional History:  Lives With: Spouse  Type of Home: House  Home Layout: One level  Home Access: Stairs to enter with rails  Entrance Stairs - Number of Steps: 3  Entrance Stairs - Rails: Right  Home Equipment: Cane, Rollator   Bathroom Shower/Tub: Tub/Shower unit  Bathroom Toilet: Standard  Bathroom Equipment: Grab bars in shower       Prior Level of Assist for ADLs: Independent  Prior Level of Assist for Homemaking: Independent  Prior Level of Assist for Transfers: Independent  Prior Level of Assist for Ambulation: Independent household ambulator, with or without device  Has the patient had two or more falls in the past year or any fall with injury in the past year?: No    Active : Yes     Additional Comments: Pt reports she was primarily wearing hospital gown at SNF d/t leaking ileostomy.    VISION:WNL    HEARING:  WNL    COGNITION: WFL    RANGE OF MOTION:  Bilateral Upper Extremity:  WNL    STRENGTH:  Bilateral Upper Extremity:  WNL    Hand Dominance: Right    SENSATION:   WFL    ADL:   Grooming: Supervision.  Pt demos oral hygiene while standing sink side with Sup.   Toileting: Independent.     Toilet Transfer: Independent.   .    IADL:   Not Tested    BALANCE:  Sitting Balance:  Independent.    Standing Balance: Independent, Supervision.      BED MOBILITY:  Supine to Sit: Independent    Sit to Supine: Independent      TRANSFERS:  Sit to Stand:  Independent.    Stand to Sit: Independent.      FUNCTIONAL MOBILITY:  Assistive Device: None  Assist Level:  Supervision.   Distance: To and from bathroom       Activity Tolerance:  Patient tolerance of  treatment: Fair treatment tolerance and Limited by pain      Functional Outcome Measures:  AM-PAC Inpatient Daily Activity Raw Score: 22     Modified [place]:  Premorbid Functional Status: Not Applicable  Current Functional Status:  Not Applicable    Assessment:  Assessment: 75 yo admitted d/t LINDA. Pt's independence with ADLs and IADLs is

## 2025-01-20 NOTE — DISCHARGE INSTR - COC
Continuity of Care Form    Patient Name: Marry Ayers   :  1950  MRN:  [id number]    Admit date:  2025  Discharge date:  25    Code Status Order: Full Code   Advance Directives:   Advance Care Flowsheet Documentation             Admitting Physician:  LOY Peterson - CNP  PCP: Silvestre Sawyer MD    Discharging Nurse: jessica de oliveira  Discharging Hospital Unit/Room#: 6K-02/002-A  Discharging Unit Phone Number: [phone]    Emergency Contact:   Extended Emergency Contact Information  Primary Emergency Contact: Antwon Ayers  Address: [address] [place] [address] [place] [address] [facility]  Home Phone: [phone]  Mobile Phone: [phone]  Relation: Spouse  Secondary Emergency Contact: Marta jaeger  Mobile Phone: [phone]  Relation: Child   needed? No    Past Surgical History:  Past Surgical History:   Procedure Laterality Date    ABDOMEN SURGERY      CARDIAC CATHETERIZATION      CARPAL TUNNEL RELEASE Bilateral     CATARACT REMOVAL      CERVICAL SPINE SURGERY      fusion    CLAVICLE SURGERY      COLONOSCOPY  2024    Dr. Prince    CYSTOSCOPY W/ URETERAL STENT PLACEMENT  2023    ESOPHAGOGASTRODUODENOSCOPY  2023    Dr. Tim Schirmer    ESOPHAGOGASTRODUODENOSCOPY  2022    Dr. Tim Schirmer    EYE SURGERY Right     2010- macular Hole Repair    HERNIA REPAIR      HYSTERECTOMY, TOTAL ABDOMINAL (CERVIX REMOVED)  1998    LAPAROTOMY N/A 2024    EXPLORATORY LAPOROTOMY, creation of iliostomy performed by Casimiro Callahan DO at [facility]    SIGMOID COLECTOMY  2023    SIGMOID COLECTOMY N/A 2024    Open Sigmoid Colectomy, Partial Small Bowel Resection performed by Casimiro Callahan DO at [facility] OR    TOTAL KNEE ARTHROPLASTY Left        Immunization History:   Immunization History   Administered Date(s) Administered    COVID-19, MODERNA Bivalent, (age 12y+), IM, 50 mcg/0.5 mL 10/05/2023    COVID-19, MODERNA, (age 12y+),  from Last 1 Encounters:   01/22/25 83.6 kg (184 lb 4.9 oz)     Mental Status:  oriented and alert    IV Access:  - None    Nursing Mobility/ADLs:  Walking   Assisted  Transfer  Assisted  Bathing  Assisted  Dressing  Assisted  Toileting  Assisted  Feeding  Independent  Med Admin  Independent  Med Delivery   none    Wound Care Documentation and Therapy:  Incision 12/04/24 Abdomen Medial (Active)   Dressing Status Clean;Dry;Intact 01/22/25 0855   Dressing Change Due 01/03/25 01/03/25 0018   Incision Cleansed Soap and water 01/04/25 0915   Dressing/Treatment Open to air 01/04/25 2019   Closure Staples 01/01/25 0900   Margins Approximated 01/04/25 2019   Incision Assessment Erythema 01/03/25 0930   Drainage Amount None (dry) 01/22/25 0855   Drainage Description Other (Comment) 01/03/25 0018   Odor None 01/03/25 0930   Eliza-incision Assessment Fragile;Blanchable erythema 01/03/25 0930   Number of days: 49       Incision 12/13/24 Abdomen Lower;Medial (Active)   Dressing Status Clean;Dry;Intact 01/21/25 0754   Incision Cleansed Not Cleansed 01/06/25 0749   Dressing/Treatment Other (comment) 01/04/25 2019   Closure Open to air 01/06/25 0749   Margins Other (Comment);Approximated 01/03/25 1309   Incision Assessment Erythema;Devitalized tissue 01/16/25 1634   Drainage Amount None (dry) 01/21/25 0754   Drainage Description Brown;Thin;Yellow 01/06/25 0749   Odor Mild 01/06/25 0749   Eliza-incision Assessment Blanchable erythema;Denuded 01/16/25 1634   Number of days: 39        Elimination:  Continence:   Bowel: ostomy  Bladder: Yes  Urinary Catheter: None   Colostomy/Ileostomy/Ileal Conduit: Yes  Ileostomy/Jejunostomy RLQ Loop ileostomy-Stomal Appliance: Clean, dry & intact  Ileostomy/Jejunostomy RLQ Loop ileostomy-Flange Size (inches): 35 Inches  Ileostomy/Jejunostomy RLQ Loop ileostomy-Stoma  Assessment: Pink  Ileostomy/Jejunostomy RLQ Loop ileostomy-Mucocutaneous Junction: Intact  Ileostomy/Jejunostomy RLQ Loop

## 2025-01-20 NOTE — PROGRESS NOTES
[facility] Continence Nurse  Progress Note       Marry Ayers  AGE: 74 y.o.   GENDER: female  : 1950  UNIT: 6K-02/002-A  TODAY'S DATE:  2025  ADMISSION DATE: 2025  9:16 AM  Subjective     Reason for [facility] Evaluation and Assessment: Ostomy supplies     Marry Ayers is a 74 y.o. female referred by:   [] Physician/PA/APRN  [x] Nursing  [] Other:     Assessment     Encounter: Present to patient room. Patient in bed. Assessment and photo to follow. Dr Callahan in room. Cleansed stoma with warm wash cloth, pat dry. Peristomal denudement noted. Stoma powder applied, excess dusted off. Skin prep applied to varsha-stomal skin. Nuhope adhesive applied to flange. Coloplast 2 piece red flange cut to fit to size, paste applied to inner edge of flange, centered over stoma, and applied. Pouch applied. Barrier extenders placed on outer edge of flange. Applied hands lightly over system to activate hydrocolloid. Will continue to follow. Additional supplies in room. Assisted patient to chair, call light in reach. Call for assistance/ concerns.     Ileostomy/Jejunostomy RLQ Loop ileostomy (Active)   Stomal Appliance 2 piece;Flat;Changed 25 1339   Flange Size (inches) 35 Inches 25 1339   Stoma  Assessment Pink;Protrudes;Moist 25 1339   Peristomal Assessment Denuded;Painful 25 1339   Mucocutaneous Junction Intact 25 1339   Treatment Pouch change;Site care;Stoma powder;Stoma paste;Liquid skin barrier 25 1339   Stool Appearance Loose 25 1339   Stool Color Green 25 1339   Stool Amount Large 25 1339   Output (mL) 100 ml 25 1339   Number of days: 37         Plan     Discharge Plan:  Placement for patient upon discharge:   [] Home with HH  [] Home with self/family member changing  [] Inpatient Rehab  [x] SNF/ECF  [] [facility]/ LTACH

## 2025-01-21 ENCOUNTER — APPOINTMENT (OUTPATIENT)
Age: 75
DRG: 872 | End: 2025-01-21
Attending: NURSE PRACTITIONER
Payer: MEDICARE

## 2025-01-21 PROBLEM — R07.89 CHEST PRESSURE: Status: ACTIVE | Noted: 2025-01-21

## 2025-01-21 PROBLEM — R00.0 SINUS TACHYCARDIA: Status: ACTIVE | Noted: 2025-01-21

## 2025-01-21 PROBLEM — J90 PLEURAL EFFUSION ON LEFT: Status: ACTIVE | Noted: 2025-01-21

## 2025-01-21 PROBLEM — E87.29 HIGH ANION GAP METABOLIC ACIDOSIS: Status: ACTIVE | Noted: 2025-01-21

## 2025-01-21 LAB
ANION GAP SERPL CALC-SCNC: 15 MEQ/L (ref 8–16)
BACTERIA BLD AEROBE CULT: NORMAL
BACTERIA BLD AEROBE CULT: NORMAL
BASOPHILS ABSOLUTE: 0.1 THOU/MM3 (ref 0–0.1)
BASOPHILS NFR BLD AUTO: 0.6 %
BUN SERPL-MCNC: 7 MG/DL (ref 7–22)
CALCIUM SERPL-MCNC: 8.5 MG/DL (ref 8.5–10.5)
CHLORIDE SERPL-SCNC: 108 MEQ/L (ref 98–111)
CO2 SERPL-SCNC: 19 MEQ/L (ref 23–33)
CREAT SERPL-MCNC: 0.9 MG/DL (ref 0.4–1.2)
DEPRECATED RDW RBC AUTO: 52 FL (ref 35–45)
ECHO AR MAX VEL PISA: 4.5 M/S
ECHO AV CUSP MM: 1.3 CM
ECHO AV PEAK GRADIENT: 10 MMHG
ECHO AV PEAK VELOCITY: 1.6 M/S
ECHO AV REGURGITANT PHT: 371 MS
ECHO AV VELOCITY RATIO: 0.69
ECHO LA AREA 2C: 11.9 CM2
ECHO LA AREA 4C: 14.8 CM2
ECHO LA DIAMETER: 3.3 CM
ECHO LA MAJOR AXIS: 5 CM
ECHO LA MINOR AXIS: 4.2 CM
ECHO LA VOL BP: 33 ML (ref 22–52)
ECHO LA VOL MOD A2C: 27 ML (ref 22–52)
ECHO LA VOL MOD A4C: 34 ML (ref 22–52)
ECHO LV E' LATERAL VELOCITY: 6.9 CM/S
ECHO LV E' SEPTAL VELOCITY: 6.9 CM/S
ECHO LV EF PHYSICIAN: 65 %
ECHO LV FRACTIONAL SHORTENING: 28 % (ref 28–44)
ECHO LV INTERNAL DIMENSION DIASTOLIC: 3.9 CM (ref 3.9–5.3)
ECHO LV INTERNAL DIMENSION SYSTOLIC: 2.8 CM
ECHO LV IVSD: 0.8 CM (ref 0.6–0.9)
ECHO LV MASS 2D: 105.3 G (ref 67–162)
ECHO LV POSTERIOR WALL DIASTOLIC: 1 CM (ref 0.6–0.9)
ECHO LV RELATIVE WALL THICKNESS RATIO: 0.51
ECHO LVOT PEAK GRADIENT: 5 MMHG
ECHO LVOT PEAK VELOCITY: 1.1 M/S
ECHO MV A VELOCITY: 1.01 M/S
ECHO MV E DECELERATION TIME (DT): 151 MS
ECHO MV E VELOCITY: 0.65 M/S
ECHO MV E/A RATIO: 0.64
ECHO MV E/E' LATERAL: 9.42
ECHO MV E/E' RATIO (AVERAGED): 9.42
ECHO MV E/E' SEPTAL: 9.42
ECHO PV MAX VELOCITY: 0.9 M/S
ECHO PV PEAK GRADIENT: 3 MMHG
ECHO RV INTERNAL DIMENSION: 2.4 CM
ECHO TV E WAVE: 0.5 M/S
EOSINOPHIL NFR BLD AUTO: 4.6 %
EOSINOPHILS ABSOLUTE: 0.5 THOU/MM3 (ref 0–0.4)
ERYTHROCYTE [DISTWIDTH] IN BLOOD BY AUTOMATED COUNT: 16.5 % (ref 11.5–14.5)
GFR SERPL CREATININE-BSD FRML MDRD: 67 ML/MIN/1.73M2
GLUCOSE BLD STRIP.AUTO-MCNC: 106 MG/DL (ref 70–108)
GLUCOSE BLD STRIP.AUTO-MCNC: 113 MG/DL (ref 70–108)
GLUCOSE SERPL-MCNC: 107 MG/DL (ref 70–108)
HCT VFR BLD AUTO: 27.6 % (ref 37–47)
HGB BLD-MCNC: 8.7 GM/DL (ref 12–16)
IMM GRANULOCYTES # BLD AUTO: 0.09 THOU/MM3 (ref 0–0.07)
IMM GRANULOCYTES NFR BLD AUTO: 0.8 %
INR PPP: 2.03 (ref 0.85–1.13)
LYMPHOCYTES ABSOLUTE: 2.1 THOU/MM3 (ref 1–4.8)
LYMPHOCYTES NFR BLD AUTO: 19 %
MCH RBC QN AUTO: 27.4 PG (ref 26–33)
MCHC RBC AUTO-ENTMCNC: 31.5 GM/DL (ref 32.2–35.5)
MCV RBC AUTO: 87.1 FL (ref 81–99)
MONOCYTES ABSOLUTE: 1 THOU/MM3 (ref 0.4–1.3)
MONOCYTES NFR BLD AUTO: 9.2 %
NEUTROPHILS ABSOLUTE: 7.4 THOU/MM3 (ref 1.8–7.7)
NEUTROPHILS NFR BLD AUTO: 65.8 %
NRBC BLD AUTO-RTO: 0 /100 WBC
PLATELET # BLD AUTO: 913 THOU/MM3 (ref 130–400)
PMV BLD AUTO: 9.1 FL (ref 9.4–12.4)
POTASSIUM SERPL-SCNC: 4 MEQ/L (ref 3.5–5.2)
RBC # BLD AUTO: 3.17 MILL/MM3 (ref 4.2–5.4)
SODIUM SERPL-SCNC: 142 MEQ/L (ref 135–145)
WBC # BLD AUTO: 11.2 THOU/MM3 (ref 4.8–10.8)

## 2025-01-21 PROCEDURE — 99233 SBSQ HOSP IP/OBS HIGH 50: CPT | Performed by: STUDENT IN AN ORGANIZED HEALTH CARE EDUCATION/TRAINING PROGRAM

## 2025-01-21 PROCEDURE — 36415 COLL VENOUS BLD VENIPUNCTURE: CPT

## 2025-01-21 PROCEDURE — 93306 TTE W/DOPPLER COMPLETE: CPT

## 2025-01-21 PROCEDURE — 2500000003 HC RX 250 WO HCPCS

## 2025-01-21 PROCEDURE — 2580000003 HC RX 258: Performed by: NURSE PRACTITIONER

## 2025-01-21 PROCEDURE — 97110 THERAPEUTIC EXERCISES: CPT

## 2025-01-21 PROCEDURE — 85610 PROTHROMBIN TIME: CPT

## 2025-01-21 PROCEDURE — 93306 TTE W/DOPPLER COMPLETE: CPT | Performed by: INTERNAL MEDICINE

## 2025-01-21 PROCEDURE — 6370000000 HC RX 637 (ALT 250 FOR IP)

## 2025-01-21 PROCEDURE — 1200000000 HC SEMI PRIVATE

## 2025-01-21 PROCEDURE — 6370000000 HC RX 637 (ALT 250 FOR IP): Performed by: NURSE PRACTITIONER

## 2025-01-21 PROCEDURE — 6360000002 HC RX W HCPCS: Performed by: NURSE PRACTITIONER

## 2025-01-21 PROCEDURE — 80048 BASIC METABOLIC PNL TOTAL CA: CPT

## 2025-01-21 PROCEDURE — 85025 COMPLETE CBC W/AUTO DIFF WBC: CPT

## 2025-01-21 PROCEDURE — 82948 REAGENT STRIP/BLOOD GLUCOSE: CPT

## 2025-01-21 PROCEDURE — 99232 SBSQ HOSP IP/OBS MODERATE 35: CPT | Performed by: NURSE PRACTITIONER

## 2025-01-21 RX ORDER — WARFARIN SODIUM 2 MG/1
2 TABLET ORAL ONCE
Status: COMPLETED | OUTPATIENT
Start: 2025-01-21 | End: 2025-01-21

## 2025-01-21 RX ADMIN — LEVOTHYROXINE SODIUM 125 MCG: 0.12 TABLET ORAL at 06:27

## 2025-01-21 RX ADMIN — LOSARTAN POTASSIUM 50 MG: 50 TABLET, FILM COATED ORAL at 08:42

## 2025-01-21 RX ADMIN — METOPROLOL SUCCINATE 75 MG: 25 TABLET, EXTENDED RELEASE ORAL at 08:42

## 2025-01-21 RX ADMIN — BUSPIRONE HYDROCHLORIDE 10 MG: 10 TABLET ORAL at 21:18

## 2025-01-21 RX ADMIN — PANTOPRAZOLE SODIUM 40 MG: 40 TABLET, DELAYED RELEASE ORAL at 21:18

## 2025-01-21 RX ADMIN — ACETAMINOPHEN 650 MG: 325 TABLET ORAL at 04:28

## 2025-01-21 RX ADMIN — WARFARIN SODIUM 2 MG: 2 TABLET ORAL at 18:08

## 2025-01-21 RX ADMIN — MONTELUKAST 10 MG: 10 TABLET, FILM COATED ORAL at 08:42

## 2025-01-21 RX ADMIN — SODIUM CHLORIDE, PRESERVATIVE FREE 10 ML: 5 INJECTION INTRAVENOUS at 08:42

## 2025-01-21 RX ADMIN — SODIUM CHLORIDE, PRESERVATIVE FREE 10 ML: 5 INJECTION INTRAVENOUS at 19:47

## 2025-01-21 RX ADMIN — ACETAMINOPHEN 650 MG: 325 TABLET ORAL at 22:07

## 2025-01-21 RX ADMIN — ENOXAPARIN SODIUM 80 MG: 100 INJECTION SUBCUTANEOUS at 08:41

## 2025-01-21 RX ADMIN — LOSARTAN POTASSIUM 50 MG: 50 TABLET, FILM COATED ORAL at 21:18

## 2025-01-21 RX ADMIN — AMPICILLIN SODIUM AND SULBACTAM SODIUM 3000 MG: 2; 1 INJECTION, POWDER, FOR SOLUTION INTRAMUSCULAR; INTRAVENOUS at 01:08

## 2025-01-21 RX ADMIN — ACETAMINOPHEN 650 MG: 325 TABLET ORAL at 15:39

## 2025-01-21 RX ADMIN — BUSPIRONE HYDROCHLORIDE 10 MG: 10 TABLET ORAL at 08:42

## 2025-01-21 RX ADMIN — AMPICILLIN SODIUM AND SULBACTAM SODIUM 3000 MG: 2; 1 INJECTION, POWDER, FOR SOLUTION INTRAMUSCULAR; INTRAVENOUS at 06:27

## 2025-01-21 ASSESSMENT — PAIN DESCRIPTION - LOCATION
LOCATION: HEAD
LOCATION: HEAD

## 2025-01-21 ASSESSMENT — PAIN DESCRIPTION - DESCRIPTORS
DESCRIPTORS: ACHING
DESCRIPTORS: ACHING;SHARP

## 2025-01-21 ASSESSMENT — PAIN DESCRIPTION - ORIENTATION
ORIENTATION: POSTERIOR
ORIENTATION: MID

## 2025-01-21 ASSESSMENT — PAIN - FUNCTIONAL ASSESSMENT
PAIN_FUNCTIONAL_ASSESSMENT: PREVENTS OR INTERFERES SOME ACTIVE ACTIVITIES AND ADLS
PAIN_FUNCTIONAL_ASSESSMENT: ACTIVITIES ARE NOT PREVENTED

## 2025-01-21 ASSESSMENT — PAIN SCALES - GENERAL
PAINLEVEL_OUTOF10: 0
PAINLEVEL_OUTOF10: 10

## 2025-01-21 NOTE — PROGRESS NOTES
Hospitalist Progress Note    Patient:  Marry Ayers      Unit/Bed:6K-02/002-A    YOB: 1950    MRN: [id number]       Acct: [id number]     PCP: Silvestre Sawyer MD    Date of Admission: 1/16/2025    Assessment/Plan:    Chest pressure, resolved. Pressure was following blood transfusion. Troponin negative. No EKG changes. Echo pending. With persistent tachycardia and acute DVT, CTA of the chest was ordered 1/20, PE was ruled out. BB increased with improvement in ST.   Abdominal pain, resolved. Imaging for enteric fistula. Concerns for Ileostomy leak on admission. Patient had recent laparoscopic ileostomy creation. She has 2nd ostomy created for drainage of abdominal fluid. Recent admission for leak and ID was consulted for infection.Treated with IV Unasyn and Diflucan transitioned to Augmentin and Diflucan on discharge. Continue IV Unasyn. ID following. Dr. 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 ID is recommending repeat CT of the abdomen tomorrow. On discharge plans for Augmentin to 2/5/2025.  Recurrent LINDA, resolved with hydration. Creatinine on arrival 2.8. Down to 0.9 today which is her baseline. Hyzaar held. HCTZ will be stopped on discharge. This is her second admission for LINDA. Restarted Cozaar 1/18.  Avoid nephrotoxic agents. Renally dose medications.   High anion gap metabolic acidosis, improved. Lactic acid 1.4. IV stopped 1/18.   Small left pleural effusion. Given Lasix x 1 1/18.  Hyperkalemia, resolved. Due to LINDA. S/p Insulin and Dextrose given. Calcium given.  Probable Sepsis(POA) due to #1. SIRS Criteria on arrival: Heart Rate >90 and WBC >12k or <4k or >10% Bands. Organ Dysfunction: Creatinine >2.0 and INR >1.5 or aPTT >60sec. Cultures Blood x2 and Urine pending. Continue IV Unasyn. Volume Expansion: 30cc/kg of Lactated Ringers was given. Continue maintenance fluids. WBC 11.2 today. Afebrile.   Supratherapeutic INR, resolved: Possibly due  stated age and cooperative.  HEENT: Pupils equal, round, and reactive to light. Conjunctivae/corneas clear.  Neck: Supple, with full range of motion. No jugular venous distention. Trachea midline.  Respiratory:  Normal respiratory effort. Clear to auscultation, bilaterally without Rales/Wheezes/Rhonchi.  Cardiovascular: Regular rate and rhythm with normal S1/S2 without murmurs, rubs or gallops.  Abdomen: Soft, obese, non-tender, non-distended with normal bowel sounds. Ostomy, irritation.   Musculoskeletal: passive and active ROM x 4 extremities.  Skin: Skin color, texture, turgor normal.  No rashes or lesions.  Neurologic:  Neurovascularly intact without any focal sensory/motor deficits. Cranial nerves: II-XII intact, grossly non-focal.  Psychiatric: Alert and oriented, thought content appropriate, normal insight  Capillary Refill: Brisk,< 3 seconds   Peripheral Pulses: +2 palpable, equal bilaterally       Labs:   Recent Labs     01/19/25  0338 01/20/25  0608 01/21/25  0604   WBC 9.6 11.4* 11.2*   HGB 8.5* 8.6* 8.7*   HCT 27.2* 27.8* 27.6*   * 907* 913*     Recent Labs     01/19/25  0338 01/20/25  0608 01/21/25  0604    139 142   K 3.5 3.6 4.0    105 108   CO2 18* 18* 19*   BUN 9 7 7   CREATININE 0.7 0.8 0.9   CALCIUM 8.1* 8.4* 8.5     No results for input(s): \"AST\", \"ALT\", \"BILIDIR\", \"BILITOT\", \"ALKPHOS\" in the last 72 hours.    Recent Labs     01/19/25  0338 01/20/25  0608 01/21/25  0604   INR 1.82* 1.90* 2.03*     No results for input(s): \"CKTOTAL\", \"TROPONINI\" in the last 72 hours.      Urinalysis:      Lab Results   Component Value Date/Time    NITRU NEGATIVE 01/16/2025 12:15 PM    WBCUA 10-15 01/16/2025 12:15 PM    BACTERIA NONE SEEN 01/16/2025 12:15 PM    RBCUA 3-5 01/16/2025 12:15 PM    BLOODU NEGATIVE 01/16/2025 12:15 PM    GLUCOSEU NEGATIVE 01/16/2025 12:15 PM       Radiology:  CTA CHEST W WO CONTRAST   Final Result   1. No filling defects are identified within the pulmonary arterial

## 2025-01-21 NOTE — PROGRESS NOTES
[facility]  INPATIENT PHYSICAL THERAPY  DAILY NOTE  [facility] RENAL TELEMETRY 6K - 6K-02/002-A      Discharge Recommendations: Continue to assess pending progress and Home with Home Health PT  Equipment Recommendations: No             Time In: 1439  Time Out: 1448  Timed Code Treatment Minutes: 9 Minutes  Minutes: 9          Date: 2025  Patient Name: Marry Ayers,  Gender:  female        MRN: [id number]  : 1950  (74 y.o.)     Referring Practitioner: Cata White APRN - CNP  Diagnosis: Acute kidney injury (HCC)  Additional Pertinent Hx: Per EMR: \"74-year-old female with fairly complicated abdominal surgical history and initially underwent resection of sigmoid as well as partial small bowel resection on  followed by repair of a colonic perforation and creation of a loop ileostomy on .  She had a subsequent ostomy bag placed at the inferior abdominal wound to help collect drainage. The intra-abdominal findings on initial operative intervention demonstrated diffuse evidence of infection throughout. Nursing home staff reported tachycardia, dehydration, lack of appetite, and stool change in ostomy bag. Concerns for Ileostomy leak on admission\"     Prior Level of Function:  Lives With: Spouse  Type of Home: House  Home Layout: One level  Home Access: Stairs to enter with rails  Entrance Stairs - Number of Steps: 3  Entrance Stairs - Rails: Right  Home Equipment: Cane, Rollator   Bathroom Shower/Tub: Tub/Shower unit  Bathroom Toilet: Standard  Bathroom Equipment: Grab bars in shower    Prior Level of Assist for ADLs: Independent  Prior Level of Assist for Homemaking: Independent  Prior Level of Assist for Transfers: Independent  Active : Yes  Additional Comments: Pt reports she was primarily wearing hospital gown at SNF d/t leaking ileostomy.  Prior Level of Assist for Ambulation: Independent household ambulator, with or without device  Has the patient had

## 2025-01-21 NOTE — PROGRESS NOTES
Spiritual Health History and Assessment/Progress Note  [facility]    (P) Initial Encounter,  ,  ,      Name: Marry Ayers MRN: [id number]    Age: 74 y.o.     Sex: female   Language: English   Episcopal: None   Acute kidney injury (HCC)     Date: 1/21/2025            Total Time Calculated: (P) 6 min              Spiritual Assessment continued in STRZ RENAL TELEMETRY 6K        Referral/Consult From: (P) Rounding   Encounter Overview/Reason: (P) Initial Encounter  Service Provided For: (P) Patient and family together    Emily, Belief, Meaning:   Patient unable to assess at this time  Family/Friends No family/friends present      Importance and Influence:  Patient has spiritual/personal beliefs that influence decisions regarding their health  Family/Friends No family/friends present    Community:  Patient feels well-supported. Support system includes: Children  Family/Friends feel well-supported. Support system includes: Extended family    Assessment and Plan of Care:     Patient Interventions include: Other: none  Family/Friends Interventions include: No family/friends present    Patient Plan of Care: No spiritual needs identified for follow-up  Family/Friends Plan of Care: No family/friends present    Electronically signed by PITO Green on 1/21/2025 at 1:42 PM

## 2025-01-21 NOTE — PROGRESS NOTES
Warfarin Pharmacy Consult Note    Warfarin Indication: Hx of DVT & PE  Target INR: 2.0-3.0  Dose prior to admission: variable since 1/7 discharge, (on hold since 1/12/25 due to high INR-fluconazole 200 mg daily 1/8-1/14, Prior to this Warfarin dose PTA: 7.5mg Tuesday 5mg [facility] Coumadin [facility])    Recent Labs     01/19/25  0338 01/20/25  0608 01/21/25  0604   HGB 8.5* 8.6* 8.7*   * 907* 913*     Recent Labs     01/19/25  0338 01/20/25  0608 01/21/25  0604   INR 1.82* 1.90* 2.03*     Concurrent anticoagulants/antiplatelets: none  Significant warfarin drug-drug interactions: levothyroxine, completed 7 day course of fluconazole 200 mg on 1/14    Date INR Warfarin Dose   1/16/2025 3.5 none   1/17/2025  3.23 0.5 mg   1/18/2025  2.13  1.5 mg     1/19/2025  1.82 2 mg     1/20/2025 1.9  1 mg     1/21/2025 2.03  2 mg              Monitoring:                   INR will be monitored daily.    **Please contact pharmacy for discharge instructions when indicated**  Monika Glass, PharmD 1/21/2025 1:20 PM

## 2025-01-21 NOTE — PROGRESS NOTES
[facility] Infectious Disease         Progress Note      Marry Ayers  MEDICAL RECORD NUMBER:  [id number]  AGE: 74 y.o.   GENDER: female  : 1950  EPISODE DATE:  2025      Assessment:     Patient is a 74-year-old female who I am consulted for persistent drainage from an inferior wound.     This patient has a fairly complicated abdominal surgical history and initially underwent resection of sigmoid as well as partial small bowel resection on  followed by repair of a colonic perforation and creation of a loop ileostomy on .  She had a subsequent ostomy bag placed at the inferior abdominal wound to help collect drainage.  Case discussed with general surgery and this appears to be a normal finding with continued drainage expected.     Patient will require repeat CT scan of the abdomen pelvis be obtained on  to reassess area of prior drainage to determine that there is not new development of intra-abdominal fluid that will require drainage.    Remains on therapy with ampicillin-sulbactam empirically  Duration of therapy remains to be decided on above   I would continue therapy for an additional 14 days   End of therapy 2025    Subjective:     Chief Complaint   Patient presents with    Wound Check         No acute events overnight.  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

## 2025-01-21 NOTE — PROGRESS NOTES
[facility] GENERAL SURGERY   General Surgery Daily Progress Note    Pt Name: Marry Ayers  Medical Record Number: [id number]  Date of Birth 1950   Today's Date: 1/22/2025  Chief complaint: doing better  ASSESSMENT   Hospital day # 4   S/P resection of sigmoid stricture and partial small bowel resection 12/4/24 S/p repair of colon perforation and loop ileostomy 12/13/24   has a past medical history of Allergic rhinitis, Anemia, Anxiety, Cataract, Essential hypertension, GERD (gastroesophageal reflux disease), Hx of blood clots, Hypokalemia, Hypothyroidism, and Pulmonary embolism (HCC).  PLAN   Ostomy much better controlled with catheter usage.   Fistula controlled with diverting ostomy  SUBJECTIVE   Marry is doing well. She denies any nausea or vomiting, has not passed flatus and had ostomy output. She is tolerating a ADULT DIET; Regular. Her pain is well controlled on current medications. She has been ambulating in the room.   CURRENT MEDICATIONS   Scheduled Meds:   amoxicillin-clavulanate  1 tablet Oral 2 times per day    warfarin  4 mg Oral Once    metoprolol succinate  75 mg Oral Daily    losartan  50 mg Oral BID    ferric gluconate  125 mg IntraVENous Once per day on Monday Wednesday Friday    busPIRone  10 mg Oral BID    levothyroxine  125 mcg Oral Daily    [Held by provider] losartan-hydroCHLOROthiazide  1 tablet Oral Daily    montelukast  10 mg Oral Daily    pantoprazole  40 mg Oral Nightly    [Held by provider] potassium chloride  20 mEq Oral Daily    sodium chloride flush  5-40 mL IntraVENous 2 times per day    warfarin placeholder: dosing by pharmacy   Oral RX Placeholder     Continuous Infusions:   sodium chloride      sodium chloride 20 mL/hr (01/20/25 0545)    dextrose       PRN Meds:.sodium chloride, magnesium sulfate, sodium chloride flush, sodium chloride, polyethylene glycol, acetaminophen **OR** acetaminophen, ondansetron **OR** ondansetron, glucose, dextrose bolus

## 2025-01-21 NOTE — PROGRESS NOTES
Physician Progress Note      PATIENT:               PITO WHYTE  [facility] #:                  [id number]  :                       1950  ADMIT DATE:       2025 9:16 AM  DISCH DATE:  RESPONDING  PROVIDER #:        Cata White CNP          QUERY TEXT:    Pt admitted with probable sepsis.  Pt noted to have Sepsis and acute organ   dysfunction of LINDA.  If possible, please document in progress notes and   discharge summary the relationship, if any, between Sepsis and LINDA:    The medical record reflects the following:  Risk Factors: enteric fistula, possible ileostomy leak, age 74  Clinical Indicators: afebrile, P > 100, WBC 17.0, procal 54.76, lactic wnl,   LINDA  Treatment: IV Unasyn, IVF, Tylenol, labs, imaging, ID consult  Options provided:  -- Acute organ dysfunction of LINDA is associated with sepsis  -- Acute organ dysfunction of LINDA is unrelated to sepsis  -- Other - I will add my own diagnosis  -- Disagree - Not applicable / Not valid  -- Disagree - Clinically unable to determine / Unknown  -- Refer to Clinical Documentation Reviewer    PROVIDER RESPONSE TEXT:    This patient has acute organ dysfunction of LINDA unrelated to sepsis.    Query created by: Regina Okeefe on 2025 12:48 PM      Electronically signed by:  Cata White CNP 2025 8:04 AM

## 2025-01-22 ENCOUNTER — APPOINTMENT (OUTPATIENT)
Dept: CT IMAGING | Age: 75
DRG: 872 | End: 2025-01-22
Payer: MEDICARE

## 2025-01-22 LAB
ANION GAP SERPL CALC-SCNC: 12 MEQ/L (ref 8–16)
BASOPHILS ABSOLUTE: 0.1 THOU/MM3 (ref 0–0.1)
BASOPHILS NFR BLD AUTO: 0.7 %
BUN SERPL-MCNC: 8 MG/DL (ref 7–22)
CALCIUM SERPL-MCNC: 8.3 MG/DL (ref 8.5–10.5)
CHLORIDE SERPL-SCNC: 107 MEQ/L (ref 98–111)
CO2 SERPL-SCNC: 21 MEQ/L (ref 23–33)
CREAT SERPL-MCNC: 0.8 MG/DL (ref 0.4–1.2)
DEPRECATED RDW RBC AUTO: 53.7 FL (ref 35–45)
EOSINOPHIL NFR BLD AUTO: 5.5 %
EOSINOPHILS ABSOLUTE: 0.6 THOU/MM3 (ref 0–0.4)
ERYTHROCYTE [DISTWIDTH] IN BLOOD BY AUTOMATED COUNT: 16.6 % (ref 11.5–14.5)
GFR SERPL CREATININE-BSD FRML MDRD: 77 ML/MIN/1.73M2
GLUCOSE SERPL-MCNC: 99 MG/DL (ref 70–108)
HCT VFR BLD AUTO: 27.3 % (ref 37–47)
HGB BLD-MCNC: 8.5 GM/DL (ref 12–16)
IMM GRANULOCYTES # BLD AUTO: 0.07 THOU/MM3 (ref 0–0.07)
IMM GRANULOCYTES NFR BLD AUTO: 0.6 %
INR PPP: 1.8 (ref 0.85–1.13)
LYMPHOCYTES ABSOLUTE: 1.9 THOU/MM3 (ref 1–4.8)
LYMPHOCYTES NFR BLD AUTO: 16.9 %
MCH RBC QN AUTO: 27.7 PG (ref 26–33)
MCHC RBC AUTO-ENTMCNC: 31.1 GM/DL (ref 32.2–35.5)
MCV RBC AUTO: 88.9 FL (ref 81–99)
MONOCYTES ABSOLUTE: 1.1 THOU/MM3 (ref 0.4–1.3)
MONOCYTES NFR BLD AUTO: 10 %
NEUTROPHILS ABSOLUTE: 7.4 THOU/MM3 (ref 1.8–7.7)
NEUTROPHILS NFR BLD AUTO: 66.3 %
NRBC BLD AUTO-RTO: 0 /100 WBC
PLATELET # BLD AUTO: 926 THOU/MM3 (ref 130–400)
PMV BLD AUTO: 9.2 FL (ref 9.4–12.4)
POTASSIUM SERPL-SCNC: 4.1 MEQ/L (ref 3.5–5.2)
RBC # BLD AUTO: 3.07 MILL/MM3 (ref 4.2–5.4)
SODIUM SERPL-SCNC: 140 MEQ/L (ref 135–145)
WBC # BLD AUTO: 11.1 THOU/MM3 (ref 4.8–10.8)

## 2025-01-22 PROCEDURE — 6370000000 HC RX 637 (ALT 250 FOR IP): Performed by: NURSE PRACTITIONER

## 2025-01-22 PROCEDURE — 99233 SBSQ HOSP IP/OBS HIGH 50: CPT | Performed by: STUDENT IN AN ORGANIZED HEALTH CARE EDUCATION/TRAINING PROGRAM

## 2025-01-22 PROCEDURE — 74176 CT ABD & PELVIS W/O CONTRAST: CPT

## 2025-01-22 PROCEDURE — 85610 PROTHROMBIN TIME: CPT

## 2025-01-22 PROCEDURE — 80048 BASIC METABOLIC PNL TOTAL CA: CPT

## 2025-01-22 PROCEDURE — 2500000003 HC RX 250 WO HCPCS

## 2025-01-22 PROCEDURE — 6360000002 HC RX W HCPCS: Performed by: NURSE PRACTITIONER

## 2025-01-22 PROCEDURE — 2580000003 HC RX 258: Performed by: NURSE PRACTITIONER

## 2025-01-22 PROCEDURE — 36415 COLL VENOUS BLD VENIPUNCTURE: CPT

## 2025-01-22 PROCEDURE — 6360000002 HC RX W HCPCS: Performed by: INTERNAL MEDICINE

## 2025-01-22 PROCEDURE — 99232 SBSQ HOSP IP/OBS MODERATE 35: CPT | Performed by: INTERNAL MEDICINE

## 2025-01-22 PROCEDURE — 1200000000 HC SEMI PRIVATE

## 2025-01-22 PROCEDURE — 6370000000 HC RX 637 (ALT 250 FOR IP): Performed by: STUDENT IN AN ORGANIZED HEALTH CARE EDUCATION/TRAINING PROGRAM

## 2025-01-22 PROCEDURE — 85025 COMPLETE CBC W/AUTO DIFF WBC: CPT

## 2025-01-22 PROCEDURE — 6370000000 HC RX 637 (ALT 250 FOR IP)

## 2025-01-22 RX ORDER — WARFARIN SODIUM 4 MG/1
4 TABLET ORAL ONCE
Status: COMPLETED | OUTPATIENT
Start: 2025-01-22 | End: 2025-01-22

## 2025-01-22 RX ORDER — ENOXAPARIN SODIUM 100 MG/ML
1 INJECTION SUBCUTANEOUS EVERY 12 HOURS
Status: DISCONTINUED | OUTPATIENT
Start: 2025-01-22 | End: 2025-01-23

## 2025-01-22 RX ADMIN — ACETAMINOPHEN 650 MG: 325 TABLET ORAL at 22:14

## 2025-01-22 RX ADMIN — METOPROLOL SUCCINATE 75 MG: 25 TABLET, EXTENDED RELEASE ORAL at 08:56

## 2025-01-22 RX ADMIN — SODIUM CHLORIDE 125 MG: 9 INJECTION, SOLUTION INTRAVENOUS at 15:42

## 2025-01-22 RX ADMIN — ENOXAPARIN SODIUM 80 MG: 100 INJECTION SUBCUTANEOUS at 13:41

## 2025-01-22 RX ADMIN — MONTELUKAST 10 MG: 10 TABLET, FILM COATED ORAL at 08:56

## 2025-01-22 RX ADMIN — AMOXICILLIN AND CLAVULANATE POTASSIUM 1 TABLET: 875; 125 TABLET, FILM COATED ORAL at 11:16

## 2025-01-22 RX ADMIN — SODIUM CHLORIDE, PRESERVATIVE FREE 10 ML: 5 INJECTION INTRAVENOUS at 20:36

## 2025-01-22 RX ADMIN — AMOXICILLIN AND CLAVULANATE POTASSIUM 1 TABLET: 875; 125 TABLET, FILM COATED ORAL at 20:36

## 2025-01-22 RX ADMIN — BUSPIRONE HYDROCHLORIDE 10 MG: 10 TABLET ORAL at 20:36

## 2025-01-22 RX ADMIN — BUSPIRONE HYDROCHLORIDE 10 MG: 10 TABLET ORAL at 08:56

## 2025-01-22 RX ADMIN — PANTOPRAZOLE SODIUM 40 MG: 40 TABLET, DELAYED RELEASE ORAL at 20:36

## 2025-01-22 RX ADMIN — WARFARIN SODIUM 4 MG: 4 TABLET ORAL at 17:13

## 2025-01-22 RX ADMIN — LEVOTHYROXINE SODIUM 125 MCG: 0.12 TABLET ORAL at 06:09

## 2025-01-22 RX ADMIN — LOSARTAN POTASSIUM 50 MG: 50 TABLET, FILM COATED ORAL at 20:36

## 2025-01-22 RX ADMIN — SODIUM CHLORIDE, PRESERVATIVE FREE 10 ML: 5 INJECTION INTRAVENOUS at 08:56

## 2025-01-22 RX ADMIN — LOSARTAN POTASSIUM 50 MG: 50 TABLET, FILM COATED ORAL at 08:56

## 2025-01-22 RX ADMIN — ACETAMINOPHEN 650 MG: 325 TABLET ORAL at 13:41

## 2025-01-22 ASSESSMENT — PAIN DESCRIPTION - DESCRIPTORS
DESCRIPTORS: ACHING
DESCRIPTORS: ACHING

## 2025-01-22 ASSESSMENT — PAIN SCALES - GENERAL
PAINLEVEL_OUTOF10: 9
PAINLEVEL_OUTOF10: 10

## 2025-01-22 ASSESSMENT — PAIN DESCRIPTION - ORIENTATION
ORIENTATION: RIGHT;LEFT;MID
ORIENTATION: RIGHT

## 2025-01-22 ASSESSMENT — PAIN DESCRIPTION - LOCATION
LOCATION: HEAD
LOCATION: HEAD

## 2025-01-22 ASSESSMENT — PAIN - FUNCTIONAL ASSESSMENT: PAIN_FUNCTIONAL_ASSESSMENT: PREVENTS OR INTERFERES SOME ACTIVE ACTIVITIES AND ADLS

## 2025-01-22 NOTE — CARE COORDINATION
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   1/20 CTA Chest:   No filling defects are identified within the pulmonary arterial vasculature to suggest the presence of pulmonary embolus. The small right pleural effusion has decreased in volume. The small left pleural effusion has increased in volume and appears loculated. Scattered patchy groundglass opacities and bilateral lower lobe consolidation suggest pneumonia in the appropriate clinical setting.    1/22 CT A/P:   The hyperdense subcapsular fluid collection along the lateral margin of the right hepatic lobe appears larger measuring up to 45 mm in thickness (previously measured 40 mm). There continues to be intimate association of the small bowel loops along the anterior abdominal wall at the midline incision which contains ill-defined fluid and air density unchanged when compared to the prior examination. Differential considerations include gas and fluid collection along the midline surgical incision, fistulous formation at this location versus protrusion of the small bowel into the midline surgical incision.  The small left pleural effusion has increased in volume in the left lower lobe compressive atelectasis/consolidation is more prominent. Correlate clinically for PNA.     Barriers to Discharge: Hospitalist, ID, general surgery. ID transitioning abx to PO. INR 1.80 w plans to bridge with therapeutic Lovenox pending therapeutic INR on Coumadin. Anticipate dc tomorrow.     PCP: Silvestre Sawyer

## 2025-01-22 NOTE — PLAN OF CARE
Problem: Discharge Planning  Goal: Discharge to home or other facility with appropriate resources  Outcome: Progressing  Flowsheets (Taken 1/20/2025 1544 by Chaya Ladd, MSW, LSW)  Discharge to home or other facility with appropriate resources: Identify barriers to discharge with patient and caregiver     Problem: Safety - Adult  Goal: Free from fall injury  Outcome: Progressing  Flowsheets (Taken 1/20/2025 0401 by Natalio Hogan, RN)  Free From Fall Injury:   Instruct family/caregiver on patient safety   Based on caregiver fall risk screen, instruct family/caregiver to ask for assistance with transferring infant if caregiver noted to have fall risk factors     Problem: Pain  Goal: Verbalizes/displays adequate comfort level or baseline comfort level  Outcome: Progressing  Flowsheets (Taken 1/20/2025 0401 by Natalio Hogan, RN)  Verbalizes/displays adequate comfort level or baseline comfort level:   Encourage patient to monitor pain and request assistance   Assess pain using appropriate pain scale   Implement non-pharmacological measures as appropriate and evaluate response   Consider cultural and social influences on pain and pain management   Notify Licensed Independent Practitioner if interventions unsuccessful or patient reports new pain   Administer analgesics based on type and severity of pain and evaluate response     Problem: Chronic Conditions and Co-morbidities  Goal: Patient's chronic conditions and co-morbidity symptoms are monitored and maintained or improved  Outcome: Progressing  Flowsheets (Taken 1/20/2025 0401 by Natalio Hogan, RN)  Care Plan - Patient's Chronic Conditions and Co-Morbidity Symptoms are Monitored and Maintained or Improved:   Monitor and assess patient's chronic conditions and comorbid symptoms for stability, deterioration, or improvement   Collaborate with multidisciplinary team to address chronic and comorbid conditions and prevent exacerbation or deterioration   Update acute

## 2025-01-22 NOTE — PROGRESS NOTES
[facility] Infectious Disease         Progress Note      Marry Ayers  MEDICAL RECORD NUMBER:  [id number]  AGE: 74 y.o.   GENDER: female  : 1950  EPISODE DATE:  2025      Assessment:     Patient is a 74-year-old female who I am consulted for persistent drainage from an inferior wound.     This patient has a fairly complicated abdominal surgical history and initially underwent resection of sigmoid as well as partial small bowel resection on  followed by repair of a colonic perforation and creation of a loop ileostomy on .  She had a subsequent ostomy bag placed at the inferior abdominal wound to help collect drainage.  Case discussed with general surgery and this appears to be a normal finding with continued drainage expected.     I reviewed repeat CT scan of the abdomen and pelvis which was obtained today.  There remains evidence of a fluid collection along the lateral right hepatic lobe which is increased to 4.5 cm.  There are also persistent findings of small bowel loops associated with anterior abdominal wall and ill-defined fluid and air density collections which are fairly stable.    Patient has been transition to Augmentin today   Augmentin 875 twice daily   End of therapy 2025    Subjective:     Chief Complaint   Patient presents with    Wound Check         No acute events overnight.  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    SOCIAL HISTORY    Social History     Tobacco Use    Smoking status: Every Day     Current packs/day: 0.25     Average packs/day: 0.3 packs/day for 30.2 years (7.5 ttl pk-yrs)     Types: Cigarettes     Start date: 11/26/1994    Smokeless tobacco: Never    Tobacco comments:     3 cigarettes/day   Vaping Use    Vaping status: Never Used   Substance Use Topics    Alcohol use: Not Currently    Drug use: Never       ALLERGIES    Allergies   Allergen Reactions    Seasonal Cough     Molds, Cats       MEDICATIONS    No current facility-administered medications on file prior to encounter.     Current Outpatient Medications on File Prior to Encounter   Medication Sig Dispense Refill    Lactobacillus (PROBIOTIC ACIDOPHILUS PO) Take 1 tablet by mouth daily (with breakfast)      cetirizine (ZYRTEC) 5 MG tablet Take 1 tablet by mouth daily      Calcium Carb-Cholecalciferol 500-5 MG-MCG TABS Take 1 tablet by mouth daily      busPIRone (BUSPAR) 10 MG tablet Take 1 tablet by mouth 2 times daily      levothyroxine (SYNTHROID) 125 MCG tablet Take 1 tablet by mouth daily      Melatonin 10 MG TABS Take 10 mg by mouth nightly      montelukast (SINGULAIR) 10 MG tablet Take 1 tablet by mouth daily      pantoprazole (PROTONIX) 40 MG tablet Take 1 tablet by mouth nightly      potassium chloride (K-TAB) 20 MEQ TBCR extended release tablet Take 1 tablet by mouth daily      warfarin (COUMADIN) 5 MG tablet Take 0.5 tablets by mouth every evening      acetaminophen (TYLENOL) 325 MG tablet Take 1 tablet by mouth every 6 hours as needed         REVIEW OF SYSTEMS    Constitutional: no fever, no night sweats, no fatigue.  Head: no head ache , no head injury, no migranes.  Eye: no blurring of vision, no double vision.  Ears: no hearing difficulty, no tinnitus  Mouth/throat: no ulceration, dental caries , dysphagia  Lungs: no cough no chest pain  CVS: no palpitation, no chest pain, no shortness of breath  GI: no abdominal pain, no nausea , no

## 2025-01-22 NOTE — PROGRESS NOTES
Warfarin Pharmacy Consult Note    Warfarin Indication: Hx of DVT & PE  Target INR: 2.0-3.0  Dose prior to admission: variable since 1/7 discharge, (on hold since 1/12/25 due to high INR-fluconazole 200 mg daily 1/8-1/14, Prior to this Warfarin dose PTA: 7.5mg Tuesday 5mg SunMWThFSat with [facility] Coumadin [facility])    Recent Labs     01/20/25  0608 01/21/25  0604 01/22/25  0612   HGB 8.6* 8.7* 8.5*   * 913* 926*     Recent Labs     01/20/25  0608 01/21/25  0604 01/22/25  0612   INR 1.90* 2.03* 1.80*     Concurrent anticoagulants/antiplatelets: lovenox 80mg BID  Significant warfarin drug-drug interactions: levothyroxine, completed 7 day course of fluconazole 200 mg on 1/14    Date INR Warfarin Dose   1/16/2025 3.5 none   1/17/2025  3.23 0.5 mg   1/18/2025  2.13  1.5 mg     1/19/2025  1.82 2 mg     1/20/2025 1.9  1 mg     1/21/2025 2.03  2 mg     1/22/2025 1.8             4 mg          Monitoring:                   INR will be monitored daily. We will continue to follow and monitor.     **Please contact pharmacy for discharge instructions when indicated**    Monika Glass, PharmD 1/22/2025 11:07 AM

## 2025-01-22 NOTE — PROGRESS NOTES
[facility]    Hospitalist PROGRESS NOTE dictated by Jcarlos Kramer MD on 2025    Patient ID: Marry Ayers is 74 y.o. and presently in room   : 1950 MRN: [id number]    Admit date: 2025  Primary Care Physician: Silvestre Sawyer MD   Patient Care Team:  Silvestre Sawyer MD as PCP - General (Internal Medicine)  Tel: [phone]  Admitting Physician: LOY Peterson - CNP   Code Status:  Full Code    Marry Ayers is a 74 y.o.  female who presented with Wound Check    Room:   Admit date: 2025    Chief Complaint:  Wound check     History Of Present Illness:  Marry Ayers is a 74 y.o. female with PMHx of stricture of sigmoid colon, peritonitis, ileostomy, bowel anastomotic leak, moderate malnutrition, PE, HTN, and hypothyroidism, who presents to [facility] with wound check.  Pt reports recent surgery with ileostomy placement and nursing home staff reported tachycardia, dehydration, lack of appetite, and stool change in ostomy bag. She reports chest wall pain after hitting a cabinet 2 days prior. She denies fever, chills, LH, dizziness, VC, and sick contacts. Pt reports high INR so she switched her warfarin regimen and was 2.9 last check.      ED course: Pt was seen in ER for Sx above. Labs found leukocytosis with left shift, anemia, thrombocytosis, elevated Cr, hyperkalemia, and a low GFR. Sepsis protocol was activated and cultures were taken. D Dimer was elevated despite INR. CXR showed no change since previous. EKG showed Sinus tachycardia.    ----------    2025: Patient is S/P resection of sigmoid stricture and partial small bowel resection 24 S/p repair of colon perforation and loop ileostomy 24.  Per surgery Ostomy much better controlled with catheter usage. Fistula controlled with diverting ostomy    Per communication with ID today to transition from Unasyn to

## 2025-01-23 VITALS
RESPIRATION RATE: 16 BRPM | BODY MASS INDEX: 34.2 KG/M2 | HEART RATE: 93 BPM | SYSTOLIC BLOOD PRESSURE: 122 MMHG | OXYGEN SATURATION: 97 % | DIASTOLIC BLOOD PRESSURE: 61 MMHG | WEIGHT: 181 LBS | TEMPERATURE: 97.5 F

## 2025-01-23 LAB — INR PPP: 2.03 (ref 0.85–1.13)

## 2025-01-23 PROCEDURE — 99239 HOSP IP/OBS DSCHRG MGMT >30: CPT | Performed by: INTERNAL MEDICINE

## 2025-01-23 PROCEDURE — 6370000000 HC RX 637 (ALT 250 FOR IP): Performed by: NURSE PRACTITIONER

## 2025-01-23 PROCEDURE — 6370000000 HC RX 637 (ALT 250 FOR IP)

## 2025-01-23 PROCEDURE — 2500000003 HC RX 250 WO HCPCS

## 2025-01-23 PROCEDURE — 6370000000 HC RX 637 (ALT 250 FOR IP): Performed by: STUDENT IN AN ORGANIZED HEALTH CARE EDUCATION/TRAINING PROGRAM

## 2025-01-23 PROCEDURE — 6360000002 HC RX W HCPCS: Performed by: INTERNAL MEDICINE

## 2025-01-23 PROCEDURE — 85610 PROTHROMBIN TIME: CPT

## 2025-01-23 PROCEDURE — 36415 COLL VENOUS BLD VENIPUNCTURE: CPT

## 2025-01-23 RX ORDER — WARFARIN SODIUM 1 MG/1
1 TABLET ORAL
Status: COMPLETED | OUTPATIENT
Start: 2025-01-23 | End: 2025-01-23

## 2025-01-23 RX ADMIN — SODIUM CHLORIDE, PRESERVATIVE FREE 10 ML: 5 INJECTION INTRAVENOUS at 08:55

## 2025-01-23 RX ADMIN — ACETAMINOPHEN 650 MG: 325 TABLET ORAL at 08:52

## 2025-01-23 RX ADMIN — MONTELUKAST 10 MG: 10 TABLET, FILM COATED ORAL at 10:18

## 2025-01-23 RX ADMIN — ONDANSETRON 4 MG: 4 TABLET, ORALLY DISINTEGRATING ORAL at 08:47

## 2025-01-23 RX ADMIN — METOPROLOL SUCCINATE 75 MG: 25 TABLET, EXTENDED RELEASE ORAL at 10:17

## 2025-01-23 RX ADMIN — BUSPIRONE HYDROCHLORIDE 10 MG: 10 TABLET ORAL at 10:19

## 2025-01-23 RX ADMIN — LOSARTAN POTASSIUM 50 MG: 50 TABLET, FILM COATED ORAL at 10:19

## 2025-01-23 RX ADMIN — WARFARIN SODIUM 1 MG: 1 TABLET ORAL at 10:40

## 2025-01-23 RX ADMIN — ENOXAPARIN SODIUM 80 MG: 100 INJECTION SUBCUTANEOUS at 01:34

## 2025-01-23 RX ADMIN — LEVOTHYROXINE SODIUM 125 MCG: 0.12 TABLET ORAL at 05:57

## 2025-01-23 RX ADMIN — AMOXICILLIN AND CLAVULANATE POTASSIUM 1 TABLET: 875; 125 TABLET, FILM COATED ORAL at 10:16

## 2025-01-23 ASSESSMENT — PAIN SCALES - GENERAL
PAINLEVEL_OUTOF10: 1
PAINLEVEL_OUTOF10: 9

## 2025-01-23 ASSESSMENT — PAIN DESCRIPTION - LOCATION: LOCATION: HEAD

## 2025-01-23 ASSESSMENT — PAIN DESCRIPTION - ORIENTATION: ORIENTATION: RIGHT

## 2025-01-23 ASSESSMENT — PAIN DESCRIPTION - DESCRIPTORS: DESCRIPTORS: ACHING

## 2025-01-23 NOTE — PLAN OF CARE
Problem: Discharge Planning  Goal: Discharge to home or other facility with appropriate resources  Outcome: Progressing  Flowsheets (Taken 1/22/2025 2320)  Discharge to home or other facility with appropriate resources:   Identify discharge learning needs (meds, wound care, etc)   Arrange for needed discharge resources and transportation as appropriate   Identify barriers to discharge with patient and caregiver   Refer to discharge planning if patient needs post-hospital services based on physician order or complex needs related to functional status, cognitive ability or social support system   Arrange for interpreters to assist at discharge as needed     Problem: Safety - Adult  Goal: Free from fall injury  Outcome: Progressing  Flowsheets (Taken 1/22/2025 2320)  Free From Fall Injury:   Instruct family/caregiver on patient safety   Based on caregiver fall risk screen, instruct family/caregiver to ask for assistance with transferring infant if caregiver noted to have fall risk factors     Problem: Pain  Goal: Verbalizes/displays adequate comfort level or baseline comfort level  Outcome: Progressing  Flowsheets (Taken 1/22/2025 2320)  Verbalizes/displays adequate comfort level or baseline comfort level:   Assess pain using appropriate pain scale   Encourage patient to monitor pain and request assistance   Administer analgesics based on type and severity of pain and evaluate response   Consider cultural and social influences on pain and pain management   Implement non-pharmacological measures as appropriate and evaluate response   Notify Licensed Independent Practitioner if interventions unsuccessful or patient reports new pain     Problem: Chronic Conditions and Co-morbidities  Goal: Patient's chronic conditions and co-morbidity symptoms are monitored and maintained or improved  Outcome: Progressing  Flowsheets (Taken 1/22/2025 2320)  Care Plan - Patient's Chronic Conditions and Co-Morbidity Symptoms are Monitored

## 2025-01-23 NOTE — FLOWSHEET NOTE
Patient transported to discharge lobby in wheelchair. Daughter picked up patient to transfer to skilled nursing facility. Ambulated well with steady gait, no issues with ambulation.

## 2025-01-23 NOTE — CARE COORDINATION
1/23/25, 11:00 AM EST    Patient goals/plan/ treatment preferences discussed by  and .  Patient goals/plan/ treatment preferences reviewed with patient/ family.  Patient/ family verbalize understanding of discharge plan and are in agreement with goal/plan/treatment preferences.  Understanding was demonstrated using the teach back method.  AVS provided by RN at time of discharge, which includes all necessary medical information pertaining to the patients current course of illness, treatment, post-discharge goals of care, and treatment preferences.     Services At/After Discharge: Skilled Nursing Facility (SNF), Aide services, Nursing service, OT, and PT    Pt is being discharged today back to [facility] under her Medicare.    ROBIN notified Kathryn, yesterday of DC today.    ROBIN spoke with pts spouse Antwon.  Pts daughter will be transporting pt at noon.      ROBIN faxed AVS.  NADIYA Mijares is aware

## 2025-01-23 NOTE — PROGRESS NOTES
Clinical Pharmacy Note                                               Warfarin Discharge Recommendations    Patient discharged from [facility] today on warfarin.    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  INR goal during admission: 2.0-3.0  Previous home warfarin regimen: 7.5 mg Tues, 5 mg SunMWThFSat  Interacting medications at discharge: : levothyroxine, completed 7 day course of fluconazole 200 mg on 1/14   Coumadin 5 mg tabs    Hospital Warfarin Doses & INR Results  Date INR Warfarin Dose   1/16/2025 3.5 none   1/17/2025  3.23 0.5 mg   1/18/2025  2.13  1.5 mg     1/19/2025  1.82 2 mg     1/20/2025 1.9  1 mg     1/21/2025 2.03  2 mg     1/22/2025 1.8             4 mg    1/23/2025 2.03             1 mg (given in hospital)       Recent INRs:  Recent Labs     01/23/25  0538   INR 2.03*     Warfarin Discharge Instructions:   Next INR date: 01/24/25 with results to provider at [facility].  Close monitoring required due to variable INR during admission.     Daisy Milligan, PharmD, BCPS  1/23/2025  8:17 AM

## 2025-01-23 NOTE — DISCHARGE INSTR - MEDS
Clinical Pharmacy Note                                               Warfarin Discharge Recommendations    Patient discharged from [facility] today on warfarin.    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  INR goal during admission: 2.0-3.0  Previous home warfarin regimen: 7.5 mg Tues, 5 mg SunMWThFSat  Interacting medications at discharge: : levothyroxine, completed 7 day course of fluconazole 200 mg on 1/14   Coumadin 5 mg tabs    Hospital Warfarin Doses & INR Results  Date INR Warfarin Dose   1/16/2025 3.5 none   1/17/2025  3.23 0.5 mg   1/18/2025  2.13  1.5 mg     1/19/2025  1.82 2 mg     1/20/2025 1.9  1 mg     1/21/2025 2.03  2 mg     1/22/2025 1.8             4 mg    1/23/2025 2.03             1 mg (given in hospital)       Recent INRs:  Recent Labs     01/23/25  0538   INR 2.03*     Warfarin Discharge Instructions:   Next INR date: 01/24/25 with results to provider at [facility].  Close monitoring required due to variable INR during admission.

## 2025-01-23 NOTE — PROGRESS NOTES
Discharge teaching and instructions for diagnosis/procedure of aliya completed with patient using teachback method. AVS reviewed. Printed prescriptions given to patient. Patient voiced understanding regarding prescriptions, follow up appointments, and care of self at home. Discharged in a wheelchair to  skilled nursing per family

## 2025-01-23 NOTE — PROGRESS NOTES
Report received from Natalio HEARN.  Patient A&O x4. Speech appropriate and clear. Oral mucosa pink and moist. Pupils PERRLA from 3 mm to 2 mm. Sensation and movement present with upper extremities without numbness or tingling. Hand grasp strong and equal. Heart sounds regular, strong amplitude, and tachycardic, 105. Lung sounds clear throughout, regular rhythm, and moderate depth with symmetric movement. Bowel sounds present in all 4 quadrants. Abdomen round, soft, and non-tender. Ostomy bag present and draining watery brown stool. Sensation and movement present with lower extremities with no numbness or tingling. Pedal push/pull strong and equal. No edema. Skin pink, warm, and dry. Two healed, well approximated incisions noted on abdomen, medial and lower medial. Bed in low position with brake on. Top two side rails up per patient preference. Call light and bedside table within reach. Patient denies any needs at this time. Discussed abnormal finding with Dyllan HEARN.

## 2025-01-23 NOTE — PROGRESS NOTES
[facility]    Hospitalist PROGRESS NOTE dictated by Jcarlos Kramer MD on 2025    Patient ID: Marry Ayers is 74 y.o. and presently in room   : 1950 MRN: [id number]    Admit date: 2025  Primary Care Physician: Silvestre Sawyer MD   Patient Care Team:  Silvestre Sawyer MD as PCP - General (Internal Medicine)  Tel: [phone]  Admitting Physician: LOY Peterson - CNP   Code Status:  Full Code    Marry Ayers is a 74 y.o.  female who presented with Wound Check    Room:   Admit date: 2025    Chief Complaint:  Wound check     History Of Present Illness:  Marry Ayers is a 74 y.o. female with PMHx of stricture of sigmoid colon, peritonitis, ileostomy, bowel anastomotic leak, moderate malnutrition, PE, HTN, and hypothyroidism, who presents to [facility] with wound check.  Pt reports recent surgery with ileostomy placement and nursing home staff reported tachycardia, dehydration, lack of appetite, and stool change in ostomy bag. She reports chest wall pain after hitting a cabinet 2 days prior. She denies fever, chills, LH, dizziness, VC, and sick contacts. Pt reports high INR so she switched her warfarin regimen and was 2.9 last check.      ED course: Pt was seen in ER for Sx above. Labs found leukocytosis with left shift, anemia, thrombocytosis, elevated Cr, hyperkalemia, and a low GFR. Sepsis protocol was activated and cultures were taken. D Dimer was elevated despite INR. CXR showed no change since previous. EKG showed Sinus tachycardia.    ----------    2025: Patient is S/P resection of sigmoid stricture and partial small bowel resection 24 S/p repair of colon perforation and loop ileostomy 24.  Per surgery Ostomy much better controlled with catheter usage. Fistula controlled with diverting ostomy    Per communication with ID today to transition from Unasyn to  lobe subsegmental atelectasis. Right lower lobe scarring noted. No acute bony abnormalities. Right lower quadrant ostomy noted. Postsurgical scarring and calcification central pelvis. No significant bowel distention. Air and stranding midline pelvic wall. This appears to directly communicate with an anterior bowel loop. Finding is concerning for an enteric fistula. Stable right upper quadrant ascites. Liver and spleen within normal limits. Pancreas and right adrenal gland unremarkable. Stable 1.3 cm left adrenal adenoma. Gallbladder within normal limits. No bilateral renal stone or hydronephrosis. No focal renal abnormality or ureteral dilation. No evidence for aortic aneurysm. No free fluid or adenopathy is noted in the pelvis. No evidence for diverticulitis. Appendix not identified. Hysterectomy. No adnexal abnormalities.     Impression: Anterior midline pelvic wall enteric fistula Left pleural effusion with lower lobe atelectasis No other change from previous study This document has been electronically signed by: Pavel Ramos MD on 01/16/2025 08:49 PM All CTs at this facility use dose modulation techniques and iterative reconstructions, and/or weight-based dosing when appropriate to reduce radiation to a low as reasonably achievable.    Vascular duplex lower extremity venous bilateral    Result Date: 1/16/2025  PROCEDURE: VAS DUP LOWER EXTREMITY VENOUS BILATERAL CLINICAL INFORMATION: DVT Hx. COMPARISON: No prior study. TECHNIQUE: Venous doppler ultrasound was performed of the bilateral lower extremities using gray scale, color flow and spectral doppler imaging. FINDINGS: There is normal color flow, spectral analysis and compressibility of the right and left common femoral vein, femoral vein and popliteal veins . There is normal color flow and compressibility in the right and left posterior tibial veins, anterior tibial veins and left peroneal veins. There is lack of all normal color flow, spectral analysis

## 2025-01-23 NOTE — PLAN OF CARE
Problem: Discharge Planning  Goal: Discharge to home or other facility with appropriate resources  1/23/2025 0940 by Dyllan Daniels, RN  Outcome: Progressing  Flowsheets (Taken 1/23/2025 0940)  Discharge to home or other facility with appropriate resources: Identify barriers to discharge with patient and caregiver     Problem: Safety - Adult  Goal: Free from fall injury  1/23/2025 0940 by Dyllan Daniels, RN  Outcome: Progressing  Flowsheets (Taken 1/23/2025 0940)  Free From Fall Injury: Instruct family/caregiver on patient safety     Problem: Pain  Goal: Verbalizes/displays adequate comfort level or baseline comfort level  1/23/2025 0940 by Dyllan Daniels, RN  Outcome: Progressing  Flowsheets (Taken 1/23/2025 0940)  Verbalizes/displays adequate comfort level or baseline comfort level: Encourage patient to monitor pain and request assistance

## 2025-01-23 NOTE — DISCHARGE SUMMARY
[facility]--HOSPITALIST GROUP    Hospitalist DISCHARGE SUMMARY dictated by Jcarlos Kramer MD on 2025      DEMOGRAPHICS     Date of Service: 2025  9:47 AM   Patient ID:Marry Ayers is74 y.o.   : 1950 MRN:[id number]  Code Status:  Full Code  Admit date: 2025  Discharge date: 2025     Primary Care Physician - Silvestre Sawyer MD   Patient Care Team:  Silvestre Sawyer MD as PCP - General (Internal Medicine)  Tel: [phone]  PHARMACY TO DOSE WARFARIN  IP CONSULT TO SOCIAL WORK  IP CONSULT TO INFECTIOUS DISEASES  IP CONSULT TO GENERAL SURGERY  Admitting Physician: LOY Peterson - CNP   Discharge Physician: Jcarlos Kramer MD      MEDICAL SYNOPSIS     FOLLOW UP APPOINTMENTS:   [address]  [facility] [address]  [phone]        Silvestre Sawyer MD  [address] [place] [address] [address]  [facility] [address]  [phone]    Schedule an appointment as soon as possible for a visit in 1 week(s)  [facility] please call to schedule a 1 week hospital follow up appointment.      Code Status:  Full Code  Diet: ADULT DIET; Regular    Final diagnoses:       Principal Problem:    Acute kidney injury (HCC)  Active Problems:    H/O colectomy    Hyperkalemia    Entero-enteric fistula    Obesity (BMI 30.0-34.9)    Intra-abdominal infection    Morbid obesity    Enteroenteric fistula    High anion gap metabolic acidosis    Chest pressure    Pleural effusion on left    Sinus tachycardia  Resolved Problems:    * No resolved hospital problems. *  Probable Sepsis(POA) . SIRS Criteria on arrival: Heart Rate >90 and WBC >12k or <4k or >10% Bands. Organ Dysfunction: Creatinine >2.0  Volume Expansion: 30cc/kg of Lactated Ringers was given.   Supratherapeutic INR, resolved: Possibly due to prescription of Diflucan. Diflucan has been held. Vitamin K given at [facility]. No active bleeding. Pharmacy dosing Coumadin.   Abdominal pain, resolved. Imaging for enteric  pneumonia. Follow-up to ensure resolution is advised. 3. Chronic findings are discussed. **This report has been created using voice recognition software.  It may contain minor errors which are inherent in voice recognition technology.** Electronically signed by Dr. Christie Topete    Echo (TTE) complete (PRN contrast/bubble/strain/3D)    Result Date: 1/21/2025    Left Ventricle: Normal left ventricular systolic function with a visually estimated EF of 60 - 65%. Left ventricle size is normal. Normal wall thickness. Normal wall motion. Normal diastolic function.   Aortic Valve: Mild regurgitation with a centrally directed jet.   Image quality is technically difficult. Technically difficult study due to patient's body habitus and procedure performed with the patient in a supine position.     CTA CHEST W WO CONTRAST    Result Date: 1/20/2025  PROCEDURE: CTA CHEST W WO CONTRAST CLINICAL INFORMATION: Tachycardia. Recurrent deep venous thrombosis. COMPARISON: Chest x-ray 1/19/2025. TECHNIQUE: 1.5 mm axial images were obtained through the chest after the administration of IV contrast. A non-contrast localizer was obtained. 2mm MIP reconstructions of the chest performed in coronal and sagittal planes. All CT scans at this facility use dose modulation, iterative reconstruction, and/or weight based dosing when appropriate to reduce the radiation dose to as low as reasonably achievable. CONTRAST: 80 cc Isovue-370. FINDINGS: Pulmonary arteries: No filling defects are identified within the pulmonary arterial vasculature to suggest the presence of pulmonary embolus. Heart/mediastinum: The heart size is normal. No pericardial effusion is observed. No aortic aneurysm or dissection is present. Coronary artery calcifications are observed. No mediastinal, hilar, or axillary lymphadenopathy is identified. Lungs: The small right pleural effusion has decreased in volume. The small left pleural effusion has increased in volume and appears

## 2025-01-31 ENCOUNTER — OFFICE VISIT (OUTPATIENT)
Dept: SURGERY | Age: 75
End: 2025-01-31

## 2025-01-31 ENCOUNTER — HOSPITAL ENCOUNTER (OUTPATIENT)
Dept: WOUND CARE | Age: 75
Discharge: HOME | End: 2025-01-31
Attending: NURSE PRACTITIONER
Payer: MEDICARE

## 2025-01-31 VITALS
HEART RATE: 123 BPM | OXYGEN SATURATION: 96 % | DIASTOLIC BLOOD PRESSURE: 53 MMHG | RESPIRATION RATE: 18 BRPM | TEMPERATURE: 97.1 F | SYSTOLIC BLOOD PRESSURE: 114 MMHG

## 2025-01-31 VITALS
BODY MASS INDEX: 31.21 KG/M2 | WEIGHT: 165.3 LBS | OXYGEN SATURATION: 96 % | DIASTOLIC BLOOD PRESSURE: 45 MMHG | SYSTOLIC BLOOD PRESSURE: 90 MMHG | HEIGHT: 61 IN | HEART RATE: 123 BPM | TEMPERATURE: 97.4 F

## 2025-01-31 DIAGNOSIS — Z93.2 ILEOSTOMY IN PLACE (HCC): ICD-10-CM

## 2025-01-31 DIAGNOSIS — Z71.89 ENCOUNTER FOR OSTOMY CARE EDUCATION: Primary | ICD-10-CM

## 2025-01-31 DIAGNOSIS — K56.699 STRICTURE OF SIGMOID COLON (HCC): Primary | ICD-10-CM

## 2025-01-31 DIAGNOSIS — R42 DIZZINESS: ICD-10-CM

## 2025-01-31 DIAGNOSIS — L30.8 PERISTOMAL DERMATITIS ASSOCIATED WITH MOISTURE: ICD-10-CM

## 2025-01-31 DIAGNOSIS — R00.0 TACHYCARDIA: ICD-10-CM

## 2025-01-31 PROCEDURE — 99213 OFFICE O/P EST LOW 20 MIN: CPT

## 2025-01-31 PROCEDURE — 99024 POSTOP FOLLOW-UP VISIT: CPT | Performed by: SURGERY

## 2025-01-31 RX ORDER — BUSPIRONE HYDROCHLORIDE 10 MG/1
10 TABLET ORAL 2 TIMES DAILY
Status: ON HOLD | COMMUNITY
Start: 2025-01-06

## 2025-01-31 RX ORDER — ONDANSETRON 4 MG/1
4 TABLET, FILM COATED ORAL EVERY 8 HOURS PRN
Status: ON HOLD | COMMUNITY

## 2025-01-31 NOTE — PLAN OF CARE
Problem: Skin/Tissue Integrity  Goal: Skin integrity remains intact  Description: 1.  Monitor for areas of redness and/or skin breakdown  Outcome: Progressing   Patient seen in clinic today for ostomy.  See AVS. Follow up as needed. Care plan reviewed with patient. Patient verbalizes understanding of the plan of care and contributes to goal setting.

## 2025-01-31 NOTE — PATIENT INSTRUCTIONS
Visit Discharge/ physician orders for ostomy:  - recommend going to ER for high heart rate, dizziness, nausea  - follow up with facility provider as well  -Please use crusting technique on irritated peristomal skin by using stoma powder and skin prep.  -Stop use of glue around stoma, use the ring only.   -Make sure to stay well hydrated, try small frequent meals, increase protein levels.                          Supplies   Size   Order #     Rosi One-Piece soft convex Cut to fit to 28-30mm-Oval (Measure each time) [id number]   Brava Protective Seal-Ring Thick [id number]   Brava Skin Barrier Wipes 30 wipes/box [id number]     Brava Stoma Powder      [id number]       Change your pouch 1-2   times / week and when leaking    Application of one Piece Colostomy/Ileostomy Pouch:  Assemble above supplies in order of application before removing pouch.  If not pre-cut: Cut a hole in the flange to fit the size of your stoma. Remove paper backing.  Remove your worn appliance by gently pulling away from skin and discard.  Wash skin with warm water, rinse and pat dry.  DO NOT USE SOAP!  Inspect your skin for redness or irritation.  If present, apply a small amount of powder to skin around stoma.  Brush off excess powder with a Kleenex. Do not use ointments.  - Brava Stoma Powder  - for wet, weepy skin  - Antifungal powder - for red,itchy rash   Center the flange around your stoma.  Smooth the adhesive collar to your abdomen.  Close the end of your pouch.  Empty when 1/3 full.    Follow up: as needed     [facility] Wound and ostomy [facility]  [address] [place] [address]  Phone: [phone]

## 2025-01-31 NOTE — PROGRESS NOTES
[facility]  [facility] GENERAL SURGERY  [address]  [place] [address]  [phone]  Post Procedure Evaluation in Office    Pt Name: Marry Ayers  Date of Birth 1950   Today's Date: 1/31/2025  Medical Record Number: [id number]  Referring Provider: No ref. provider found  Primary Care Provider: Silvestre Sawyer MD  Chief Complaint   Patient presents with    Post-Op Check     S/P resection of sigmoid stricture and partial small bowel resection 12/4/24. S/p repair of colon perforation and loop ileostomy 12/13/24-Admitted 1/16/25-1/23/25 for wound check     ASSESSMENT       Diagnosis Orders   1. Stricture of sigmoid colon (HCC)      S/P resection of sigmoid stricture and partial small bowel resection 12/4/24 S/p repair of colon perforation and loop ileostomy 12/13/24      Incision is clean, dry and intact or healing as expected   PLANS   Assessment & Plan   Pathology reviewed with the patient who understands. All questions were answered.  Patient Instructions   No lifting, pushing or pulling more than 50 lbs for 2 weeks, then 80 lbs for 2 weeks. No restrictions after 4 weeks. Still having issues with ostomy leaking, will be following up with stoma therapy.  Stop Amoxicillin as this is causing her nausea  Stressed the importance of staying hydrated.   Will consider repeat imaging in 1-2 months to reassess fistula for possible ostomy takedown.  Follow up: Return in about 2 weeks (around 2/14/2025).      MARY Tuttle is seen today for post-op follow-up. She is S/P resection of sigmoid stricture and partial small bowel resection 12/4/24 S/p repair of colon perforation and loop ileostomy 12/13/24. She is still having some issues with ostomy bag leaking at the flange even with frequent emptying. C/O nausea with oral antibiotics that is somewhat improved with zofran. Midline incision is no longer leaking.  Past Medical History   has a past medical history of

## 2025-01-31 NOTE — PROGRESS NOTES
[facility]  Ostomy Progress Note      NAME:  Marry Ayers  MEDICAL RECORD NUMBER:  [id number]  AGE: 74 y.o.   GENDER:  female  :  1950  TODAY'S DATE:  2025  Referring Provider: Casimiro Callahan DO   Reason for Referral: ostomy care    Subjective       Chief Complaint   Patient presents with    Wound Check         HISTORY of PRESENT ILLNESS     Marry Ayers is a 74 y.o. female established patient  who presents today for ostomy/stoma evaluation.     History of Ostomy Context: Patient presents today for ostomy care education and evaluation s/p open sigmoid colectomy, partial small bowel resection 24 and exploratory laporotomy with creation of ileostomy 24 per Dr. Callahan secondary to sigmoid stricture.  At last visit patient had very poor wear time with associated skin irritation.  Ostomy care was changed to Rosi, 1 piece, soft convex, cut to fit 40 mm oval pouch with ostomy ring.  Today, she reports improved wear time with changes made, has been changing every 2-3 days.  She has not been completing any ostomy care at facility, does not look at stoma throughout visit today. She voices plan to learn ostomy plan at facility this week with planned discharge home next week. She does not know at this time if home health will be consulted at discharge from [facility].  She reports liquid output from stoma.   She has had poor appetite and oral intake at home, states that she does not drink water.  She states that she has had increased heart rate, low blood pressure, nausea, dry heaves and dizziness at [facility].  She states that she has only been able to ambulate short distances with therapy prior to stopping due to dizziness.  She states that she did have labs drawn this morning.  Was recently hospitalized secondary to LINDA.  She denies any further needs or concerns.     PAST MEDICAL HISTORY        Diagnosis Date    Allergic rhinitis     Anemia     Anxiety     Cataract     Essential 
Clinical Level of Care Assessment    Outpatient Ostomy Care      NAME:  Marry Ayers  YOB: 1950  MEDICAL RECORD NUMBER:  [id number]   DATE:  1/31/2025      Patient /Ostomy Assessment- Document in Flowsheet I&O   Points   Review of chart []   0   Assess the Complete Ostomy tab in Navigator for assessment of; stoma status, peristomal skin, presence of hernia/stool consistency/diet/related medications.   Simple adjustments to pouch size/pouch system, new stoma pattern, accessory addition/deletion.   []   1   Assess the Complete Ostomy tab in Navigator for assessment of; stoma status, peristomal skin, presence of hernia/stool consistency/diet/related medications.   Moderate adjustments to pouch size/pouch system, new stoma pattern, and accessory addition/deletion.  Observe patient/caregiver with hands-on care.   1-2 adjustments to pouch size/system/skincare/accessory addition or deletion.    [x]   2   Assess the Complete Ostomy tab in Navigator for assessment of; stoma status, peristomal skin, presence of hernia/stool consistency/diet/related medications.   Complex adjustments to pouch size/pouch system, new stoma pattern, accessory addition/deletion.  3 or more complex adjustments to pouch size/system/skincare/accessory addition or deletion.  Observe patient/caregiver with hands-on care.   Assess patient/patient's abdomen for optimal pre-marked stoma site.  Assess the patient's abdomen for a type of hernia belt/accessory needed. []   3         Ambulation Status Documented in CN Clinical Note  Status Definition Points   Independent Independently able to ambulate.  Fully able (without any assistance) to get on/off the exam table/chair.    []   0   Minimal Physical Assistance Requires physical assistance of one person to ambulate and position the patient to be examined. Includes necessary physical assistance to position lower extremities on/off the stool.   [x]   1   Moderate Physical Assistance 
Prophylactic measure

## 2025-02-02 ENCOUNTER — HOSPITAL ENCOUNTER (INPATIENT)
Age: 75
LOS: 3 days | Discharge: HOME | DRG: 683 | End: 2025-02-05
Attending: STUDENT IN AN ORGANIZED HEALTH CARE EDUCATION/TRAINING PROGRAM | Admitting: INTERNAL MEDICINE
Payer: MEDICARE

## 2025-02-02 ENCOUNTER — APPOINTMENT (OUTPATIENT)
Dept: ULTRASOUND IMAGING | Age: 75
DRG: 683 | End: 2025-02-02
Payer: MEDICARE

## 2025-02-02 ENCOUNTER — APPOINTMENT (OUTPATIENT)
Dept: CT IMAGING | Age: 75
DRG: 683 | End: 2025-02-02
Payer: MEDICARE

## 2025-02-02 DIAGNOSIS — E87.5 HYPERKALEMIA: ICD-10-CM

## 2025-02-02 DIAGNOSIS — N17.9 ACUTE KIDNEY INJURY: Primary | ICD-10-CM

## 2025-02-02 LAB
ALBUMIN SERPL BCG-MCNC: 3.1 G/DL (ref 3.5–5.1)
ALP SERPL-CCNC: 260 U/L (ref 38–126)
ALT SERPL W/O P-5'-P-CCNC: 14 U/L (ref 11–66)
ANION GAP SERPL CALC-SCNC: 14 MEQ/L (ref 8–16)
ANION GAP SERPL CALC-SCNC: 16 MEQ/L (ref 8–16)
ANION GAP SERPL CALC-SCNC: 18 MEQ/L (ref 8–16)
AST SERPL-CCNC: 15 U/L (ref 5–40)
BACTERIA: ABNORMAL
BASOPHILS ABSOLUTE: 0 THOU/MM3 (ref 0–0.1)
BASOPHILS NFR BLD AUTO: 0.3 %
BILIRUB SERPL-MCNC: 0.3 MG/DL (ref 0.3–1.2)
BILIRUB UR QL STRIP: NEGATIVE
BUN SERPL-MCNC: 39 MG/DL (ref 7–22)
BUN SERPL-MCNC: 42 MG/DL (ref 7–22)
BUN SERPL-MCNC: 47 MG/DL (ref 7–22)
CA-I BLD ISE-SCNC: 1.07 MMOL/L (ref 1.12–1.32)
CALCIUM SERPL-MCNC: 8.3 MG/DL (ref 8.5–10.5)
CALCIUM SERPL-MCNC: 8.8 MG/DL (ref 8.5–10.5)
CALCIUM SERPL-MCNC: 9.3 MG/DL (ref 8.5–10.5)
CASTS #/AREA URNS LPF: ABNORMAL /LPF
CASTS #/AREA URNS LPF: ABNORMAL /LPF
CHARACTER UR: CLEAR
CHARCOAL URNS QL MICRO: ABNORMAL
CHLORIDE 24H UR-SRATE: 74 MEQ/L
CHLORIDE SERPL-SCNC: 105 MEQ/L (ref 98–111)
CHLORIDE SERPL-SCNC: 105 MEQ/L (ref 98–111)
CHLORIDE SERPL-SCNC: 97 MEQ/L (ref 98–111)
CO2 SERPL-SCNC: 11 MEQ/L (ref 23–33)
CO2 SERPL-SCNC: 16 MEQ/L (ref 23–33)
CO2 SERPL-SCNC: 16 MEQ/L (ref 23–33)
COLOR UR: YELLOW
CREAT SERPL-MCNC: 1.5 MG/DL (ref 0.4–1.2)
CREAT SERPL-MCNC: 1.7 MG/DL (ref 0.4–1.2)
CREAT SERPL-MCNC: 2.2 MG/DL (ref 0.4–1.2)
CREAT UR-MCNC: 110.7 MG/DL
CRYSTALS URNS QL MICRO: ABNORMAL
DEPRECATED RDW RBC AUTO: 52.9 FL (ref 35–45)
DEPRECATED RDW RBC AUTO: 58.3 FL (ref 35–45)
EKG ATRIAL RATE: 112 BPM
EKG P AXIS: 53 DEGREES
EKG P-R INTERVAL: 168 MS
EKG Q-T INTERVAL: 310 MS
EKG QRS DURATION: 60 MS
EKG QTC CALCULATION (BAZETT): 423 MS
EKG R AXIS: 18 DEGREES
EKG T AXIS: 65 DEGREES
EKG VENTRICULAR RATE: 112 BPM
EOSINOPHIL NFR BLD AUTO: 2.3 %
EOSINOPHIL SMEAR, URINE: NORMAL
EOSINOPHILS ABSOLUTE: 0.4 THOU/MM3 (ref 0–0.4)
EPITHELIAL CELLS, UA: ABNORMAL /HPF
ERYTHROCYTE [DISTWIDTH] IN BLOOD BY AUTOMATED COUNT: 16.8 % (ref 11.5–14.5)
ERYTHROCYTE [DISTWIDTH] IN BLOOD BY AUTOMATED COUNT: 17.3 % (ref 11.5–14.5)
FLUAV RNA RESP QL NAA+PROBE: NOT DETECTED
FLUBV RNA RESP QL NAA+PROBE: NOT DETECTED
GFR SERPL CREATININE-BSD FRML MDRD: 23 ML/MIN/1.73M2
GFR SERPL CREATININE-BSD FRML MDRD: 31 ML/MIN/1.73M2
GFR SERPL CREATININE-BSD FRML MDRD: 36 ML/MIN/1.73M2
GLUCOSE BLD STRIP.AUTO-MCNC: 100 MG/DL (ref 70–108)
GLUCOSE BLD STRIP.AUTO-MCNC: 109 MG/DL (ref 70–108)
GLUCOSE BLD STRIP.AUTO-MCNC: 120 MG/DL (ref 70–108)
GLUCOSE BLD STRIP.AUTO-MCNC: 129 MG/DL (ref 70–108)
GLUCOSE BLD STRIP.AUTO-MCNC: 135 MG/DL (ref 70–108)
GLUCOSE BLD STRIP.AUTO-MCNC: 248 MG/DL (ref 70–108)
GLUCOSE BLD STRIP.AUTO-MCNC: 58 MG/DL (ref 70–108)
GLUCOSE SERPL-MCNC: 145 MG/DL (ref 70–108)
GLUCOSE SERPL-MCNC: 152 MG/DL (ref 70–108)
GLUCOSE SERPL-MCNC: 60 MG/DL (ref 70–108)
GLUCOSE UR QL STRIP.AUTO: NEGATIVE MG/DL
HCT VFR BLD AUTO: 29.4 % (ref 37–47)
HCT VFR BLD AUTO: 29.8 % (ref 37–47)
HGB BLD-MCNC: 8.9 GM/DL (ref 12–16)
HGB BLD-MCNC: 9.5 GM/DL (ref 12–16)
HGB UR QL STRIP.AUTO: NEGATIVE
IMM GRANULOCYTES # BLD AUTO: 0.1 THOU/MM3 (ref 0–0.07)
IMM GRANULOCYTES NFR BLD AUTO: 0.6 %
INR PPP: 1.85 (ref 0.85–1.13)
KETONES UR QL STRIP.AUTO: NEGATIVE
LACTATE SERPL-SCNC: 0.8 MMOL/L (ref 0.5–2)
LEUKOCYTE ESTERASE UR QL STRIP.AUTO: ABNORMAL
LYMPHOCYTES ABSOLUTE: 2.1 THOU/MM3 (ref 1–4.8)
LYMPHOCYTES NFR BLD AUTO: 13.7 %
MAGNESIUM SERPL-MCNC: 1.1 MG/DL (ref 1.6–2.4)
MCH RBC QN AUTO: 27.6 PG (ref 26–33)
MCH RBC QN AUTO: 27.9 PG (ref 26–33)
MCHC RBC AUTO-ENTMCNC: 29.9 GM/DL (ref 32.2–35.5)
MCHC RBC AUTO-ENTMCNC: 32.3 GM/DL (ref 32.2–35.5)
MCV RBC AUTO: 86.2 FL (ref 81–99)
MCV RBC AUTO: 92.5 FL (ref 81–99)
MONOCYTES ABSOLUTE: 1.4 THOU/MM3 (ref 0.4–1.3)
MONOCYTES NFR BLD AUTO: 9.1 %
NEUTROPHILS ABSOLUTE: 11.5 THOU/MM3 (ref 1.8–7.7)
NEUTROPHILS NFR BLD AUTO: 74 %
NITRITE UR QL STRIP.AUTO: NEGATIVE
NRBC BLD AUTO-RTO: 0 /100 WBC
OSMOLALITY SERPL CALC.SUM OF ELEC: 273.8 MOSMOL/KG (ref 275–300)
OSMOLALITY SERPL CALC.SUM OF ELEC: 277.4 MOSMOL/KG (ref 275–300)
OSMOLALITY SERPL CALC.SUM OF ELEC: 281.7 MOSMOL/KG (ref 275–300)
OSMOLALITY UR: NORMAL MOSMOL/KG (ref 250–750)
PH UR STRIP.AUTO: 5 [PH] (ref 5–9)
PLATELET # BLD AUTO: 498 THOU/MM3 (ref 130–400)
PLATELET # BLD AUTO: 570 THOU/MM3 (ref 130–400)
PMV BLD AUTO: 10.1 FL (ref 9.4–12.4)
PMV BLD AUTO: 10.3 FL (ref 9.4–12.4)
POTASSIUM SERPL-SCNC: 4.7 MEQ/L (ref 3.5–5.2)
POTASSIUM SERPL-SCNC: 4.9 MEQ/L (ref 3.5–5.2)
POTASSIUM SERPL-SCNC: 5.6 MEQ/L (ref 3.5–5.2)
POTASSIUM UR-SCNC: 61.6 MEQ/L
PROT SERPL-MCNC: 7.8 G/DL (ref 6.1–8)
PROT UR STRIP.AUTO-MCNC: 30 MG/DL
RBC # BLD AUTO: 3.22 MILL/MM3 (ref 4.2–5.4)
RBC # BLD AUTO: 3.41 MILL/MM3 (ref 4.2–5.4)
RBC #/AREA URNS HPF: ABNORMAL /HPF
RENAL EPI CELLS #/AREA URNS HPF: ABNORMAL /[HPF]
SARS-COV-2 RNA RESP QL NAA+PROBE: NOT DETECTED
SODIUM SERPL-SCNC: 129 MEQ/L (ref 135–145)
SODIUM SERPL-SCNC: 134 MEQ/L (ref 135–145)
SODIUM SERPL-SCNC: 135 MEQ/L (ref 135–145)
SODIUM UR-SCNC: 27 MEQ/L
SODIUM UR-SCNC: 27 MEQ/L
SPECIFIC GRAVITY UA: 1.02 (ref 1–1.03)
UROBILINOGEN, URINE: 0.2 EU/DL (ref 0–1)
UUN 24H UR-MCNC: 835 MG/DL
WBC # BLD AUTO: 13.7 THOU/MM3 (ref 4.8–10.8)
WBC # BLD AUTO: 15.5 THOU/MM3 (ref 4.8–10.8)
WBC #/AREA URNS HPF: ABNORMAL /HPF
YEAST LIKE FUNGI URNS QL MICRO: ABNORMAL

## 2025-02-02 PROCEDURE — 1200000003 HC TELEMETRY R&B

## 2025-02-02 PROCEDURE — 2500000003 HC RX 250 WO HCPCS

## 2025-02-02 PROCEDURE — 85027 COMPLETE CBC AUTOMATED: CPT

## 2025-02-02 PROCEDURE — 6370000000 HC RX 637 (ALT 250 FOR IP)

## 2025-02-02 PROCEDURE — 82436 ASSAY OF URINE CHLORIDE: CPT

## 2025-02-02 PROCEDURE — 76770 US EXAM ABDO BACK WALL COMP: CPT

## 2025-02-02 PROCEDURE — 85025 COMPLETE CBC W/AUTO DIFF WBC: CPT

## 2025-02-02 PROCEDURE — 2580000003 HC RX 258

## 2025-02-02 PROCEDURE — 93005 ELECTROCARDIOGRAM TRACING: CPT | Performed by: STUDENT IN AN ORGANIZED HEALTH CARE EDUCATION/TRAINING PROGRAM

## 2025-02-02 PROCEDURE — 2580000003 HC RX 258: Performed by: STUDENT IN AN ORGANIZED HEALTH CARE EDUCATION/TRAINING PROGRAM

## 2025-02-02 PROCEDURE — 99285 EMERGENCY DEPT VISIT HI MDM: CPT

## 2025-02-02 PROCEDURE — 81001 URINALYSIS AUTO W/SCOPE: CPT

## 2025-02-02 PROCEDURE — 2500000003 HC RX 250 WO HCPCS: Performed by: STUDENT IN AN ORGANIZED HEALTH CARE EDUCATION/TRAINING PROGRAM

## 2025-02-02 PROCEDURE — 36415 COLL VENOUS BLD VENIPUNCTURE: CPT

## 2025-02-02 PROCEDURE — 96360 HYDRATION IV INFUSION INIT: CPT

## 2025-02-02 PROCEDURE — 84540 ASSAY OF URINE/UREA-N: CPT

## 2025-02-02 PROCEDURE — 83735 ASSAY OF MAGNESIUM: CPT

## 2025-02-02 PROCEDURE — 82570 ASSAY OF URINE CREATININE: CPT

## 2025-02-02 PROCEDURE — 6370000000 HC RX 637 (ALT 250 FOR IP): Performed by: STUDENT IN AN ORGANIZED HEALTH CARE EDUCATION/TRAINING PROGRAM

## 2025-02-02 PROCEDURE — 6370000000 HC RX 637 (ALT 250 FOR IP): Performed by: INTERNAL MEDICINE

## 2025-02-02 PROCEDURE — 6360000002 HC RX W HCPCS: Performed by: INTERNAL MEDICINE

## 2025-02-02 PROCEDURE — 87636 SARSCOV2 & INF A&B AMP PRB: CPT

## 2025-02-02 PROCEDURE — 74176 CT ABD & PELVIS W/O CONTRAST: CPT

## 2025-02-02 PROCEDURE — 82330 ASSAY OF CALCIUM: CPT

## 2025-02-02 PROCEDURE — 83935 ASSAY OF URINE OSMOLALITY: CPT

## 2025-02-02 PROCEDURE — 96361 HYDRATE IV INFUSION ADD-ON: CPT

## 2025-02-02 PROCEDURE — 99223 1ST HOSP IP/OBS HIGH 75: CPT | Performed by: STUDENT IN AN ORGANIZED HEALTH CARE EDUCATION/TRAINING PROGRAM

## 2025-02-02 PROCEDURE — 99222 1ST HOSP IP/OBS MODERATE 55: CPT | Performed by: INTERNAL MEDICINE

## 2025-02-02 PROCEDURE — 89190 NASAL SMEAR FOR EOSINOPHILS: CPT

## 2025-02-02 PROCEDURE — 85610 PROTHROMBIN TIME: CPT

## 2025-02-02 PROCEDURE — 6360000002 HC RX W HCPCS: Performed by: STUDENT IN AN ORGANIZED HEALTH CARE EDUCATION/TRAINING PROGRAM

## 2025-02-02 PROCEDURE — 2580000003 HC RX 258: Performed by: INTERNAL MEDICINE

## 2025-02-02 PROCEDURE — 84300 ASSAY OF URINE SODIUM: CPT

## 2025-02-02 PROCEDURE — 82948 REAGENT STRIP/BLOOD GLUCOSE: CPT

## 2025-02-02 PROCEDURE — 80053 COMPREHEN METABOLIC PANEL: CPT

## 2025-02-02 PROCEDURE — 83605 ASSAY OF LACTIC ACID: CPT

## 2025-02-02 PROCEDURE — 84133 ASSAY OF URINE POTASSIUM: CPT

## 2025-02-02 PROCEDURE — 6360000002 HC RX W HCPCS

## 2025-02-02 RX ORDER — 0.9 % SODIUM CHLORIDE 0.9 %
1000 INTRAVENOUS SOLUTION INTRAVENOUS ONCE
Status: COMPLETED | OUTPATIENT
Start: 2025-02-02 | End: 2025-02-02

## 2025-02-02 RX ORDER — SODIUM CHLORIDE 9 MG/ML
INJECTION, SOLUTION INTRAVENOUS CONTINUOUS
Status: DISCONTINUED | OUTPATIENT
Start: 2025-02-02 | End: 2025-02-03

## 2025-02-02 RX ORDER — SODIUM CHLORIDE 0.9 % (FLUSH) 0.9 %
5-40 SYRINGE (ML) INJECTION EVERY 12 HOURS SCHEDULED
Status: DISCONTINUED | OUTPATIENT
Start: 2025-02-02 | End: 2025-02-05 | Stop reason: HOSPADM

## 2025-02-02 RX ORDER — ONDANSETRON 2 MG/ML
4 INJECTION INTRAMUSCULAR; INTRAVENOUS EVERY 6 HOURS PRN
Status: DISCONTINUED | OUTPATIENT
Start: 2025-02-02 | End: 2025-02-05 | Stop reason: HOSPADM

## 2025-02-02 RX ORDER — SODIUM CHLORIDE 9 MG/ML
INJECTION, SOLUTION INTRAVENOUS CONTINUOUS
Status: DISCONTINUED | OUTPATIENT
Start: 2025-02-02 | End: 2025-02-02

## 2025-02-02 RX ORDER — SODIUM CHLORIDE 9 MG/ML
INJECTION, SOLUTION INTRAVENOUS PRN
Status: DISCONTINUED | OUTPATIENT
Start: 2025-02-02 | End: 2025-02-05 | Stop reason: HOSPADM

## 2025-02-02 RX ORDER — MONTELUKAST SODIUM 10 MG/1
10 TABLET ORAL DAILY
Status: DISCONTINUED | OUTPATIENT
Start: 2025-02-03 | End: 2025-02-05 | Stop reason: HOSPADM

## 2025-02-02 RX ORDER — BUSPIRONE HYDROCHLORIDE 10 MG/1
10 TABLET ORAL 2 TIMES DAILY
Status: DISCONTINUED | OUTPATIENT
Start: 2025-02-02 | End: 2025-02-05 | Stop reason: HOSPADM

## 2025-02-02 RX ORDER — ACETAMINOPHEN 325 MG/1
650 TABLET ORAL EVERY 6 HOURS PRN
Status: DISCONTINUED | OUTPATIENT
Start: 2025-02-02 | End: 2025-02-05 | Stop reason: HOSPADM

## 2025-02-02 RX ORDER — SODIUM BICARBONATE 650 MG/1
650 TABLET ORAL 2 TIMES DAILY
Status: DISCONTINUED | OUTPATIENT
Start: 2025-02-02 | End: 2025-02-03

## 2025-02-02 RX ORDER — MAGNESIUM SULFATE IN WATER 40 MG/ML
2000 INJECTION, SOLUTION INTRAVENOUS ONCE
Status: COMPLETED | OUTPATIENT
Start: 2025-02-02 | End: 2025-02-02

## 2025-02-02 RX ORDER — FAMOTIDINE 20 MG/1
10 TABLET, FILM COATED ORAL DAILY
Status: DISCONTINUED | OUTPATIENT
Start: 2025-02-02 | End: 2025-02-05 | Stop reason: HOSPADM

## 2025-02-02 RX ORDER — CALCIUM GLUCONATE 20 MG/ML
1000 INJECTION, SOLUTION INTRAVENOUS ONCE
Status: COMPLETED | OUTPATIENT
Start: 2025-02-02 | End: 2025-02-02

## 2025-02-02 RX ORDER — DEXTROSE MONOHYDRATE 50 MG/ML
INJECTION, SOLUTION INTRAVENOUS
Status: DISPENSED
Start: 2025-02-02 | End: 2025-02-02

## 2025-02-02 RX ORDER — CETIRIZINE HYDROCHLORIDE 5 MG/1
5 TABLET ORAL DAILY
Status: DISCONTINUED | OUTPATIENT
Start: 2025-02-03 | End: 2025-02-05 | Stop reason: HOSPADM

## 2025-02-02 RX ORDER — GLUCAGON 1 MG/ML
1 KIT INJECTION PRN
Status: DISCONTINUED | OUTPATIENT
Start: 2025-02-02 | End: 2025-02-05 | Stop reason: HOSPADM

## 2025-02-02 RX ORDER — WARFARIN SODIUM 5 MG/1
5 TABLET ORAL
Status: COMPLETED | OUTPATIENT
Start: 2025-02-02 | End: 2025-02-02

## 2025-02-02 RX ORDER — DEXTROSE MONOHYDRATE 100 MG/ML
INJECTION, SOLUTION INTRAVENOUS CONTINUOUS PRN
Status: DISCONTINUED | OUTPATIENT
Start: 2025-02-02 | End: 2025-02-05 | Stop reason: HOSPADM

## 2025-02-02 RX ORDER — LOSARTAN POTASSIUM 50 MG/1
50 TABLET ORAL 2 TIMES DAILY
Status: DISCONTINUED | OUTPATIENT
Start: 2025-02-02 | End: 2025-02-05 | Stop reason: HOSPADM

## 2025-02-02 RX ORDER — ONDANSETRON 4 MG/1
4 TABLET, ORALLY DISINTEGRATING ORAL EVERY 8 HOURS PRN
Status: DISCONTINUED | OUTPATIENT
Start: 2025-02-02 | End: 2025-02-05 | Stop reason: HOSPADM

## 2025-02-02 RX ORDER — LEVOTHYROXINE SODIUM 125 UG/1
125 TABLET ORAL DAILY
Status: DISCONTINUED | OUTPATIENT
Start: 2025-02-03 | End: 2025-02-05 | Stop reason: HOSPADM

## 2025-02-02 RX ORDER — SODIUM CHLORIDE 0.9 % (FLUSH) 0.9 %
5-40 SYRINGE (ML) INJECTION PRN
Status: DISCONTINUED | OUTPATIENT
Start: 2025-02-02 | End: 2025-02-05 | Stop reason: HOSPADM

## 2025-02-02 RX ORDER — ACETAMINOPHEN 650 MG/1
650 SUPPOSITORY RECTAL EVERY 6 HOURS PRN
Status: DISCONTINUED | OUTPATIENT
Start: 2025-02-02 | End: 2025-02-05 | Stop reason: HOSPADM

## 2025-02-02 RX ADMIN — MAGNESIUM SULFATE HEPTAHYDRATE 2000 MG: 40 INJECTION, SOLUTION INTRAVENOUS at 18:01

## 2025-02-02 RX ADMIN — ONDANSETRON 4 MG: 2 INJECTION INTRAMUSCULAR; INTRAVENOUS at 11:50

## 2025-02-02 RX ADMIN — DEXTROSE MONOHYDRATE 250 ML: 100 INJECTION, SOLUTION INTRAVENOUS at 11:56

## 2025-02-02 RX ADMIN — SODIUM BICARBONATE 650 MG: 650 TABLET ORAL at 21:23

## 2025-02-02 RX ADMIN — Medication 10 UNITS: at 07:09

## 2025-02-02 RX ADMIN — SODIUM CHLORIDE, PRESERVATIVE FREE 10 ML: 5 INJECTION INTRAVENOUS at 19:47

## 2025-02-02 RX ADMIN — ACETAMINOPHEN 650 MG: 325 TABLET ORAL at 18:02

## 2025-02-02 RX ADMIN — SODIUM CHLORIDE 1000 ML: 9 INJECTION, SOLUTION INTRAVENOUS at 11:49

## 2025-02-02 RX ADMIN — SODIUM CHLORIDE: 9 INJECTION, SOLUTION INTRAVENOUS at 23:00

## 2025-02-02 RX ADMIN — ACETAMINOPHEN 650 MG: 325 TABLET ORAL at 11:51

## 2025-02-02 RX ADMIN — CALCIUM GLUCONATE 1000 MG: 20 INJECTION, SOLUTION INTRAVENOUS at 07:32

## 2025-02-02 RX ADMIN — SODIUM BICARBONATE 50 MEQ: 84 INJECTION, SOLUTION INTRAVENOUS at 06:58

## 2025-02-02 RX ADMIN — CALCIUM POLYCARBOPHIL 1250 MG: 625 TABLET, FILM COATED ORAL at 11:51

## 2025-02-02 RX ADMIN — SODIUM CHLORIDE: 9 INJECTION, SOLUTION INTRAVENOUS at 11:58

## 2025-02-02 RX ADMIN — SODIUM ZIRCONIUM CYCLOSILICATE 10 G: 10 POWDER, FOR SUSPENSION ORAL at 15:21

## 2025-02-02 RX ADMIN — DEXTROSE MONOHYDRATE 250 ML: 100 INJECTION, SOLUTION INTRAVENOUS at 07:14

## 2025-02-02 RX ADMIN — SODIUM CHLORIDE 1000 ML: 9 INJECTION, SOLUTION INTRAVENOUS at 04:50

## 2025-02-02 RX ADMIN — FAMOTIDINE 10 MG: 20 TABLET, FILM COATED ORAL at 11:50

## 2025-02-02 RX ADMIN — SODIUM CHLORIDE, PRESERVATIVE FREE 10 ML: 5 INJECTION INTRAVENOUS at 11:51

## 2025-02-02 RX ADMIN — Medication 3 MG: at 19:56

## 2025-02-02 RX ADMIN — SODIUM CHLORIDE 1000 ML: 9 INJECTION, SOLUTION INTRAVENOUS at 03:11

## 2025-02-02 RX ADMIN — WARFARIN SODIUM 5 MG: 5 TABLET ORAL at 18:28

## 2025-02-02 ASSESSMENT — PAIN SCALES - GENERAL
PAINLEVEL_OUTOF10: 7
PAINLEVEL_OUTOF10: 5
PAINLEVEL_OUTOF10: 9
PAINLEVEL_OUTOF10: 10

## 2025-02-02 ASSESSMENT — PAIN - FUNCTIONAL ASSESSMENT
PAIN_FUNCTIONAL_ASSESSMENT: 0-10
PAIN_FUNCTIONAL_ASSESSMENT: NONE - DENIES PAIN
PAIN_FUNCTIONAL_ASSESSMENT: 0-10
PAIN_FUNCTIONAL_ASSESSMENT: NONE - DENIES PAIN
PAIN_FUNCTIONAL_ASSESSMENT: 0-10
PAIN_FUNCTIONAL_ASSESSMENT: NONE - DENIES PAIN

## 2025-02-02 NOTE — ED NOTES
Patient sitting up in cot but will take PO meds when feeling more awake. Lunch tray at bedside on bedside table.

## 2025-02-02 NOTE — ED TRIAGE NOTES
Pt presents to ED via EMS from Nursing home with complains of nausea, fatigue, and kidney pain. Pt states symptoms have been ongoing for a few days. Pt denies a hx of kidney stones. Pt is A&Ox4, respirations equal and unlabored, vital signs assessed.

## 2025-02-02 NOTE — ED NOTES
Patient more sleepy and fatigued. POCT glucose checked at this time. Result of 58. Patient is able to be awoken with lights turned on and verbal stimuli. Patient denies any concerns at this time. Dextrose initiated at this time per PRN orders.

## 2025-02-02 NOTE — PROGRESS NOTES
Warfarin Pharmacy Consult Note    Marry Ayers is a 74 y.o. female for whom pharmacy has been asked to manage warfarin therapy.     Consulting provider: Dr Maier  Indication: Hx of DVT & PE  Target INR: 2.0-3.0  Warfarin dose PTA: 5 mg daily   Outpatient warfarin provider: Provider at [facility]    Recent Labs     02/02/25  0130 02/02/25  1019   HGB 9.5* 8.9*   * 498*     Recent Labs     02/02/25  0923   INR 1.85*     Concurrent anticoagulants/antiplatelets: none  Significant warfarin drug-drug interactions: none    Date INR Warfarin Dose   2/2/2025 1.85 5 mg                                    INR will be monitored routinely until therapeutic INR is achieved.    Pharmacy will continue to follow. Thank you for the consult,     Daisy Milligan, PharmD, BCPS  2/2/2025  4:35 PM

## 2025-02-02 NOTE — PLAN OF CARE
Problem: Discharge Planning  Goal: Discharge to home or other facility with appropriate resources  Outcome: Progressing  Flowsheets (Taken 2/2/2025 1305)  Discharge to home or other facility with appropriate resources: Identify barriers to discharge with patient and caregiver     Problem: Safety - Adult  Goal: Free from fall injury  Outcome: Progressing

## 2025-02-02 NOTE — ED PROVIDER NOTES
[facility] EMERGENCY DEPARTMENT  EMERGENCY DEPARTMENT ENCOUNTER          Pt Name: Marry Ayers  MRN: [id number]  Birthdate 1950  Date of evaluation: 2/2/2025  Physician: Codey River DO      CHIEF COMPLAINT       Chief Complaint   Patient presents with    Nausea    Fatigue    Flank Pain         HISTORY OF PRESENT ILLNESS    HPI  Marry Ayers is a 74 y.o. female who presents to the emergency department from nursing home, brought in by EMS for evaluation of flank pain, nausea, and fatigue.  Patient reports has been progressive for the last several days.  Patient reports history of kidney stones.  Patient Nuys any blood in her urine.  She denies any fevers or chills.  She denies any vomiting.  Patient ileostomy placed secondary to colonic stricture.  Has been admitted several times for issues with electrolyte imbalance since then.  The patient has no other acute complaints at this time.      REVIEW OF SYSTEMS   Review of Systems   All other systems reviewed and are negative.        PAST MEDICAL AND SURGICAL HISTORY     Past Medical History:   Diagnosis Date    Allergic rhinitis     Anemia     Anxiety     Cataract     Essential hypertension     GERD (gastroesophageal reflux disease)     Hx of blood clots 06/2014    DVT    Hypokalemia     Hypothyroidism     Pulmonary embolism (HCC) 08/2012     Past Surgical History:   Procedure Laterality Date    ABDOMEN SURGERY      CARDIAC CATHETERIZATION      CARPAL TUNNEL RELEASE Bilateral     CATARACT REMOVAL      CERVICAL SPINE SURGERY  2001    fusion    CLAVICLE SURGERY      COLONOSCOPY  08/07/2024    Dr. Prince    CYSTOSCOPY W/ URETERAL STENT PLACEMENT  02/06/2023    ESOPHAGOGASTRODUODENOSCOPY  09/19/2023    Dr. Tim Schirmer    ESOPHAGOGASTRODUODENOSCOPY  12/23/2022    Dr. Tim Schirmer    EYE SURGERY Right     2010- macular Hole Repair    HERNIA REPAIR      HYSTERECTOMY, TOTAL ABDOMINAL (CERVIX REMOVED)  1998    LAPAROTOMY N/A 12/13/2024  encounter:  Kidney stones, recent ostomy placement      Differential Diagnosis includes but is not limited to:  Acute kidney injury  Dehydration  Hyponatremia  Hyperkalemia  Influenza  Acute cystitis  Ureterolithiasis      Decision Rules/Clinical Scores utilized:  Not Applicable.     Plan:   EKG, CBC, CMP, urinalysis, CT of the abdomen pelvis      Code Status:  Not addressed during this ED visit    Social determinants of health impacting treatment or disposition:  Considered and not applicable       PREVIOUS RECORDS  AND EXTERNAL INFORMATION REVIEWED   History obtained from: the patient.    Pertinent previous and/or external records reviewed:  Recent mission for similar .    Case discussed with specialties other than Emergency Medicine: Hospitalist      ED COURSE   ED Medications administered this visit (None if left blank):   Medications   sodium chloride 0.9 % bolus 1,000 mL (1,000 mLs IntraVENous New Bag 2/2/25 5496)   calcium gluconate 1,000 mg in sodium chloride 50 mL (has no administration in time range)   sodium bicarbonate 8.4 % injection 50 mEq (has no administration in time range)   sodium zirconium cyclosilicate (LOKELMA) oral suspension 10 g (has no administration in time range)   insulin regular (HumuLIN R;NovoLIN R) injection 10 Units (has no administration in time range)     And   dextrose bolus 10% 250 mL (has no administration in time range)   glucose chewable tablet 16 g (has no administration in time range)   dextrose bolus 10% 125 mL (has no administration in time range)     Or   dextrose bolus 10% 250 mL (has no administration in time range)   glucagon injection 1 mg (has no administration in time range)   dextrose 10 % infusion (has no administration in time range)   sodium chloride 0.9 % bolus 1,000 mL (0 mLs IntraVENous Stopped 2/2/25 9135)              CRITICAL CARE:  None    PROCEDURES: (None if blank)  Procedures:       MEDICATION CHANGES     New Prescriptions    No medications on file

## 2025-02-02 NOTE — ED NOTES
ED to inpatient nurses report      Chief Complaint:  Chief Complaint   Patient presents with    Nausea    Fatigue    Flank Pain     Present to ED from: Nursing Home    MOA:     LOC: alert and orientated to name, place, date  Mobility: Requires assistance * 1  Oxygen Baseline: RA    Current needs required: RA     Code Status:   Prior    What abnormal results were found and what did you give/do to treat them? LINDA  Any procedures or intervention occur? None; See MAR    Mental Status:  Level of Consciousness: Alert (0)    Psych Assessment:        Vitals:  Patient Vitals for the past 24 hrs:   BP Temp Temp src Pulse Resp SpO2 Height Weight   02/02/25 0551 115/65 -- -- (!) 113 15 96 % -- --   02/02/25 0451 113/62 -- -- (!) 109 14 96 % -- --   02/02/25 0312 (!) 97/58 -- -- (!) 109 14 95 % -- --   02/02/25 0112 (!) 100/51 97.9 °F (36.6 °C) Oral (!) 112 15 95 % 1.524 m (5') 74.8 kg (165 lb)        LDAs:   Peripheral IV 02/02/25 Left Antecubital (Active)   Site Assessment Clean, dry & intact 02/02/25 0551   Line Status Infusing 02/02/25 0551   Line Care Connections checked and tightened 02/02/25 0452   Phlebitis Assessment No symptoms 02/02/25 0551   Infiltration Assessment 0 02/02/25 0551   Dressing Status Clean, dry & intact 02/02/25 0551   Dressing Type Transparent 02/02/25 0115   Dressing Intervention New 02/02/25 0115       Ambulatory Status:  No data recorded    Diagnosis:  DISPOSITION Decision To Admit 02/02/2025 06:29:45 AM   Final diagnoses:   Acute kidney injury (HCC)        Consults:  None     Pain Score:  Pain Assessment  Pain Assessment: None - Denies Pain  Pain Level: 7  Patient's Stated Pain Goal: 0 - No pain    C-SSRS:   Risk of Suicide: No Risk    Sepsis Screening:       [place] Fall Risk:       Swallow Screening        Preferred Language:   English      ALLERGIES     Seasonal    SURGICAL HISTORY       Past Surgical History:   Procedure Laterality Date    ABDOMEN SURGERY      CARDIAC CATHETERIZATION

## 2025-02-02 NOTE — CONSULTS
[facility]    [address] [place], [place] [id number]  [phone]     Hospital Consult  2/2/2025 5:29 PM    Pt Name:    Marry Ayers  MRN:     [id number]   [id number]  YOB: 1950  Admit Date:    2/2/2025  1:05 AM  Primary Care Physician:  Silvestre Sawyer MD    CSN Number:   [id number]    Reason for Consult:  LINDA  Requesting provider:  Hospitalist    History:   The patient is a 74 y.o. female with history of sigmoid colon stricture, partial small bowel obstruction who recently underwent resection of sigmoid stricture.  Patient has ileostomy in place.  She presents to the hospital with complaints of generalized weakness, fatigue, dehydration, poor oral intake, high output from her ileostomy.  In the emergency room she was noted to have elevated creatinine, hyperkalemia.  She was started on IV fluids.  Nephrology consulted for management of LINDA.  Patient seen and examined.  Denies any urinary complaints.  No dysuria or hematuria.  No alleviating or aggravating factors.  Creatinine was 2.2 on admission.    Past Medical History:  Past Medical History:   Diagnosis Date    Allergic rhinitis     Anemia     Anxiety     Cataract     Essential hypertension     GERD (gastroesophageal reflux disease)     Hx of blood clots 06/2014    DVT    Hypokalemia     Hypothyroidism     Pulmonary embolism (HCC) 08/2012       Past Surgical History:  Past Surgical History:   Procedure Laterality Date    ABDOMEN SURGERY      CARDIAC CATHETERIZATION      CARPAL TUNNEL RELEASE Bilateral     CATARACT REMOVAL      CERVICAL SPINE SURGERY  2001    fusion    CLAVICLE SURGERY      COLONOSCOPY  08/07/2024    Dr. Prince    CYSTOSCOPY W/ URETERAL STENT PLACEMENT  02/06/2023    ESOPHAGOGASTRODUODENOSCOPY  09/19/2023    Dr. Tim Schirmer    ESOPHAGOGASTRODUODENOSCOPY  12/23/2022    Dr. Tim Schirmer    EYE SURGERY Right     2010- macular Hole Repair    HERNIA REPAIR      HYSTERECTOMY, TOTAL ABDOMINAL (CERVIX REMOVED)

## 2025-02-02 NOTE — ED NOTES
Patient more alert with eyes open. Patient declining to eat anything at this time. Food remains at bedside and call light in reach

## 2025-02-02 NOTE — H&P
Internal Medicine Resident History & Physical      Patient: Marry Ayers  : 1950  MRN: [id number]     Acct: [id number]    PCP: Silvestre Sawyer MD  Date of Admission: 2025  Date of Service: Pt seen/examined on 25  and Admitted to Inpatient with expected LOS greater than two midnights due to medical therapy.     Hospital Problems             Last Modified POA    * (Principal) LINDA (acute kidney injury) ([facility]) 2025 Yes       ASSESSMENT AND PLAN     Active Problems:  Flank pain, bilateral, increases with postural changes.  Likely secondary to LINDA, however patient does have a history of chronic lower back pain for which she has been seen in the ED in the recent past.  Pain control with as needed analgesics  Management of LINDA as noted below  LINDA, oliguric.  Likely prerenal in setting of high output ostomy leading to dehydration.  Creatinine 2.2 on admit, improved to 1.5 s/p IV fluids.  Hyponatremic, mildly hyperkalemic, likely multifactorial in setting of LINDA due to dehydration and GI losses.  Consult nephrology, Dr Vargas following.  US Renal, renal studies completed  Continue to monitor with daily BMP.  Strict I/Os, IVFs when indicated.  Avoid nephrotoxic agents, renally dose medications when indicated.  High output ostomy.   FiberCon for bulking properties.  IVFs for dehydration as noted above, will require close monitoring once dehydration is resolved.  Strict I/Os  Stool PCR & culture  Consider loperamide pending stool studies  Nutrition consult for ileostomy diet counseling    Chronic Problems:  Hx of PE/DVT on chronic warfarin.   Pharmacy to dose warfarin  INR pending  HTN.   Continue home meds with hold parameters.   HLD.  Continue home statin.  Hypothyroidism  Continue home dose levothyroxine.  Hx of abdominal mass, benign. s/p exlap with partial bowel rseection and anastomosis, complicated by anastomosis leak leading to feculent peritonitis and ileostomy creation Dec 2024.

## 2025-02-03 LAB
ANION GAP SERPL CALC-SCNC: 15 MEQ/L (ref 8–16)
BUN SERPL-MCNC: 22 MG/DL (ref 7–22)
CALCIUM SERPL-MCNC: 8.5 MG/DL (ref 8.5–10.5)
CHLORIDE SERPL-SCNC: 105 MEQ/L (ref 98–111)
CO2 SERPL-SCNC: 16 MEQ/L (ref 23–33)
CREAT SERPL-MCNC: 0.8 MG/DL (ref 0.4–1.2)
DEPRECATED RDW RBC AUTO: 55.3 FL (ref 35–45)
ERYTHROCYTE [DISTWIDTH] IN BLOOD BY AUTOMATED COUNT: 17.2 % (ref 11.5–14.5)
GFR SERPL CREATININE-BSD FRML MDRD: 77 ML/MIN/1.73M2
GLUCOSE BLD STRIP.AUTO-MCNC: 107 MG/DL (ref 70–108)
GLUCOSE BLD STRIP.AUTO-MCNC: 114 MG/DL (ref 70–108)
GLUCOSE BLD STRIP.AUTO-MCNC: 122 MG/DL (ref 70–108)
GLUCOSE BLD STRIP.AUTO-MCNC: 124 MG/DL (ref 70–108)
GLUCOSE SERPL-MCNC: 106 MG/DL (ref 70–108)
HCT VFR BLD AUTO: 28.4 % (ref 37–47)
HGB BLD-MCNC: 8.9 GM/DL (ref 12–16)
INR PPP: 1.79 (ref 0.85–1.13)
MAGNESIUM SERPL-MCNC: 1.8 MG/DL (ref 1.6–2.4)
MCH RBC QN AUTO: 28 PG (ref 26–33)
MCHC RBC AUTO-ENTMCNC: 31.3 GM/DL (ref 32.2–35.5)
MCV RBC AUTO: 89.3 FL (ref 81–99)
ORIGINAL SAMPLE NUMBER: NORMAL
PLATELET # BLD AUTO: 636 THOU/MM3 (ref 130–400)
PMV BLD AUTO: 10.5 FL (ref 9.4–12.4)
POTASSIUM SERPL-SCNC: 4.8 MEQ/L (ref 3.5–5.2)
RBC # BLD AUTO: 3.18 MILL/MM3 (ref 4.2–5.4)
REFERENCE LOCATION: NORMAL
REFERENCE RANGE: NORMAL
SODIUM SERPL-SCNC: 136 MEQ/L (ref 135–145)
TEST RESULTS WITH UNITS: NORMAL
TEST(S) BEING PERFORMED: NORMAL
WBC # BLD AUTO: 11 THOU/MM3 (ref 4.8–10.8)

## 2025-02-03 PROCEDURE — 85610 PROTHROMBIN TIME: CPT

## 2025-02-03 PROCEDURE — 82948 REAGENT STRIP/BLOOD GLUCOSE: CPT

## 2025-02-03 PROCEDURE — 97166 OT EVAL MOD COMPLEX 45 MIN: CPT

## 2025-02-03 PROCEDURE — 1200000003 HC TELEMETRY R&B

## 2025-02-03 PROCEDURE — 6370000000 HC RX 637 (ALT 250 FOR IP): Performed by: PHARMACIST

## 2025-02-03 PROCEDURE — 97535 SELF CARE MNGMENT TRAINING: CPT

## 2025-02-03 PROCEDURE — 80048 BASIC METABOLIC PNL TOTAL CA: CPT

## 2025-02-03 PROCEDURE — 97116 GAIT TRAINING THERAPY: CPT

## 2025-02-03 PROCEDURE — 97530 THERAPEUTIC ACTIVITIES: CPT

## 2025-02-03 PROCEDURE — 2580000003 HC RX 258: Performed by: INTERNAL MEDICINE

## 2025-02-03 PROCEDURE — 97162 PT EVAL MOD COMPLEX 30 MIN: CPT

## 2025-02-03 PROCEDURE — 6370000000 HC RX 637 (ALT 250 FOR IP)

## 2025-02-03 PROCEDURE — 6370000000 HC RX 637 (ALT 250 FOR IP): Performed by: INTERNAL MEDICINE

## 2025-02-03 PROCEDURE — 99233 SBSQ HOSP IP/OBS HIGH 50: CPT | Performed by: STUDENT IN AN ORGANIZED HEALTH CARE EDUCATION/TRAINING PROGRAM

## 2025-02-03 PROCEDURE — 2500000003 HC RX 250 WO HCPCS

## 2025-02-03 PROCEDURE — 83735 ASSAY OF MAGNESIUM: CPT

## 2025-02-03 PROCEDURE — 36415 COLL VENOUS BLD VENIPUNCTURE: CPT

## 2025-02-03 PROCEDURE — 85027 COMPLETE CBC AUTOMATED: CPT

## 2025-02-03 PROCEDURE — 99232 SBSQ HOSP IP/OBS MODERATE 35: CPT | Performed by: INTERNAL MEDICINE

## 2025-02-03 RX ORDER — SODIUM BICARBONATE 650 MG/1
1300 TABLET ORAL 2 TIMES DAILY
Status: DISCONTINUED | OUTPATIENT
Start: 2025-02-03 | End: 2025-02-05 | Stop reason: HOSPADM

## 2025-02-03 RX ORDER — SODIUM CHLORIDE, SODIUM LACTATE, POTASSIUM CHLORIDE, CALCIUM CHLORIDE 600; 310; 30; 20 MG/100ML; MG/100ML; MG/100ML; MG/100ML
INJECTION, SOLUTION INTRAVENOUS CONTINUOUS
Status: DISCONTINUED | OUTPATIENT
Start: 2025-02-03 | End: 2025-02-05 | Stop reason: HOSPADM

## 2025-02-03 RX ADMIN — ACETAMINOPHEN 650 MG: 325 TABLET ORAL at 21:59

## 2025-02-03 RX ADMIN — ACETAMINOPHEN 650 MG: 325 TABLET ORAL at 14:03

## 2025-02-03 RX ADMIN — SODIUM CHLORIDE, PRESERVATIVE FREE 10 ML: 5 INJECTION INTRAVENOUS at 19:43

## 2025-02-03 RX ADMIN — SODIUM BICARBONATE 1300 MG: 650 TABLET ORAL at 19:41

## 2025-02-03 RX ADMIN — ONDANSETRON 4 MG: 4 TABLET, ORALLY DISINTEGRATING ORAL at 18:32

## 2025-02-03 RX ADMIN — BUSPIRONE HYDROCHLORIDE 10 MG: 10 TABLET ORAL at 08:03

## 2025-02-03 RX ADMIN — CETIRIZINE HYDROCHLORIDE 5 MG: 5 TABLET ORAL at 08:03

## 2025-02-03 RX ADMIN — LEVOTHYROXINE SODIUM 125 MCG: 0.12 TABLET ORAL at 08:03

## 2025-02-03 RX ADMIN — SODIUM CHLORIDE, POTASSIUM CHLORIDE, SODIUM LACTATE AND CALCIUM CHLORIDE: 600; 310; 30; 20 INJECTION, SOLUTION INTRAVENOUS at 18:28

## 2025-02-03 RX ADMIN — MONTELUKAST 10 MG: 10 TABLET, FILM COATED ORAL at 08:02

## 2025-02-03 RX ADMIN — METOPROLOL SUCCINATE 75 MG: 25 TABLET, FILM COATED, EXTENDED RELEASE ORAL at 08:02

## 2025-02-03 RX ADMIN — Medication 3 MG: at 19:41

## 2025-02-03 RX ADMIN — FAMOTIDINE 10 MG: 20 TABLET, FILM COATED ORAL at 08:03

## 2025-02-03 RX ADMIN — SODIUM BICARBONATE 1300 MG: 650 TABLET ORAL at 08:02

## 2025-02-03 RX ADMIN — WARFARIN SODIUM 6 MG: 5 TABLET ORAL at 17:03

## 2025-02-03 RX ADMIN — CALCIUM POLYCARBOPHIL 1250 MG: 625 TABLET, FILM COATED ORAL at 08:02

## 2025-02-03 RX ADMIN — LOSARTAN POTASSIUM 50 MG: 50 TABLET, FILM COATED ORAL at 08:03

## 2025-02-03 RX ADMIN — BUSPIRONE HYDROCHLORIDE 10 MG: 10 TABLET ORAL at 19:41

## 2025-02-03 RX ADMIN — LOSARTAN POTASSIUM 50 MG: 50 TABLET, FILM COATED ORAL at 19:42

## 2025-02-03 RX ADMIN — SODIUM CHLORIDE, POTASSIUM CHLORIDE, SODIUM LACTATE AND CALCIUM CHLORIDE: 600; 310; 30; 20 INJECTION, SOLUTION INTRAVENOUS at 08:00

## 2025-02-03 ASSESSMENT — PAIN SCALES - GENERAL
PAINLEVEL_OUTOF10: 0
PAINLEVEL_OUTOF10: 9
PAINLEVEL_OUTOF10: 0

## 2025-02-03 ASSESSMENT — PAIN DESCRIPTION - ORIENTATION: ORIENTATION: RIGHT

## 2025-02-03 ASSESSMENT — PAIN DESCRIPTION - LOCATION: LOCATION: HEAD

## 2025-02-03 ASSESSMENT — PAIN DESCRIPTION - DESCRIPTORS: DESCRIPTORS: ACHING;DISCOMFORT

## 2025-02-03 ASSESSMENT — PAIN SCALES - WONG BAKER
WONGBAKER_NUMERICALRESPONSE: NO HURT
WONGBAKER_NUMERICALRESPONSE: NO HURT

## 2025-02-03 NOTE — PROGRESS NOTES
[facility]  INPATIENT PHYSICAL THERAPY  EVALUATION  STRZ MED SURG 8AB - 8B-28/028-A    Discharge Recommendations: Continue to assess pending progress, Home with Home health PT  Equipment Recommendations: No             Time In: 1421  Time Out: 1440  Timed Code Treatment Minutes: 11 Minutes  Minutes: 19          Date: 2/3/2025  Patient Name: Marry Ayers,  Gender:  female        MRN: [id number]  : 1950  (74 y.o.)      Referring Practitioner: Jasmyne Maier MD  Diagnosis: LINDA (acute kidney injury) (Formerly [facility])  Additional Pertinent Hx: Per EMR \"Marry Ayers is a 74 y.o. female with PMHx of abdominal mass s/p ex lap complicated by feculent peritonitis due to anastomotic leak, now s/p ileostomy who presents to [facility] with weakness, bilateral back pain.  States that approximately 1 week ago she developed fatigue.  She had been previously working well with PT/OT, however has spent the last week in bed due to weakness.  Over this time she has also noted increasing bilateral back pain, sparing the median.  Pain does not radiate.  She has no tenderness to palpation, however does note that she has increased pain with movement, particularly with sitting up.  She also notes that she has had a decreased appetite, decreased oral intake including fluids, and has noticed that she has had decreasing urine output as well.  She states that she did have nausea and vomiting overnight, however vomitus was scant due to poor oral intake recently.  She notes that she has had significant ostomy output despite poor p.o. intake, stating that in December she was changing her ostomy bag every 15 minutes.  It has improved to somewhat, however she is still having to change it once every hour.  Notes that she did have a skin infection at the site of her ostomy, however it is now resolved after treatment at the wound clinic.  Denies fever/chills, vision changes, dyspnea, chest  Balance:  Modified Independent  Dynamic Sitting Balance: Supervision  Static Standing Balance: Supervision, Stand By Assistance  Dynamic Standing Balance: Stand By Assistance  Pt completed functional tasks, able to empty ostomy bag, occasional cues for safety  Bed Mobility:  Supine to Sit: Modified Independent  Sit to Supine: Modified Independent     Transfers:  Sit to Stand: Supervision, Stand By Assistance  Stand to Sit:Supervision  Completed transfers from bed and toilet, no LOB, pushed walker to the side and states \"I do not need that\"  Ambulation:  Stand By Assistance, X 1  Distance: 10 feet, 180 feet  Surface: Level Tile  Device: No Device  Gait Deviations: Forward Flexed Posture, Slow Krystal, Decreased Step Length Bilaterally, and Decreased Gait Speed   Holding onto IV pole, no LOB, cues for overall safety, no LOB noted with gait tasks  Stairs:  Not Tested    Exercise:  None    Functional Outcome Measures:  [facility] (6 CLICK) BASIC MOBILITY  AM-[facility] Inpatient Mobility Raw Score : 20  AM-[facility] Inpatient T-Scale Score : 47.67          Modified Jing:  Premorbid Functional Status: Not Applicable  Current Functional Status:  Not Applicable    ASSESSMENT:  Activity Tolerance:  Patient tolerance of treatment:Good.    Treatment Initiated: Treatment and education initiated within context of evaluation.  Evaluation time included review of current medical information, gathering information related to past medical, social and functional history, completion of standardized testing, formal and informal observation of tasks, assessment of data and development of plan of care and goals.  Treatment time included skilled education and facilitation of tasks to increase safety and independence with functional mobility for improved independence and quality of life.    Assessment:  Body Structures, Functions, Activity Limitations Requiring Skilled Therapeutic Intervention: Decreased functional mobility , Decreased endurance,  Decreased safe awareness, Decreased balance  Assessment: Marry Ayers is a 74 y.o. female who presents with the deficits stated previously. Pt requires 1 person assist for functional tasks with use of no AD for support. Pt cont to require skilled PT services to increase IND with functional tasks and progress towards PLOF to return to home environment safely.   Therapy Prognosis: Good    Requires PT Follow-Up: Yes    Patient Education:      .    Patient Education  Education Given To: Patient  Education Provided: Role of Therapy, Plan of Care, Transfer Training, Mobility Training  Education Method: Verbal  Education Outcome: Demonstrated understanding, Verbalized understanding, Continued education needed       Plan:  Current Treatment Recommendations: Strengthening, Balance training, Gait training, Functional mobility training, Neuromuscular re-education, Transfer training, Stair training, Endurance training, Equipment evaluation, education, & procurement, Patient/Caregiver education & training, Safety education & training, Therapeutic activities, Home exercise program  General Plan:  (3-5x GM)    Goals:  Patient Goals : learn to manage ostomy and then return home  Short Term Goals  Time Frame for Short Term Goals: by discharge  Short Term Goal 1: Pt will demo sit to/from stand transfers with LRAD with IND to return home safely.  Short Term Goal 2: Pt will amb for >200 feet with IND with LRAD to return home safely.  Short Term Goal 3: Pt will demo S for stair negotiation with rail for support to return home safely.  Long Term Goals  Time Frame for Long Term Goals : NA due to short ELOS    Following session, patient left in safe position with all fall risk precautions in place. Pt in bed following session, all needs and call light in reach, alarm on.

## 2025-02-03 NOTE — CARE COORDINATION
02/03/25 0713   Readmission Assessment   Number of Days since last admission? 8-30 days   Previous Disposition SNF   Who is being Interviewed Unable to Complete  (From Medical Record.)   What was the patient's/caregiver's perception as to why they think they needed to return back to the hospital? Other (Comment)  (Developed flank pain.)   Did you visit your Primary Care Physician after you left the hospital, before you returned this time? Yes  (Uncertain but pt from SNF so possibly seen by house Provider.)   Did you see a specialist, such as Cardiac, Pulmonary, Orthopedic Physician, etc. after you left the hospital? Yes   Who advised the patient to return to the hospital? Skilled Unit   Does the patient report anything that got in the way of taking their medications? No   In our efforts to provide the best possible care to you and others like you, can you think of anything that we could have done to help you after you left the hospital the first time, so that you might not have needed to return so soon? Other (Comment)  (No.)

## 2025-02-03 NOTE — PROGRESS NOTES
Hospitalist Progress Note    Patient:  Marry Ayers      Unit/Bed:8B-28/028-A    YOB: 1950    MRN: [id number]       Acct: [id number]     PCP: Silvestre Sawyer MD    Date of Admission: 2/2/2025    Assessment/Plan:    LINDA, resolved: Multifactorial, likely prerenal etiology in setting of GI losses secondary to high output ostomy with subsequent dehydration.  Baseline CR 0.8-1.  Presented CR 2.2, BUN 47 with BUN: Cr > 20.  Renal US with no evidence of hydronephrosis.  S/p IVF with improvement.  Limit nephrotoxic agents.  Monitor renal function with daily BMP.  Nephrology following.  High output ostomy: With subsequent LINDA and dehydration, see above.  Started on IVF, fluid resuscitated.  Added FiberCon for bulking properties.  Strict I&O's.  Stool cultures ordered.  Suspicion for active infection.  Defer antibiotics for now.  Nutrition consulted for ileostomy diet consult.  Per nephrology due to recurring issues will arrange for outpatient IV fluids once a week.  Started on p.o. bicarb.  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  Pharmacy consulted dose  HTN: Continue antihypertensive holding parameters  HLD: continue statin  Hypothyroidism: Continue home Synthroid  History of abdominal mass: Benign. S/p ex lap with partial bowel resection and anastomosis complicated by anastomosis leak leading to feculent peritonitis with ileostomy 12/24.      Expected discharge date: 2/3/2025    Disposition:    [] Home       [] TCU       [] Rehab       [] Psych       [] SNF       [] Long Term Care Facility       [] Other-    Chief Complaint: Back pain    Hospital Course:   Marry Ayers is a 74 y.o. female with PMHx of abdominal mass s/p ex lap complicated by feculent peritonitis due to anastomotic leak, now s/p ileostomy who presents to [facility] with weakness, bilateral back pain.  States that approximately 1 week ago she developed fatigue.  She had been previously working well with PT/OT, however has  metoprolol succinate  75 mg Oral Daily    levothyroxine  125 mcg Oral Daily     PRN Meds: glucose, dextrose bolus **OR** dextrose bolus, glucagon (rDNA), dextrose, sodium chloride flush, sodium chloride, ondansetron **OR** ondansetron, acetaminophen **OR** acetaminophen      Intake/Output Summary (Last 24 hours) at 2/3/2025 0858  Last data filed at 2/3/2025 0441  Gross per 24 hour   Intake 1448.39 ml   Output 1100 ml   Net 348.39 ml       Diet:  ADULT DIET; Regular  ADULT ORAL NUTRITION SUPPLEMENT; Breakfast, Lunch, Dinner; Standard High Calorie/High Protein Oral Supplement    Exam:  BP (!) 140/63   Pulse 96   Temp 97.7 °F (36.5 °C) (Oral)   Resp 18   Ht 1.524 m (5')   Wt 74.8 kg (165 lb)   SpO2 98%   BMI 32.22 kg/m²     General appearance: No apparent distress, appears stated age and cooperative.  HEENT: Pupils equal, round, and reactive to light. Conjunctivae/corneas clear.  Neck: Supple, with full range of motion. No jugular venous distention. Trachea midline.  Respiratory:  Normal respiratory effort. Clear to auscultation, bilaterally without Rales/Wheezes/Rhonchi.  Cardiovascular: Regular rate and rhythm with normal S1/S2 without murmurs, rubs or gallops.  Abdomen: Soft, non-tender, non-distended with normal bowel sounds.  Musculoskeletal: passive and active ROM x 4 extremities.  Skin: Skin color, texture, turgor normal.  No rashes or lesions.  Neurologic:  Neurovascularly intact without any focal sensory/motor deficits. Cranial nerves: II-XII intact, grossly non-focal.  Psychiatric: Alert and oriented, thought content appropriate, normal insight  Capillary Refill: Brisk,< 3 seconds   Peripheral Pulses: +2 palpable, equal bilaterally       Labs:   Recent Labs     02/02/25  0130 02/02/25  1019 02/03/25  0554   WBC 15.5* 13.7* 11.0*   HGB 9.5* 8.9* 8.9*   HCT 29.4* 29.8* 28.4*   * 498* 636*     Recent Labs     02/02/25  0723 02/02/25  1019 02/03/25  0554   * 135 136   K 4.9 4.7 4.8

## 2025-02-03 NOTE — PROGRESS NOTES
Warfarin Pharmacy Consult Note    Warfarin Indication: Hx of DVT & PE  Target INR: 2.0-3.0  Dose prior to admission: 5 mg daily    Recent Labs     02/02/25  0130 02/02/25  1019 02/03/25  0554   HGB 9.5* 8.9* 8.9*   * 498* 636*     Recent Labs     02/02/25  0923 02/03/25  1007   INR 1.85* 1.79*     Concurrent anticoagulants/antiplatelets: none  Significant warfarin drug-drug interactions: none     Date INR Warfarin Dose   2/2/2025 1.85 5 mg    2/3/2025   1.79 6 mg                                                Monitoring:                   INR will be monitored daily.    **Please contact pharmacy for discharge instructions when indicated**    Anastasia MONGE.Ph., BCPS., 2/3/2025,11:20 AM

## 2025-02-03 NOTE — PROGRESS NOTES
[facility]   Nephrology progress note  2/3/2025, 7:28 AM      Pt Name:    Marry Ayers  MRN:     [id number]     YOB: 1950  Admit Date:    2/2/2025  1:05 AM    Chief Complaint: Nephrology following for LINDA    Subjective:  Patient was seen and examined this morning.   Feels ok. No complaints.    Objective:  24HR INTAKE/OUTPUT:    Intake/Output Summary (Last 24 hours) at 2/3/2025 0728  Last data filed at 2/3/2025 0441  Gross per 24 hour   Intake 1448.39 ml   Output 1100 ml   Net 348.39 ml         I/O last 3 completed shifts:  In: 1448.4 [P.O.:450; I.V.:998.4]  Out: 1100 [Urine:400; Stool:700]  No intake/output data recorded.   Admission weight: 74.8 kg (165 lb)  Wt Readings from Last 3 Encounters:   02/02/25 74.8 kg (165 lb)   01/31/25 75 kg (165 lb 4.8 oz)   01/23/25 82.1 kg (181 lb)        Vitals :   Vitals:    02/02/25 1545 02/02/25 1944 02/02/25 2302 02/03/25 0411   BP: (!) 96/54 (!) 119/58 (!) 109/55 136/70   Pulse: (!) 101 (!) 109 (!) 102 (!) 103   Resp:  16 14    Temp: 97.8 °F (36.6 °C) 97.7 °F (36.5 °C) 98.1 °F (36.7 °C) 97.9 °F (36.6 °C)   TempSrc: Oral Oral Oral Oral   SpO2: 100% 94% 93% 96%   Weight:       Height:           Physical examination  General Appearance: alert and cooperative with exam, appears comfortable, no distress  Mouth/Throat: Oral mucosa moist  Neck: No JVD  Lungs: Air entry B/L, no rales, no use of accessory muscles  Heart:  S1, S2 heard  GI: soft, non-tender, no guarding  Extremities: no leg edema    Medications:  Infusion:    lactated ringers      dextrose      sodium chloride       Meds:    sodium bicarbonate  1,300 mg Oral BID    sodium chloride flush  5-40 mL IntraVENous 2 times per day    melatonin  3 mg Oral Nightly    famotidine  10 mg Oral Daily    polycarbophil  1,250 mg Oral Daily    warfarin placeholder: dosing by pharmacy   Oral RX Placeholder    busPIRone  10 mg Oral BID    montelukast  10 mg Oral Daily    cetirizine  5 mg Oral Daily

## 2025-02-03 NOTE — CARE COORDINATION
2/3/25, 3:51 PM EST  Discharge Planning Evaluation  Social work consult received, patient from [facility].    Patient/Family preference is to return to [facility] or home with  and [facility] HH per patient and ..    The patient's current payor source at the facility is Medicare.   Medicare skilled days available: yes  Medicare does the patient have a three midnight qualifying stay? yes  Insurance precert:  no  Spoke with Kathryn at the facility. Discharge was planned for 2/5.  Patient bed hold: will have bed if she decides to return  Anticipated transport plan:   Patient's Healthcare Decision Maker: Legal Next of Kin      Readmission Risk Low 0-14, Mod 15-19), High > 20: Readmission Risk Score: 24.9    Current PCP: Silvestre Sawyer MD  PCP verified by CM? Yes    Patient Orientation: Alert and Oriented    Patient Cognition: Alert  History Provided by: Patient, Spouse    Advance Directives:      Code Status: Full Code   Patient's Primary Decision Maker is: Legal Next of Kin       Discharge Planning:    Patient lives with: Other (Comment) Type of Home: Skilled Nursing Facility  Primary Care Giver: Self ([facility] assists as needed)  Patient Support Systems include: Spouse/Significant Other, Children   Current Financial resources: Medicare  Current community resources:    Current services prior to admission: Durable Medical Equipment            Current DME:              Type of Home Care services:  None    ADLS  Prior functional level: Assistance with the following:, Bathing, Cooking, Housework  Current functional level: Assistance with the following:, Cooking, Housework    Family can provide assistance at DC: Yes  Would you like Case Management to discuss the discharge plan with any other family members/significant others, and if so, who? Yes ()  Plans to Return to Present Housing: Yes  Other Identified Issues/Barriers to RETURNING to current housing: none  Potential Assistance needed at

## 2025-02-03 NOTE — CARE COORDINATION
Case Management Assessment Initial Evaluation    Date/Time of Evaluation: 2/3/2025 7:02 AM  Assessment Completed by: Avani Paniagua RN    If patient is discharged prior to next notation, then this note serves as note for discharge by case management.    Patient Name: Marry Ayers                   YOB: 1950  Diagnosis: Hyperkalemia [E87.5]  LINDA (acute kidney injury) (HCC) [N17.9]  Acute kidney injury (HCC) [N17.9]                   Date / Time: 2025  1:05 AM  Location: [facility]028-     Patient Admission Status: Inpatient   Readmission Risk Low 0-14, Mod 15-19), High > 20: Readmission Risk Score: 25.3    Current PCP: Silvestre Sawyer MD  Health Care Decision Makers:     Additional Case Management Notes: Admitted through ER from [facility]. Flank pain, nausea, fatigue. 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 Hx of lytes imbalance. WBC's 15.5. Hgb 9.5. Creat 2.2. Na+ 129. K+ 5.6. Alk Phos 260.  Tx LINDA and hyperkalemia. Nephrology consulted. PT/OT. IVF at 100/hr.     Procedures: No    Imagin--24 Renal US  IMPRESSION:  1. No evidence of hydronephrosis.   2. Perihepatic fluid collection.    Patient Goals/Plan/Treatment Preferences: From [facility]. SW following for continuity of care.

## 2025-02-03 NOTE — PROGRESS NOTES
[facility]  INPATIENT OCCUPATIONAL THERAPY  STRZ MED SURG 8AB  EVALUATION      Discharge Recommendations: Continue to assess pending progress (Home with HH OT vs return to SNF)  Equipment Recommendations: No        Time In: 0850  Time Out: 921  Timed Code Treatment Minutes: 23 Minutes  Minutes: 31          Date: 2/3/2025  Patient Name: Marry Ayers,   Gender: female      MRN: [id number]  : 1950  (74 y.o.)  Referring Practitioner: Jasmyne Maier MD  Diagnosis: LINDA (acute kidney injury) (HCC)  Additional Pertinent Hx: Marry Ayers is a 74 y.o. female with PMHx of abdominal mass s/p ex lap complicated by feculent peritonitis due to anastomotic leak, now s/p ileostomy who presents to [facility] with weakness, bilateral back pain.  States that approximately 1 week ago she developed fatigue.  She had been previously working well with PT/OT, however has spent the last week in bed due to weakness.  Over this time she has also noted increasing bilateral back pain, sparing the median.  Pain does not radiate.  She has no tenderness to palpation, however does note that she has increased pain with movement, particularly with sitting up.  She also notes that she has had a decreased appetite, decreased oral intake including fluids, and has noticed that she has had decreasing urine output as well.  She states that she did have nausea and vomiting overnight, however vomitus was scant due to poor oral intake recently.  She notes that she has had significant ostomy output despite poor p.o. intake, stating that in December she was changing her ostomy bag every 15 minutes.  It has improved to somewhat, however she is still having to change it once every hour.  Notes that she did have a skin infection at the site of her ostomy, however it is now resolved after treatment at the wound clinic.  Denies fever/chills, vision changes, dyspnea, chest pain/pressure/tightness,

## 2025-02-04 ENCOUNTER — TELEPHONE (OUTPATIENT)
Dept: NEPHROLOGY | Age: 75
End: 2025-02-04

## 2025-02-04 DIAGNOSIS — E86.9 VOLUME DEPLETION: Primary | ICD-10-CM

## 2025-02-04 DIAGNOSIS — N17.9 AKI (ACUTE KIDNEY INJURY) (HCC): Primary | ICD-10-CM

## 2025-02-04 LAB
ANION GAP SERPL CALC-SCNC: 15 MEQ/L (ref 8–16)
BUN SERPL-MCNC: 16 MG/DL (ref 7–22)
CALCIUM SERPL-MCNC: 8.8 MG/DL (ref 8.5–10.5)
CHLORIDE SERPL-SCNC: 104 MEQ/L (ref 98–111)
CO2 SERPL-SCNC: 17 MEQ/L (ref 23–33)
CREAT SERPL-MCNC: 0.7 MG/DL (ref 0.4–1.2)
DEPRECATED RDW RBC AUTO: 56.3 FL (ref 35–45)
ERYTHROCYTE [DISTWIDTH] IN BLOOD BY AUTOMATED COUNT: 17.2 % (ref 11.5–14.5)
GFR SERPL CREATININE-BSD FRML MDRD: 90 ML/MIN/1.73M2
GLUCOSE BLD STRIP.AUTO-MCNC: 100 MG/DL (ref 70–108)
GLUCOSE BLD STRIP.AUTO-MCNC: 108 MG/DL (ref 70–108)
GLUCOSE BLD STRIP.AUTO-MCNC: 171 MG/DL (ref 70–108)
GLUCOSE SERPL-MCNC: 98 MG/DL (ref 70–108)
HCT VFR BLD AUTO: 28.2 % (ref 37–47)
HGB BLD-MCNC: 8.7 GM/DL (ref 12–16)
INR PPP: 2.19 (ref 0.85–1.13)
MAGNESIUM SERPL-MCNC: 1.7 MG/DL (ref 1.6–2.4)
MCH RBC QN AUTO: 27.6 PG (ref 26–33)
MCHC RBC AUTO-ENTMCNC: 30.9 GM/DL (ref 32.2–35.5)
MCV RBC AUTO: 89.5 FL (ref 81–99)
PLATELET # BLD AUTO: 496 THOU/MM3 (ref 130–400)
PMV BLD AUTO: 9.8 FL (ref 9.4–12.4)
POTASSIUM SERPL-SCNC: 4.4 MEQ/L (ref 3.5–5.2)
RBC # BLD AUTO: 3.15 MILL/MM3 (ref 4.2–5.4)
SODIUM SERPL-SCNC: 136 MEQ/L (ref 135–145)
WBC # BLD AUTO: 11.2 THOU/MM3 (ref 4.8–10.8)

## 2025-02-04 PROCEDURE — 2580000003 HC RX 258: Performed by: INTERNAL MEDICINE

## 2025-02-04 PROCEDURE — 85027 COMPLETE CBC AUTOMATED: CPT

## 2025-02-04 PROCEDURE — 6370000000 HC RX 637 (ALT 250 FOR IP): Performed by: PHARMACIST

## 2025-02-04 PROCEDURE — 80048 BASIC METABOLIC PNL TOTAL CA: CPT

## 2025-02-04 PROCEDURE — 97535 SELF CARE MNGMENT TRAINING: CPT

## 2025-02-04 PROCEDURE — 83735 ASSAY OF MAGNESIUM: CPT

## 2025-02-04 PROCEDURE — 36415 COLL VENOUS BLD VENIPUNCTURE: CPT

## 2025-02-04 PROCEDURE — 97530 THERAPEUTIC ACTIVITIES: CPT

## 2025-02-04 PROCEDURE — 6370000000 HC RX 637 (ALT 250 FOR IP)

## 2025-02-04 PROCEDURE — 99233 SBSQ HOSP IP/OBS HIGH 50: CPT | Performed by: STUDENT IN AN ORGANIZED HEALTH CARE EDUCATION/TRAINING PROGRAM

## 2025-02-04 PROCEDURE — 99232 SBSQ HOSP IP/OBS MODERATE 35: CPT | Performed by: INTERNAL MEDICINE

## 2025-02-04 PROCEDURE — 1200000003 HC TELEMETRY R&B

## 2025-02-04 PROCEDURE — 6370000000 HC RX 637 (ALT 250 FOR IP): Performed by: INTERNAL MEDICINE

## 2025-02-04 PROCEDURE — 85610 PROTHROMBIN TIME: CPT

## 2025-02-04 PROCEDURE — 82948 REAGENT STRIP/BLOOD GLUCOSE: CPT

## 2025-02-04 RX ORDER — WARFARIN SODIUM 5 MG/1
5 TABLET ORAL ONCE
Status: COMPLETED | OUTPATIENT
Start: 2025-02-04 | End: 2025-02-04

## 2025-02-04 RX ADMIN — METOPROLOL SUCCINATE 75 MG: 25 TABLET, FILM COATED, EXTENDED RELEASE ORAL at 10:05

## 2025-02-04 RX ADMIN — FAMOTIDINE 10 MG: 20 TABLET, FILM COATED ORAL at 10:09

## 2025-02-04 RX ADMIN — CETIRIZINE HYDROCHLORIDE 5 MG: 5 TABLET ORAL at 10:04

## 2025-02-04 RX ADMIN — BUSPIRONE HYDROCHLORIDE 10 MG: 10 TABLET ORAL at 10:04

## 2025-02-04 RX ADMIN — BUSPIRONE HYDROCHLORIDE 10 MG: 10 TABLET ORAL at 19:47

## 2025-02-04 RX ADMIN — CALCIUM POLYCARBOPHIL 1250 MG: 625 TABLET, FILM COATED ORAL at 10:05

## 2025-02-04 RX ADMIN — LEVOTHYROXINE SODIUM 125 MCG: 0.12 TABLET ORAL at 10:04

## 2025-02-04 RX ADMIN — MONTELUKAST 10 MG: 10 TABLET, FILM COATED ORAL at 10:04

## 2025-02-04 RX ADMIN — SODIUM BICARBONATE 1300 MG: 650 TABLET ORAL at 19:47

## 2025-02-04 RX ADMIN — WARFARIN SODIUM 5 MG: 5 TABLET ORAL at 15:29

## 2025-02-04 RX ADMIN — SODIUM CHLORIDE, POTASSIUM CHLORIDE, SODIUM LACTATE AND CALCIUM CHLORIDE: 600; 310; 30; 20 INJECTION, SOLUTION INTRAVENOUS at 10:19

## 2025-02-04 RX ADMIN — LOSARTAN POTASSIUM 50 MG: 50 TABLET, FILM COATED ORAL at 10:04

## 2025-02-04 RX ADMIN — SODIUM CHLORIDE, POTASSIUM CHLORIDE, SODIUM LACTATE AND CALCIUM CHLORIDE: 600; 310; 30; 20 INJECTION, SOLUTION INTRAVENOUS at 19:50

## 2025-02-04 RX ADMIN — SODIUM BICARBONATE 1300 MG: 650 TABLET ORAL at 10:05

## 2025-02-04 RX ADMIN — Medication 3 MG: at 19:47

## 2025-02-04 RX ADMIN — ACETAMINOPHEN 650 MG: 325 TABLET ORAL at 15:29

## 2025-02-04 ASSESSMENT — PAIN SCALES - GENERAL: PAINLEVEL_OUTOF10: 0

## 2025-02-04 ASSESSMENT — PAIN SCALES - WONG BAKER: WONGBAKER_NUMERICALRESPONSE: NO HURT

## 2025-02-04 NOTE — CARE COORDINATION
2/4/25, 2:53 PM EST    DISCHARGE PLANNING EVALUATION    Spoke with patient, discussed discharge tomorrow home with  and new [facility].  Patient very concerned that no one has shown her how to care for the ostomy yet. She also would like her  called so he knows the plan and when to pick her up.    Notified RN that patient is concerned that no one has shown her how to care for her ostomy.    Spoke with patient  Naeem, informed planned discharge tomorrow with HH.  He confirmed [facility].  He will be here at 1:00 to pick her up.    Spoke with Patricia from [facility] , they had a referral from [facility] and have accepted patient.  Advised discharge tomorrow.  Will fax HH orders and AVS at discharge.    Left message for Kathryn at [facility], informed patient will discharge home with  tomorrow 2/5.

## 2025-02-04 NOTE — PROGRESS NOTES
[facility]   Nephrology progress note  2/4/2025, 8:26 AM      Pt Name:    Marry Ayers  MRN:     [id number]     YOB: 1950  Admit Date:    2/2/2025  1:05 AM    Chief Complaint: Nephrology following for LINDA    Subjective:  Patient was seen and examined this morning.   Doing well, she is hoping to get discharged today.    Objective:  24HR INTAKE/OUTPUT:    Intake/Output Summary (Last 24 hours) at 2/4/2025 0826  Last data filed at 2/3/2025 1939  Gross per 24 hour   Intake 967 ml   Output 0 ml   Net 967 ml         I/O last 3 completed shifts:  In: 1725.4 [P.O.:1407; I.V.:318.4]  Out: 750 [Urine:200; Stool:550]  No intake/output data recorded.   Admission weight: 74.8 kg (165 lb)  Wt Readings from Last 3 Encounters:   02/02/25 74.8 kg (165 lb)   01/31/25 75 kg (165 lb 4.8 oz)   01/23/25 82.1 kg (181 lb)        Vitals :   Vitals:    02/03/25 1657 02/03/25 1939 02/04/25 0409 02/04/25 0800   BP: 130/64 (!) 114/48 115/67 138/67   Pulse: (!) 101 (!) 102 96 (!) 102   Resp:  16  16   Temp:  98 °F (36.7 °C) 97.8 °F (36.6 °C) 97.9 °F (36.6 °C)   TempSrc:  Oral Oral Oral   SpO2: 97% 95% 94% 94%   Weight:       Height:           Physical examination  General Appearance: alert and cooperative with exam, appears comfortable, no distress  Mouth/Throat: Oral mucosa moist  Neck: No JVD  Lungs: Air entry B/L, no rales, no use of accessory muscles  Heart:  S1, S2 heard  GI: soft, non-tender, no guarding  Extremities: no leg edema    Medications:  Infusion:    lactated ringers 100 mL/hr at 02/03/25 1828    dextrose      sodium chloride       Meds:    warfarin  5 mg Oral Once    sodium bicarbonate  1,300 mg Oral BID    sodium chloride flush  5-40 mL IntraVENous 2 times per day    melatonin  3 mg Oral Nightly    famotidine  10 mg Oral Daily    polycarbophil  1,250 mg Oral Daily    warfarin placeholder: dosing by pharmacy   Oral RX Placeholder    busPIRone  10 mg Oral BID    montelukast  10 mg Oral Daily

## 2025-02-04 NOTE — TELEPHONE ENCOUNTER
----- Message from Dr. Stephanie Cardona DO sent at 2/4/2025  7:40 AM EST -----  Patient is getting discharged home most likely today  She needs outpatient IV Fluids once a week  Lives close to [facility]  Lactated ringers 2000 mL over 4 hours once a week with weekly BMP  Needs hospital f/u appt with me in about 3 weeks

## 2025-02-04 NOTE — PROGRESS NOTES
Hospitalist Progress Note    Patient:  Marry Ayers      Unit/Bed:8B-28/028-A    YOB: 1950    MRN: [id number]       Acct: [id number]     PCP: Silvestre Sawyer MD    Date of Admission: 2/2/2025    Assessment/Plan:    LINDA, resolved: Multifactorial, likely prerenal etiology in setting of GI losses secondary to high output ostomy with subsequent dehydration.  Baseline CR 0.8-1.  Presented CR 2.2, BUN 47 with BUN: Cr > 20.  Renal US with no evidence of hydronephrosis.  S/p IVF with improvement.  Limit nephrotoxic agents.  Monitor renal function with daily BMP.  Nephrology following.  High output ostomy: With subsequent LINDA and dehydration, see above.  Started on IVF, fluid resuscitated.  Added FiberCon for bulking properties.  Strict I&O's.  Stool cultures ordered.  Suspicion for active infection.  Defer antibiotics for now.  Nutrition consulted for ileostomy diet consult.  Per nephrology due to recurring issues will arrange for outpatient IV fluids once a week.  Started on p.o. bicarb.  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  Pharmacy consulted dose  HTN: Continue antihypertensive holding parameters  HLD: continue statin  Hypothyroidism: Continue home Synthroid  History of abdominal mass: Benign. S/p ex lap with partial bowel resection and anastomosis complicated by anastomosis leak leading to feculent peritonitis with ileostomy 12/24.      Expected discharge date: 2/3/2025    Disposition:    [] Home       [] TCU       [] Rehab       [] Psych       [] SNF       [] Long Term Care Facility       [] Other-    Chief Complaint: Back pain    Hospital Course:   Marry Ayers is a 74 y.o. female with PMHx of abdominal mass s/p ex lap complicated by feculent peritonitis due to anastomotic leak, now s/p ileostomy who presents to [facility] with weakness, bilateral back pain.  States that approximately 1 week ago she developed fatigue.  She had been previously working well with PT/OT, however has    CO2 16* 16* 17*   BUN 39* 22 16   CREATININE 1.5* 0.8 0.7   CALCIUM 8.8 8.5 8.8     Recent Labs     02/02/25  0130   AST 15   ALT 14   BILITOT 0.3   ALKPHOS 260*     Recent Labs     02/02/25  0923 02/03/25  1007 02/04/25  0545   INR 1.85* 1.79* 2.19*     No results for input(s): \"CKTOTAL\", \"TROPONINI\" in the last 72 hours.    Microbiology:      Urinalysis:      Lab Results   Component Value Date/Time    NITRU NEGATIVE 02/02/2025 05:36 AM    WBCUA 10-15 02/02/2025 05:36 AM    BACTERIA NONE SEEN 02/02/2025 05:36 AM    RBCUA 3-5 02/02/2025 05:36 AM    BLOODU NEGATIVE 02/02/2025 05:36 AM    GLUCOSEU NEGATIVE 02/02/2025 05:36 AM       Radiology:  US RENAL COMPLETE   Final Result      1. No evidence of hydronephrosis.    2. Perihepatic fluid collection.               **This report has been created using voice recognition software. It may contain   minor errors which are inherent in voice recognition technology.**            Electronically signed by Dr. Ramos Day      CT ABDOMEN PELVIS WO CONTRAST Additional Contrast? None   Final Result      1. Stable CT scan of the abdomen and pelvis, no interval change since previous   study dated 1/22/2025..               **This report has been created using voice recognition software. It may contain   minor errors which are inherent in voice recognition technology.**            Electronically signed by Dr. Ramos Day          DVT prophylaxis: [] Lovenox                                 [] SCDs                                 [] SQ Heparin                                 [] Encourage ambulation           [x] Already on Anticoagulation-Coumadin     Code Status: Full Code    PT/OT Eval Status: PT/OT consulted    Tele:   [x] yes             [] no    Electronically signed by Refugio Medina DO on 2/4/2025 at 9:06 AM

## 2025-02-04 NOTE — PROGRESS NOTES
Warfarin Pharmacy Consult Note    Warfarin Indication: Hx of DVT & PE  Target INR: 2.0-3.0  Dose prior to admission: 5 mg daily    Recent Labs     02/02/25  1019 02/03/25  0554 02/04/25  0545   HGB 8.9* 8.9* 8.7*   * 636* 496*     Recent Labs     02/02/25  0923 02/03/25  1007 02/04/25  0545   INR 1.85* 1.79* 2.19*     Concurrent anticoagulants/antiplatelets: none  Significant warfarin drug-drug interactions: none     Date INR Warfarin Dose   2/2/2025 1.85 5 mg    2/3/2025   1.79 6 mg    2/4/2025   2.19 5 mg                                        Monitoring:                   INR will be monitored daily.    **Please contact pharmacy for discharge instructions when indicated**    Anastasia MONGE.Ph., BCPS., 2/4/2025,7:41 AM

## 2025-02-04 NOTE — PROGRESS NOTES
[facility]  [facility] MED SURG 8AB  Occupational Therapy  Daily Note    Discharge Recommendations: Home with Home Health OT  Equipment Recommendations: No        Time In: 0908  Time Out: 1002  Timed Code Treatment Minutes: 54 Minutes  Minutes: 54          Date: 2025  Patient Name: Marry Ayers,   Gender: female      Room: 8B-/028-A  MRN: [id number]  : 1950  (74 y.o.)  Referring Practitioner: Jasmyne Maier MD  Diagnosis: LINDA (acute kidney injury) (HCC)  Additional Pertinent Hx: Marry Ayers is a 74 y.o. female with PMHx of abdominal mass s/p ex lap complicated by feculent peritonitis due to anastomotic leak, now s/p ileostomy who presents to [facility] with weakness, bilateral back pain.  States that approximately 1 week ago she developed fatigue.  She had been previously working well with PT/OT, however has spent the last week in bed due to weakness.  Over this time she has also noted increasing bilateral back pain, sparing the median.  Pain does not radiate.  She has no tenderness to palpation, however does note that she has increased pain with movement, particularly with sitting up.  She also notes that she has had a decreased appetite, decreased oral intake including fluids, and has noticed that she has had decreasing urine output as well.  She states that she did have nausea and vomiting overnight, however vomitus was scant due to poor oral intake recently.  She notes that she has had significant ostomy output despite poor p.o. intake, stating that in December she was changing her ostomy bag every 15 minutes.  It has improved to somewhat, however she is still having to change it once every hour.  Notes that she did have a skin infection at the site of her ostomy, however it is now resolved after treatment at the wound clinic.  Denies fever/chills, vision changes, dyspnea, chest pain/pressure/tightness, palpitations, abdominal pain, skin changes, or new  edema.    Restrictions/Precautions:  Restrictions/Precautions: General Precautions, Fall Risk  Position Activity Restriction  Other Position/Activity Restrictions: ileostomy     Social/Functional History:  Lives With: Spouse  Type of Home: House  Home Layout: One level, Laundry in basement  Home Access: Stairs to enter with rails  Entrance Stairs - Number of Steps: 3 GRACIELA  Home Equipment: Walker - Rolling, Wheelchair - Manual, Cane - Quad, Cane   Bathroom Shower/Tub: Walk-in shower  Bathroom Equipment: Grab bars in shower       Prior Level of Assist for ADLs: Independent  Prior Level of Assist for Homemaking: Independent  Prior Level of Assist for Transfers: Independent  Prior Level of Assist for Ambulation: Independent household ambulator, with or without device, Independent community ambulator, with or without device  Has the patient had two or more falls in the past year or any fall with injury in the past year?: No    Active : Yes  Leisure & Hobbies: Taking care of her dog.  Additional Comments: Pt reports being in SNF since Dec. 2024 for rehab. Pt reports (I) prior to SNF admission and was to be discharged home 2/4/25. Pt plans to return home with .    SUBJECTIVE: Nurse ok'd session. Patient lying in bed upon arrival. Agreeable to OT session    PAIN: Complains of slight abdominal area     Vitals: Heart Rate: Fluctuated between 116-146 bpm. Nurse Lal notified.     COGNITION: Decreased Safety Awareness    ADL:   Grooming: Minimal Assistance.  For hair care including washing hair in sink. Patient able to comb once washed. Increased time  Bathing: Stand By Assistance.  Standing sinkside to wash varsha area/bottom. Able to wash remaining areas seated in chair at sink with supervision  Upper Extremity Dressing: with set-up.  To doff/don gown in seated position  Lower Extremity Dressing: Stand By Assistance.  For clothing management downward/upward. Able to unthread/thread seated in chair with  supervision  Footwear Management: Supervision.  To doff/don socks in seated position   Toileting: Stand By Assistance.  For hygiene and clothing management  Toilet Transfer: Stand By Assistance. To/from STS  .  Patients heart rate fluctuated between 116-146 bpm throughout BADL routine requiring lengthy rest breaks throughout due to need to decrease heart rate. Cues provided throughout for deep breathing in through nose and out through mouth.     IADL:   Not Tested    BALANCE:  Sitting Balance:  Supervision.    Standing Balance: Stand By Assistance.      BED MOBILITY:  Rolling to Right: Modified Independent    Supine to Sit: Modified Independent - using side rail  Sit to Supine: Modified Independent      TRANSFERS:  Sit to Stand:  Stand By Assistance. From various surfaces  Stand to Sit: Stand By Assistance. To various surfaces    FUNCTIONAL MOBILITY:  Assistive Device: IV pole  Assist Level:  Stand By Assistance.   Distance: To and from bathroom       Modified Tate:  Current Functional Status:  Not Applicable    ASSESSMENT:     Activity Tolerance:  Patient tolerance of  treatment: Good treatment tolerance, Limited by fatigue, Reduced activity pace, and Need for increased rest breaks      Plan: Times Per Week: 4-5x  Current Treatment Recommendations: Strengthening, ROM, Balance training, Functional mobility training, Endurance training, Safety education & training, Patient/Caregiver education & training, Equipment evaluation, education, & procurement, Self-Care / ADL, Home management training    Education:  Learners: Patient  Safety with transfers and mobility, ADL strategies    Goals  Short Term Goals  Time Frame for Short Term Goals: Until discharge  Short Term Goal 1: Pt will complete functional transfers with Super and 0-2 cues for safety to increase (I) with toileting.  Short Term Goal 2: Pt will demonstrate dynamic standing x3-5 minutes using LRAD with Super to (I) with eventual sinkside grooming

## 2025-02-04 NOTE — CONSULTS
Comprehensive Nutrition Assessment    Type and Reason for Visit: Initial, Consult, Patient education (High ileostomy output and dehydration)    Nutrition Recommendations/Plan:   Continue diet as ordered.   Modify ONS from Ensure Plus to Ensure Clear per pt request.   Recommend MVI.   RD provided ostomy diet education to patient (it was also provided on 12/26/2024).   RD ordered updated weight- admission weight is a stated weight.      Malnutrition Assessment:  Malnutrition Status: Moderate malnutrition  Context: Acute Illness       Findings of the 6 clinical characteristics of malnutrition:  Energy Intake:  75% or less of estimated energy requirements for 7 or more days  Weight Loss:  Greater than 5% over 1 month (5.7% (11 lb) in the last month which is significant)     Body Fat Loss:  No body fat loss     Muscle Mass Loss:  No muscle mass loss    Fluid Accumulation:  No fluid accumulation     Strength:  Not Performed    Nutrition Assessment:    Pt. moderately malnourished AEB criteria as listed above. At risk for further nutrition compromise r/t admitted d/t back pain and increased fatigue. LINDA (now resolved), high ostomy output. Noted underlying medical condition (PMHx abdominal mass s/p ex lap and anastomotic leak with ileostomy, anemia, anxiety, HTN, GERD, pulmonary embolism).    Nutrition Related Findings:    Pt. Report/Treatments/Miscellaneous: Patient seen, sitting up in bed. She reports that she has had significant weight loss in the last 2 months (most recent weight is EMR is a stated weight). She reports that her appetite is \"sucky\". She usually eats 2 small meals per day. Patient does not like Ensure Plus- would like to trial Ensure Clear. Patient comments that she has never had ostomy diet education (worth noting- this RD provided diet education in December when ileostomy was placed). RD provided additional education and walked through entire handout answering questions.   GI Status: ostomy output  250 ml in last 24 hours  Wound: None   Edema: none, per flowsheet   Pertinent Labs:   No results found for: \"GLUF\", \"LABA1C\"  Recent Labs     02/02/25  0723 02/02/25  1019 02/03/25  0554 02/04/25  0545   * 135 136 136   K 4.9 4.7 4.8 4.4    105 105 104   GLUCOSE 152* 60* 106 98   BUN 42* 39* 22 16   CREATININE 1.7* 1.5* 0.8 0.7   MG 1.1*  --  1.8 1.7     Pertinent Medications: warfarin, famotidine, montelukast, losartan, metoprolol, levothyroxine    Current Nutrition Intake & Therapies:    Recent PO intake: 26-50%, %  Recent Supplement Intake:  (does not like- would like to try ensure clear)    - Current intake likely does not meet estimated needs.   ADULT DIET; Regular  ADULT ORAL NUTRITION SUPPLEMENT; Breakfast, Lunch, Dinner; Clear Liquid Oral Supplement    Anthropometric Measures:  Height: 152.4 cm (5')  Ideal Body Weight (IBW): 100 lbs  Admission Body Mario: 74.8 kg (165 lb) (2/2, no edema. STATED WEIGHT)  Current Body Weight: 74.8 kg (165 lb) (2/2, no edema STATED WEIGHT)  Current BMI: Body mass index is 32.22 kg/m².  Usual Body Weight:  (1/23: 181 lb; 1/17: 175 lb; 1/2: 192 lb; 11/26: 187 lb)  Weight Adjustment for: No Adjustment  BMI Categories: Class I Obesity (30-34.9)    Estimated Daily Nutrient Needs:  Energy (kcal/day): 2851-2027 kcal (20-25 kcla/kg)  Weight Used for energy calculation:  Admission (75 kg)  Protein (g/day): 60-90 g (0.8-1.2 g/kg)    Weight Used for protein calculation: Admission (75 kg)  Fluid (ml/day): per provider (Defer to provider)    Nutrition Diagnosis:   Moderate malnutrition, in context of acute illness or injury related to inadequate protein-energy intake as evidenced by criteria as identified in malnutrition assessment    Nutrition Interventions:   Nutrition and/ or Nutrient Delivery: Continue Current Diet, Modify Oral Nutrition Supplement   Nutrition Education/ Counseling: Education/Counseling completed (gave ileostomy diet education)   Coordination of

## 2025-02-05 VITALS
SYSTOLIC BLOOD PRESSURE: 135 MMHG | WEIGHT: 165 LBS | BODY MASS INDEX: 32.39 KG/M2 | OXYGEN SATURATION: 96 % | TEMPERATURE: 98.7 F | RESPIRATION RATE: 16 BRPM | HEIGHT: 60 IN | HEART RATE: 105 BPM | DIASTOLIC BLOOD PRESSURE: 59 MMHG

## 2025-02-05 LAB
GLUCOSE BLD STRIP.AUTO-MCNC: 103 MG/DL (ref 70–108)
GLUCOSE BLD STRIP.AUTO-MCNC: 114 MG/DL (ref 70–108)
INR PPP: 2.52 (ref 0.85–1.13)
MAGNESIUM SERPL-MCNC: 1.3 MG/DL (ref 1.6–2.4)

## 2025-02-05 PROCEDURE — 85610 PROTHROMBIN TIME: CPT

## 2025-02-05 PROCEDURE — 99239 HOSP IP/OBS DSCHRG MGMT >30: CPT | Performed by: INTERNAL MEDICINE

## 2025-02-05 PROCEDURE — 6370000000 HC RX 637 (ALT 250 FOR IP): Performed by: PHARMACIST

## 2025-02-05 PROCEDURE — 36415 COLL VENOUS BLD VENIPUNCTURE: CPT

## 2025-02-05 PROCEDURE — 2580000003 HC RX 258: Performed by: INTERNAL MEDICINE

## 2025-02-05 PROCEDURE — 6370000000 HC RX 637 (ALT 250 FOR IP): Performed by: INTERNAL MEDICINE

## 2025-02-05 PROCEDURE — 6360000002 HC RX W HCPCS: Performed by: INTERNAL MEDICINE

## 2025-02-05 PROCEDURE — 83735 ASSAY OF MAGNESIUM: CPT

## 2025-02-05 PROCEDURE — 99232 SBSQ HOSP IP/OBS MODERATE 35: CPT | Performed by: INTERNAL MEDICINE

## 2025-02-05 PROCEDURE — 82948 REAGENT STRIP/BLOOD GLUCOSE: CPT

## 2025-02-05 PROCEDURE — 6370000000 HC RX 637 (ALT 250 FOR IP)

## 2025-02-05 RX ORDER — SODIUM BICARBONATE 650 MG/1
1300 TABLET ORAL 2 TIMES DAILY
Qty: 120 TABLET | Refills: 0 | Status: SHIPPED | OUTPATIENT
Start: 2025-02-05 | End: 2025-03-07

## 2025-02-05 RX ORDER — WARFARIN SODIUM 4 MG/1
4 TABLET ORAL ONCE
Qty: 1 TABLET | Refills: 0 | Status: SHIPPED | OUTPATIENT
Start: 2025-02-05 | End: 2025-02-05 | Stop reason: HOSPADM

## 2025-02-05 RX ORDER — WARFARIN SODIUM 4 MG/1
4 TABLET ORAL ONCE
Status: COMPLETED | OUTPATIENT
Start: 2025-02-05 | End: 2025-02-05

## 2025-02-05 RX ORDER — MONTELUKAST SODIUM 10 MG/1
10 TABLET ORAL DAILY
Qty: 30 TABLET | Refills: 0 | Status: SHIPPED
Start: 2025-02-05 | End: 2025-06-05

## 2025-02-05 RX ORDER — LOSARTAN POTASSIUM 50 MG/1
50 TABLET ORAL 2 TIMES DAILY
Qty: 30 TABLET | Refills: 3 | Status: SHIPPED | OUTPATIENT
Start: 2025-02-05

## 2025-02-05 RX ORDER — MAGNESIUM SULFATE IN WATER 40 MG/ML
2000 INJECTION, SOLUTION INTRAVENOUS ONCE
Status: COMPLETED | OUTPATIENT
Start: 2025-02-05 | End: 2025-02-05

## 2025-02-05 RX ORDER — WARFARIN SODIUM 5 MG/1
TABLET ORAL
Qty: 30 TABLET | Refills: 0 | Status: SHIPPED | OUTPATIENT
Start: 2025-02-05

## 2025-02-05 RX ORDER — METOPROLOL SUCCINATE 25 MG/1
75 TABLET, EXTENDED RELEASE ORAL DAILY
Qty: 30 TABLET | Refills: 3 | Status: SHIPPED | OUTPATIENT
Start: 2025-02-06

## 2025-02-05 RX ORDER — FAMOTIDINE 10 MG
10 TABLET ORAL DAILY
Qty: 30 TABLET | Refills: 0 | Status: SHIPPED | OUTPATIENT
Start: 2025-02-06 | End: 2025-02-05 | Stop reason: HOSPADM

## 2025-02-05 RX ORDER — LEVOTHYROXINE SODIUM 125 UG/1
125 TABLET ORAL DAILY
Qty: 30 TABLET | Refills: 3 | Status: SHIPPED | OUTPATIENT
Start: 2025-02-06

## 2025-02-05 RX ADMIN — WARFARIN SODIUM 4 MG: 4 TABLET ORAL at 11:59

## 2025-02-05 RX ADMIN — MONTELUKAST 10 MG: 10 TABLET, FILM COATED ORAL at 07:47

## 2025-02-05 RX ADMIN — BUSPIRONE HYDROCHLORIDE 10 MG: 10 TABLET ORAL at 07:46

## 2025-02-05 RX ADMIN — LOSARTAN POTASSIUM 50 MG: 50 TABLET, FILM COATED ORAL at 07:46

## 2025-02-05 RX ADMIN — FAMOTIDINE 10 MG: 20 TABLET, FILM COATED ORAL at 07:47

## 2025-02-05 RX ADMIN — LEVOTHYROXINE SODIUM 125 MCG: 0.12 TABLET ORAL at 07:47

## 2025-02-05 RX ADMIN — SODIUM BICARBONATE 1300 MG: 650 TABLET ORAL at 07:47

## 2025-02-05 RX ADMIN — METOPROLOL SUCCINATE 75 MG: 25 TABLET, FILM COATED, EXTENDED RELEASE ORAL at 07:46

## 2025-02-05 RX ADMIN — MAGNESIUM SULFATE HEPTAHYDRATE 2000 MG: 40 INJECTION, SOLUTION INTRAVENOUS at 10:07

## 2025-02-05 RX ADMIN — SODIUM CHLORIDE, POTASSIUM CHLORIDE, SODIUM LACTATE AND CALCIUM CHLORIDE: 600; 310; 30; 20 INJECTION, SOLUTION INTRAVENOUS at 06:01

## 2025-02-05 RX ADMIN — CETIRIZINE HYDROCHLORIDE 5 MG: 5 TABLET ORAL at 07:47

## 2025-02-05 RX ADMIN — CALCIUM POLYCARBOPHIL 1250 MG: 625 TABLET, FILM COATED ORAL at 07:47

## 2025-02-05 NOTE — PROGRESS NOTES
Perfect serve sent to wound/ostomy team regarding patient request to be educated on her ileostomy, and supplies prior to d/c

## 2025-02-05 NOTE — CARE COORDINATION
CM received a call about water proof sheets and a toilet riser. Marry's daughter Marta relayed that the SNF was planning to provide these at discharge. CM met with the patient to explain options for this DME as it is not covered in the acute setting. Suggested she obtain orders from her PCP if possible. Patient states she doesn't need a toilet riser and she doesn't know why her family wants her to have one. She states they can just buy the water proof sheets. CM gave Marry extra packs of chucks pads to take home in the mean time. Denies further needs.

## 2025-02-05 NOTE — PROGRESS NOTES
Warfarin Pharmacy Consult Note    Warfarin Indication: Hx of DVT & PE  Target INR: 2.0-3.0  Dose prior to admission: 5 mg daily    Recent Labs     02/02/25  1019 02/03/25  0554 02/04/25  0545   HGB 8.9* 8.9* 8.7*   * 636* 496*     Recent Labs     02/03/25  1007 02/04/25  0545 02/05/25  0554   INR 1.79* 2.19* 2.52*     Concurrent anticoagulants/antiplatelets: none  Significant warfarin drug-drug interactions: none     Date INR Warfarin Dose   2/2/2025 1.85 5 mg    2/3/2025   1.79 6 mg    2/4/2025   2.19 5 mg    2/5/2025  2.52  4 mg                                Monitoring:                   INR will be monitored daily.    **Please contact pharmacy for discharge instructions when indicated**    Anastasia MONGE.Ph., BCPS., 2/5/2025,8:49 AM

## 2025-02-05 NOTE — DISCHARGE INSTRUCTIONS
Visit Discharge/ physician orders for ostomy:  -Please use crusting technique on irritated peristomal skin by using stoma powder and skin prep.  -Stop use of glue around stoma, use the ring only.   -Make sure to stay well hydrated, try small frequent meals,increase protein levels.                                                                                                                                                    Supplies    Size    Order #      Rosi One-Piece soft convex Cut to fit to 28-30mm-Oval (Measure each time) [id number]   Brava Protective Seal-Ring Thick [id number]   Brava Skin Barrier Wipes 30 wipes/box [id number]      Brava Stoma Powder         [id number]         Change your pouch  1-2                    times / week and when leaking     Application of one Piece Colostomy/Ileostomy Pouch:  Assemble above supplies in order of application before removing pouch.  If not pre-cut: Cut a hole in the flange to fit the size of your stoma. Remove paper backing.  Remove your worn appliance by gently pulling away from skin and discard.  Wash skin with warm water, rinse and pat dry.  DO NOT USE SOAP!  Inspect your skin for redness or irritation.  If present, apply a small amount of powder to skin around stoma.  Brush off excess powder with a Kleenex. Do not use ointments.  - Brava Stoma Powder  - for wet, weepy skin  - Antifungal powder - for red,itchy rash   Center the flange around your stoma.  Smooth the adhesive collar to your abdomen.  Close the end of your pouch.  Empty when 1/3 full.     Follow up: as needed      [facility]  [address] [place] [address]  Phone: [phone]

## 2025-02-05 NOTE — PROGRESS NOTES
[facility]   PROGRESS NOTE      Patient: Marry Ayers  Room #: 8B-28/028-A            YOB: 1950  Age: 74 y.o.  Gender: female            Admit Date & Time: 2/2/2025  1:05 AM    Assessment:    The patient declined a visit today.    Interventions:  The patient was provided information about Spiritual Care being available.     Outcomes:  The  wished the patient a positive day.     Plan:  1.Spiritual care will continue to follow the patient according to [facility] spiritual care SOP.       Electronically signed by Chaplain Lopez, on 2/5/2025 at 10:34 AM.  Spiritual Care Department  [facility]  [phone]

## 2025-02-05 NOTE — PROGRESS NOTES
Discharge teaching and instructions for diagnosis/procedure of Acute kidney injury completed with patient using teachback method. AVS reviewed. Printed prescriptions given to patient. Patient voiced understanding regarding prescriptions, follow up appointments, and care of self at home. Discharged in a wheelchair to  home with support per family

## 2025-02-05 NOTE — TELEPHONE ENCOUNTER
Spoke to Dr. Cardona and she wants 8 weeks of hydration with weekly bmp and mag. Orders placed faxing to [facility] at [phone] they will call patient to schedule

## 2025-02-05 NOTE — CARE COORDINATION
2/5/25, 2:07 PM EST    Patient goals/plan/ treatment preferences discussed by  and .  Patient goals/plan/ treatment preferences reviewed with patient/ family.  Patient/ family verbalize understanding of discharge plan and are in agreement with goal/plan/treatment preferences.  Understanding was demonstrated using the teach back method.  AVS provided by RN at time of discharge, which includes all necessary medical information pertaining to the patients current course of illness, treatment, post-discharge goals of care, and treatment preferences.     Services At/After Discharge: Home Health and Nursing service       Patient discharged home with  and new [place] .  Spoke with Vicky at [facility] , informed of discharge today.  Faxed H&P, HH orders and AVS.   transported at 1:00.

## 2025-02-05 NOTE — DISCHARGE SUMMARY
DISCHARGE SUMMARY      Patient Identification:   Marry Ayers   : 1950  MRN: [id number]   Account: [id number]      Patient's PCP: Silvestre Sawyer MD    Admit Date: 2025     Discharge Date:   2025    Admitting Physician: Domonique Olivarez MD     Discharge Physician: Geena Valencia MD     Discharge Diagnoses:    LINDA resolved  High output ostomy  History of DVT/PE  Hypertension  Hyperlipidemia  Hypothyroidism  History of abdominal mass s/p partial bowel resection and anastomosis complicated by anastomosis leak leading to feculent peritonitis with ileostomy       The patient was seen and examined on day of discharge and this discharge summary is in conjunction with any daily progress note from day of discharge.    Hospital Course:   Marry Ayers is a 74 y.o. female admitted to [facility] on 2025 for due to generalized weakness, bilateral back pain and high output ostomy.  CT abdomen/pelvis negative for any acute findings, and renal ultrasound negative for hydronephrosis/acute findings  Consult to nephrology for LINDA--improved kidney function with IV fluids and holding nephrotoxins  PT/OT evaluate the patient for debility/deconditioning-not plan to continue PT outpatient  Increased fiber for bulk forming properties.  Wound ostomy consulted and provided patient with education on ostomy care/supplies and management  Patient remained stable and plans for discharge were made.    Patient was seen and examined on day of discharge.  Denies any fevers, chills, chest pain, shortness of breath, lower extremity edema, syncope, lightheadedness  Patient discharged with plan to follow-up with nephrology for IV fluids outpatient  Plan to follow-up with PCP in 1 week    Exam:     Vitals:  Vitals:    25 1543 25 1930 25 1944 25 0306   BP: 130/65 (!) 115/50 (!) 110/50 128/63   Pulse: 92 85  94   Resp: 18 18     Temp: 97.5 °F (36.4 °C) 97.9 °F (36.6 °C)    AM    CALCIUM 8.8 02/04/2025 05:45 AM    PHOS 4.1 01/17/2025 06:50 AM         Significant Diagnostic Studies    Radiology:   US RENAL COMPLETE   Final Result      1. No evidence of hydronephrosis.    2. Perihepatic fluid collection.               **This report has been created using voice recognition software. It may contain   minor errors which are inherent in voice recognition technology.**            Electronically signed by Dr. Ramos Day      CT ABDOMEN PELVIS WO CONTRAST Additional Contrast? None   Final Result      1. Stable CT scan of the abdomen and pelvis, no interval change since previous   study dated 1/22/2025..               **This report has been created using voice recognition software. It may contain   minor errors which are inherent in voice recognition technology.**            Electronically signed by Dr. Ramos Day             Consults:     IP CONSULT TO NEPHROLOGY  IP CONSULT TO DIETITIAN  IP CONSULT TO PHARMACY  IP CONSULT TO SOCIAL WORK    Disposition:    [x] Home       [] TCU       [] Rehab       [] Psych       [] SNF       [] Long Term Care Facility       [] Other-    Condition at Discharge: Stable    Code Status:  Full Code     Patient Instructions:    Discharge lab work: n/a  Activity: activity as tolerated  Diet: ADULT DIET; Regular  ADULT ORAL NUTRITION SUPPLEMENT; Breakfast, Lunch, Dinner; Clear Liquid Oral Supplement      Follow-up visits:   Silvestre Sawyer MD  [address] [place] [address]  [facility] [address] [phone]    Follow up on 2/13/2025  Appointment February 13, 2025 at 10:00am    Stephanie Cardona DO  [address] [facility] [address]  [facility] [address]  [phone]    Follow up on 3/3/2025  Appointment March 3, 2025 at 1:00pm    [facility]  [address] [address]  [phone]         Discharge Medications:        Medication List        ASK your doctor about these medications      acetaminophen 325 MG tablet  Commonly known as: TYLENOL

## 2025-02-05 NOTE — DISCHARGE INSTR - MEDS
Warfarin Discharge Instructions:   Date Warfarin Dose   2/6/25 (tomorrow) 5 mg (1 tablet)   2/7/25 5 mg (1 tablet)   2/8/25 5 mg (1 tablet)   2/9/25 5 mg (1 tablet)   2/10/25 5 mg (1 tablet)   2/11/25 5 mg (1 tablet)      Provider dosing warfarin: [facility] (re-referral to clinic post ECF stay  Next INR date: 2/11/25     Please call [facility] [phone] to schedule an appointment

## 2025-02-05 NOTE — PROGRESS NOTES
[facility]  Progress Note       Marry Ayers  AGE: 74 y.o.   GENDER: female  : 1950  UNIT: 8B-28/028-A  TODAY'S DATE:  2025  ADMISSION DATE: 2025  1:05 AM    Summary:     Consult received for ileostomy. Notes from [facility] reviewed. Patient voicing concern regarding caring for her ostomy. Patient had ileostomy created on 24. Patient has been followed by wound ostomy post-surgically, with twice weekly pouching system changes and education throughout multiple hospital admissions. Refer to previous wound and ostomy notes. During these encounters, patient does not show interest in caring for ostomy. There is concern patient may not want to learn to care for her ostomy and would prefer to have it cared for by nursing staff or someone other than herself. Patient has been encouraged to participate in care of her ostomy, including emptying and changing her pouching system. Patient does not appear vested in caring for her ostomy. Note from [facility] indicates patient will be discharged to home. Home health can assist with ostomy education and pouching system changes. Recommend for patient to follow up in [facility] Outpatient ostomy clinic, as she has been seen by provider in office twice. Will send supplies in preparation for discharge. Nothing further to offer at this time.

## 2025-02-05 NOTE — DISCHARGE INSTR - COC
Continuity of Care Form    Patient Name: Marry Ayers   :  1950  MRN:  [id number]    Admit date:  2025  Discharge date:  ***    Code Status Order: Full Code   Advance Directives:   Advance Care Flowsheet Documentation             Admitting Physician:  Domonique Olivarez MD  PCP: Silvestre Sawyer MD    Discharging Nurse: ***  Discharging Hospital Unit/Room#: 8B-28/028-A  Discharging Unit Phone Number: ***    Emergency Contact:   Extended Emergency Contact Information  Primary Emergency Contact: Antwon Ayers  Address: [address] [place] [address] [place] [address] [facility]  Home Phone: [phone]  Mobile Phone: [phone]  Relation: Spouse  Secondary Emergency Contact: Marta jaeger  Mobile Phone: [phone]  Relation: Child   needed? No    Past Surgical History:  Past Surgical History:   Procedure Laterality Date    ABDOMEN SURGERY      CARDIAC CATHETERIZATION      CARPAL TUNNEL RELEASE Bilateral     CATARACT REMOVAL      CERVICAL SPINE SURGERY      fusion    CLAVICLE SURGERY      COLONOSCOPY  2024    Dr. Prince    CYSTOSCOPY W/ URETERAL STENT PLACEMENT  2023    ESOPHAGOGASTRODUODENOSCOPY  2023    Dr. Tim Schirmer    ESOPHAGOGASTRODUODENOSCOPY  2022    Dr. Tim Schirmer    EYE SURGERY Right     - macular Hole Repair    HERNIA REPAIR      HYSTERECTOMY, TOTAL ABDOMINAL (CERVIX REMOVED)      LAPAROTOMY N/A 2024    EXPLORATORY LAPOROTOMY, creation of iliostomy performed by Casimiro Callahan DO at [facility] OR    SIGMOID COLECTOMY  2023    SIGMOID COLECTOMY N/A 2024    Open Sigmoid Colectomy, Partial Small Bowel Resection performed by Casimiro Callahan DO at [facility] OR    TOTAL KNEE ARTHROPLASTY Left        Immunization History:   Immunization History   Administered Date(s) Administered    COVID-19, MODERNA Bivalent, (age 12y+), IM, 50 mcg/0.5 mL 10/05/2023    COVID-19, MODERNA, (age 12y+), IM, 50mcg/0.5mL 2024

## 2025-02-05 NOTE — PROGRESS NOTES
[facility]   Nephrology progress note  2/5/2025, 8:07 AM      Pt Name:    Marry Ayers  MRN:     [id number]     YOB: 1950  Admit Date:    2/2/2025  1:05 AM    Chief Complaint: Nephrology following for LINDA    Subjective:  Patient was seen and examined this morning.   Getting discharged today.    Objective:  24HR INTAKE/OUTPUT:    Intake/Output Summary (Last 24 hours) at 2/5/2025 0807  Last data filed at 2/4/2025 1105  Gross per 24 hour   Intake 240 ml   Output --   Net 240 ml         I/O last 3 completed shifts:  In: 487 [P.O.:477; I.V.:10]  Out: 0   No intake/output data recorded.   Admission weight: 74.8 kg (165 lb)  Wt Readings from Last 3 Encounters:   02/02/25 74.8 kg (165 lb)   01/31/25 75 kg (165 lb 4.8 oz)   01/23/25 82.1 kg (181 lb)        Vitals :   Vitals:    02/04/25 1930 02/04/25 1944 02/05/25 0306 02/05/25 0742   BP: (!) 115/50 (!) 110/50 128/63 (!) 137/54   Pulse: 85  94 92   Resp: 18   15   Temp: 97.9 °F (36.6 °C)   98.2 °F (36.8 °C)   TempSrc: Oral   Oral   SpO2: 96%  94% 93%   Weight:       Height:           Physical examination  General Appearance: alert and cooperative with exam, appears comfortable, no distress  Mouth/Throat: Oral mucosa moist  Neck: No JVD  Lungs: Air entry B/L, no rales, no use of accessory muscles  Heart:  S1, S2 heard  GI: soft, non-tender, no guarding  Extremities: no leg edema    Medications:  Infusion:    lactated ringers 100 mL/hr at 02/05/25 0601    dextrose      sodium chloride       Meds:    magnesium sulfate  2,000 mg IntraVENous Once    sodium bicarbonate  1,300 mg Oral BID    sodium chloride flush  5-40 mL IntraVENous 2 times per day    melatonin  3 mg Oral Nightly    famotidine  10 mg Oral Daily    polycarbophil  1,250 mg Oral Daily    warfarin placeholder: dosing by pharmacy   Oral RX Placeholder    busPIRone  10 mg Oral BID    montelukast  10 mg Oral Daily    cetirizine  5 mg Oral Daily    losartan  50 mg Oral BID

## 2025-02-05 NOTE — PROGRESS NOTES
Clinical Pharmacy Note                                               Warfarin Discharge Recommendations    Patient discharged from [facility] today on warfarin.    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  INR goal during admission: 2.0-3.0  Previous home warfarin regimen: 5 mg daily  Interacting medications at discharge: none  Coumadin 5 mg tabs    Hospital Warfarin Doses & INR Results  Concurrent anticoagulants/antiplatelets: none  Significant warfarin drug-drug interactions: none     Date INR Warfarin Dose   2/2/2025 1.85 5 mg    2/3/2025   1.79 6 mg    2/4/2025   2.19 5 mg    2/5/2025  2.52  4 mg                                Recent INRs:  Recent Labs     02/05/25  0554   INR 2.52*     Warfarin Discharge Instructions:   Date Warfarin Dose   2/6/25 (tomorrow) 5 mg (1 tablet)   2/7/25 5 mg (1 tablet)   2/8/25 5 mg (1 tablet)   2/9/25 5 mg (1 tablet)   2/10/25 5 mg (1 tablet)   2/11/25 5 mg (1 tablet)     Provider dosing warfarin: [facility] (re-referral to clinic post ECF stay  Next INR date: 2/11/25

## 2025-02-05 NOTE — PROGRESS NOTES
[facility]  Progress Note       Marry Ayers  AGE: 74 y.o.   GENDER: female  : 1950  UNIT: 8B-/028-A  TODAY'S DATE:  2025  ADMISSION DATE: 2025  1:05 AM    Summary:     Present to patient room. Patient in bed. Pouching system had been changed by primary RN. Patient states RN reviewed steps for changing system. Opted to remove current pouch and replace due to pouch offset to side. Pointing pouch opening down will help assist patient in emptying into toilet. Flange left intact, no evidence of leaking. Reviewed steps for changing and supplies with patient. Encouraged patient to empty pouch when 1/3 full to ensure liquid stool does not loosen seal of flange. Supplies for home going provided to patient. Recommended patient follow up with LOY Cerna in outpatient wound ostomy clinic and contact wound clinic or  for questions/ concerns. Discharge instructions updated. Patient in bed, call light in reach.     Visit Discharge/ physician orders for ostomy:  -Please use crusting technique on irritated peristomal skin by using stoma powder and skin prep.  -Stop use of glue around stoma, use the ring only.   -Make sure to stay well hydrated, try small frequent meals,increase protein levels.                                                                                                                                                    Supplies    Size    Order #      Rosi One-Piece soft convex Cut to fit to 28-30mm-Oval (Measure each time) [id number]   Brava Protective Seal-Ring Thick [id number]   Brava Skin Barrier Wipes 30 wipes/box [id number]      Brava Stoma Powder         [id number]         Change your pouch  1-2                    times / week and when leaking     Application of one Piece Colostomy/Ileostomy Pouch:  Assemble above supplies in order of application before removing pouch.  If not pre-cut: Cut a hole in the flange to fit the size of your stoma. Remove paper

## 2025-02-13 ENCOUNTER — TELEPHONE (OUTPATIENT)
Dept: SURGERY | Age: 75
End: 2025-02-13

## 2025-02-13 NOTE — TELEPHONE ENCOUNTER
Received call from patients spouse, patient is currently in the ER at [facility] in [place]. Patient instructed to come to the ER by PCP for evaluation of hypotension and tachycardia related to high ostomy output. Per spouse BP was around 70/50 with a high heart rate. Spouse uncertain of the plan due to ER being full, still awaiting to be seen and evaluated. Spouse was told by PCP to \"demand\" for colostomy takedown and reversal. Discussed with spouse that this may not be an option at this moment but I would discuss their concerns with Dr. Callahan. Spouse expressed understanding, will monitor for plan of care from [facility].

## 2025-02-17 ENCOUNTER — TELEPHONE (OUTPATIENT)
Dept: SURGERY | Age: 75
End: 2025-02-17

## 2025-02-17 NOTE — TELEPHONE ENCOUNTER
Received call from patients spouse, patient at OP nursing for IV hydration in [place]. Patient again hypotensive, tachycardic and diaphoretic with vomiting. Spouse upset and wanting hydration orders changed due to patient not tolerating in between periods. Patient informed that Nephrology wrote orders for hydration and that management of those orders would come from their office. Patient currently being transported from OP Nursing to the ER in [place] to manage symptoms.

## 2025-02-18 ENCOUNTER — TELEPHONE (OUTPATIENT)
Dept: NEPHROLOGY | Age: 75
End: 2025-02-18

## 2025-02-18 NOTE — TELEPHONE ENCOUNTER
----- Message from Dr. Stephanie Cardona DO sent at 2/18/2025 12:19 PM EST -----  For now increase outpatient IV fluids to 3 days a week - Monday, Wednesday, Friday  ----- Message -----  From: Kathryn Dc CMA  Sent: 2/18/2025  12:06 PM EST  To: Stephanie Cardona DO    Patient was in Er yesterday and Dr. Matson added 2 days of hydration and he feels she needs a port placed.     Kathryn  ----- Message -----  From: Kathryn Dc CMA  Sent: 2/17/2025   1:56 PM EST  To: Kathryn Dc CMA    Address hydration orders needing more than 1 time a week been er twice for hypotension.

## 2025-02-21 ENCOUNTER — OFFICE VISIT (OUTPATIENT)
Dept: SURGERY | Age: 75
End: 2025-02-21

## 2025-02-21 VITALS
TEMPERATURE: 97.1 F | RESPIRATION RATE: 18 BRPM | HEART RATE: 115 BPM | SYSTOLIC BLOOD PRESSURE: 142 MMHG | BODY MASS INDEX: 30.63 KG/M2 | DIASTOLIC BLOOD PRESSURE: 78 MMHG | OXYGEN SATURATION: 98 % | WEIGHT: 156 LBS | HEIGHT: 60 IN

## 2025-02-21 DIAGNOSIS — Z90.49 S/P SMALL BOWEL RESECTION: ICD-10-CM

## 2025-02-21 DIAGNOSIS — Z93.2 S/P ILEOSTOMY (HCC): ICD-10-CM

## 2025-02-21 DIAGNOSIS — K56.699 STRICTURE OF SIGMOID COLON (HCC): Primary | ICD-10-CM

## 2025-02-21 PROCEDURE — 99024 POSTOP FOLLOW-UP VISIT: CPT | Performed by: SURGERY

## 2025-02-21 NOTE — PATIENT INSTRUCTIONS
CT pelvis with rectal contrast scheduled at [facility] 2/27/25 at 10:30 am.   Arrive at 10:15 am.

## 2025-02-21 NOTE — PROGRESS NOTES
[facility]  [facility]  [address]  [place] [address]  [phone]  Post Procedure Evaluation in Office    Pt Name: Marry Ayers  Date of Birth 1950   Today's Date: 2/21/2025  Medical Record Number: [id number]  Referring Provider: No ref. provider found  Primary Care Provider: Silvestre Sawyer MD  Chief Complaint   Patient presents with    Post-Op Check     S/P resection of sigmoid stricture and partial small bowel resection 12/4/24. S/p repair of colon perforation and loop ileostomy 12/13/24, last seen in office 1/31/25, admitted 2/2/25-2/5/25     ASSESSMENT       Diagnosis Orders   1. Stricture of sigmoid colon (HCC)  CT PELVIS W CONTRAST    S/P resection of sigmoid stricture and partial small bowel resection 12/4/24 S/p repair of colon perforation and loop ileostomy 12/13/24      2. S/P small bowel resection  CT PELVIS W CONTRAST      3. S/P ileostomy (HCC)  CT PELVIS W CONTRAST      Incision is clean, dry and intact or healing as expected   PLANS   Assessment & Plan   Pathology reviewed with the patient who understands. All questions were answered.  Patient Instructions   CT pelvis with rectal contrast scheduled at [facility] 2/27/25 at 10:30 am.   Arrive at 10:15 am.    CT to re-evaluate for fistula and patency  Follow up: Return for After testing completed.      MARY Tuttle is seen today for post-op follow-up. She is S/P resection of sigmoid stricture and partial small bowel resection 12/4/24 S/p repair of colon perforation and loop ileostomy 12/13/24. She is still having some issues with ostomy bag leaking at the flange even with frequent emptying. C/O nausea with oral antibiotics that is somewhat improved with zofran. Midline incision is no longer leaking.  Past Medical History   has a past medical history of Allergic rhinitis, Anemia, Anxiety, Cataract, Essential hypertension, GERD (gastroesophageal reflux disease), Hx of blood

## 2025-03-03 ENCOUNTER — OFFICE VISIT (OUTPATIENT)
Dept: NEPHROLOGY | Age: 75
End: 2025-03-03
Payer: MEDICARE

## 2025-03-03 ENCOUNTER — TELEPHONE (OUTPATIENT)
Dept: NEPHROLOGY | Age: 75
End: 2025-03-03

## 2025-03-03 VITALS
WEIGHT: 159 LBS | BODY MASS INDEX: 31.05 KG/M2 | SYSTOLIC BLOOD PRESSURE: 120 MMHG | OXYGEN SATURATION: 96 % | HEART RATE: 98 BPM | DIASTOLIC BLOOD PRESSURE: 66 MMHG

## 2025-03-03 DIAGNOSIS — E86.9 VOLUME DEPLETION: Primary | ICD-10-CM

## 2025-03-03 LAB — MAGNESIUM: 0.8 MG/DL

## 2025-03-03 PROCEDURE — 3017F COLORECTAL CA SCREEN DOC REV: CPT | Performed by: INTERNAL MEDICINE

## 2025-03-03 PROCEDURE — 3074F SYST BP LT 130 MM HG: CPT | Performed by: INTERNAL MEDICINE

## 2025-03-03 PROCEDURE — G8399 PT W/DXA RESULTS DOCUMENT: HCPCS | Performed by: INTERNAL MEDICINE

## 2025-03-03 PROCEDURE — 1160F RVW MEDS BY RX/DR IN RCRD: CPT | Performed by: INTERNAL MEDICINE

## 2025-03-03 PROCEDURE — 1159F MED LIST DOCD IN RCRD: CPT | Performed by: INTERNAL MEDICINE

## 2025-03-03 PROCEDURE — 1111F DSCHRG MED/CURRENT MED MERGE: CPT | Performed by: INTERNAL MEDICINE

## 2025-03-03 PROCEDURE — 1090F PRES/ABSN URINE INCON ASSESS: CPT | Performed by: INTERNAL MEDICINE

## 2025-03-03 PROCEDURE — 3078F DIAST BP <80 MM HG: CPT | Performed by: INTERNAL MEDICINE

## 2025-03-03 PROCEDURE — 99214 OFFICE O/P EST MOD 30 MIN: CPT | Performed by: INTERNAL MEDICINE

## 2025-03-03 PROCEDURE — 1123F ACP DISCUSS/DSCN MKR DOCD: CPT | Performed by: INTERNAL MEDICINE

## 2025-03-03 PROCEDURE — 4004F PT TOBACCO SCREEN RCVD TLK: CPT | Performed by: INTERNAL MEDICINE

## 2025-03-03 PROCEDURE — G8427 DOCREV CUR MEDS BY ELIG CLIN: HCPCS | Performed by: INTERNAL MEDICINE

## 2025-03-03 PROCEDURE — G8417 CALC BMI ABV UP PARAM F/U: HCPCS | Performed by: INTERNAL MEDICINE

## 2025-03-03 RX ORDER — POTASSIUM CHLORIDE 1500 MG/1
20 TABLET, EXTENDED RELEASE ORAL 2 TIMES DAILY
Qty: 180 TABLET | Refills: 1 | Status: SHIPPED | OUTPATIENT
Start: 2025-03-03

## 2025-03-03 RX ORDER — SODIUM BICARBONATE 650 MG/1
650 TABLET ORAL 2 TIMES DAILY
Qty: 180 TABLET | Refills: 1 | Status: SHIPPED | OUTPATIENT
Start: 2025-03-03 | End: 2025-06-01

## 2025-03-03 NOTE — TELEPHONE ENCOUNTER
Birgit from [facility] called regarding critical result on K+ of 2.5.  Patient has appointment at 1 pm today.

## 2025-03-03 NOTE — TELEPHONE ENCOUNTER
Labs pulled in from [facility] Everywhere, routed to Dr. Cardona.  She will discuss with patient at today's appointment.

## 2025-03-03 NOTE — TELEPHONE ENCOUNTER
Birgit called with another critical for Marry. Magnesium is 0.8. Dr. Cardona informed. She will discuss results today with Marry at her appointment.

## 2025-03-03 NOTE — PROGRESS NOTES
[facility] [place] [facility]  [facility]  [address]  [address]  [facility] [address]  Dept: [phone]  Loc: [phone]  Progress Note  3/3/2025 12:56 PM      Pt Name:    Marry Ayers  YOB: 1950  Primary Care Physician:  Silvestre Sawyer MD     Chief Complaint:   Chief Complaint   Patient presents with    Follow-Up from Hospital        History of Present Illness:   This is a hospital f/u visit for LINDA.   She has history of high ostomy output and has had a few recent hospitalizations for LINDA, electrolyte abnormalities from dehydration.    She is now getting IV fluids 3 x weekly.  This has helped with her hypotension/tachycardia.   Still having a lot of output. She had a CT scan and sees Dr. Callahan next week. She is hoping the ostomy can be reversed soon.    Labs today showed critical K of 2.5 and mag of 0.8. she is having hand cramps and spasms.      Pertinent items are noted in HPI.         Past History:  Past Medical History:   Diagnosis Date    Allergic rhinitis     Anemia     Anxiety     Cataract     Essential hypertension     GERD (gastroesophageal reflux disease)     Hx of blood clots 06/2014    DVT    Hypokalemia     Hypothyroidism     Pulmonary embolism (HCC) 08/2012     Past Surgical History:   Procedure Laterality Date    ABDOMEN SURGERY      CARDIAC CATHETERIZATION      CARPAL TUNNEL RELEASE Bilateral     CATARACT REMOVAL      CERVICAL SPINE SURGERY  2001    fusion    CLAVICLE SURGERY      COLONOSCOPY  08/07/2024    Dr. Prince    CYSTOSCOPY W/ URETERAL STENT PLACEMENT  02/06/2023    ESOPHAGOGASTRODUODENOSCOPY  09/19/2023    Dr. Tim Schirmer    ESOPHAGOGASTRODUODENOSCOPY  12/23/2022    Dr. Tim Schirmer    EYE SURGERY Right     2010- macular Hole Repair    HERNIA REPAIR      HYSTERECTOMY, TOTAL ABDOMINAL (CERVIX REMOVED)  1998    LAPAROTOMY N/A 12/13/2024    EXPLORATORY LAPOROTOMY, creation of iliostomy performed by Casimiro Callahan DO

## 2025-03-03 NOTE — PATIENT INSTRUCTIONS
Start potassium 20 meq twice a day and will try to get you infusion of potassium and magnesium tomorrow

## 2025-03-07 ENCOUNTER — HOSPITAL ENCOUNTER (OUTPATIENT)
Dept: CT IMAGING | Age: 75
Discharge: HOME | End: 2025-03-07
Attending: RADIOLOGY

## 2025-03-07 DIAGNOSIS — Z00.6 EXAMINATION FOR NORMAL COMPARISON FOR CLINICAL RESEARCH: ICD-10-CM

## 2025-03-11 ENCOUNTER — OFFICE VISIT (OUTPATIENT)
Dept: SURGERY | Age: 75
End: 2025-03-11

## 2025-03-11 VITALS
HEIGHT: 60 IN | OXYGEN SATURATION: 98 % | DIASTOLIC BLOOD PRESSURE: 82 MMHG | SYSTOLIC BLOOD PRESSURE: 136 MMHG | HEART RATE: 100 BPM | TEMPERATURE: 97.3 F | BODY MASS INDEX: 31.05 KG/M2

## 2025-03-11 DIAGNOSIS — K56.699 STRICTURE OF SIGMOID COLON (HCC): Primary | ICD-10-CM

## 2025-03-11 DIAGNOSIS — Z93.2 S/P ILEOSTOMY (HCC): ICD-10-CM

## 2025-03-11 PROCEDURE — 99024 POSTOP FOLLOW-UP VISIT: CPT | Performed by: SURGERY

## 2025-03-12 NOTE — PROGRESS NOTES
surgical history that includes Colonoscopy (08/07/2024); Esophagogastroduodenoscopy (09/19/2023); Sigmoid Colectomy (02/06/2023); Cystoscopy w/ ureteral stent placement (02/06/2023); Esophagogastroduodenoscopy (12/23/2022); Total knee arthroplasty (Left, 2008); Cardiac catheterization; hernia repair; Cataract removal; Hysterectomy, total abdominal (1998); Carpal tunnel release (Bilateral); Clavicle surgery; Eye surgery (Right); Cervical spine surgery (2001); Sigmoid Colectomy (N/A, 12/04/2024); laparotomy (N/A, 12/13/2024); and Abdomen surgery.  Medications  has a current medication list which includes the following prescription(s): sodium bicarbonate, potassium chloride, polycarbophil, levothyroxine, melatonin, warfarin, buspirone, ferrous sulfate, lactobacillus, cetirizine, calcium carb-cholecalciferol, pantoprazole, acetaminophen, metoprolol succinate, losartan, montelukast, and ondansetron.  Allergies  is allergic to seasonal.  Social History   reports that she has been smoking cigarettes. She started smoking about 30 years ago. She has a 7.6 pack-year smoking history. She has never been exposed to tobacco smoke. She has never used smokeless tobacco. She reports that she does not currently use alcohol. She reports that she does not use drugs.  Health Screening Exams  Health Maintenance   Topic Date Due    Depression Screen  Never done    Hepatitis C screen  Never done    DTaP/Tdap/Td vaccine (1 - Tdap) Never done    Pneumococcal 50+ years Vaccine (1 of 2 - PCV) Never done    Lipids  Never done    Breast cancer screen  Never done    Colorectal Cancer Screen  Never done    Respiratory Syncytial Virus (RSV) Pregnant or age 60 yrs+ (1 - Risk 60-74 years 1-dose series) Never done    Annual Wellness Visit (Medicare)  Never done    COVID-19 Vaccine (7 - 2024-25 season) 03/03/2025    DEXA (modify frequency per FRAX score)  Completed    Flu vaccine  Completed    Shingles vaccine  Completed    Hepatitis A vaccine  Aged

## 2025-03-19 ENCOUNTER — TELEPHONE (OUTPATIENT)
Dept: NEPHROLOGY | Age: 75
End: 2025-03-19

## 2025-03-19 DIAGNOSIS — E86.9 VOLUME DEPLETION: Primary | ICD-10-CM

## 2025-03-19 NOTE — TELEPHONE ENCOUNTER
Patient's  called stating they will be continuing the infusions and are now requesting an order for a PICC line to be placed.  Please fax order to [facility].  Spouse states they will be there today from 11-3 and again on Friday, hoping to get this done as soon as possible as she is getting very bruised and blowing out veins every time they stick her.

## 2025-03-20 NOTE — TELEPHONE ENCOUNTER
Beata called From [facility] and states they do not put PICC lines in there. Informed patient and she told me to hold off until she talks to them tomorrow. She will let us know when and if she wants us to set it up at [facility]

## 2025-03-22 NOTE — PROGRESS NOTES
Physician Progress Note      PATIENT:               PITO WHYTE  [facility] #:                  [id number]  :                       1950  ADMIT DATE:       2025 1:05 AM  DISCH DATE:        2025 1:22 PM  RESPONDING  PROVIDER #:        DICKSON ROWE          QUERY TEXT:    Patient admitted with LINDA. Noted to have documentation of poor po intake,   weight loss, and dietician assessment with moderate malnutrition diagnosis. If   possible, please document in progress notes and discharge summary if you are   evaluating and /or treating any of the following:    The medical record reflects the following:    Risk Factors: high ileostomy output, poor po intake, weight loss  Clinical Indicators: Moderate malnutrition per dietician per AND/ASPEN   guidelines as evidenced by Energy Intake:  75% or less of estimated energy   requirements for 7 or more days, 5.7% (11 lb) weight loss in the last month   which is significant  Treatment: Dietician consult & Ensure Clear    ASPEN Criteria:    https://aspenjournals.onlinelibrary.russell.com/doi/full/10.1177/830139219942244  5  Options provided:  -- Protein calorie malnutrition moderate  -- Other - I will add my own diagnosis  -- Disagree - Not applicable / Not valid  -- Disagree - Clinically unable to determine / Unknown  -- Refer to Clinical Documentation Reviewer    PROVIDER RESPONSE TEXT:    This patient has moderate protein calorie malnutrition.    Query created by: Juan Antonio Spence on 3/21/2025 11:58 AM      Electronically signed by:  DICKSON ROWE 3/22/2025 8:02 AM

## 2025-04-15 ENCOUNTER — TELEPHONE (OUTPATIENT)
Dept: NEPHROLOGY | Age: 75
End: 2025-04-15

## 2025-04-15 ENCOUNTER — RESULTS FOLLOW-UP (OUTPATIENT)
Dept: NEPHROLOGY | Age: 75
End: 2025-04-15

## 2025-04-15 ENCOUNTER — OFFICE VISIT (OUTPATIENT)
Dept: NEPHROLOGY | Age: 75
End: 2025-04-15
Payer: MEDICARE

## 2025-04-15 VITALS
DIASTOLIC BLOOD PRESSURE: 68 MMHG | HEART RATE: 95 BPM | BODY MASS INDEX: 30.66 KG/M2 | SYSTOLIC BLOOD PRESSURE: 126 MMHG | WEIGHT: 157 LBS | OXYGEN SATURATION: 98 %

## 2025-04-15 DIAGNOSIS — E86.9 VOLUME DEPLETION: Primary | ICD-10-CM

## 2025-04-15 LAB
ANION GAP SERPL CALC-SCNC: 11 MEQ/L (ref 8–16)
BUN SERPL-MCNC: 15 MG/DL (ref 8–23)
CALCIUM SERPL-MCNC: 9.5 MG/DL (ref 8.8–10.2)
CHLORIDE SERPL-SCNC: 106 MEQ/L (ref 98–111)
CO2 SERPL-SCNC: 20 MEQ/L (ref 22–29)
CREAT SERPL-MCNC: 0.8 MG/DL (ref 0.5–0.9)
GFR SERPL CREATININE-BSD FRML MDRD: 77 ML/MIN/1.73M2
GLUCOSE SERPL-MCNC: 89 MG/DL (ref 74–109)
MAGNESIUM SERPL-MCNC: 1.9 MG/DL (ref 1.6–2.6)
POTASSIUM SERPL-SCNC: 4.6 MEQ/L (ref 3.5–5.2)
SODIUM SERPL-SCNC: 137 MEQ/L (ref 135–145)

## 2025-04-15 PROCEDURE — 1090F PRES/ABSN URINE INCON ASSESS: CPT | Performed by: INTERNAL MEDICINE

## 2025-04-15 PROCEDURE — 3074F SYST BP LT 130 MM HG: CPT | Performed by: INTERNAL MEDICINE

## 2025-04-15 PROCEDURE — 1159F MED LIST DOCD IN RCRD: CPT | Performed by: INTERNAL MEDICINE

## 2025-04-15 PROCEDURE — 3078F DIAST BP <80 MM HG: CPT | Performed by: INTERNAL MEDICINE

## 2025-04-15 PROCEDURE — G8427 DOCREV CUR MEDS BY ELIG CLIN: HCPCS | Performed by: INTERNAL MEDICINE

## 2025-04-15 PROCEDURE — 3017F COLORECTAL CA SCREEN DOC REV: CPT | Performed by: INTERNAL MEDICINE

## 2025-04-15 PROCEDURE — 1123F ACP DISCUSS/DSCN MKR DOCD: CPT | Performed by: INTERNAL MEDICINE

## 2025-04-15 PROCEDURE — G8417 CALC BMI ABV UP PARAM F/U: HCPCS | Performed by: INTERNAL MEDICINE

## 2025-04-15 PROCEDURE — G8399 PT W/DXA RESULTS DOCUMENT: HCPCS | Performed by: INTERNAL MEDICINE

## 2025-04-15 PROCEDURE — 99214 OFFICE O/P EST MOD 30 MIN: CPT | Performed by: INTERNAL MEDICINE

## 2025-04-15 PROCEDURE — 4004F PT TOBACCO SCREEN RCVD TLK: CPT | Performed by: INTERNAL MEDICINE

## 2025-04-15 RX ORDER — LOPERAMIDE HYDROCHLORIDE 2 MG/1
2 CAPSULE ORAL 4 TIMES DAILY PRN
COMMUNITY
Start: 2025-03-20

## 2025-04-15 NOTE — PROGRESS NOTES
[facility] [place] [facility]  [facility]  [address]  [address]  [place] [address]  Dept: [phone]  Loc: [phone]  Progress Note  4/15/2025 12:53 PM      Pt Name:    Marry Ayers  YOB: 1950  Primary Care Physician:  Silvestre Sawyer MD     Chief Complaint:   Chief Complaint   Patient presents with    Follow-up     Volume depletion        History of Present Illness:   This is al f/u visit for LINDA.   She has history of high ostomy output and has had a few recent hospitalizations for LINDA, electrolyte abnormalities from dehydration.    She was getting IV fluids 3 days a week due to dehydration but this was stopped 3/26 by her surgeon at [facility] as he did not feel her ostomy output was that high to cause dehydration. She is on imodium now which has helped. No repeat labs since IVF were stopped but she feels well.      Pertinent items are noted in HPI.         Past History:  Past Medical History:   Diagnosis Date    Allergic rhinitis     Anemia     Anxiety     Cataract     Essential hypertension     GERD (gastroesophageal reflux disease)     Hx of blood clots 06/2014    DVT    Hypokalemia     Hypothyroidism     Pulmonary embolism (HCC) 08/2012     Past Surgical History:   Procedure Laterality Date    ABDOMEN SURGERY      CARDIAC CATHETERIZATION      CARPAL TUNNEL RELEASE Bilateral     CATARACT REMOVAL      CERVICAL SPINE SURGERY  2001    fusion    CLAVICLE SURGERY      COLONOSCOPY  08/07/2024    Dr. Prince    CYSTOSCOPY W/ URETERAL STENT PLACEMENT  02/06/2023    ESOPHAGOGASTRODUODENOSCOPY  09/19/2023    Dr. Tim Schirmer    ESOPHAGOGASTRODUODENOSCOPY  12/23/2022    Dr. Tim Schirmer    EYE SURGERY Right     2010- macular Hole Repair    HERNIA REPAIR      HYSTERECTOMY, TOTAL ABDOMINAL (CERVIX REMOVED)  1998    LAPAROTOMY N/A 12/13/2024    EXPLORATORY LAPOROTOMY, creation of iliostomy performed by Casimiro Callahan DO at [facility] OR    SIGMOID COLECTOMY

## 2025-04-16 RX ORDER — POTASSIUM CHLORIDE 1.5 G/1.58G
20 POWDER, FOR SOLUTION ORAL DAILY
COMMUNITY

## 2025-06-25 ENCOUNTER — TRANSCRIBE ORDERS (OUTPATIENT)
Dept: ADMINISTRATIVE | Age: 75
End: 2025-06-25

## 2025-06-25 DIAGNOSIS — K65.1 POSTPROCEDURAL INTRAABDOMINAL ABSCESS (HCC): ICD-10-CM

## 2025-06-25 DIAGNOSIS — T81.43XA POSTPROCEDURAL INTRAABDOMINAL ABSCESS (HCC): ICD-10-CM

## 2025-06-25 DIAGNOSIS — K91.30 COLORECTAL ANASTOMOTIC STRICTURE: Primary | ICD-10-CM

## 2025-06-25 DIAGNOSIS — K63.2 ENTEROCUTANEOUS FISTULA: ICD-10-CM

## 2025-06-26 ENCOUNTER — RESULTS FOLLOW-UP (OUTPATIENT)
Dept: FAMILY MEDICINE CLINIC | Age: 75
End: 2025-06-26

## 2025-07-02 ENCOUNTER — HOSPITAL ENCOUNTER (OUTPATIENT)
Dept: CT IMAGING | Age: 75
Discharge: HOME | End: 2025-07-02
Attending: RADIOLOGY

## 2025-07-02 DIAGNOSIS — Z00.6 ENCOUNTER FOR EXAMINATION FOR NORMAL COMPARISON OR CONTROL IN CLINICAL RESEARCH PROGRAM: ICD-10-CM

## 2025-07-03 ENCOUNTER — HOSPITAL ENCOUNTER (OUTPATIENT)
Dept: CT IMAGING | Age: 75
Discharge: HOME | End: 2025-07-03
Payer: MEDICARE

## 2025-07-03 ENCOUNTER — HOSPITAL ENCOUNTER (EMERGENCY)
Age: 75
Discharge: HOME | End: 2025-07-03
Attending: FAMILY MEDICINE
Payer: MEDICARE

## 2025-07-03 VITALS
HEIGHT: 61 IN | RESPIRATION RATE: 18 BRPM | TEMPERATURE: 98 F | DIASTOLIC BLOOD PRESSURE: 90 MMHG | HEART RATE: 90 BPM | WEIGHT: 142 LBS | SYSTOLIC BLOOD PRESSURE: 159 MMHG | BODY MASS INDEX: 26.81 KG/M2 | OXYGEN SATURATION: 93 %

## 2025-07-03 DIAGNOSIS — K63.2 ENTEROCUTANEOUS FISTULA: ICD-10-CM

## 2025-07-03 DIAGNOSIS — T85.698A BROKEN JACKSON-PRATT DRAIN, INITIAL ENCOUNTER: Primary | ICD-10-CM

## 2025-07-03 DIAGNOSIS — T81.43XA POSTPROCEDURAL INTRAABDOMINAL ABSCESS (HCC): ICD-10-CM

## 2025-07-03 DIAGNOSIS — K65.1 POSTPROCEDURAL INTRAABDOMINAL ABSCESS (HCC): ICD-10-CM

## 2025-07-03 DIAGNOSIS — K91.30 COLORECTAL ANASTOMOTIC STRICTURE: ICD-10-CM

## 2025-07-03 LAB
ALBUMIN SERPL BCG-MCNC: 3.8 G/DL (ref 3.4–4.9)
ALP SERPL-CCNC: 199 U/L (ref 38–126)
ALT SERPL W/O P-5'-P-CCNC: 66 U/L (ref 10–35)
ANION GAP SERPL CALC-SCNC: 21 MEQ/L (ref 8–16)
AST SERPL-CCNC: 45 U/L (ref 10–35)
BASOPHILS ABSOLUTE: 0.1 THOU/MM3 (ref 0–0.1)
BASOPHILS NFR BLD AUTO: 0.7 %
BILIRUB SERPL-MCNC: 0.4 MG/DL (ref 0.3–1.2)
BUN SERPL-MCNC: 17 MG/DL (ref 8–23)
CALCIUM SERPL-MCNC: 10.2 MG/DL (ref 8.8–10.2)
CHLORIDE SERPL-SCNC: 95 MEQ/L (ref 98–111)
CO2 SERPL-SCNC: 21 MEQ/L (ref 22–29)
CREAT SERPL-MCNC: 1 MG/DL (ref 0.5–0.9)
DEPRECATED RDW RBC AUTO: 51.8 FL (ref 35–45)
EOSINOPHIL NFR BLD AUTO: 3.1 %
EOSINOPHILS ABSOLUTE: 0.3 THOU/MM3 (ref 0–0.4)
ERYTHROCYTE [DISTWIDTH] IN BLOOD BY AUTOMATED COUNT: 16.3 % (ref 11.5–14.5)
GFR SERPL CREATININE-BSD FRML MDRD: 59 ML/MIN/1.73M2
GLUCOSE SERPL-MCNC: 113 MG/DL (ref 74–109)
HCT VFR BLD AUTO: 37.2 % (ref 37–47)
HGB BLD-MCNC: 11.7 GM/DL (ref 12–16)
IMM GRANULOCYTES # BLD AUTO: 0.02 THOU/MM3 (ref 0–0.07)
IMM GRANULOCYTES NFR BLD AUTO: 0.2 %
INR PPP: 2.61 (ref 0.85–1.13)
LYMPHOCYTES ABSOLUTE: 2.3 THOU/MM3 (ref 1–4.8)
LYMPHOCYTES NFR BLD AUTO: 24.7 %
MCH RBC QN AUTO: 27.4 PG (ref 26–33)
MCHC RBC AUTO-ENTMCNC: 31.5 GM/DL (ref 32.2–35.5)
MCV RBC AUTO: 87.1 FL (ref 81–99)
MONOCYTES ABSOLUTE: 0.6 THOU/MM3 (ref 0.4–1.3)
MONOCYTES NFR BLD AUTO: 6.8 %
NEUTROPHILS ABSOLUTE: 6.1 THOU/MM3 (ref 1.8–7.7)
NEUTROPHILS NFR BLD AUTO: 64.5 %
NRBC BLD AUTO-RTO: 0 /100 WBC
OSMOLALITY SERPL CALC.SUM OF ELEC: 276.2 MOSMOL/KG (ref 275–300)
PLATELET # BLD AUTO: 619 THOU/MM3 (ref 130–400)
PMV BLD AUTO: 10.5 FL (ref 9.4–12.4)
POC CREATININE WHOLE BLOOD: 1.1 MG/DL (ref 0.5–1.2)
POTASSIUM SERPL-SCNC: 4.1 MEQ/L (ref 3.5–5.2)
PROT SERPL-MCNC: 8.5 G/DL (ref 6.4–8.3)
PROTHROMBIN TIME: 30.1 SECONDS (ref 10–13.5)
RBC # BLD AUTO: 4.27 MILL/MM3 (ref 4.2–5.4)
SODIUM SERPL-SCNC: 137 MEQ/L (ref 135–145)
WBC # BLD AUTO: 9.4 THOU/MM3 (ref 4.8–10.8)

## 2025-07-03 PROCEDURE — 36415 COLL VENOUS BLD VENIPUNCTURE: CPT

## 2025-07-03 PROCEDURE — 80053 COMPREHEN METABOLIC PANEL: CPT

## 2025-07-03 PROCEDURE — 85610 PROTHROMBIN TIME: CPT

## 2025-07-03 PROCEDURE — 85025 COMPLETE CBC W/AUTO DIFF WBC: CPT

## 2025-07-03 PROCEDURE — 99283 EMERGENCY DEPT VISIT LOW MDM: CPT

## 2025-07-03 PROCEDURE — 82565 ASSAY OF CREATININE: CPT

## 2025-07-03 PROCEDURE — 6360000004 HC RX CONTRAST MEDICATION: Performed by: NURSE PRACTITIONER

## 2025-07-03 PROCEDURE — 74177 CT ABD & PELVIS W/CONTRAST: CPT

## 2025-07-03 RX ORDER — IOPAMIDOL 755 MG/ML
80 INJECTION, SOLUTION INTRAVASCULAR
Status: COMPLETED | OUTPATIENT
Start: 2025-07-03 | End: 2025-07-03

## 2025-07-03 RX ADMIN — IOPAMIDOL 80 ML: 755 INJECTION, SOLUTION INTRAVENOUS at 09:49

## 2025-07-03 ASSESSMENT — PAIN - FUNCTIONAL ASSESSMENT: PAIN_FUNCTIONAL_ASSESSMENT: NONE - DENIES PAIN

## 2025-07-03 NOTE — DISCHARGE INSTRUCTIONS
Please follow-up with your surgery appointment for evaluation.    Keep a gauze on your drainage.  Continue to take your antibiotics as directed.    If you get any concerns please return to the emergency department including fevers, chills, abdominal pain or uncontrollable drainage.

## 2025-07-03 NOTE — ED PROVIDER NOTES
[facility] EMERGENCY DEPARTMENT    EMERGENCY MEDICINE     Patient Name: Marry Ayers  MRN: [id number]  YOB: 1950  Date of Evaluation: 7/3/2025  Treating Resident Physician: Huyen June DO  Supervising Physician: Dr. Hal Coto MD    Chief Complaint   Patient presents with    Abdominal Wound Drain Fell Out        History of Present Illness   History obtained from the patient.    Marry Ayers is a 75 y.o. female who presents to the emergency department for evaluation of PALAK drain evaluation     Patient has 2 left abdomen PALAK drain in place, which the lower drain fell out today. Patient does not know how it fell out, but she said she might have pulled it when she was changing her pants. There is slight drainage from the drain. The drain appears red in color.     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 Patient had 2 PALAK place that she followed up on 6/24/25 at [facility] by colon and rectal surgery that showed PALAK is infected. She was placed on 3 weeks of Cefdinir and has plan for CT outpatient and to follow up in 3 weeks from appointment date.     She had a CT abdomen and pelvis w contrast that was done today for postop evaluation. There was a drain on left abdomen that showed no fluid abscess or free air.     Patient denied any fever, chills, abdominal pain. She reports compliances with her antibiotics.  History of blood clot on warfarin, INR yesterday 1.8.     Past Medical History:   Diagnosis Date    Allergic rhinitis     Anemia     Anxiety     Cataract     Essential hypertension     GERD (gastroesophageal reflux disease)     Hx of blood clots 06/2014    DVT    Hypokalemia     Hypothyroidism     Pulmonary embolism (HCC) 08/2012       Past Surgical History:   Procedure Laterality Date    ABDOMEN SURGERY      CARDIAC CATHETERIZATION      CARPAL TUNNEL RELEASE Bilateral     CATARACT REMOVAL      CERVICAL SPINE

## 2025-07-03 NOTE — ED PROVIDER NOTES
[facility] EMERGENCY DEPARTMENT    EMERGENCY MEDICINE     Patient Name: Marry Ayers  MRN: [id number]  YOB: 1950  Date of Evaluation: 7/3/2025  Treating Resident Physician: Huyen June DO  Supervising Physician: Dr. Hal Coto MD    Chief Complaint   Patient presents with   • Abdominal Wound Drain Fell Out        History of Present Illness   History obtained from the patient.    Marry Ayers is a 75 y.o. female who presents to the emergency department for evaluation of PALAK drain evaluation.     Patient has 2 left abdomen PALAK drain in place, which the lower drain fell out today. Patient does not know how it fell out, but she said she might have pulled it when she was changing her pants. There is slight drainage from the drain. The drain appears red in color.     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 Patient had 2 PALAK place that she followed up on 6/24/25 at [facility] by colon and rectal surgery that showed PALAK is infected. She was placed on 3 weeks of Cefdinir and has plan for CT outpatient and to follow up in 3 weeks from appointment date.     She had a CT abdomen and pelvis w contrast that was done today for postop evaluation. There was a drain on left abdomen that showed no fluid abscess or free air.     Patient denied any fever, chills, abdominal pain. She reports compliances with her antibiotics.  History of blood clot on warfarin, INR yesterday 1.8.     Past Medical History:   Diagnosis Date   • Allergic rhinitis    • Anemia    • Anxiety    • Cataract    • Essential hypertension    • GERD (gastroesophageal reflux disease)    • Hx of blood clots 06/2014    DVT   • Hypokalemia    • Hypothyroidism    • Pulmonary embolism (HCC) 08/2012       Past Surgical History:   Procedure Laterality Date   • ABDOMEN SURGERY     • CARDIAC CATHETERIZATION     • CARPAL TUNNEL RELEASE Bilateral    • CATARACT REMOVAL     •  hours as needed    BUSPIRONE (BUSPAR) 10 MG TABLET    Take 1 tablet by mouth 2 times daily    CALCIUM CARB-CHOLECALCIFEROL 500-5 MG-MCG TABS    Take 1 tablet by mouth daily    CETIRIZINE (ZYRTEC) 5 MG TABLET    Take 1 tablet by mouth daily    FERROUS SULFATE (IRON 325) 325 (65 FE) MG TABLET    Take 1 tablet by mouth 2 times daily    LACTOBACILLUS (PROBIOTIC ACIDOPHILUS PO)    Take 1 tablet by mouth daily (with breakfast)    LEVOTHYROXINE (SYNTHROID) 125 MCG TABLET    Take 1 tablet by mouth daily    LOPERAMIDE (IMODIUM) 2 MG CAPSULE    Take 1 capsule by mouth 4 times daily as needed    MELATONIN 3 MG TABS TABLET    Take 1 tablet by mouth nightly    ONDANSETRON (ZOFRAN) 4 MG TABLET    Take 1 tablet by mouth every 8 hours as needed for Nausea or Vomiting    OXYCODONE (ROXICODONE) 5 MG IMMEDIATE RELEASE TABLET    Take 1 tablet by mouth every 8 hours as needed for Pain for up to 30 days. Max Daily Amount: 15 mg    PANTOPRAZOLE (PROTONIX) 40 MG TABLET    Take 1 tablet by mouth nightly    POLYCARBOPHIL (FIBERCON) 625 MG TABLET    Take 2 tablets by mouth daily    POTASSIUM CHLORIDE (KLOR-CON) 20 MEQ PACKET    Take 20 mEq by mouth daily    WARFARIN (COUMADIN) 5 MG TABLET    Take by mouth daily            Physical Exam & EKG     Vitals:    07/03/25 1526   BP: (!) 159/90   Pulse: 90   Resp: 18   Temp: 98 °F (36.7 °C)   TempSrc: Oral   SpO2: 93%   Weight: 64.4 kg (142 lb)   Height: 1.549 m (5' 1\")       Physical Exam  Vitals and nursing note reviewed.   Constitutional:       Appearance: Normal appearance. She is normal weight.   HENT:      Head: Normocephalic and atraumatic.      Right Ear: External ear normal.      Left Ear: External ear normal.      Nose: Nose normal.      Mouth/Throat:      Mouth: Mucous membranes are moist.      Pharynx: Oropharynx is clear. No oropharyngeal exudate or posterior oropharyngeal erythema.   Eyes:      Extraocular Movements: Extraocular movements intact.      Conjunctiva/sclera: Conjunctivae

## 2025-07-03 NOTE — ED NOTES
Patient to the ED with wound drain out. Patient states that she has had two wound drains status post colorectal surgery one  month ago at [facility]. Patient states that she accidentally pulled her drain tube out this afternoon. She states that she is not having any pain, or any other complaints at this time. Patient is resting in bed with easy and unlabored respirations. Call light in reach. Side rails up x2. Patient denies further complaints or concerns. Will monitor.

## (undated) DEVICE — YANKAUER,POOLE TIP,STERILE,50/CS: Brand: MEDLINE

## (undated) DEVICE — CLEAN CLOSURE PACK: Brand: MEDLINE INDUSTRIES, INC.

## (undated) DEVICE — PACK,LAPAROSCOPY,PK II,SIRUS: Brand: MEDLINE

## (undated) DEVICE — RELOAD STPL L75MM OPN H3.8MM CLS 1.5MM WIRE DIA0.2MM REG

## (undated) DEVICE — BLADE ES L6IN ELASTOMERIC COAT EXT DURABLE BEND UPTO 90DEG

## (undated) DEVICE — SHEET, T, LAPAROTOMY, STERILE: Brand: MEDLINE

## (undated) DEVICE — BASIC SINGLE BASIN BTC-LF: Brand: MEDLINE INDUSTRIES, INC.

## (undated) DEVICE — TOWEL,OR,DSP,ST,WHITE,DLX,XR,4/PK,20PK/C: Brand: MEDLINE

## (undated) DEVICE — SUTURE VICRYL + SZ 0 L27IN ABSRB VLT L36MM CT-1 1/2 CIR VCPB260H

## (undated) DEVICE — SUTURE ABSORBABLE MONOFILAMENT 0 CTX 60 IN VIO PDS + PDP990G

## (undated) DEVICE — GARMENT,MEDLINE,DVT,INT,CALF,MED, GEN2: Brand: MEDLINE

## (undated) DEVICE — PREMIUM DRY TRAY LF: Brand: MEDLINE INDUSTRIES, INC.

## (undated) DEVICE — SYRINGE MED 10ML LUERLOCK TIP W/O SFTY DISP

## (undated) DEVICE — STAPLER INT CUT LN 40MM STPL 51MM GRN CRV HD B FRM

## (undated) DEVICE — SEALER ENDOSCP NANO COAT OPN DIV CRV L JAW LIGASURE IMPACT

## (undated) DEVICE — SPONGE GZ W4XL4IN COT 12 PLY TYP VII WVN C FLD DSGN STERILE

## (undated) DEVICE — DRAIN SURG 19FR 0.25IN SIL RND W/ TRCR INDIC DOT RADPQ FULL

## (undated) DEVICE — GLOVE ORANGE PI 7 1/2   MSG9075

## (undated) DEVICE — PREVENA INCISION MANAGEMENT SYSTEM- PEEL & PLACE DRESSING: Brand: PREVENA™ PEEL & PLACE™

## (undated) DEVICE — STAPLER INT L75MM CUT LN L73MM STPL LN L77MM BLU B FRM 8

## (undated) DEVICE — SUTURE PROL SZ 3-0 L30IN NONABSORBABLE BLU L26MM CT-2 1/2 8422H

## (undated) DEVICE — SUTURE VICRYL + SZ 2-0 L27IN ABSRB WHT SH 1/2 CIR TAPERCUT VCP417H

## (undated) DEVICE — STAPLER INT L60MM REG TISS BLU B FRM 8 FIRING 2 ROW AUTO

## (undated) DEVICE — EVACUATOR SURG 100CC SIL BLB SUCT RESVR FOR CLS WND DRNGE

## (undated) DEVICE — SUTURE VICRYL + SZ 0 L27IN ABSRB UD CT-1 L36MM 1/2 CIR TAPR VCP260H

## (undated) DEVICE — SUTURE VICRYL + SZ 0 L18IN ABSRB TIE VCP106G

## (undated) DEVICE — SUTURE VICRYL + SZ 3-0 L27IN ABSRB UD L26MM SH 1/2 CIR VCP416H

## (undated) DEVICE — Device: Brand: SENSURA MIO

## (undated) DEVICE — SEALANT TISS 10 ML FIBRIN VISTASEAL

## (undated) DEVICE — Device

## (undated) DEVICE — APPLIER LIG CLP M L11IN TI STR RNG HNDL FOR 20 CLP DISP

## (undated) DEVICE — SOLUTION PREP PAINT POV IOD FOR SKIN MUCOUS MEM

## (undated) DEVICE — STAPLER INT DIA29MM CLS STPL H1.5-2.2MM OPN LEG L5.2MM 26

## (undated) DEVICE — SPONGE LAP W18XL18IN WHT COT 4 PLY FLD STRUNG RADPQ DISP ST 2 PER PACK

## (undated) DEVICE — 450 ML BOTTLE OF 0.05% CHLORHEXIDINE GLUCONATE IN 99.95% STERILE WATER FOR IRRIGATION, USP AND APPLICATOR.: Brand: IRRISEPT ANTIMICROBIAL WOUND LAVAGE

## (undated) DEVICE — BREAST HERNIA: Brand: MEDLINE INDUSTRIES, INC.

## (undated) DEVICE — SOLUTION SCRB 4OZ 7.5% POVIDONE IOD ANTIMIC BTL

## (undated) DEVICE — SUTURE CHROMIC GUT SZ 3-0 L27IN ABSRB BRN L26MM SH 1/2 CIR G122H

## (undated) DEVICE — SUTURE MONOCRYL + ABSORBABLE MONOFILAMENT 3-0 PS-2 27 IN UD SXMP1B109

## (undated) DEVICE — RELOAD STPL 40MM H1.5-4.7MM WIRE 0.2MM REG TISS GRN CRV

## (undated) DEVICE — SUTURE PERMA-HAND SZ 2-0 L30IN NONABSORBABLE BLK L26MM SH K833H

## (undated) DEVICE — PENCIL SMK EVAC ALL IN 1 DSGN ENH VISIBILITY IMPROVED AIR

## (undated) DEVICE — SUTURE VICRYL + SZ 2-0 L27IN ABSRB CLR CT-1 1/2 CIR TAPERCUT VCP259H